# Patient Record
Sex: MALE | Race: WHITE | NOT HISPANIC OR LATINO | Employment: OTHER | URBAN - METROPOLITAN AREA
[De-identification: names, ages, dates, MRNs, and addresses within clinical notes are randomized per-mention and may not be internally consistent; named-entity substitution may affect disease eponyms.]

---

## 2017-06-19 DIAGNOSIS — E11.9 TYPE 2 DIABETES MELLITUS WITHOUT COMPLICATION (HCC): ICD-10-CM

## 2017-08-23 DIAGNOSIS — I10 ESSENTIAL HYPERTENSION: ICD-10-CM

## 2017-08-23 RX ORDER — ENALAPRIL MALEATE 5 MG/1
TABLET ORAL
Qty: 30 TABLET | Refills: 3 | Status: SHIPPED | OUTPATIENT
Start: 2017-08-23 | End: 2018-01-13 | Stop reason: SDUPTHER

## 2017-09-14 ENCOUNTER — APPOINTMENT (OUTPATIENT)
Dept: GENERAL RADIOLOGY | Facility: HOSPITAL | Age: 67
End: 2017-09-14

## 2017-09-14 ENCOUNTER — APPOINTMENT (OUTPATIENT)
Dept: MRI IMAGING | Facility: HOSPITAL | Age: 67
End: 2017-09-14

## 2017-09-14 ENCOUNTER — APPOINTMENT (OUTPATIENT)
Dept: CT IMAGING | Facility: HOSPITAL | Age: 67
End: 2017-09-14

## 2017-09-14 ENCOUNTER — TELEPHONE (OUTPATIENT)
Dept: INTERNAL MEDICINE | Age: 67
End: 2017-09-14

## 2017-09-14 ENCOUNTER — HOSPITAL ENCOUNTER (OUTPATIENT)
Facility: HOSPITAL | Age: 67
Setting detail: OBSERVATION
Discharge: HOME | End: 2017-09-16
Attending: EMERGENCY MEDICINE | Admitting: INTERNAL MEDICINE

## 2017-09-14 DIAGNOSIS — E11.9 TYPE 2 DIABETES MELLITUS WITHOUT COMPLICATION (HCC): ICD-10-CM

## 2017-09-14 DIAGNOSIS — G45.9 TRANSIENT CEREBRAL ISCHEMIA, UNSPECIFIED TYPE: Primary | ICD-10-CM

## 2017-09-14 DIAGNOSIS — R47.01 APHASIA: ICD-10-CM

## 2017-09-14 LAB
ALBUMIN SERPL-MCNC: 4.6 G/DL (ref 3.5–5.2)
ALBUMIN/GLOB SERPL: 1.8 G/DL
ALP SERPL-CCNC: 94 U/L (ref 39–117)
ALT SERPL W P-5'-P-CCNC: 14 U/L (ref 1–41)
ANION GAP SERPL CALCULATED.3IONS-SCNC: 13.8 MMOL/L
AST SERPL-CCNC: 13 U/L (ref 1–40)
BASOPHILS # BLD AUTO: 0.03 10*3/MM3 (ref 0–0.2)
BASOPHILS NFR BLD AUTO: 0.3 % (ref 0–1.5)
BILIRUB SERPL-MCNC: 0.3 MG/DL (ref 0.1–1.2)
BILIRUB UR QL STRIP: NEGATIVE
BUN BLD-MCNC: 16 MG/DL (ref 8–23)
BUN/CREAT SERPL: 20.3 (ref 7–25)
CALCIUM SPEC-SCNC: 9.8 MG/DL (ref 8.6–10.5)
CHLORIDE SERPL-SCNC: 100 MMOL/L (ref 98–107)
CLARITY UR: CLEAR
CO2 SERPL-SCNC: 27.2 MMOL/L (ref 22–29)
COLOR UR: YELLOW
CREAT BLD-MCNC: 0.79 MG/DL (ref 0.76–1.27)
DEPRECATED RDW RBC AUTO: 41.5 FL (ref 37–54)
EOSINOPHIL # BLD AUTO: 0.05 10*3/MM3 (ref 0–0.7)
EOSINOPHIL NFR BLD AUTO: 0.6 % (ref 0.3–6.2)
ERYTHROCYTE [DISTWIDTH] IN BLOOD BY AUTOMATED COUNT: 13.1 % (ref 11.5–14.5)
GFR SERPL CREATININE-BSD FRML MDRD: 98 ML/MIN/1.73
GLOBULIN UR ELPH-MCNC: 2.5 GM/DL
GLUCOSE BLD-MCNC: 162 MG/DL (ref 65–99)
GLUCOSE BLDC GLUCOMTR-MCNC: 167 MG/DL (ref 70–130)
GLUCOSE BLDC GLUCOMTR-MCNC: 175 MG/DL (ref 70–130)
GLUCOSE UR STRIP-MCNC: NEGATIVE MG/DL
HBA1C MFR BLD: 6.9 % (ref 4.8–5.6)
HCT VFR BLD AUTO: 41.4 % (ref 40.4–52.2)
HGB BLD-MCNC: 14 G/DL (ref 13.7–17.6)
HGB UR QL STRIP.AUTO: NEGATIVE
HOLD SPECIMEN: NORMAL
HOLD SPECIMEN: NORMAL
IMM GRANULOCYTES # BLD: 0.02 10*3/MM3 (ref 0–0.03)
IMM GRANULOCYTES NFR BLD: 0.2 % (ref 0–0.5)
KETONES UR QL STRIP: NEGATIVE
LEUKOCYTE ESTERASE UR QL STRIP.AUTO: NEGATIVE
LYMPHOCYTES # BLD AUTO: 2.28 10*3/MM3 (ref 0.9–4.8)
LYMPHOCYTES NFR BLD AUTO: 26.1 % (ref 19.6–45.3)
MAGNESIUM SERPL-MCNC: 1.9 MG/DL (ref 1.6–2.4)
MCH RBC QN AUTO: 29.2 PG (ref 27–32.7)
MCHC RBC AUTO-ENTMCNC: 33.8 G/DL (ref 32.6–36.4)
MCV RBC AUTO: 86.4 FL (ref 79.8–96.2)
MONOCYTES # BLD AUTO: 0.65 10*3/MM3 (ref 0.2–1.2)
MONOCYTES NFR BLD AUTO: 7.5 % (ref 5–12)
NEUTROPHILS # BLD AUTO: 5.69 10*3/MM3 (ref 1.9–8.1)
NEUTROPHILS NFR BLD AUTO: 65.3 % (ref 42.7–76)
NITRITE UR QL STRIP: NEGATIVE
PH UR STRIP.AUTO: 5.5 [PH] (ref 5–8)
PLATELET # BLD AUTO: 267 10*3/MM3 (ref 140–500)
PMV BLD AUTO: 10.3 FL (ref 6–12)
POTASSIUM BLD-SCNC: 4 MMOL/L (ref 3.5–5.2)
PROT SERPL-MCNC: 7.1 G/DL (ref 6–8.5)
PROT UR QL STRIP: NEGATIVE
RBC # BLD AUTO: 4.79 10*6/MM3 (ref 4.6–6)
SODIUM BLD-SCNC: 141 MMOL/L (ref 136–145)
SP GR UR STRIP: 1.01 (ref 1–1.03)
TROPONIN T SERPL-MCNC: <0.01 NG/ML (ref 0–0.03)
UROBILINOGEN UR QL STRIP: NORMAL
WBC NRBC COR # BLD: 8.72 10*3/MM3 (ref 4.5–10.7)
WHOLE BLOOD HOLD SPECIMEN: NORMAL
WHOLE BLOOD HOLD SPECIMEN: NORMAL

## 2017-09-14 PROCEDURE — 71010 HC CHEST PA OR AP: CPT

## 2017-09-14 PROCEDURE — G0378 HOSPITAL OBSERVATION PER HR: HCPCS

## 2017-09-14 PROCEDURE — 81003 URINALYSIS AUTO W/O SCOPE: CPT | Performed by: EMERGENCY MEDICINE

## 2017-09-14 PROCEDURE — 96361 HYDRATE IV INFUSION ADD-ON: CPT

## 2017-09-14 PROCEDURE — 85025 COMPLETE CBC W/AUTO DIFF WBC: CPT | Performed by: EMERGENCY MEDICINE

## 2017-09-14 PROCEDURE — 70450 CT HEAD/BRAIN W/O DYE: CPT

## 2017-09-14 PROCEDURE — 63710000001 INSULIN ASPART PER 5 UNITS: Performed by: INTERNAL MEDICINE

## 2017-09-14 PROCEDURE — 99285 EMERGENCY DEPT VISIT HI MDM: CPT

## 2017-09-14 PROCEDURE — 93010 ELECTROCARDIOGRAM REPORT: CPT | Performed by: INTERNAL MEDICINE

## 2017-09-14 PROCEDURE — 93005 ELECTROCARDIOGRAM TRACING: CPT | Performed by: EMERGENCY MEDICINE

## 2017-09-14 PROCEDURE — 83735 ASSAY OF MAGNESIUM: CPT | Performed by: EMERGENCY MEDICINE

## 2017-09-14 PROCEDURE — 82962 GLUCOSE BLOOD TEST: CPT

## 2017-09-14 PROCEDURE — 84484 ASSAY OF TROPONIN QUANT: CPT | Performed by: EMERGENCY MEDICINE

## 2017-09-14 PROCEDURE — 25010000002 ONDANSETRON PER 1 MG: Performed by: INTERNAL MEDICINE

## 2017-09-14 PROCEDURE — 83036 HEMOGLOBIN GLYCOSYLATED A1C: CPT | Performed by: INTERNAL MEDICINE

## 2017-09-14 PROCEDURE — 80053 COMPREHEN METABOLIC PANEL: CPT | Performed by: EMERGENCY MEDICINE

## 2017-09-14 PROCEDURE — 96374 THER/PROPH/DIAG INJ IV PUSH: CPT

## 2017-09-14 RX ORDER — DEXTROSE MONOHYDRATE 25 G/50ML
25 INJECTION, SOLUTION INTRAVENOUS
Status: DISCONTINUED | OUTPATIENT
Start: 2017-09-14 | End: 2017-09-16 | Stop reason: HOSPADM

## 2017-09-14 RX ORDER — ATORVASTATIN CALCIUM 80 MG/1
80 TABLET, FILM COATED ORAL NIGHTLY
Status: DISCONTINUED | OUTPATIENT
Start: 2017-09-14 | End: 2017-09-16 | Stop reason: HOSPADM

## 2017-09-14 RX ORDER — ASPIRIN 325 MG
325 TABLET ORAL ONCE
Status: COMPLETED | OUTPATIENT
Start: 2017-09-14 | End: 2017-09-14

## 2017-09-14 RX ORDER — NICOTINE POLACRILEX 4 MG
15 LOZENGE BUCCAL
Status: DISCONTINUED | OUTPATIENT
Start: 2017-09-14 | End: 2017-09-16 | Stop reason: HOSPADM

## 2017-09-14 RX ORDER — SODIUM CHLORIDE 9 MG/ML
75 INJECTION, SOLUTION INTRAVENOUS CONTINUOUS
Status: DISCONTINUED | OUTPATIENT
Start: 2017-09-14 | End: 2017-09-16 | Stop reason: HOSPADM

## 2017-09-14 RX ORDER — ASPIRIN 325 MG
TABLET ORAL
Status: COMPLETED
Start: 2017-09-14 | End: 2017-09-14

## 2017-09-14 RX ORDER — ACETAMINOPHEN 650 MG/1
650 SUPPOSITORY RECTAL EVERY 4 HOURS PRN
Status: DISCONTINUED | OUTPATIENT
Start: 2017-09-14 | End: 2017-09-16 | Stop reason: HOSPADM

## 2017-09-14 RX ORDER — ASPIRIN 300 MG/1
300 SUPPOSITORY RECTAL DAILY
Status: DISCONTINUED | OUTPATIENT
Start: 2017-09-14 | End: 2017-09-15

## 2017-09-14 RX ORDER — SODIUM CHLORIDE 0.9 % (FLUSH) 0.9 %
10 SYRINGE (ML) INJECTION AS NEEDED
Status: DISCONTINUED | OUTPATIENT
Start: 2017-09-14 | End: 2017-09-16 | Stop reason: HOSPADM

## 2017-09-14 RX ORDER — SODIUM CHLORIDE 0.9 % (FLUSH) 0.9 %
1-10 SYRINGE (ML) INJECTION AS NEEDED
Status: DISCONTINUED | OUTPATIENT
Start: 2017-09-14 | End: 2017-09-16 | Stop reason: HOSPADM

## 2017-09-14 RX ORDER — ENALAPRIL MALEATE 5 MG/1
5 TABLET ORAL
Status: DISCONTINUED | OUTPATIENT
Start: 2017-09-14 | End: 2017-09-16 | Stop reason: HOSPADM

## 2017-09-14 RX ORDER — LORAZEPAM 0.5 MG/1
0.5 TABLET ORAL ONCE AS NEEDED
Status: COMPLETED | OUTPATIENT
Start: 2017-09-14 | End: 2017-09-14

## 2017-09-14 RX ORDER — ASPIRIN 325 MG
325 TABLET ORAL DAILY
Status: DISCONTINUED | OUTPATIENT
Start: 2017-09-14 | End: 2017-09-15

## 2017-09-14 RX ORDER — ACETAMINOPHEN 325 MG/1
650 TABLET ORAL EVERY 4 HOURS PRN
Status: DISCONTINUED | OUTPATIENT
Start: 2017-09-14 | End: 2017-09-16 | Stop reason: HOSPADM

## 2017-09-14 RX ORDER — ONDANSETRON 2 MG/ML
4 INJECTION INTRAMUSCULAR; INTRAVENOUS EVERY 6 HOURS PRN
Status: DISCONTINUED | OUTPATIENT
Start: 2017-09-14 | End: 2017-09-16 | Stop reason: HOSPADM

## 2017-09-14 RX ADMIN — Medication 325 MG: at 17:33

## 2017-09-14 RX ADMIN — ENALAPRIL MALEATE 5 MG: 5 TABLET ORAL at 23:08

## 2017-09-14 RX ADMIN — ASPIRIN 325 MG: 325 TABLET ORAL at 17:33

## 2017-09-14 RX ADMIN — LORAZEPAM 0.5 MG: 0.5 TABLET ORAL at 20:35

## 2017-09-14 RX ADMIN — SODIUM CHLORIDE 75 ML/HR: 9 INJECTION, SOLUTION INTRAVENOUS at 22:27

## 2017-09-14 RX ADMIN — ONDANSETRON 4 MG: 2 INJECTION INTRAMUSCULAR; INTRAVENOUS at 20:30

## 2017-09-14 RX ADMIN — ATORVASTATIN CALCIUM 80 MG: 80 TABLET, FILM COATED ORAL at 23:08

## 2017-09-14 RX ADMIN — INSULIN ASPART 2 UNITS: 100 INJECTION, SOLUTION INTRAVENOUS; SUBCUTANEOUS at 23:08

## 2017-09-14 NOTE — ED NOTES
Gave patient urine specimen cup and asked that he give us a sample as soon as he can      Vlad Molina  09/14/17 9019

## 2017-09-14 NOTE — ED PROVIDER NOTES
" EMERGENCY DEPARTMENT ENCOUNTER    CHIEF COMPLAINT  Chief Complaint: jumbled speech  History given by: pt  History limited by: nothing  Room Number: 21/21  PMD: Artie Carranza MD      HPI:  Pt is a 66 y.o. male who presents complaining of an episode of \"jumbled speech\" yesterday resolved in 1.5 minutes.  He describes it as he was trying to talk but his speech came out in nonsensical pattern without recognizable words.  Pt states that he has had two episodes.  Pt states the first episode occurred 2 months ago.  This most recent episode lasted for about a minute and a half.  Pt also c/o feeling off balance and SOA for months, unchanged recently.  Pt is diabetic.  Pt denies cp, palpitations, fever, cough, abdominal pain, vomiting, and diarrhea.  Pt states 5 years ago he had evaluation of his carotids that were 50 and 60% stenosed at that time.    Duration:  2 months ( 2 episodes)  Onset: gradual  Timing: episodic  Radiation: none  Quality: jumbled speech  Intensity/Severity: moderate  Progression: unchanged  Associated Symptoms: feeling off balance  Aggravating Factors: none  Alleviating Factors: none  Previous Episodes: none  Treatment before arrival: none    PAST MEDICAL HISTORY  Active Ambulatory Problems     Diagnosis Date Noted   • Hypertension 08/30/2016   • Diabetes mellitus 08/30/2016   • Routine health maintenance 08/30/2016   • Abnormal PSA 09/01/2016   • Prostatitis 09/26/2016   • Sepsis 09/27/2016   • UTI (urinary tract infection) due to urinary indwelling catheter 09/27/2016   • Reflux esophagitis 12/01/2016   • Melena 12/01/2016   • Bilateral carotid artery stenosis 12/01/2016     Resolved Ambulatory Problems     Diagnosis Date Noted   • No Resolved Ambulatory Problems     Past Medical History:   Diagnosis Date   • Cancer    • Diabetes mellitus    • Hypertension    • Sepsis 09/2016   • Shingles    • UTI (urinary tract infection)    • Vertebral artery insufficiency        PAST SURGICAL HISTORY  Past " Surgical History:   Procedure Laterality Date   • COLONOSCOPY      2011   • ENDOSCOPY N/A 12/15/2016    Procedure: ESOPHAGOGASTRODUODENOSCOPY WITH COLD BIOPSIES;  Surgeon: Moshe Lux MD;  [facility] END[facility]COPY;  Service:        FAMILY HISTORY  Family History   Problem Relation Age of Onset   • Diabetes Mother    • Heart disease Mother    • Uterine cancer Mother    • Diabetes Father    • Heart disease Father    • Kidney disease Father    • Skin cancer Father      melanoma   • Cancer Father      testicle       SOCIAL HISTORY  Social History     Social History   • Marital status:      Spouse name: N/A   • Number of children: N/A   • Years of education: N/A     Occupational History   • Not on file.     Social History Main Topics   • Smoking status: Never Smoker   • Smokeless tobacco: Not on file   • Alcohol use No      Comment: 1/2 beer once every 3 months   • Drug use: No   • Sexual activity: Defer     Other Topics Concern   • Not on file     Social History Narrative       ALLERGIES  Shellfish-derived products and Bee venom    REVIEW OF SYSTEMS  Review of Systems   Constitutional: Negative for chills and fever.   HENT: Negative for sore throat and trouble swallowing.    Eyes: Negative for visual disturbance.   Respiratory: Negative for cough and shortness of breath.    Cardiovascular: Negative for chest pain and leg swelling.   Gastrointestinal: Negative for abdominal pain, diarrhea and vomiting.   Endocrine: Negative.    Genitourinary: Negative for decreased urine volume and frequency.   Musculoskeletal: Negative for neck pain.   Skin: Negative for rash.   Allergic/Immunologic: Negative.    Neurological: Positive for speech difficulty (jumbled). Negative for weakness and numbness.   Hematological: Negative.    Psychiatric/Behavioral: Negative.    All other systems reviewed and are negative.      PHYSICAL EXAM  ED Triage Vitals   Temp Heart Rate Resp BP SpO2   09/14/17 1333 09/14/17 1333 09/14/17  1411 09/14/17 1411 09/14/17 1333   96.8 °F (36 °C) 107 18 129/84 99 %      Temp src Heart Rate Source Patient Position BP Location FiO2 (%)   -- 09/14/17 1411 09/14/17 1411 09/14/17 1411 --    Monitor Sitting Left arm        Physical Exam   Constitutional: He is oriented to person, place, and time. He appears distressed (mildly).   HENT:   Head: Normocephalic and atraumatic.   Eyes: EOM are normal.   Neck: Normal range of motion.   Cardiovascular: Normal rate, regular rhythm and normal heart sounds.    No murmur heard.  Pulses:       Posterior tibial pulses are 2+ on the right side, and 2+ on the left side.   Pulmonary/Chest: Effort normal and breath sounds normal. No respiratory distress. He has no wheezes.   Abdominal: Soft. Bowel sounds are normal. There is no tenderness. There is no rebound and no guarding.   Musculoskeletal: Normal range of motion. He exhibits no edema.   Neurological: He is alert and oriented to person, place, and time.   Skin: Skin is warm and dry.   Psychiatric: Affect normal.   Nursing note and vitals reviewed.      LAB RESULTS  Lab Results (last 24 hours)     Procedure Component Value Units Date/Time    CBC & Differential [[id number]] Collected:  09/14/17 1449    Specimen:  Blood Updated:  09/14/17 1313    Narrative:       The following orders were created for panel order CBC & Differential.  Procedure                               Abnormality         Status                     ---------                               -----------         ------                     CBC Auto Differential[[id number]]         Normal              Final result                 Please view results for these tests on the individual orders.    Comprehensive Metabolic Panel [[id number]]  (Abnormal) Collected:  09/14/17 1449    Specimen:  Blood Updated:  09/14/17 9348     Glucose 162 (H) mg/dL      BUN 16 mg/dL      Creatinine 0.79 mg/dL      Sodium 141 mmol/L      Potassium 4.0 mmol/L      Chloride 100 mmol/L      CO2 27.2  mmol/L      Calcium 9.8 mg/dL      Total Protein 7.1 g/dL      Albumin 4.60 g/dL      ALT (SGPT) 14 U/L      AST (SGOT) 13 U/L      Alkaline Phosphatase 94 U/L      Total Bilirubin 0.3 mg/dL      eGFR Non African Amer 98 mL/min/1.73      Globulin 2.5 gm/dL      A/G Ratio 1.8 g/dL      BUN/Creatinine Ratio 20.3     Anion Gap 13.8 mmol/L     Troponin [[id number]]  (Normal) Collected:  09/14/17 1449    Specimen:  Blood Updated:  09/14/17 1538     Troponin T <0.010 ng/mL     Narrative:       Troponin T Reference Ranges:  Less than 0.03 ng/mL:    Negative for AMI  0.03 to 0.09 ng/mL:      Indeterminant for AMI  Greater than 0.09 ng/mL: Positive for AMI    Magnesium [[id number]]  (Normal) Collected:  09/14/17 1449    Specimen:  Blood Updated:  09/14/17 1538     Magnesium 1.9 mg/dL     CBC Auto Differential [[id number]]  (Normal) Collected:  09/14/17 1449    Specimen:  Blood Updated:  09/14/17 1513     WBC 8.72 10*3/mm3      RBC 4.79 10*6/mm3      Hemoglobin 14.0 g/dL      Hematocrit 41.4 %      MCV 86.4 fL      MCH 29.2 pg      MCHC 33.8 g/dL      RDW 13.1 %      RDW-SD 41.5 fl      MPV 10.3 fL      Platelets 267 10*3/mm3      Neutrophil % 65.3 %      Lymphocyte % 26.1 %      Monocyte % 7.5 %      Eosinophil % 0.6 %      Basophil % 0.3 %      Immature Grans % 0.2 %      Neutrophils, Absolute 5.69 10*3/mm3      Lymphocytes, Absolute 2.28 10*3/mm3      Monocytes, Absolute 0.65 10*3/mm3      Eosinophils, Absolute 0.05 10*3/mm3      Basophils, Absolute 0.03 10*3/mm3      Immature Grans, Absolute 0.02 10*3/mm3     Urinalysis With / Culture If Indicated [[id number]]  (Normal) Collected:  09/14/17 1620    Specimen:  Urine from Urine, Clean Catch Updated:  09/14/17 1636     Color, UA Yellow     Appearance, UA Clear     pH, UA 5.5     Specific Gravity, UA 1.015     Glucose, UA Negative     Ketones, UA Negative     Bilirubin, UA Negative     Blood, UA Negative     Protein, UA Negative     Leuk Esterase, UA Negative     Nitrite, UA Negative      Urobilinogen, UA 0.2 E.U./dL    Narrative:       Urine microscopic not indicated.          I ordered the above labs and reviewed the results    RADIOLOGY  XR Chest 1 View   Final Result   No evidence for acute pulmonary process. Follow-up as   clinical indications persist.       This report was finalized on 9/14/2017 5:27 PM by Dr. Rhett Barnard MD.          CT Head Without Contrast   Final Result           No acute intracranial hemorrhage or hydrocephalus. Chronic appearing   changes. If there is further clinical concern, MRI could be considered   for further evaluation.       This report was finalized on 9/14/2017 4:25 PM by Dr. Rhett Barnard MD.               I ordered the above noted radiological studies. Interpreted by radiologist. Reviewed by me in PACS.       PROCEDURES  Procedures    EKG         EKG time: 1504   Rhythm/Rate: sinus rhythm, rate: 90  Normal P waves and normal [place] interval   Normal QRS, Normal axis  Normal ST and T waves  Limited by significant artifact    Interpreted Contemporaneously by me, independently viewed  unchanged compared to prior 9/20/04    Interval: baseline (on initial exam)  1a. Level Of Consciousness: 0-->Alert: keenly responsive  1b. LOC Questions: 0-->Answers both questions correctly  1c. LOC Commands: 0-->Performs both tasks correctly  2. Best Gaze: 0-->Normal  3. Visual: 0-->No visual loss  4. Facial Palsy: 0-->Normal symmetrical movements  5a. Motor Arm, Left: 0-->No drift: limb holds 90 (or 45) degrees for full 10 secs  5b. Motor Arm, Right: 0-->No drift: limb holds 90 (or 45) degrees for full 10 secs  6a. Motor Leg, Left: 0-->No drift: leg holds 30 degree position for full 5 secs  6b. Motor Leg, Right: 0-->No drift: leg holds 30 degree position for full 5 secs  7. Limb Ataxia: 0-->Absent  8. Sensory: 0-->Normal: no sensory loss  9. Best Language: 0-->No aphasia: normal  10. Dysarthria: 0-->Normal  11. Extinction and Inattention (formerly Neglect): 0-->No  abnormality    Total (NIH Stroke Scale): 0    PROGRESS AND CONSULTS  ED Course   1445: On initial exam discussed plan to admit pt. He agrees with and understands plan to admit. All questions were answered at this time.  1448: Ordered IVF. Ordered labs and CXR for further evaluation.  1517: Ordered CT head for further evaluation.  1723: Placed call to [facility].  1743: Pt will be admitted. Waiting for call from [facility].  1751: Discussed pt's case with Dr. Mckeon ([facility]) who agrees to admit pt.    MEDICAL DECISION MAKING  Results were reviewed/discussed with the patient and they were also made aware of online access. Pt also made aware that some labs, such as cultures, will not be resulted during ER visit and follow up with PMD is necessary.     MDM  Number of Diagnoses or Management Options  Aphasia:   Transient cerebral ischemia, unspecified type:      Amount and/or Complexity of Data Reviewed  Clinical lab tests: ordered and reviewed  Tests in the radiology section of CPT®: ordered and reviewed (CT head: showed nothing acute. CXR: showed nothing acute)  Tests in the medicine section of CPT®: ordered and reviewed (See note)  Decide to obtain previous medical records or to obtain history from someone other than the patient: yes  Review and summarize past medical records: yes  Discuss the patient with other providers: yes (Dr. Mckeon([facility]))  Independent visualization of images, tracings, or specimens: yes    Patient Progress  Patient progress: stable         DIAGNOSIS  Final diagnoses:   Transient cerebral ischemia, unspecified type   Aphasia       DISPOSITION  ADMISSION    Discussed treatment plan and reason for admission with pt/family and admitting physician.  Pt/family voiced understanding of the plan for admission for further testing/treatment as needed.         Latest Documented Vital Signs:  As of 5:48 PM  BP- 130/86 HR- 92 Temp- 96.8 °F (36 °C) O2 sat- 100%    --  Documentation assistance provided by alex Stover  for Dr. Nelson.  Information recorded by the scribe was done at my direction and has been verified and validated by me.          Ary Stover  09/14/17 3061       Sasha Nelson MD  09/16/17 8925

## 2017-09-14 NOTE — TELEPHONE ENCOUNTER
BENI:  Pt called stating, two months ago and then again two days ago, while talking to his wife, he became off balanced, queasy, words were jumbled, and lasted from 1-2 minutes. Pt stated when he tried to deliberately focus on the words as he was speaking, it sounded foreign. Pt did not go anywhere to be seen for this problem. Pt said he was going to wait and try to get into see Dr Carranza when he got back.   I spoke with the medical assistant who informed me that the pt should go directly to the ER to be checked out.  I spoke with the pt and told him what the assistant said, and the pt understood and said he would go to [facility] ER this morning 9-14-17.  Thanks SP

## 2017-09-14 NOTE — H&P
Internal medicine history and physical  INTERNAL MEDICINE   [facility]       Patient Identification:  Name: Harry Sierra  Age: 66 y.o.  Sex: male  :  1950  MRN: [id number]                   Primary Care Physician: Artie Carranza MD                                   Chief Complaint:  Sudden onset of inability to speak very well lasting about a minute to minute and a half around midday 2 days ago and another episode 2 months ago    History of Present Illness:   Patient is a 66-year-old male with past medical history remarkable for hospitalization in [place] 5 years ago for sudden symptoms of ataxia and vertigo.  According to him he was worked up with MRI and was told that he has 50 and 60% blockages in his circulation of the neck.  He was diagnosed with TIA at that time.  According to him he has vertigo since then and has been battling with these but otherwise doing okay until about 2 months ago when while driving the car and talking to his wife over the phone he noticed that he couldn't say the words she wanted to say and was not coming out right.  He has to pull over his car until he settled down.  The symptoms lasted for about couple of minutes and went away.  He talked about these symptoms to his daughter who works in [place] and has an in vitro fertilization program, and she suggested at that time that he needs to check it out as he may have a TIA.  According to the patient he told her that he would not do that and was busy in doing what he does on a daily basis until about a month ago when he started noticing that she was getting short of breath easily and for getting fatigued and weak and tired.  He also having more episodes of vertigo.  In that background 2 days ago he developed these symptoms again as mentioned.  In this time he decided to come to the emergency room for further evaluation.      Past Medical History:  Past Medical History:   Diagnosis Date   • Cancer     Basal  Cell   • Diabetes mellitus    • Hypertension    • Sepsis 09/2016   • Shingles    • UTI (urinary tract infection)    • Vertebral artery insufficiency      Past Surgical History:  Past Surgical History:   Procedure Laterality Date   • COLONOSCOPY      2011   • ENDOSCOPY N/A 12/15/2016    Procedure: ESOPHAGOGASTRODUODENOSCOPY WITH COLD BIOPSIES;  Surgeon: Moshe Lux MD;  Location: [facility] ENDOSCOPY;  Service:       Home Meds:  Prescriptions Prior to Admission   Medication Sig Dispense Refill Last Dose   • acetaminophen (TYLENOL) 500 MG tablet Take 1 tablet by mouth 4 (Four) Times a Day As Needed for mild pain (1-3) or fever.  0 12/12/2016   • enalapril (VASOTEC) 5 MG tablet TAKE ONE TABLET BY MOUTH DAILY 30 tablet 3    • metFORMIN (GLUCOPHAGE) 1000 MG tablet TAKE ONE TABLET BY MOUTH TWICE A DAY WITH MEALS 180 tablet 2    • aspirin 81 MG chewable tablet Chew 325 mg Daily.   12/14/2016 at Unknown time     Current Meds:     Current Facility-Administered Medications:   •  sodium chloride 0.9 % flush 10 mL, 10 mL, Intravenous, PRN, Sasha Nelson MD  Allergies:  Allergies   Allergen Reactions   • Shellfish-Derived Products Anaphylaxis   • Bee Venom      Social History:   Social History   Substance Use Topics   • Smoking status: Never Smoker   • Smokeless tobacco: Not on file   • Alcohol use No      Comment: 1/2 beer once every 3 months      Family History:  Family History   Problem Relation Age of Onset   • Diabetes Mother    • Heart disease Mother    • Uterine cancer Mother    • Diabetes Father    • Heart disease Father    • Kidney disease Father    • Skin cancer Father      melanoma   • Cancer Father      testicle          Review of Systems  See history of present illness and past medical history. .  Constitutional: Complaining of progressive fatigue and weakness.   HENT: Negative for sore throat and trouble swallowing.    Eyes: Negative for visual disturbance.   Respiratory: Negative for cough and shortness of  "breath.    Cardiovascular: Negative for chest pain and leg swelling.   Gastrointestinal: Negative for abdominal pain, diarrhea and vomiting.   Endocrine: Negative.    Genitourinary: Negative for decreased urine volume and frequency.   Musculoskeletal: Negative for neck pain.   Skin: Negative for rash.   Allergic/Immunologic: Negative.    Neurological: Positive for speech difficulty (jumbled) transient 2 months ago in 2 days ago not present at this time.. Negative for weakness and numbness.   Hematological: Negative.    Psychiatric/Behavioral: Negative.      Vitals:   /83 (BP Location: Left arm, Patient Position: Lying)  Pulse 86  Temp 97.5 °F (36.4 °C) (Oral)   Resp 18  Ht 69\" (175.3 cm)  Wt 161 lb (73 kg)  SpO2 97%  BMI 23.78 kg/m2  I/O: No intake or output data in the 24 hours ending 09/14/17 1909  Exam:  General Appearance:    Alert, cooperative, no distress, appears stated age   Head:    Normocephalic, without obvious abnormality, atraumatic   Eyes:    PERRL, conjunctiva/corneas clear, EOM's intact, both eyes   Ears:    Normal external ear canals, both ears   Nose:   Nares normal, septum midline, mucosa normal, no drainage    or sinus tenderness   Throat:   Lips, tongue, gums normal; oral mucosa pink and moist   Neck:   Supple, symmetrical, trachea midline, no adenopathy;     thyroid:  no enlargement/tenderness/nodules; no carotid    bruit or JVD   Back:     Symmetric, no curvature, ROM normal, no CVA tenderness   Lungs:     Clear to auscultation bilaterally, respirations unlabored   Chest Wall:    No tenderness or deformity    Heart:    Regular rate and rhythm, S1 and S2 normal, no murmur, rub   or gallop   Abdomen:     Soft, non-tender, bowel sounds active all four quadrants,     no masses, no hepatomegaly, no splenomegaly   Extremities:   Extremities normal, atraumatic, no cyanosis or edema   Pulses:   Pulses palpable in all extremities; symmetric all extremities   Skin:   Skin color normal, Skin " is warm and dry,  no rashes or palpable lesions   Neurologic:   CNII-XII intact, motor strength grossly intact, sensation grossly intact to light touch, no focal deficits noted       Data Review:      I reviewed the patient's new clinical results.    Results from last 7 days  Lab Units 09/14/17  1449   WBC 10*3/mm3 8.72   HEMOGLOBIN g/dL 14.0   PLATELETS 10*3/mm3 267       Results from last 7 days  Lab Units 09/14/17  1449   SODIUM mmol/L 141   POTASSIUM mmol/L 4.0   CHLORIDE mmol/L 100   CO2 mmol/L 27.2   BUN mg/dL 16   CREATININE mg/dL 0.79   CALCIUM mg/dL 9.8   GLUCOSE mg/dL 162*       Assessment:  Active Hospital Problems (** Indicates Principal Problem)    Diagnosis Date Noted   • **Transient cerebral ischemia [G45.9] 09/14/2017   • Bilateral carotid artery stenosis [I65.23] 12/01/2016     Bilateral 16-49% stenosis, December 2016     • Diabetes mellitus [E11.9] 08/30/2016   • Hypertension [I10] 08/30/2016      Resolved Hospital Problems    Diagnosis Date Noted Date Resolved   No resolved problems to display.       Plan:  Admit the patient, check MRI and MRA of head and neck vessels, check 2-D echo with Doppler, neurology consultation, check fasting lipid profile and continue with aspirin and Lipitor.  Further management as his condition evolves.    Harley Mckeon MD   9/14/2017  7:09 PM  Much of this encounter note is an electronic transcription/translation of spoken language to printed text. The electronic translation of spoken language may permit erroneous, or at times, nonsensical words or phrases to be inadvertently transcribed; Although I have reviewed the note for such errors, some may still exist

## 2017-09-15 ENCOUNTER — APPOINTMENT (OUTPATIENT)
Dept: CARDIOLOGY | Facility: HOSPITAL | Age: 67
End: 2017-09-15
Attending: INTERNAL MEDICINE

## 2017-09-15 ENCOUNTER — APPOINTMENT (OUTPATIENT)
Dept: CT IMAGING | Facility: HOSPITAL | Age: 67
End: 2017-09-15

## 2017-09-15 LAB
ALBUMIN SERPL-MCNC: 4 G/DL (ref 3.5–5.2)
ALBUMIN/GLOB SERPL: 1.9 G/DL
ALP SERPL-CCNC: 77 U/L (ref 39–117)
ALT SERPL W P-5'-P-CCNC: 15 U/L (ref 1–41)
ANION GAP SERPL CALCULATED.3IONS-SCNC: 11.7 MMOL/L
AST SERPL-CCNC: 13 U/L (ref 1–40)
BH CV ECHO MEAS - ACS: 1.6 CM
BH CV ECHO MEAS - AO MEAN PG (FULL): 2 MMHG
BH CV ECHO MEAS - AO MEAN PG: 6 MMHG
BH CV ECHO MEAS - AO ROOT AREA (BSA CORRECTED): 1.4
BH CV ECHO MEAS - AO ROOT AREA: 5.7 CM^2
BH CV ECHO MEAS - AO ROOT DIAM: 2.7 CM
BH CV ECHO MEAS - AO V2 MAX: 177 CM/SEC
BH CV ECHO MEAS - AO V2 MEAN: 107 CM/SEC
BH CV ECHO MEAS - AO V2 VTI: 32.3 CM
BH CV ECHO MEAS - ASC AORTA: 2.9 CM
BH CV ECHO MEAS - AVA(I,A): 2.6 CM^2
BH CV ECHO MEAS - AVA(I,D): 2.6 CM^2
BH CV ECHO MEAS - BSA(HAYCOCK): 1.9 M^2
BH CV ECHO MEAS - BSA: 1.9 M^2
BH CV ECHO MEAS - BZI_BMI: 23.8 KILOGRAMS/M^2
BH CV ECHO MEAS - BZI_METRIC_HEIGHT: 175.3 CM
BH CV ECHO MEAS - BZI_METRIC_WEIGHT: 73 KG
BH CV ECHO MEAS - CONTRAST EF (2CH): 65.6 ML/M^2
BH CV ECHO MEAS - CONTRAST EF 4CH: 65.6 ML/M^2
BH CV ECHO MEAS - EDV(CUBED): 91.1 ML
BH CV ECHO MEAS - EDV(MOD-SP2): 61 ML
BH CV ECHO MEAS - EDV(MOD-SP4): 61 ML
BH CV ECHO MEAS - EDV(TEICH): 92.4 ML
BH CV ECHO MEAS - EF(CUBED): 80.7 %
BH CV ECHO MEAS - EF(MOD-SP2): 65.6 %
BH CV ECHO MEAS - EF(MOD-SP4): 65.6 %
BH CV ECHO MEAS - EF(TEICH): 73.4 %
BH CV ECHO MEAS - ESV(CUBED): 17.6 ML
BH CV ECHO MEAS - ESV(MOD-SP2): 21 ML
BH CV ECHO MEAS - ESV(MOD-SP4): 21 ML
BH CV ECHO MEAS - ESV(TEICH): 24.6 ML
BH CV ECHO MEAS - FS: 42.2 %
BH CV ECHO MEAS - IVS/LVPW: 0.9
BH CV ECHO MEAS - IVSD: 0.9 CM
BH CV ECHO MEAS - LAT PEAK E' VEL: 10 CM/SEC
BH CV ECHO MEAS - LV DIASTOLIC VOL/BSA (35-75): 32.4 ML/M^2
BH CV ECHO MEAS - LV MASS(C)D: 142.9 GRAMS
BH CV ECHO MEAS - LV MASS(C)DI: 75.9 GRAMS/M^2
BH CV ECHO MEAS - LV MEAN PG: 4 MMHG
BH CV ECHO MEAS - LV SYSTOLIC VOL/BSA (12-30): 11.1 ML/M^2
BH CV ECHO MEAS - LV V1 MAX: 133 CM/SEC
BH CV ECHO MEAS - LV V1 MEAN: 85.1 CM/SEC
BH CV ECHO MEAS - LV V1 VTI: 26.7 CM
BH CV ECHO MEAS - LVIDD: 4.5 CM
BH CV ECHO MEAS - LVIDS: 2.6 CM
BH CV ECHO MEAS - LVLD AP2: 7.3 CM
BH CV ECHO MEAS - LVLD AP4: 7.8 CM
BH CV ECHO MEAS - LVLS AP2: 6.3 CM
BH CV ECHO MEAS - LVLS AP4: 6.5 CM
BH CV ECHO MEAS - LVOT AREA (M): 3.1 CM^2
BH CV ECHO MEAS - LVOT AREA: 3.1 CM^2
BH CV ECHO MEAS - LVOT DIAM: 2 CM
BH CV ECHO MEAS - LVPWD: 1 CM
BH CV ECHO MEAS - MED PEAK E' VEL: 9 CM/SEC
BH CV ECHO MEAS - MR MAX PG: 26.4 MMHG
BH CV ECHO MEAS - MR MAX VEL: 250.5 CM/SEC
BH CV ECHO MEAS - MV A DUR: 0.13 SEC
BH CV ECHO MEAS - MV A MAX VEL: 114 CM/SEC
BH CV ECHO MEAS - MV DEC SLOPE: 506 CM/SEC^2
BH CV ECHO MEAS - MV DEC TIME: 0.21 SEC
BH CV ECHO MEAS - MV E MAX VEL: 94.8 CM/SEC
BH CV ECHO MEAS - MV E/A: 0.83
BH CV ECHO MEAS - MV MEAN PG: 3 MMHG
BH CV ECHO MEAS - MV P1/2T MAX VEL: 111 CM/SEC
BH CV ECHO MEAS - MV P1/2T: 64.3 MSEC
BH CV ECHO MEAS - MV V2 MEAN: 74 CM/SEC
BH CV ECHO MEAS - MV V2 VTI: 29 CM
BH CV ECHO MEAS - MVA P1/2T LCG: 2 CM^2
BH CV ECHO MEAS - MVA(P1/2T): 3.4 CM^2
BH CV ECHO MEAS - MVA(VTI): 2.9 CM^2
BH CV ECHO MEAS - PA ACC SLOPE: 6.7 CM/SEC^2
BH CV ECHO MEAS - PA ACC TIME: 0.12 SEC
BH CV ECHO MEAS - PA MAX PG (FULL): 1.2 MMHG
BH CV ECHO MEAS - PA MAX PG: 3.3 MMHG
BH CV ECHO MEAS - PA PR(ACCEL): 26.8 MMHG
BH CV ECHO MEAS - PA V2 MAX: 90.8 CM/SEC
BH CV ECHO MEAS - PULM A REVS DUR: 0.11 SEC
BH CV ECHO MEAS - PULM A REVS VEL: 33.6 CM/SEC
BH CV ECHO MEAS - PULM DIAS VEL: 44.9 CM/SEC
BH CV ECHO MEAS - PULM S/D: 1.5
BH CV ECHO MEAS - PULM SYS VEL: 65.6 CM/SEC
BH CV ECHO MEAS - PVA(V,A): 3.9 CM^2
BH CV ECHO MEAS - PVA(V,D): 3.9 CM^2
BH CV ECHO MEAS - QP/QS: 0.94
BH CV ECHO MEAS - RAP SYSTOLE: 8 MMHG
BH CV ECHO MEAS - RV MAX PG: 2.1 MMHG
BH CV ECHO MEAS - RV MEAN PG: 1 MMHG
BH CV ECHO MEAS - RV V1 MAX: 72.9 CM/SEC
BH CV ECHO MEAS - RV V1 MEAN: 53.5 CM/SEC
BH CV ECHO MEAS - RV V1 VTI: 16 CM
BH CV ECHO MEAS - RVOT AREA: 4.9 CM^2
BH CV ECHO MEAS - RVOT DIAM: 2.5 CM
BH CV ECHO MEAS - RVSP: 26.8 MMHG
BH CV ECHO MEAS - SI(AO): 98.2 ML/M^2
BH CV ECHO MEAS - SI(CUBED): 39 ML/M^2
BH CV ECHO MEAS - SI(LVOT): 44.5 ML/M^2
BH CV ECHO MEAS - SI(MOD-SP2): 21.2 ML/M^2
BH CV ECHO MEAS - SI(MOD-SP4): 21.2 ML/M^2
BH CV ECHO MEAS - SI(TEICH): 36 ML/M^2
BH CV ECHO MEAS - SUP REN AO DIAM: 1.7 CM
BH CV ECHO MEAS - SV(AO): 184.9 ML
BH CV ECHO MEAS - SV(CUBED): 73.5 ML
BH CV ECHO MEAS - SV(LVOT): 83.9 ML
BH CV ECHO MEAS - SV(MOD-SP2): 40 ML
BH CV ECHO MEAS - SV(MOD-SP4): 40 ML
BH CV ECHO MEAS - SV(RVOT): 78.5 ML
BH CV ECHO MEAS - SV(TEICH): 67.8 ML
BH CV ECHO MEAS - TAPSE (>1.6): 2.7 CM2
BH CV ECHO MEAS - TR MAX VEL: 217 CM/SEC
BH CV VAS BP RIGHT ARM: NORMAL MMHG
BH CV XLRA - RV BASE: 3 CM
BH CV XLRA - TDI S': 12 CM/SEC
BILIRUB SERPL-MCNC: 0.3 MG/DL (ref 0.1–1.2)
BUN BLD-MCNC: 16 MG/DL (ref 8–23)
BUN/CREAT SERPL: 22.5 (ref 7–25)
CALCIUM SPEC-SCNC: 9.2 MG/DL (ref 8.6–10.5)
CHLORIDE SERPL-SCNC: 107 MMOL/L (ref 98–107)
CHOLEST SERPL-MCNC: 120 MG/DL (ref 0–200)
CO2 SERPL-SCNC: 24.3 MMOL/L (ref 22–29)
CREAT BLD-MCNC: 0.71 MG/DL (ref 0.76–1.27)
DEPRECATED RDW RBC AUTO: 42 FL (ref 37–54)
E/E' RATIO: 11
ERYTHROCYTE [DISTWIDTH] IN BLOOD BY AUTOMATED COUNT: 13.1 % (ref 11.5–14.5)
GFR SERPL CREATININE-BSD FRML MDRD: 110 ML/MIN/1.73
GLOBULIN UR ELPH-MCNC: 2.1 GM/DL
GLUCOSE BLD-MCNC: 142 MG/DL (ref 65–99)
GLUCOSE BLDC GLUCOMTR-MCNC: 114 MG/DL (ref 70–130)
GLUCOSE BLDC GLUCOMTR-MCNC: 146 MG/DL (ref 70–130)
GLUCOSE BLDC GLUCOMTR-MCNC: 147 MG/DL (ref 70–130)
GLUCOSE BLDC GLUCOMTR-MCNC: 203 MG/DL (ref 70–130)
GLUCOSE BLDC GLUCOMTR-MCNC: 224 MG/DL (ref 70–130)
HCT VFR BLD AUTO: 38.8 % (ref 40.4–52.2)
HDLC SERPL-MCNC: 30 MG/DL (ref 40–60)
HGB BLD-MCNC: 13 G/DL (ref 13.7–17.6)
LDLC SERPL CALC-MCNC: 69 MG/DL (ref 0–100)
LDLC/HDLC SERPL: 2.29 {RATIO}
LEFT ATRIUM VOLUME INDEX: 14 ML/M2
LV EF 2D ECHO EST: 66 %
MCH RBC QN AUTO: 29.5 PG (ref 27–32.7)
MCHC RBC AUTO-ENTMCNC: 33.5 G/DL (ref 32.6–36.4)
MCV RBC AUTO: 88 FL (ref 79.8–96.2)
PLATELET # BLD AUTO: 209 10*3/MM3 (ref 140–500)
PMV BLD AUTO: 10.3 FL (ref 6–12)
POTASSIUM BLD-SCNC: 4.2 MMOL/L (ref 3.5–5.2)
PROT SERPL-MCNC: 6.1 G/DL (ref 6–8.5)
RBC # BLD AUTO: 4.41 10*6/MM3 (ref 4.6–6)
SODIUM BLD-SCNC: 143 MMOL/L (ref 136–145)
TRIGL SERPL-MCNC: 106 MG/DL (ref 0–150)
VLDLC SERPL-MCNC: 21.2 MG/DL (ref 5–40)
WBC NRBC COR # BLD: 6.64 10*3/MM3 (ref 4.5–10.7)

## 2017-09-15 PROCEDURE — 82962 GLUCOSE BLOOD TEST: CPT

## 2017-09-15 PROCEDURE — G8978 MOBILITY CURRENT STATUS: HCPCS

## 2017-09-15 PROCEDURE — 97161 PT EVAL LOW COMPLEX 20 MIN: CPT

## 2017-09-15 PROCEDURE — G0378 HOSPITAL OBSERVATION PER HR: HCPCS

## 2017-09-15 PROCEDURE — G8979 MOBILITY GOAL STATUS: HCPCS

## 2017-09-15 PROCEDURE — G8980 MOBILITY D/C STATUS: HCPCS

## 2017-09-15 PROCEDURE — 85027 COMPLETE CBC AUTOMATED: CPT | Performed by: INTERNAL MEDICINE

## 2017-09-15 PROCEDURE — 93306 TTE W/DOPPLER COMPLETE: CPT

## 2017-09-15 PROCEDURE — 63710000001 INSULIN ASPART PER 5 UNITS: Performed by: INTERNAL MEDICINE

## 2017-09-15 PROCEDURE — 70496 CT ANGIOGRAPHY HEAD: CPT

## 2017-09-15 PROCEDURE — 93306 TTE W/DOPPLER COMPLETE: CPT | Performed by: INTERNAL MEDICINE

## 2017-09-15 PROCEDURE — 97110 THERAPEUTIC EXERCISES: CPT

## 2017-09-15 PROCEDURE — 80061 LIPID PANEL: CPT | Performed by: INTERNAL MEDICINE

## 2017-09-15 PROCEDURE — 80053 COMPREHEN METABOLIC PANEL: CPT | Performed by: INTERNAL MEDICINE

## 2017-09-15 PROCEDURE — 96361 HYDRATE IV INFUSION ADD-ON: CPT

## 2017-09-15 PROCEDURE — 99205 OFFICE O/P NEW HI 60 MIN: CPT | Performed by: RADIOLOGY

## 2017-09-15 PROCEDURE — 70498 CT ANGIOGRAPHY NECK: CPT

## 2017-09-15 PROCEDURE — 0 IOPAMIDOL PER 1 ML: Performed by: HOSPITALIST

## 2017-09-15 RX ORDER — CLOPIDOGREL BISULFATE 75 MG/1
75 TABLET ORAL DAILY
Status: DISCONTINUED | OUTPATIENT
Start: 2017-09-15 | End: 2017-09-16 | Stop reason: HOSPADM

## 2017-09-15 RX ADMIN — SODIUM CHLORIDE 75 ML/HR: 9 INJECTION, SOLUTION INTRAVENOUS at 17:38

## 2017-09-15 RX ADMIN — ASPIRIN 325 MG: 325 TABLET ORAL at 08:37

## 2017-09-15 RX ADMIN — IOPAMIDOL 95 ML: 755 INJECTION, SOLUTION INTRAVENOUS at 21:15

## 2017-09-15 RX ADMIN — SODIUM CHLORIDE 75 ML/HR: 9 INJECTION, SOLUTION INTRAVENOUS at 07:00

## 2017-09-15 RX ADMIN — ATORVASTATIN CALCIUM 80 MG: 80 TABLET, FILM COATED ORAL at 21:19

## 2017-09-15 RX ADMIN — CLOPIDOGREL 75 MG: 75 TABLET, FILM COATED ORAL at 19:27

## 2017-09-15 RX ADMIN — INSULIN ASPART 3 UNITS: 100 INJECTION, SOLUTION INTRAVENOUS; SUBCUTANEOUS at 12:06

## 2017-09-15 NOTE — SIGNIFICANT NOTE
09/15/17 1111   Rehab Treatment   Discipline occupational therapist   Rehab Evaluation   Evaluation Not Performed (pt and wife deny ADL or mobility concerns. Pt walks to bathroom, toilets on own while OT in room. Denies visual, cognitive, balance, UE concerns. Full OT eval not warranted.  Pt hopes to d/c home today. )

## 2017-09-15 NOTE — PLAN OF CARE
Problem: Patient Care Overview (Adult)  Goal: Plan of Care Review    09/15/17 6753   Coping/Psychosocial Response Interventions   Plan Of Care Reviewed With patient   Outcome Evaluation   Outcome Summary/Follow up Plan Pt. currently independent with functional mobility and is at baseline functionally. Pt. does not require skilled inpt. P.T. and can ambulate with family or nursing staff while here in the hospital. Pt. with no further questions/concerns regarding functional mobility or home safety. Will sign off.

## 2017-09-15 NOTE — PLAN OF CARE
Problem: Patient Care Overview (Adult)  Goal: Plan of Care Review  Outcome: Ongoing (interventions implemented as appropriate)    09/15/17 0059   Coping/Psychosocial Response Interventions   Plan Of Care Reviewed With patient;spouse   Patient Care Overview   Progress unable to show any progress toward functional goals   Outcome Evaluation   Outcome Summary/Follow up Plan Patient has had a history of gradual onset of gait unstability and STM loss. SX aware.          Problem: Stroke (Ischemic) (Adult)  Goal: Signs and Symptoms of Listed Potential Problems Will be Absent or Manageable (Stroke)  Outcome: Ongoing (interventions implemented as appropriate)    09/15/17 0059   Stroke (Ischemic)   Problems Assessed (Stroke (Ischemic)/TIA) situational response;acute neurologic deterioration   Problems Present (Stroke (Ischemic)/TIA) situational response;acute neurologic deterioration         Problem: Fall Risk (Adult)  Goal: Identify Related Risk Factors and Signs and Symptoms  Outcome: Ongoing (interventions implemented as appropriate)    09/15/17 0059   Fall Risk   Fall Risk: Related Risk Factors age-related changes;fatigue/slow reaction;gait/mobility problems   Fall Risk: Signs and Symptoms presence of risk factors       Goal: Absence of Falls  Outcome: Ongoing (interventions implemented as appropriate)    09/15/17 0059   Fall Risk (Adult)   Absence of Falls making progress toward outcome

## 2017-09-15 NOTE — PROGRESS NOTES
Continued Stay Note  [facility]     Patient Name: Harry Sierra  MRN: [id number]  Today's Date: 9/15/2017    Admit Date: 9/14/2017          Discharge Plan       09/15/17 1215    Case Management/Social Work Plan    Plan Home with assist of wife    Patient/Family In Agreement With Plan yes    Additional Comments Met with patient and wife.  Plan is home at discharge.  No needs identified.                Discharge Codes     None            Faith Devi RN

## 2017-09-15 NOTE — PLAN OF CARE
Problem: Patient Care Overview (Adult)  Goal: Plan of Care Review  Outcome: Ongoing (interventions implemented as appropriate)    09/15/17 1481   Coping/Psychosocial Response Interventions   Plan Of Care Reviewed With patient   Patient Care Overview   Progress progress towards functional goals is fair   Outcome Evaluation   Outcome Summary/Follow up Plan Patient has a NIH of 0. C/O gradual STM loss and gait instability over the last few months. SX aware. Patient had an episode last evening of misprounouncing a word per family. Neuro assessment unchanged. Both Primary and Neuro SX notified. Attempted MRI yesterday but, even with light sedation on board, pt became very anxious in MRI. Both SX made aware. Will attempt again today , VVS with no neuro changes.

## 2017-09-15 NOTE — NURSING NOTE
Referral received per Psychiatric TIA/Stroke order set.  Noted OT and PT have seen and signed off.  Will sign off re: inpatient acute rehab.  Thank you--Katherine Morales,  Rehab Adm Nurse

## 2017-09-15 NOTE — CONSULTS
"DOS: 9/15/2017  NAME: Harry Sierra   : 1950  PCP: Artie Carranza MD  CC: aphasia  Referring MD: Harley Mckeon MD    Neurological Problem and Interval History:  66 y.o. LHW male with a Hx of diabetes mellitus, hyperlipidemia, hypertension and \"VBI\".  The patient was in usual state of excellent health when 3 days ago he suddenly had trouble talking.  All of his words came out \"scrambled up like a word salad\".  This lasted 90 seconds to 2 minutes and resolved spontaneously.  He denies any other stroke or TIA symptoms with this event.  It was no headache with it either.  The exact same thing happened approximately 2 months prior.  He did not seek medical attention with either event.  5-6 years ago he had an episode of severe decreased balance.  He states that he had a complete workup and no cause was found except for possible viral infection.  In reviewing the chart notation is made of VBI but the patient denies this. He does state however that 5-6 years ago he had approximately 50% stenosis carotid arteries.  He is independent and active.  He does not take any antiplatelets.  Approximately 3-4 months ago he had an losing ulcer and did not have any surgery for it.  Whenever he takes any nonsteroidal anti-inflammatory medication he has black tarry stools.  An aspirin upsets his stomach.  He is severely claustrophobic and cannot tolerate MRI.    Past Medical/Surgical Hx:  Past Medical History:   Diagnosis Date   • Cancer     Basal Cell   • Diabetes mellitus    • Hypertension    • Sepsis 2016   • Shingles    • UTI (urinary tract infection)    • Vertebral artery insufficiency      Past Surgical History:   Procedure Laterality Date   • COLONOSCOPY         • ENDOSCOPY N/A 12/15/2016    Procedure: ESOPHAGOGASTRODUODENOSCOPY WITH COLD BIOPSIES;  Surgeon: Moshe Lux MD;  Location: [facility] ENDOSCOPY;  Service:        Review of Systems:        A complete review of all systems is negative except as described " above plus Occasionally off balance.    Medications On Admission  Prescriptions Prior to Admission   Medication Sig Dispense Refill Last Dose   • acetaminophen (TYLENOL) 500 MG tablet Take 1 tablet by mouth 4 (Four) Times a Day As Needed for mild pain (1-3) or fever.  0 12/12/2016   • enalapril (VASOTEC) 5 MG tablet TAKE ONE TABLET BY MOUTH DAILY 30 tablet 3    • metFORMIN (GLUCOPHAGE) 1000 MG tablet TAKE ONE TABLET BY MOUTH TWICE A DAY WITH MEALS 180 tablet 2    • aspirin 81 MG chewable tablet Chew 325 mg Daily.   12/14/2016 at Unknown time       Allergies:  Allergies   Allergen Reactions   • Shellfish-Derived Products Anaphylaxis   • Bee Venom        Social Hx:  Social History     Social History   • Marital status:      Spouse name: N/A   • Number of children: N/A   • Years of education: N/A     Occupational History   • Not on file.     Social History Main Topics   • Smoking status: Never Smoker   • Smokeless tobacco: Not on file   • Alcohol use No      Comment: 1/2 beer once every 3 months   • Drug use: No   • Sexual activity: Defer     Other Topics Concern   • Not on file     Social History Narrative       Family Hx:  Family History   Problem Relation Age of Onset   • Diabetes Mother    • Heart disease Mother    • Uterine cancer Mother    • Diabetes Father    • Heart disease Father    • Kidney disease Father    • Skin cancer Father      melanoma   • Cancer Father      testicle       Review of Imaging (Interpretation of images not reports):  CT brain: Negative for acute stroke mass or hemorrhage.  There is severe calcification of the intracranial vertebral arteries.  EKG: Sinus rhythm  Carotid ultrasound: Plaque in bilateral carotid arteries but no significant stenosis    Laboratory Results:   Lab Results   Component Value Date    GLUCOSE 142 (H) 09/15/2017    CALCIUM 9.2 09/15/2017     09/15/2017    K 4.2 09/15/2017    CO2 24.3 09/15/2017     09/15/2017    BUN 16 09/15/2017    CREATININE 0.71  "(L) 09/15/2017    EGFRIFAFRI 116 08/31/2016    EGFRIFNONA 110 09/15/2017    BCR 22.5 09/15/2017    ANIONGAP 11.7 09/15/2017     Lab Results   Component Value Date    WBC 6.64 09/15/2017    HGB 13.0 (L) 09/15/2017    HCT 38.8 (L) 09/15/2017    MCV 88.0 09/15/2017     09/15/2017     Lab Results   Component Value Date    LDLCALC 69 09/15/2017     Lab Results   Component Value Date    HGBA1C 6.90 (H) 09/14/2017     No results found for: INR, PROTIME  TTE: No cardiac source of embolism    Physical Examination:  /80 (BP Location: Left arm, Patient Position: Lying)  Pulse 82  Temp 97.5 °F (36.4 °C) (Oral)   Resp 18  Ht 69\" (175.3 cm)  Wt 161 lb (73 kg)  SpO2 98%  BMI 23.78 kg/m2  General Appearance:   Well developed, well nourished, well groomed, alert, and cooperative.  HEENT: Normocephalic.  Normal fundoscopic exam including normal retina, discs are flat with sharp margins, normal vasculature.  Neck and Spine: Normal range of motion.  Normal alignment. No mass or tenderness. No bruits.  Cardiac: Regular rate and rhythm. No murmurs.  Peripheral Vasculature: Radial and pedal pulses are 1+ equal and symmetric. No signs of distal embolization.  Extremities:    No edema or deformities. Normal joint ROM. EFREN hoses and SCD's in place  Skin:    No rashes or birth marks.    Neurological examination:  Higher Integrative  Function: Oriented to time, place and person. Normal registration, recall, attention span and concentration. Normal language including comprehension, spontaneous speech, repetition, reading, writing, naming and vocabulary. No neglect with normal visual-spatial function and construction. Normal fund of knowledge and higher integrative function.  CN II: Pupils are equal, round, and reactive to light. Normal visual acuity and visual fields.    CN III IV VI: Extraocular movements are full without nystagmus.   CN V: Normal facial sensation and strength of muscles of mastication.  CN VII: Facial " movements are symmetric. No weakness.  CN VIII:   Auditory acuity is normal.  CN IX & X:   Symmetric palatal movement.  CN XI: Sternocleidomastoid and trapezius are normal.  No weakness.  CN XII:   The tongue is midline.  No atrophy or fasciculations.  Motor: Normal muscle strength, bulk and tone in upper and lower extremities. ?  Subtle pronator drift in the right arm.  No fasciculations, rigidity, spasticity, or abnormal movements.  Reflexes: 2+ in the upper and 1+ knees, trace at ankles. Plantar responses are flexor.  Sensation: Normal to light touch, pinprick, vibration, temperature, and proprioception in arms and legs. Normal graphesthesia and no extinction on DSS.  Station and Gait: Normal gait and station.    Coordination: Finger to nose test shows no dysmetria.  Rapid alternating movements are normal.  Heel to shin normal.  NIHSS: 0    Diagnoses / Discussion:  66 y.o. man who presents with Sx of recurrent transient aphasia who is neurologically normal.  He has several stroke risk factors and the recurrence of the same symptoms is suggestive of a fixed arterial stenosis causing TIA.  He needs completion of the stroke workup.  Since he will not tolerate an MRI he will need a CT angiogram of the head and neck.  Depending on what that shows he may or may not need revascularization.  He will need continued treatment of risk factors which appear to be generally well controlled.  The major concern will be his history of recent GI bleed and for that reason I will start Plavix only and will need to monitor for derick/occult blood in the stool.    Plan:  Plavix 75mg  Hydrate  [facility]  Lipitor  Non-pharmacological DVT prophylaxis  CTA  ?SEAN- if CTA negative  EKG Tele  PT/OT/ST  Stroke Education  Blood pressure control to <130/80  Goal LDL <70-recommend high dose statins-   Serum glucose < 140     Call 911 for stroke any stroke symptoms    I have discussed the above with the patient.  Time spent with patient:  60min    MDM  Reviewed: vitals  Reviewed previous: ultrasound  Interpretation: CT scan, ultrasound, labs and ECG        Dictated using Dragon dictation.

## 2017-09-15 NOTE — SIGNIFICANT NOTE
09/15/17 1306   Rehab Treatment   Discipline speech language pathologist   Rehab Evaluation   Evaluation Not Performed patient/family declined evaluation  (Pt/family deny any speech, language, swallowing difficulties.  Pt able to participate in concrete and abstract discussions with SLP.)

## 2017-09-15 NOTE — PLAN OF CARE
Problem: Patient Care Overview (Adult)  Goal: Plan of Care Review  Outcome: Ongoing (interventions implemented as appropriate)    09/15/17 9223   Coping/Psychosocial Response Interventions   Plan Of Care Reviewed With patient   Patient Care Overview   Progress progress toward functional goals is gradual   Outcome Evaluation   Outcome Summary/Follow up Plan NIH 0; alert and oriented x4. unable to do MRI. denies pain. up ad maureen. refusd scd. ivf. vss         Problem: Stroke (Ischemic) (Adult)  Goal: Signs and Symptoms of Listed Potential Problems Will be Absent or Manageable (Stroke)  Outcome: Ongoing (interventions implemented as appropriate)

## 2017-09-15 NOTE — SIGNIFICANT NOTE
09/15/17 0943   Rehab Treatment   Discipline occupational therapist   Rehab Evaluation   Evaluation Not Performed (off floor to testing)   Recommendation   OT - Next Appointment 09/16/17

## 2017-09-15 NOTE — CONSULTS
"Diabetes Education  Assessment/Teaching    Patient Name:  Harry Sierra  YOB: 1950  MRN: [id number]  Admit Date:  9/14/2017      Assessment Date:  9/15/2017       Most Recent Value    General Information      Referral From:  A1c    Height  5' 9\" (1.753 m)    Height Method  Stated    Weight  161 lb (73 kg)    Weight Method  Stated    Pregnancy Assessment     Diabetes History     What type of diabetes do you have?  Type 2    Length of Diabetes Diagnosis  6 - 10 years    Current DM knowledge  good    Have you had diabetes education/teaching in the past?  no    Do you test your blood sugar at home?  no    Have you had low blood sugar? (<70mg/dl)  no    Have you had high blood sugar? (>140mg/dl)  no    How would you rate your diabetes control?  fair    Education Preferences     What areas of diabetes would you like to learn about?  avoiding high blood sugar, testing my blood sugar at home, medications for diabetes    Nutrition Information     Assessment Topics     Healthy Eating - Assessment  N/A-unable to assess    Being Active - Assessment  N/A- unable to assess    Taking Medication - Assessment  Competent    Problem Solving - Assessment  N/A-unable to assess    Reducing Risk - Assessment  N/A-unable to assess    Healthy Coping - Assessment  N/A-unable to assess    Monitoring - Assessment  Needs education    DM Goals     Monitoring - Goal  0-7 days from discharge [to test 1-2 times a day]               Most Recent Value    DM Education Needs     Meter  Meter provided    Meter Type  One Touch [verio]    Frequency of Testing  Daily [was not testing but is willing to test once a day]    Medication  Oral [on metformin at home]    Healthy Coping  Appropriate    Discharge Plan  Home    Motivation  Engaged    Teaching Method  Explanation, Discussion, Demonstration, Handouts, Teach back    Patient Response  Verbalized understanding, Demonstrates adequately            Other Comments:         Electronically signed " by:  Mariela Gaming RN  09/15/17 3:27 PM  Discussed diabetes management with  Pt and his wife. Pt has never tested BG ,has a fear of needles. Given and instructed on a verio meter,pt did well and states he is willing to test every day.Will need a RX for the strips and lancets.

## 2017-09-15 NOTE — THERAPY DISCHARGE NOTE
Acute Care - Physical Therapy Initial Eval/Discharge  [facility]     Patient Name: Harry Sierra  : 1950  MRN: [id number]  Today's Date: 9/15/2017   Onset of Illness/Injury or Date of Surgery Date: 17  Date of Referral to PT: 17  Referring Physician: Harley Zhang      Admit Date: 2017    Visit Dx:    ICD-10-CM ICD-9-CM   1. Transient cerebral ischemia, unspecified type G45.9 435.9   2. Aphasia R47.01 784.3     Patient Active Problem List   Diagnosis   • Hypertension   • Diabetes mellitus   • Routine health maintenance   • Abnormal PSA   • Prostatitis   • Sepsis   • UTI (urinary tract infection) due to urinary indwelling catheter   • Reflux esophagitis   • Melena   • Bilateral carotid artery stenosis   • Transient cerebral ischemia     Past Medical History:   Diagnosis Date   • Cancer     Basal Cell   • Diabetes mellitus    • Hypertension    • Sepsis 2016   • Shingles    • UTI (urinary tract infection)    • Vertebral artery insufficiency      Past Surgical History:   Procedure Laterality Date   • COLONOSCOPY         • ENDOSCOPY N/A 12/15/2016    Procedure: ESOPHAGOGASTRODUODENOSCOPY WITH COLD BIOPSIES;  Surgeon: Moshe Lux MD;  Location: [facility] ENDOSCOPY;  Service:           PT ASSESSMENT (last 72 hours)      PT Evaluation       09/15/17 1058 09/15/17 0943    Rehab Evaluation    Document Type discharge evaluation/summary  -MS     Subjective Information agree to therapy;no complaints  -MS     Evaluation Not Performed  --   off floor to testing  -LE    Patient Effort, Rehab Treatment excellent  -MS     Symptoms Noted Comment Pt. reports no pain, dizziness, or weakness. Pt. also reports Aphasia is completely resolved as of today.  -MS     General Information    Onset of Illness/Injury or Date of Surgery Date 17  -MS     Referring Physician Harley Zhang  -MS     Pertinent History Of Current Problem Pt. admitted with aphasia  -MS     Prior Level of Function  independent:  -MS     Equipment Currently Used at Home none  -MS     Plans/Goals Discussed With patient and family;agreed upon  -MS     Clinical Impression    Date of Referral to PT 09/14/17  -MS     Criteria for Skilled Therapeutic Interventions Met no problems identified which require skilled intervention  -MS     Pain Assessment    Pain Assessment No/denies pain   No verbal/visual signs of pain.  -MS     Cognitive Assessment/Intervention    Current Cognitive/Communication Assessment functional  -MS     Orientation Status oriented x 4  -MS     Follows Commands/Answers Questions 100% of the time  -MS     Personal Safety WNL/WFL  -MS     Personal Safety Interventions fall prevention program maintained;gait belt;nonskid shoes/slippers when out of bed;supervised activity  -MS     ROM (Range of Motion)    General ROM no range of motion deficits identified  -MS     MMT (Manual Muscle Testing)    General MMT Assessment --   BUE/LE MMT (4+/5)  -MS     Bed Mobility, Assessment/Treatment    Bed Mob, Supine to Sit, [place] independent  -MS     Bed Mob, Sit to Supine, [place] independent  -MS     Transfer Assessment/Treatment    Transfers, Sit-Stand [place] independent  -MS     Transfers, Stand-Sit [place] independent  -MS     Gait Assessment/Treatment    Gait, [place] Level independent  -MS     Gait, Distance (Feet) 400  -MS     Gait, Comment No balance or gait deficits noted.  -MS     Positioning and Restraints    Pre-Treatment Position sitting in chair/recliner  -MS     Post Treatment Position chair  -MS     In Chair notified nsg;sitting;call light within reach;encouraged to call for assist;with family/caregiver;with nsg  -MS       09/15/17 0909 09/15/17 0830    Rehab Evaluation    Evaluation Not Performed patient unavailable for evaluation   Pt. leaving the floor for tesing (Cardio); Will follow up.  -MS     Muscle Tone Assessment    Muscle Tone Assessment  Bilateral Upper Extremities  -JM       09/14/17 2323 09/14/17 2002    General Information    Equipment Currently Used at Home none  -LB     Muscle Tone Assessment    Muscle Tone Assessment  Bilateral Upper Extremities  -LB      User Key  (r) = Recorded By, (t) = Taken By, (c) = Cosigned By    Initials Name Provider Type    ARSH Khalil, OTR Occupational Therapist    MS Noe Morrison, PT Physical Therapist    VARUN Guzman, RN Registered Nurse    SINDHU Thomas, RN Registered Nurse          Physical Therapy Education     Title: PT OT SLP Therapies (Resolved)     Topic: Physical Therapy (Resolved)     Point: Mobility training (Resolved)    Learning Progress Summary    Learner Readiness Method Response Comment Documented by Status   Patient Acceptance ALESHIA GRIMES DU  MS 09/15/17 1105 Done               Point: Body mechanics (Resolved)    Learning Progress Summary    Learner Readiness Method Response Comment Documented by Status   Patient Acceptance ALESHIA GRIMES DU  MS 09/15/17 1105 Done               Point: Precautions (Resolved)    Learning Progress Summary    Learner Readiness Method Response Comment Documented by Status   Patient Acceptance ALESHIA GRIMES DU  MS 09/15/17 1105 Done                      User Key     Initials Effective Dates Name Provider Type Discipline    MS 12/01/15 -  Noe Morrison, PT Physical Therapist PT                PT Recommendation and Plan  Anticipated Discharge Disposition: home  Plan of Care Review  Plan Of Care Reviewed With: patient  Outcome Summary/Follow up Plan: Pt. currently independent with functional mobility and is at baseline functionally. Pt. does not require skilled inpt. P.T. and can ambulate with family or nursing staff while here in the hospital.  Pt. with no further questions/concerns regarding functional mobility or home safety. Will sign off.              Outcome Measures       09/15/17 1100          How much help from another person do you currently need...    Turning from your back to your side while in flat  bed without using bedrails? 4  -MS      Moving from lying on back to sitting on the side of a flat bed without bedrails? 4  -MS      Moving to and from a bed to a chair (including a wheelchair)? 4  -MS      Standing up from a chair using your arms (e.g., wheelchair, bedside chair)? 4  -MS      Climbing 3-5 steps with a railing? 4  -MS      To walk in hospital room? 4  -MS      AM-PAC 6 Clicks Score 24  -MS      Functional Assessment    Outcome Measure Options AM-PAC 6 Clicks Basic Mobility (PT)  -MS        User Key  (r) = Recorded By, (t) = Taken By, (c) = Cosigned By    Initials Name Provider Type    MS Noe TIN Tamiko PT Physical Therapist           Time Calculation:         PT Charges       09/15/17 1107 09/15/17 0909       Time Calculation    Start Time 1032  -MS      Stop Time 1045  -MS      Time Calculation (min) 13 min  -MS      PT Received On 09/15/17  -MS      PT - Next Appointment  09/15/17  -MS       User Key  (r) = Recorded By, (t) = Taken By, (c) = Cosigned By    Initials Name Provider Type    MS Noe TIN Tamiko PT Physical Therapist          Therapy Charges for Today     Code Description Service Date Service Provider Modifiers Qty    65222901635 HC PT MOBILITY CURRENT 9/15/2017 Noe Morrison, PT GP,  1    01604460953 HC PT MOBILITY PROJECTED 9/15/2017 Noe Morrison PT GP,  1    88296038993 HC PT MOBILITY DISCHARGE 9/15/2017 Noe Morrison PT GP,  1    09109341195 HC PT EVAL LOW COMPLEXITY 1 9/15/2017 Noe Morrison PT GP 1    84008068113 HC PT THER PROC EA 15 MIN 9/15/2017 Noe Morrison PT GP 1          PT G-Codes  PT Professional Judgement Used?: Yes  Outcome Measure Options: AM-PAC 6 Clicks Basic Mobility (PT)  Functional Limitation: Mobility: Walking and moving around  Mobility: Walking and Moving Around Current Status (): 0 percent impaired, limited or restricted  Mobility: Walking and Moving Around Goal Status (): 0 percent impaired, limited or  restricted  Mobility: Walking and Moving Around Discharge Status (): 0 percent impaired, limited or restricted    PT Discharge Summary  Anticipated Discharge Disposition: home  Reason for Discharge: Independent, At baseline function  Outcomes Achieved: Refer to plan of care for updates on goals achieved  Discharge Destination: Home    Noe Morrison, PT  9/15/2017

## 2017-09-15 NOTE — SIGNIFICANT NOTE
09/15/17 0909   Rehab Evaluation   Evaluation Not Performed patient unavailable for evaluation  (Pt. leaving the floor for tesing (Cardio); Will follow up.)   Recommendation   PT - Next Appointment 09/15/17

## 2017-09-15 NOTE — PROGRESS NOTES
"DAILY PROGRESS NOTE  [facility]    Patient Identification:  Name: Harry Sierra  Age: 66 y.o.  Sex: male  :  1950  MRN: [id number]         Primary Care Physician: Artie Carranza MD      Subjective  No new c/o.  No recurrence of dysphagia.  Unable to due MRI/MRA due to claustrophobia.     Objective:  General Appearance:  Comfortable, well-appearing, in no acute distress and not in pain.    Vital signs: (most recent): Blood pressure 134/75, pulse 88, temperature 97.9 °F (36.6 °C), temperature source Oral, resp. rate 18, height 69\" (175.3 cm), weight 161 lb (73 kg), SpO2 97 %.    Lungs:  Normal respiratory rate and normal effort.  Breath sounds clear to auscultation.    Heart: Normal rate.  Regular rhythm.  S1 normal.    Extremities: There is no dependent edema.    Neurological: Patient is alert and oriented to person, place and time.    Skin:  Warm and dry.                Vital signs in last 24 hours:  Temp:  [97.5 °F (36.4 °C)-98.5 °F (36.9 °C)] 97.9 °F (36.6 °C)  Heart Rate:  [84-93] 88  Resp:  [16-20] 18  BP: (123-148)/() 134/75    Intake/Output:    Intake/Output Summary (Last 24 hours) at 09/15/17 1359  Last data filed at 09/15/17 0300   Gross per 24 hour   Intake              360 ml   Output              500 ml   Net             -140 ml           Results from last 7 days  Lab Units 09/15/17  0505 17  1449   WBC 10*3/mm3 6.64 8.72   HEMOGLOBIN g/dL 13.0* 14.0   PLATELETS 10*3/mm3 209 267     Results from last 7 days  Lab Units 09/15/17  0505 17  1449   SODIUM mmol/L 143 141   POTASSIUM mmol/L 4.2 4.0   CHLORIDE mmol/L 107 100   CO2 mmol/L 24.3 27.2   BUN mg/dL 16 16   CREATININE mg/dL 0.71* 0.79   GLUCOSE mg/dL 142* 162*   Estimated Creatinine Clearance: 91.3 mL/min (by C-G formula based on Cr of 0.71).  Results from last 7 days  Lab Units 09/15/17  0505 17  1449   CALCIUM mg/dL 9.2 9.8   ALBUMIN g/dL 4.00 4.60   MAGNESIUM mg/dL  --  1.9     Results from last 7 " days  Lab Units 09/15/17  0505 09/14/17  1449   ALBUMIN g/dL 4.00 4.60   BILIRUBIN mg/dL 0.3 0.3   ALK PHOS U/L 77 94   AST (SGOT) U/L 13 13   ALT (SGPT) U/L 15 14       Assessment:  Principal Problem:    Transient cerebral ischemia  Active Problems:    Hypertension    Diabetes mellitus    Bilateral carotid artery stenosis        Plan:  Await Neuro input re MRI, sedation...    Chinedu Steele MD  9/15/2017  1:59 PM

## 2017-09-16 VITALS
WEIGHT: 161 LBS | BODY MASS INDEX: 23.85 KG/M2 | HEIGHT: 69 IN | HEART RATE: 85 BPM | RESPIRATION RATE: 18 BRPM | TEMPERATURE: 97.8 F | DIASTOLIC BLOOD PRESSURE: 86 MMHG | SYSTOLIC BLOOD PRESSURE: 130 MMHG | OXYGEN SATURATION: 100 %

## 2017-09-16 LAB
GLUCOSE BLDC GLUCOMTR-MCNC: 161 MG/DL (ref 70–130)
GLUCOSE BLDC GLUCOMTR-MCNC: 201 MG/DL (ref 70–130)

## 2017-09-16 PROCEDURE — G0378 HOSPITAL OBSERVATION PER HR: HCPCS

## 2017-09-16 PROCEDURE — 99214 OFFICE O/P EST MOD 30 MIN: CPT | Performed by: NURSE PRACTITIONER

## 2017-09-16 PROCEDURE — 82962 GLUCOSE BLOOD TEST: CPT

## 2017-09-16 PROCEDURE — 63710000001 INSULIN ASPART PER 5 UNITS: Performed by: INTERNAL MEDICINE

## 2017-09-16 RX ORDER — PANTOPRAZOLE SODIUM 40 MG/1
40 TABLET, DELAYED RELEASE ORAL DAILY
Qty: 30 TABLET | Refills: 0 | Status: SHIPPED | OUTPATIENT
Start: 2017-09-16 | End: 2017-10-17 | Stop reason: SDUPTHER

## 2017-09-16 RX ORDER — CLOPIDOGREL BISULFATE 75 MG/1
75 TABLET ORAL DAILY
Qty: 30 TABLET | Refills: 0 | Status: SHIPPED | OUTPATIENT
Start: 2017-09-16 | End: 2017-10-17 | Stop reason: SDUPTHER

## 2017-09-16 RX ORDER — ATORVASTATIN CALCIUM 80 MG/1
80 TABLET, FILM COATED ORAL NIGHTLY
Qty: 30 TABLET | Refills: 0 | Status: SHIPPED | OUTPATIENT
Start: 2017-09-16 | End: 2017-10-17 | Stop reason: SDUPTHER

## 2017-09-16 RX ORDER — PANTOPRAZOLE SODIUM 40 MG/1
40 TABLET, DELAYED RELEASE ORAL
Status: DISCONTINUED | OUTPATIENT
Start: 2017-09-17 | End: 2017-09-16 | Stop reason: HOSPADM

## 2017-09-16 RX ADMIN — INSULIN ASPART 3 UNITS: 100 INJECTION, SOLUTION INTRAVENOUS; SUBCUTANEOUS at 12:13

## 2017-09-16 RX ADMIN — ENALAPRIL MALEATE 5 MG: 5 TABLET ORAL at 08:22

## 2017-09-16 RX ADMIN — SODIUM CHLORIDE 75 ML/HR: 9 INJECTION, SOLUTION INTRAVENOUS at 08:20

## 2017-09-16 RX ADMIN — INSULIN ASPART 2 UNITS: 100 INJECTION, SOLUTION INTRAVENOUS; SUBCUTANEOUS at 08:20

## 2017-09-16 RX ADMIN — CLOPIDOGREL 75 MG: 75 TABLET, FILM COATED ORAL at 08:22

## 2017-09-16 NOTE — PROGRESS NOTES
"DOS: 2017  NAME: Harry Sierra   : 1950  PCP: Artie Carranza MD  Chief Complaint   Patient presents with   • Unable to speak     intermittent \"jumbled words.\" \"It's like I'm speakingin tongues.\"     CC: Speech trouble.     Subjective: No changes overnight per RN. Patient denies any recurrence of speech trouble. He denies any new stroke/TIA symptoms.     Interval History  Patient Complaints: None  Patient Denies:  Headache   History taken from: patient chart family RN    Objective:  Vital signs: /86 (BP Location: Left arm, Patient Position: Lying)  Pulse 85  Temp 97.8 °F (36.6 °C) (Oral)   Resp 18  Ht 69\" (175.3 cm)  Wt 161 lb (73 kg)  SpO2 100%  BMI 23.78 kg/m2      Physical Exam:  GENERAL: NAD  HEENT: Normocephalic, atraumatic   COR: RRR  Resp: Even and unlabored  Extremities: No signs of distal embolization. TEDs and SCDs in place.    Neurological:   MS: AO. Language normal. No neglect. Higher integrative function normal  CN: II-XII normal  Motor: Normal strength and tone throughout.  Sensory: Intact  Coordination: Normal    Results Review:     I reviewed the patient's new clinical results.    Current Medications:    atorvastatin 80 mg Oral Nightly   clopidogrel 75 mg Oral Daily   enalapril 5 mg Oral Q24H   gadobenate dimeglumine 15 mL Intravenous Once in imaging   insulin aspart 0-7 Units Subcutaneous 4x Daily With Meals & Nightly       sodium chloride 75 mL/hr Last Rate: 75 mL/hr (17 0820)       Medications Reviewed    Laboratory results:  Lab Results   Component Value Date    GLUCOSE 142 (H) 09/15/2017    CALCIUM 9.2 09/15/2017     09/15/2017    K 4.2 09/15/2017    CO2 24.3 09/15/2017     09/15/2017    BUN 16 09/15/2017    CREATININE 0.71 (L) 09/15/2017    EGFRIFAFRI 116 2016    EGFRIFNONA 110 09/15/2017    BCR 22.5 09/15/2017    ANIONGAP 11.7 09/15/2017     Lab Results   Component Value Date    WBC 6.64 09/15/2017    HGB 13.0 (L) 09/15/2017    HCT 38.8 (L) " 09/15/2017    MCV 88.0 09/15/2017     09/15/2017        Results from last 7 days  Lab Units 09/15/17  0505   CHOLESTEROL mg/dL 120     No results found for: INR, PROTIME  Lab Results   Component Value Date    LDLCALC 69 09/15/2017     Lab Results   Component Value Date    HGBA1C 6.90 (H) 09/14/2017       Review and interpretation of imaging: Per Dr. Alvarez.  CT brain: Negative for acute stroke mass or hemorrhage.  There is severe calcification of the intracranial vertebral arteries.  Carotid ultrasound: Plaque in bilateral carotid arteries but no significant stenosis  CTA h/n 9/16/17: Mild calcified aortic stenosis. Moderate calcified stenosis of the bilateral vertebral origins. Mild calcification of the V4 segments. Calcified eccentric plaque of bilateral ICA bulbs. Calcification of the intracranial bilateral ICAs. Small right ICA sidewall aneurysm vs ulceration.     EKG: Sinus rhythm  TTE 9/15/17: LVEF 65%, normal size; RV normal size; LA normal size, saline tests negative; RA normal    Impression: 66 y.o. man who presents with Sx of recurrent transient aphasia who is neurologically normal.  He has several stroke risk factors and the recurrence of the same symptoms is suggestive of a fixed arterial stenosis causing TIA.  He could not tolerate MRI; but I reviewed his CTA h/n with Dr. Alvarez which shows eccentric plaques of bilateral ICA bulbs that are the likely cause. TTE results are noted above, essentially normal. He will need to continue Plavix and Lipitor. I discussed importance of risk factor control for stroke prevention, including BP, cholesterol and blood sugar control. No further work-up from neurology standpoint. He will need to follow-up in stroke clinic in three months. Please call with questions or concerns.     Plan:  Plavix 75mg  Lipitor 80 mg  Follow-up with PCP in 6-8 week recheck of lipid panel, LFTs  PT/OT/ST  Stroke Education  Blood pressure control to <130/80  Goal LDL  <70-recommend high dose statins-                       Serum glucose < 140    Call 911 for stroke/TIA symptoms  F/U in stroke clinic in 3 months, call [phone] for an appointment      I have discussed the above with the patient and family.  Latrice Gustafson, JO  09/16/17  10:29 AM

## 2017-09-16 NOTE — DISCHARGE SUMMARY
"                                                                   PHYSICIAN DISCHARGE SUMMARY                                                                        [facility]    Patient Identification:  Name: Harry Sierra  Age: 66 y.o.  Sex: male  :  1950  MRN: [id number]  Primary Care Physician: Artie Carranza MD    Admit date: 2017  Discharge date and time: 2017     Discharged Condition: good    Discharge Diagnoses:Principal Problem:    Transient cerebral ischemia  Active Problems:    Hypertension    Diabetes mellitus    Bilateral carotid artery stenosis    Recent hx/o UGI bleed.       Hospital Course:  Pleasant 58-year-old gentleman with a known history of cerebral vascular disease presents with a history of transient bouts of difficulty speaking.  Please H&P for full details.  He was admitted and put through the CVA protocol with exception that he could not tolerate MRI.  CTA of the head was performed instead.  This did show evidence of atherosclerotic disease but no critical stenosis.  There is no recurrence of symptomatology during the hospitalization.  Clinically this appeared to be secondary to a fixed stenosis.  Recommendations by neurology were for a platelet inhibitor and of course optimizing risk factors.  He has a fairly recent history of an upper GI bleed.  For this reason he was switched over to Plavix.  On discussing with the patient today it is noted that he is taking his PPI only \"as needed.\"  Have encouraged him to take this on a regular basis and will be provided prescription for such.  He did have a CTA done yesterday and I have instructed him to put his metformin on hold for 3 days.  He will resume this on Tuesday.      Consults:     Consults     Date and Time Order Name Status Description    2017 Inpatient consult to Neurology Completed     2017 5953 [facility] (on-call MD unless specified) Completed             Discharge Exam:  Physical Exam  Afebrile " vital signs stable.  Well-developed well-nourished male in no apparent distress.  Lungs clear to auscultation good air movement.  Heart regular rate and rhythm.  Alert oriented conversant cooperative and pleasant with no appreciable lateralizing signs.     Disposition:  Home    Patient Instructions:    Harry Sierra   Home Medication Instructions DAYNA:[id number]    Printed on:09/16/17 1411   Medication Information                      acetaminophen (TYLENOL) 500 MG tablet  Take 1 tablet by mouth 4 (Four) Times a Day As Needed for mild pain (1-3) or fever.             atorvastatin (LIPITOR) 80 MG tablet  Take 1 tablet by mouth Every Night.             clopidogrel (PLAVIX) 75 MG tablet  Take 1 tablet by mouth Daily.             enalapril (VASOTEC) 5 MG tablet  TAKE ONE TABLET BY MOUTH DAILY             glucose blood (COOL BLOOD GLUCOSE TEST STRIPS) test strip  Use as instructed             metFORMIN (GLUCOPHAGE) 1000 MG tablet  Hold till Tuesday.             pantoprazole (PROTONIX) 40 MG EC tablet  Take 1 tablet by mouth Daily.               No future appointments.  Additional Instructions for the Follow-ups that You Need to Schedule     Discharge Follow-up with PCP    As directed    Follow Up Details:  1 week       Discharge Follow-up with Specialty    As directed    Specialty:  Neurology   Follow Up Details:  As directed.             Follow-up Information     Follow up with Artie Carranza MD .    Specialty:  Internal Medicine    Why:  1 week    Contact information:    [address] [address]  [phone]          Discharge Order     Start     Ordered    09/16/17 1407  Discharge patient  Once     Expected Discharge Date:  09/16/17    Discharge Disposition:  Home or Self Care        09/16/17 1410                Signed:  Chinedu Steele MD  9/16/2017  2:11 PM    EMR Dragon/Transcription disclaimer:   Much of this encounter note is an electronic transcription/translation of spoken language to  printed text. The electronic translation of spoken language may permit erroneous, or at times, nonsensical words or phrases to be inadvertently transcribed; Although I have reviewed the note for such errors, some may still exist.

## 2017-09-16 NOTE — DISCHARGE INSTRUCTIONS
Diabetes educator consult: test blood glucose 1-2 times day and use numbers to problem solve changes in diabetes care. questions call [phone] or email:  [email or web address].    Patient Risk Factors  -Previous stroke  -High blood pressure  -Type II Diabetes    Please keep all follow up appointments and continue to take Vasotec (high blood pressure), Metformin (Type II Diabetes), Lipitor (Cholesterol control) and Plavix (Blood Thinner).

## 2017-09-20 ENCOUNTER — PATIENT OUTREACH (OUTPATIENT)
Dept: CASE MANAGEMENT | Facility: OTHER | Age: 67
End: 2017-09-20

## 2017-09-20 NOTE — OUTREACH NOTE
"Pt. Report he is doing \"good\" he has filled his new scripts and plans to see PCP to review new meds.He was concerned about all the tests he had and PCP not having them, explained they are all in EPIC for him to see.  Explained role of Care Advisor and contact information given to patient.No other questions or concerns voiced at this time.  "

## 2017-09-25 ENCOUNTER — PATIENT OUTREACH (OUTPATIENT)
Dept: CASE MANAGEMENT | Facility: OTHER | Age: 67
End: 2017-09-25

## 2017-09-25 NOTE — OUTREACH NOTE
"Pt. Reports he is feeling better and feels like he is \"getting a handle\" on his new med regimen. He reviewed schedule with RN and has good knowledge base on meds. He has a f/u appointment with PCP 9/28 teaching done on need to take all meds for him to eval.No other questions or concerns voiced at this time.  "

## 2017-09-28 ENCOUNTER — OFFICE VISIT (OUTPATIENT)
Dept: INTERNAL MEDICINE | Age: 67
End: 2017-09-28

## 2017-09-28 VITALS
WEIGHT: 160 LBS | SYSTOLIC BLOOD PRESSURE: 120 MMHG | BODY MASS INDEX: 23.7 KG/M2 | OXYGEN SATURATION: 97 % | HEIGHT: 69 IN | TEMPERATURE: 97.8 F | DIASTOLIC BLOOD PRESSURE: 70 MMHG | HEART RATE: 82 BPM

## 2017-09-28 DIAGNOSIS — E11.9 TYPE 2 DIABETES MELLITUS WITHOUT COMPLICATION, WITHOUT LONG-TERM CURRENT USE OF INSULIN (HCC): ICD-10-CM

## 2017-09-28 DIAGNOSIS — G45.1 HEMISPHERIC CAROTID ARTERY SYNDROME: Primary | ICD-10-CM

## 2017-09-28 PROCEDURE — 99213 OFFICE O/P EST LOW 20 MIN: CPT | Performed by: INTERNAL MEDICINE

## 2017-09-28 NOTE — PROGRESS NOTES
"Harry ALCALA Mariela / 66 y.o. / male  09/28/2017  VITALS    /70  Pulse 82  Temp 97.8 °F (36.6 °C)  Ht 69\" (175.3 cm)  Wt 160 lb (72.6 kg)  SpO2 97%  BMI 23.63 kg/m2    BP Readings from Last 3 Encounters:   09/28/17 120/70   09/16/17 130/86   12/15/16 134/80     Wt Readings from Last 3 Encounters:   09/28/17 160 lb (72.6 kg)   09/14/17 161 lb (73 kg)   09/14/17 161 lb (73 kg)      Body mass index is 23.63 kg/(m^2).    CC:  Main reason(s) for today's visit: Transient cerebral ischemia (ER F/U 9/14/2017 Discharged 9/16/17)      HPI:     This is arrival home, he has had no further symptoms and is tolerating medication without difficulty.  He is to see neurology again during November or December.  This time he is having difficulty scheduling an appointment with this particular group.  Please medical history significant for carotid artery stenosis while living in [place].    Patient Care Team:  Artie Carranza MD as PCP - General (Internal Medicine)  Sakshi Pearce, RN as Care Coordinator (Population Health)  ____________________________________________________________________    ASSESSMENT & PLAN:    Problem List Items Addressed This Visit        Unprioritized    Diabetes mellitus    Relevant Medications    metFORMIN (GLUCOPHAGE) 1000 MG tablet    Transient cerebral ischemia - Primary    Relevant Orders    Lipid Panel    Adult Transthoracic Echo Complete W/ Cont if Necessary Per Protocol    Duplex Carotid Ultrasound CAR        Orders Placed This Encounter   Procedures   • Lipid Panel   • Adult Transthoracic Echo Complete W/ Cont if Necessary Per Protocol       Summary/Discussion:   Number-one status post TIA, clinically stable, on appropriate medication at this time, we'll check lipid panel to see if adjusting dosage of atorvastatin as indicated.  We'll also check cardiac echo or completeness    #2 history of carotid artery stenosis, will check carotid Doppler follow-up results on lying or by mail as " Call the office at [phone]  if you have any questions or concerns. appropriate.    Return in about 3 months (around 12/28/2017) for Recheck.    No future appointments.    ______________________________________________________    REVIEW OF SYSTEMS    Review of Systems   Eyes: Negative for visual disturbance.   Neurological: Negative for dizziness, tremors, seizures, syncope, speech difficulty, weakness, light-headedness, numbness and headaches.         PHYSICAL EXAMINATION    Physical Exam   Constitutional: He is oriented to person, place, and time. He appears well-developed and well-nourished. No distress.   Eyes: EOM are normal. Pupils are equal, round, and reactive to light.   Neurological: He is alert and oriented to person, place, and time. He has normal strength and normal reflexes. No cranial nerve deficit or sensory deficit. He displays a negative Romberg sign.   Skin: Skin is warm and dry. He is not diaphoretic.   Psychiatric: He has a normal mood and affect. His behavior is normal. Judgment and thought content normal.   Nursing note and vitals reviewed.        REVIEWED DATA:    Labs:     Lab Results   Component Value Date     09/15/2017    K 4.2 09/15/2017    AST 13 09/15/2017    ALT 15 09/15/2017    BUN 16 09/15/2017    CREATININE 0.71 (L) 09/15/2017    CREATININE 0.79 09/14/2017    CREATININE 0.66 (L) 09/28/2016    EGFRIFNONA 110 09/15/2017    EGFRIFAFRI 116 08/31/2016       Lab Results   Component Value Date    HGBA1C 6.90 (H) 09/14/2017    HGBA1C 7.10 (H) 09/27/2016    HGBA1C 6.8 (H) 08/31/2016     (H) 08/31/2016    MICROALBUR 3.5 08/31/2016       Lab Results   Component Value Date    LDL 88 08/31/2016    HDL 30 (L) 09/15/2017    TRIG 106 09/15/2017       No results found for: TSH, FREET4    Lab Results   Component Value Date    WBC 6.64 09/15/2017    HGB 13.0 (L) 09/15/2017    HGB 14.0 09/14/2017    HGB 13.0 12/01/2016     09/15/2017       Imaging:         Medical Tests:         Summary of old records / correspondence / consultant report: Patient  presented to the emergency room on 14th of September was discharged from the hospital and 16 to September.  He has a known history of cerebrovascular disease, and had transient episodes of dysarthria.  He was admitted, went through the CVA protocol with the exception of a could not tolerate MRI.  CTA was performed did show some evidence of atherosclerotic disease but no critical stenosis.  He is no recurrent symptoms during his hospitalizations.  Neurology saw the patient recommended platelet inhibitor and optimization of risk factors.  Because of the fairly recent history of upper GI bleeding, he was switched to Plavix.        Request outside records:         ALLERGIES  Allergies   Allergen Reactions   • Shellfish-Derived Products Anaphylaxis   • Bee Venom         MEDICATIONS  Current Outpatient Prescriptions on File Prior to Visit   Medication Sig Dispense Refill   • acetaminophen (TYLENOL) 500 MG tablet Take 1 tablet by mouth 4 (Four) Times a Day As Needed for mild pain (1-3) or fever.  0   • atorvastatin (LIPITOR) 80 MG tablet Take 1 tablet by mouth Every Night. 30 tablet 0   • clopidogrel (PLAVIX) 75 MG tablet Take 1 tablet by mouth Daily. 30 tablet 0   • enalapril (VASOTEC) 5 MG tablet TAKE ONE TABLET BY MOUTH DAILY 30 tablet 3   • glucose blood (COOL BLOOD GLUCOSE TEST STRIPS) test strip Use as instructed 1 each 0   • metFORMIN (GLUCOPHAGE) 1000 MG tablet Hold till Tuesday. 180 tablet 2   • pantoprazole (PROTONIX) 40 MG EC tablet Take 1 tablet by mouth Daily. 30 tablet 0     No current facility-administered medications on file prior to visit.      Current outpatient and discharge medications have been reconciled for the patient.  Artie Carranza MD    [facility]:     The following portions of the patient's history were reviewed and updated as appropriate: Allergies / Current Medications / Past Medical History / Surgical History / Social History / Family History    PROBLEM LIST   Patient Active Problem List    Diagnosis   • Hypertension   • Diabetes mellitus   • Routine health maintenance   • Abnormal PSA   • Prostatitis   • Sepsis   • UTI (urinary tract infection) due to urinary indwelling catheter   • Reflux esophagitis   • Melena   • Bilateral carotid artery stenosis   • Transient cerebral ischemia       PAST MEDICAL HISTORY  Past Medical History:   Diagnosis Date   • Cancer     Basal Cell   • Diabetes mellitus    • Hypertension    • Sepsis 09/2016   • Shingles    • UTI (urinary tract infection)    • Vertebral artery insufficiency        SURGICAL HISTORY  Past Surgical History:   Procedure Laterality Date   • COLONOSCOPY      2011   • ENDOSCOPY N/A 12/15/2016    Procedure: ESOPHAGOGASTRODUODENOSCOPY WITH COLD BIOPSIES;  Surgeon: Moshe Lux MD;  Location: [facility] ENDOSCOPY;  Service:        SOCIAL HISTORY  Social History     Social History   • Marital status:      Spouse name: N/A   • Number of children: N/A   • Years of education: N/A     Social History Main Topics   • Smoking status: Never Smoker   • Smokeless tobacco: Never Used   • Alcohol use No      Comment: 1/2 beer once every 3 months   • Drug use: No   • Sexual activity: Defer     Other Topics Concern   • None     Social History Narrative   • None       FAMILY HISTORY  Family History   Problem Relation Age of Onset   • Diabetes Mother    • Heart disease Mother    • Uterine cancer Mother    • Diabetes Father    • Heart disease Father    • Kidney disease Father    • Skin cancer Father      melanoma   • Cancer Father      testicle         **Dragon Disclaimer:   Much of this encounter note is an electronic transcription/translation of spoken language to printed text. The electronic translation of spoken language may permit erroneous, or at times, nonsensical words or phrases to be inadvertently transcribed. Although I have reviewed the note for such errors, some may still exist.

## 2017-10-12 ENCOUNTER — TELEPHONE (OUTPATIENT)
Dept: NEUROLOGY | Facility: CLINIC | Age: 67
End: 2017-10-12

## 2017-10-12 NOTE — TELEPHONE ENCOUNTER
I called and S/W Mr. Sierra.  He is back to work now with only intermittent dizziness and a feeling of being off balance.  This does not happen a lot and he did tell Dr. Carranza at his appt 2 weeks ago.  We reviewed his upcoming follow up appointments.  He is to have a carotid US on Oct 17th followed by a TTE with [facility] the 17th at 12:30 at their office.  Latrice OSORIO will see him December 12th at [facility] neurology.  We reviewed his home meds:  Enalapril 5 mg daily, Blood glucose checks 4 times a day.  Metformin 1000 mg daily, Lipitor 80 mg daily and Plavix 75 mg daily.  He told me he has not had any GI difficulties taking the Plavix and he is aware to contact his MD if any black, tarry stools occur.  We reviewed S/S of stroke and to call 911 immediately.  mRS 1.  CORNELIO Escalante RN

## 2017-10-17 ENCOUNTER — HOSPITAL ENCOUNTER (OUTPATIENT)
Dept: CARDIOLOGY | Facility: HOSPITAL | Age: 67
Discharge: HOME | End: 2017-10-17
Admitting: INTERNAL MEDICINE

## 2017-10-17 ENCOUNTER — TELEPHONE (OUTPATIENT)
Dept: NEUROLOGY | Facility: CLINIC | Age: 67
End: 2017-10-17

## 2017-10-17 ENCOUNTER — HOSPITAL ENCOUNTER (OUTPATIENT)
Dept: CARDIOLOGY | Facility: HOSPITAL | Age: 67
End: 2017-10-17

## 2017-10-17 DIAGNOSIS — K21.00 REFLUX ESOPHAGITIS: ICD-10-CM

## 2017-10-17 DIAGNOSIS — I65.23 BILATERAL CAROTID ARTERY STENOSIS: ICD-10-CM

## 2017-10-17 DIAGNOSIS — G45.1 HEMISPHERIC CAROTID ARTERY SYNDROME: Primary | ICD-10-CM

## 2017-10-17 LAB
BH CV XLRA MEAS LEFT DIST CCA EDV: -25.1 CM/SEC
BH CV XLRA MEAS LEFT DIST CCA PSV: -101 CM/SEC
BH CV XLRA MEAS LEFT DIST ICA EDV: -22.1 CM/SEC
BH CV XLRA MEAS LEFT DIST ICA PSV: -60.8 CM/SEC
BH CV XLRA MEAS LEFT MID ICA EDV: -29.1 CM/SEC
BH CV XLRA MEAS LEFT MID ICA PSV: -73.1 CM/SEC
BH CV XLRA MEAS LEFT PROX CCA EDV: 24.4 CM/SEC
BH CV XLRA MEAS LEFT PROX CCA PSV: 122 CM/SEC
BH CV XLRA MEAS LEFT PROX ECA EDV: -22.8 CM/SEC
BH CV XLRA MEAS LEFT PROX ECA PSV: -96.6 CM/SEC
BH CV XLRA MEAS LEFT PROX ICA EDV: -22.8 CM/SEC
BH CV XLRA MEAS LEFT PROX ICA PSV: -55.8 CM/SEC
BH CV XLRA MEAS LEFT PROX SCLA PSV: 68.6 CM/SEC
BH CV XLRA MEAS LEFT VERTEBRAL A EDV: 18.8 CM/SEC
BH CV XLRA MEAS LEFT VERTEBRAL A PSV: 52.2 CM/SEC
BH CV XLRA MEAS RIGHT DIST CCA EDV: 25.2 CM/SEC
BH CV XLRA MEAS RIGHT DIST CCA PSV: 87.9 CM/SEC
BH CV XLRA MEAS RIGHT DIST ICA EDV: -23.1 CM/SEC
BH CV XLRA MEAS RIGHT DIST ICA PSV: -57.7 CM/SEC
BH CV XLRA MEAS RIGHT MID ICA EDV: -22.3 CM/SEC
BH CV XLRA MEAS RIGHT MID ICA PSV: -61 CM/SEC
BH CV XLRA MEAS RIGHT PROX CCA EDV: 18.2 CM/SEC
BH CV XLRA MEAS RIGHT PROX CCA PSV: 90.3 CM/SEC
BH CV XLRA MEAS RIGHT PROX ECA EDV: -16.5 CM/SEC
BH CV XLRA MEAS RIGHT PROX ECA PSV: -122 CM/SEC
BH CV XLRA MEAS RIGHT PROX ICA EDV: 24.6 CM/SEC
BH CV XLRA MEAS RIGHT PROX ICA PSV: 81.5 CM/SEC
BH CV XLRA MEAS RIGHT PROX SCLA PSV: 95.9 CM/SEC
BH CV XLRA MEAS RIGHT VERTEBRAL A EDV: 18.2 CM/SEC
BH CV XLRA MEAS RIGHT VERTEBRAL A PSV: 51 CM/SEC
LEFT ARM BP: NORMAL MMHG
RIGHT ARM BP: NORMAL MMHG

## 2017-10-17 PROCEDURE — 93880 EXTRACRANIAL BILAT STUDY: CPT

## 2017-10-17 RX ORDER — ATORVASTATIN CALCIUM 80 MG/1
80 TABLET, FILM COATED ORAL NIGHTLY
Qty: 30 TABLET | Refills: 2 | Status: SHIPPED | OUTPATIENT
Start: 2017-10-17 | End: 2018-01-13 | Stop reason: SDUPTHER

## 2017-10-17 RX ORDER — CLOPIDOGREL BISULFATE 75 MG/1
75 TABLET ORAL DAILY
Qty: 30 TABLET | Refills: 2 | Status: SHIPPED | OUTPATIENT
Start: 2017-10-17 | End: 2018-01-13 | Stop reason: SDUPTHER

## 2017-10-17 RX ORDER — PANTOPRAZOLE SODIUM 40 MG/1
40 TABLET, DELAYED RELEASE ORAL DAILY
Qty: 30 TABLET | Refills: 2 | Status: SHIPPED | OUTPATIENT
Start: 2017-10-17 | End: 2018-01-13 | Stop reason: SDUPTHER

## 2017-10-17 NOTE — TELEPHONE ENCOUNTER
----- Message from JO Garza sent at 10/17/2017  3:43 PM EDT -----  Contact: PT  This is not stroke related. He needs to FU with his PCP for this  ----- Message -----     From: Lisbeth Brown MA     Sent: 10/17/2017   1:28 PM       To: JO Garza        ----- Message -----     From: Latrice Bernal     Sent: 10/13/2017   2:42 PM       To: Lisbeth Brown MA    Pt says at times his hands are very very cold and wonders if it could be due to poor blood circulation also occasionally he feels like there's a bug crawling on his scalp just above his left ear, when his wife or daughter checks there is nothing there.  Please call him at [phone]

## 2017-12-12 ENCOUNTER — OFFICE VISIT (OUTPATIENT)
Dept: NEUROLOGY | Facility: CLINIC | Age: 67
End: 2017-12-12

## 2017-12-12 VITALS
SYSTOLIC BLOOD PRESSURE: 108 MMHG | HEIGHT: 69 IN | BODY MASS INDEX: 23.85 KG/M2 | WEIGHT: 161 LBS | DIASTOLIC BLOOD PRESSURE: 66 MMHG

## 2017-12-12 DIAGNOSIS — E11.8 TYPE 2 DIABETES MELLITUS WITH COMPLICATION, UNSPECIFIED LONG TERM INSULIN USE STATUS: ICD-10-CM

## 2017-12-12 DIAGNOSIS — E78.5 HYPERLIPIDEMIA LDL GOAL <70: ICD-10-CM

## 2017-12-12 DIAGNOSIS — G45.1 HEMISPHERIC CAROTID ARTERY SYNDROME: Primary | ICD-10-CM

## 2017-12-12 DIAGNOSIS — I10 ESSENTIAL HYPERTENSION: ICD-10-CM

## 2017-12-12 DIAGNOSIS — I65.23 BILATERAL CAROTID ARTERY STENOSIS: ICD-10-CM

## 2017-12-12 PROCEDURE — 99213 OFFICE O/P EST LOW 20 MIN: CPT | Performed by: NURSE PRACTITIONER

## 2017-12-12 NOTE — PROGRESS NOTES
"DOS: 2017  NAME: Harry Sierra   : 1950  PCP: Artie Carranza MD    Chief Complaint   Patient presents with   • Cerebrovascular Accident      Neurological Problem and Interval History:  67 y.o. LHW (ambidextrous) male with HTN, DM, HLD and VBI who presents today to follow-up for TIA.     Mr. Sierra presented to [facility] on 9/15/17 with recurrent transient aphasia. His could not tolerate an MRI but did have a CTA head/neck that showed an eccentric plaques of bilateral ICA bulbs that were likely cause of his TIA.  He had a 2-D echocardiogram that was essentially normal.  He was on ASA 81 mg prior to his event.  He was discharged on Plavix and Lipitor.  He continues on the same medication regimen and is tolerating well, but does complain of some increased loose BMs.  He also complains of bilateral hands being cold.  He denies weakness, numbness, tingling, recurrent speech difficulty or any new stroke/TIA symptoms.  He states feeling a little \"off\" today but no changes in recent health.  He does not take his blood pressure or his blood sugar regularly.  He denies smoking or alcohol use.    Review of Systems:        Review of Systems   Constitutional: Positive for fatigue. Negative for chills and fever.   HENT: Positive for hearing loss and tinnitus. Negative for trouble swallowing.    Eyes: Positive for redness. Negative for pain, discharge, itching and visual disturbance.   Respiratory: Positive for shortness of breath. Negative for cough and wheezing.    Cardiovascular: Negative for chest pain, palpitations and leg swelling.   Gastrointestinal: Positive for nausea (up to 5 times a week). Negative for constipation, diarrhea and vomiting.   Endocrine: Positive for cold intolerance (both hands cold). Negative for heat intolerance and polydipsia.   Genitourinary: Negative for decreased urine volume, difficulty urinating, frequency and urgency.   Musculoskeletal: Negative for back pain, gait problem, neck pain and neck " "stiffness.   Skin: Negative for color change, rash and wound.   Allergic/Immunologic: Positive for food allergies. Negative for immunocompromised state.   Neurological: Positive for dizziness (up to 5 times a week) and headaches (over the right eye). Negative for tremors, seizures, syncope, facial asymmetry, speech difficulty, weakness, light-headedness and numbness.   Hematological: Negative for adenopathy. Does not bruise/bleed easily.   Psychiatric/Behavioral: Negative for agitation, behavioral problems, confusion, decreased concentration, dysphoric mood, hallucinations, self-injury, sleep disturbance and suicidal ideas. The patient is not nervous/anxious and is not hyperactive.        \"The following portions of the patient's history were reviewed and updated as appropriate: allergies, current medications, past family history, past medical history, past social history, past surgical history and problem list.\"  Review and Interpretation of Imaging: Per Dr. Alvarez.  CT brain: Negative for acute stroke mass or hemorrhage.  There is severe calcification of the intracranial vertebral arteries.  Carotid ultrasound: Plaque in bilateral carotid arteries but no significant stenosis  CTA h/n 9/16/17: Mild calcified aortic stenosis. Moderate calcified stenosis of the bilateral vertebral origins. Mild calcification of the V4 segments. Calcified eccentric plaque of bilateral ICA bulbs. Calcification of the intracranial bilateral ICAs. Small right ICA sidewall aneurysm vs ulceration.      EKG: Sinus rhythm  TTE 9/15/17: LVEF 65%, normal size; RV normal size; LA normal size, saline tests negative; RA normal    Carotid US 10/17/17:   Blood Pressure Measurements     Right Side Left Side   Blood Pressure 121/73 mmHg       129/76 mmHg            Carotid Velocities - Right Side    Systolic Diastolic   CCA Prox 90.3 cm/sec       18.2 cm/sec         CCA Dist 87.9 cm/sec       25.2 cm/sec         ICA Prox 81.5 cm/sec       24.6 " cm/sec         ICA Mid -61.0 cm/sec       -22.3 cm/sec         ICA Dist -57.7 cm/sec       -23.1 cm/sec         ECA -122.0 cm/sec       -16.5 cm/sec         Vertebral 51.0 cm/sec       18.2 cm/sec         Subclavian 95.9 cm/sec                Carotid Velocities - Left Side    Systolic Diastolic   CCA Prox 122.0 cm/sec       24.4 cm/sec         CCA Dist -101.0 cm/sec       -25.1 cm/sec         ICA Prox -55.8 cm/sec       -22.8 cm/sec         ICA Mid -73.1 cm/sec       -29.1 cm/sec         ICA Dist -60.8 cm/sec       -22.1 cm/sec         ECA -96.6 cm/sec       -22.8 cm/sec         Vertebral 52.2 cm/sec       18.8 cm/sec         Subclavian 68.6 cm/sec                   Laboratory Results:             Lab Results   Component Value Date    HGBA1C 6.90 (H) 09/14/2017     Lab Results   Component Value Date    CHOL 120 09/15/2017     Lab Results   Component Value Date    HDL 30 (L) 09/15/2017    HDL 35 (L) 08/31/2016     Lab Results   Component Value Date    LDL 88 08/31/2016     Lab Results   Component Value Date    TRIG 106 09/15/2017    TRIG 105 08/31/2016     Lab Results   Component Value Date    CHOL 120 09/15/2017    CHLPL 144 08/31/2016    TRIG 106 09/15/2017    HDL 30 (L) 09/15/2017    LDLCALC 69 09/15/2017    LDL 88 08/31/2016     No components found for: CHOLHDL  No results found for: RPR  No results found for: TSH  No results found for: [id number]    Physical Examination: NIHSS: 0 mRS: 0  General Appearance:   Well developed, well nourished, well groomed, alert, and cooperative.  HEENT: Normocephalic.    Neck and Spine: Normal range of motion.  Normal alignment. No mass or tenderness.   Cardiac: Regular rate and rhythm. No murmurs.  Peripheral Vasculature: Radial pulses are equal and symmetric. No signs of distal embolization.  Extremities:    No edema or deformities. Normal joint ROM.  Skin:    No rashes or birth marks.    Neurological examination:  Higher Integrative  Function: Oriented to time, place and  person. Normal registration, recall (3/3), attention span and concentration. Normal language including comprehension, spontaneous speech, repetition, reading, writing, naming and vocabulary. No neglect with normal visual-spatial function and construction. Normal fund of knowledge and higher integrative function.  CN II: Pupils are equal, round, and reactive to light. Normal visual acuity and visual fields.    CN III IV VI: Extraocular movements are full without nystagmus.   CN V: Normal facial sensation and strength of muscles of mastication.  CN VII: Facial movements are symmetric. No weakness.  CN VIII:   Auditory acuity is normal.  CN IX & X:   Symmetric palatal movement.  CN XI: Sternocleidomastoid and trapezius are normal.  No weakness.  CN XII:   The tongue is midline.  No atrophy or fasciculations.  Motor: Normal muscle strength, bulk and tone in upper and lower extremities.  No fasciculations, rigidity, spasticity, or abnormal movements.  Sensation: Normal to light touch, temperature, and proprioception in arms and legs. Normal graphesthesia and no extinction on DSS.  Station and Gait: Normal gait and station.    Coordination: Finger to nose test shows no dysmetria.  Heel to shin normal.    Diagnoses / Discussion:  Mr. Sierra is doing well following his episodes of transient aphasia he suffered in September of this year.  His had no recurrent events or any new stroke/TIA symptoms and is essentially remained at his neurologic baseline.  The etiology of his events were likely due to bilateral ICA bulb eccentric plaques found on CTA. He had a carotid US done 10/17, velocities indicate no severe stenosis. He should continue his current medication regimen. He is scheduled to have follow-up lab work with his PCP in the next several weeks. Continue Lipitor with goal LDL < 70.  We discussed the importance of regular monitoring of his blood pressure and blood sugar. Discussed importance of risk factor control for stroke  prevention, including BP and cholesterol control. We reviewed signs/symptoms of stroke and importance of calling 911 if he were to experience any of these. He is to follow-up in 9 months.     Plan:   Continue plavix   Patient to follow-up with PCP with lab work   Lipitor, dose pending above   Goal LDL < 70   montior BP and blood sugar regularly   Blood pressure control to <130/80   Serum glucose < 140     Call 911 for stroke any stroke symptoms   Follow-up in 9 months  Harry was seen today for cerebrovascular accident.    Diagnoses and all orders for this visit:    Hemispheric carotid artery syndrome    Essential hypertension    Bilateral carotid artery stenosis    Type 2 diabetes mellitus with complication, unspecified long term insulin use status    Hyperlipidemia LDL goal <70      Coding

## 2017-12-18 ENCOUNTER — TELEPHONE (OUTPATIENT)
Dept: INTERNAL MEDICINE | Age: 67
End: 2017-12-18

## 2017-12-18 NOTE — TELEPHONE ENCOUNTER
Patient called his neuro doctor would like to make sure when we do labs that we run PSA and lipid for him. He is scheduled for 12-19-17

## 2017-12-19 ENCOUNTER — OFFICE VISIT (OUTPATIENT)
Dept: INTERNAL MEDICINE | Age: 67
End: 2017-12-19

## 2017-12-19 VITALS
BODY MASS INDEX: 24.2 KG/M2 | OXYGEN SATURATION: 99 % | TEMPERATURE: 98 F | WEIGHT: 163.4 LBS | SYSTOLIC BLOOD PRESSURE: 112 MMHG | HEART RATE: 96 BPM | HEIGHT: 69 IN | DIASTOLIC BLOOD PRESSURE: 68 MMHG

## 2017-12-19 DIAGNOSIS — R19.7 DIARRHEA, UNSPECIFIED TYPE: Primary | ICD-10-CM

## 2017-12-19 DIAGNOSIS — R97.20 ABNORMAL PROSTATE SPECIFIC ANTIGEN (PSA): ICD-10-CM

## 2017-12-19 DIAGNOSIS — E11.8 TYPE 2 DIABETES MELLITUS WITH COMPLICATION, UNSPECIFIED LONG TERM INSULIN USE STATUS: ICD-10-CM

## 2017-12-19 PROCEDURE — 99214 OFFICE O/P EST MOD 30 MIN: CPT | Performed by: INTERNAL MEDICINE

## 2017-12-19 RX ORDER — GLIMEPIRIDE 2 MG/1
2 TABLET ORAL
Qty: 30 TABLET | Refills: 2 | Status: SHIPPED | OUTPATIENT
Start: 2017-12-19 | End: 2018-03-13

## 2017-12-19 NOTE — PROGRESS NOTES
"Harry S Mariela / 67 y.o. / male  12/19/2017  VITALS    Visit Vitals   • /68   • Pulse 96   • Temp 98 °F (36.7 °C)   • Ht 175.3 cm (69.02\")   • Wt 74.1 kg (163 lb 6.4 oz)   • SpO2 99%   • BMI 24.12 kg/m2       BP Readings from Last 3 Encounters:   12/19/17 112/68   12/12/17 108/66   09/28/17 120/70     Wt Readings from Last 3 Encounters:   12/19/17 74.1 kg (163 lb 6.4 oz)   12/12/17 73 kg (161 lb)   09/28/17 72.6 kg (160 lb)      Body mass index is 24.12 kg/(m^2).    CC:  Main reason(s) for today's visit: Follow-up      HPI:   Patient presents today with chief complaint of diarrhea, noted over the past 3 months.  He has been on metformin over the past 3 months as well.  He has never been on Amaryl.  He does not monitor blood sugar at home.    Interval history: Patient is seen by neurology within the past week.  He has had no further neurological symptoms since his discharge earlier this fall.    Patient is being followed by urology for abnormal PSA.  He has had at least one biopsy to date.  There is a family history of prostate cancer (brother).  He would like his PSA done today to have in hand for his next urological follow-up.      Patient Care Team:  Artie Carranza MD as PCP - General (Internal Medicine)  Sakshi Pearce RN as Care Coordinator (Population Health)  ____________________________________________________________________    ASSESSMENT & PLAN:    Problem List Items Addressed This Visit        Unprioritized    Diabetes mellitus    Relevant Medications    glimepiride (AMARYL) 2 MG tablet    Other Relevant Orders    Hemoglobin A1c    Microalbumin / Creatinine Urine Ratio - Urine, Clean Catch    Lipid Panel      Other Visit Diagnoses     Diarrhea, unspecified type    -  Primary    Abnormal prostate specific antigen (PSA)        Relevant Orders    PSA        Orders Placed This Encounter   Procedures   • Hemoglobin A1c   • Microalbumin / Creatinine Urine Ratio - Urine, Clean Catch   • Lipid Panel   • " PSA       Summary/Discussion:  · Number-one diarrhea I believe this is due to side effect of metformin.  Recommend discontinue metformin today, patient will contact me in about a week with an update on his symptoms.  ·   · #2 diabetes we'll check A1c and microalbumin today as a baseline since patient including side effects was reviewed not monitor blood sugar at home, switched Amaryl 2 mg per day.  Side effects reviewed with patient.  We'll also check baseline lipid panel today    #3 history of abnormal PSA, repeat PSA today, so we can present this at his next urological follow-up in January.    Return in about 2 months (around 2/19/2018) for Recheck.    No future appointments.    ______________________________________________________    REVIEW OF SYSTEMS    Review of Systems   Respiratory: Negative for chest tightness and shortness of breath.    Cardiovascular: Negative for chest pain and palpitations.   Neurological: Negative for dizziness, syncope, speech difficulty, weakness, light-headedness and headaches.         PHYSICAL EXAMINATION    Physical Exam   Constitutional: He is oriented to person, place, and time. He appears well-developed and well-nourished. No distress.   Cardiovascular: Normal rate, regular rhythm, normal heart sounds and intact distal pulses.  Exam reveals no gallop and no friction rub.    No murmur heard.  Pulmonary/Chest: Effort normal and breath sounds normal. He has no wheezes. He has no rales.   Musculoskeletal: He exhibits no edema.   Neurological: He is alert and oriented to person, place, and time.   Skin: Skin is warm and dry. No rash noted.   Psychiatric: He has a normal mood and affect. His behavior is normal. Judgment and thought content normal.   Nursing note and vitals reviewed.    REVIEWED DATA:    Labs:     Lab Results   Component Value Date     09/15/2017    K 4.2 09/15/2017    AST 13 09/15/2017    ALT 15 09/15/2017    BUN 16 09/15/2017    CREATININE 0.71 (L) 09/15/2017     CREATININE 0.79 09/14/2017    CREATININE 0.66 (L) 09/28/2016    EGFRIFNONA 110 09/15/2017    EGFRIFAFRI 116 08/31/2016       Lab Results   Component Value Date    HGBA1C 6.90 (H) 09/14/2017    HGBA1C 7.10 (H) 09/27/2016    HGBA1C 6.8 (H) 08/31/2016     (H) 08/31/2016    MICROALBUR 3.5 08/31/2016       Lab Results   Component Value Date    LDL 88 08/31/2016    HDL 30 (L) 09/15/2017    TRIG 106 09/15/2017       No results found for: TSH, FREET4    Lab Results   Component Value Date    WBC 6.64 09/15/2017    HGB 13.0 (L) 09/15/2017    HGB 14.0 09/14/2017    HGB 13.0 12/01/2016     09/15/2017       Imaging:         Medical Tests:         Summary of old records / correspondence / consultant report:         Request outside records:         ALLERGIES  Allergies   Allergen Reactions   • Shellfish-Derived Products Anaphylaxis   • Bee Venom         MEDICATIONS  Current Outpatient Prescriptions   Medication Sig Dispense Refill   • acetaminophen (TYLENOL) 500 MG tablet Take 1 tablet by mouth 4 (Four) Times a Day As Needed for mild pain (1-3) or fever.  0   • atorvastatin (LIPITOR) 80 MG tablet Take 1 tablet by mouth Every Night. 30 tablet 2   • clopidogrel (PLAVIX) 75 MG tablet Take 1 tablet by mouth Daily. 30 tablet 2   • enalapril (VASOTEC) 5 MG tablet TAKE ONE TABLET BY MOUTH DAILY 30 tablet 3   • glimepiride (AMARYL) 2 MG tablet Take 1 tablet by mouth Every Morning Before Breakfast. 30 tablet 2   • glucose blood (COOL BLOOD GLUCOSE TEST STRIPS) test strip Use as instructed 1 each 0   • pantoprazole (PROTONIX) 40 MG EC tablet Take 1 tablet by mouth Daily. 30 tablet 2     No current facility-administered medications for this visit.        PFSH:     The following portions of the patient's history were reviewed and updated as appropriate: Allergies / Current Medications / Past Medical History / Surgical History / Social History / Family History    PROBLEM LIST   Patient Active Problem List   Diagnosis   •  Hypertension   • Diabetes mellitus   • Routine health maintenance   • Abnormal PSA   • Prostatitis   • Sepsis   • UTI (urinary tract infection) due to urinary indwelling catheter   • Reflux esophagitis   • Melena   • Bilateral carotid artery stenosis   • Transient cerebral ischemia   • Hyperlipidemia LDL goal <70       PAST MEDICAL HISTORY  Past Medical History:   Diagnosis Date   • Cancer     Basal Cell   • Diabetes mellitus    • Hypertension    • Sepsis 09/2016   • Shingles    • UTI (urinary tract infection)    • Vertebral artery insufficiency        SURGICAL HISTORY  Past Surgical History:   Procedure Laterality Date   • COLONOSCOPY      2011   • ENDOSCOPY N/A 12/15/2016    Procedure: ESOPHAGOGASTRODUODENOSCOPY WITH COLD BIOPSIES;  Surgeon: Moshe Lux MD;  Location: [facility] ENDOSCOPY;  Service:        SOCIAL HISTORY  Social History     Social History   • Marital status:      Spouse name: N/A   • Number of children: N/A   • Years of education: N/A     Social History Main Topics   • Smoking status: Never Smoker   • Smokeless tobacco: Never Used   • Alcohol use No      Comment: 1/2 beer once every 3 months   • Drug use: No   • Sexual activity: Defer     Other Topics Concern   • None     Social History Narrative       FAMILY HISTORY  Family History   Problem Relation Age of Onset   • Diabetes Mother    • Heart disease Mother    • Uterine cancer Mother    • Diabetes Father    • Heart disease Father    • Kidney disease Father    • Skin cancer Father      melanoma   • Cancer Father      testicle         **Dragon Disclaimer:   Much of this encounter note is an electronic transcription/translation of spoken language to printed text. The electronic translation of spoken language may permit erroneous, or at times, nonsensical words or phrases to be inadvertently transcribed. Although I have reviewed the note for such errors, some may still exist.

## 2017-12-20 LAB
CHOLEST SERPL-MCNC: 85 MG/DL (ref 0–200)
HBA1C MFR BLD: 8.63 % (ref 4.8–5.6)
HDLC SERPL-MCNC: 37 MG/DL (ref 40–60)
LDLC SERPL CALC-MCNC: 33 MG/DL (ref 0–100)
PSA SERPL-MCNC: 4.26 NG/ML (ref 0–4)
TRIGL SERPL-MCNC: 73 MG/DL (ref 0–150)
UNABLE TO VOID: NORMAL
VLDLC SERPL CALC-MCNC: 14.6 MG/DL (ref 5–40)

## 2017-12-26 ENCOUNTER — TELEPHONE (OUTPATIENT)
Dept: INTERNAL MEDICINE | Age: 67
End: 2017-12-26

## 2017-12-26 NOTE — TELEPHONE ENCOUNTER
"If he is doing well and having \"no adverse reactions\" then I would suggest continuing the same dose would be the most logical thing to do.  Follow-up with Dr. Carranza per whatever advice has been given to the patient by him.  "

## 2017-12-26 NOTE — TELEPHONE ENCOUNTER
The new medication (replacement for metformin) is going well. He has had no adverse reactions. He wanted to know if Dr. Carranza wanted to up the dose or keep it at the same dose.

## 2017-12-26 NOTE — TELEPHONE ENCOUNTER
Pt notified of Dr Blackman recommendations. Pt requested this info to still be passed to PCP for review. SHIRA

## 2018-01-13 DIAGNOSIS — I65.23 BILATERAL CAROTID ARTERY STENOSIS: ICD-10-CM

## 2018-01-13 DIAGNOSIS — I10 ESSENTIAL HYPERTENSION: ICD-10-CM

## 2018-01-13 DIAGNOSIS — G45.1 HEMISPHERIC CAROTID ARTERY SYNDROME: ICD-10-CM

## 2018-01-13 DIAGNOSIS — K21.00 REFLUX ESOPHAGITIS: ICD-10-CM

## 2018-01-15 RX ORDER — ENALAPRIL MALEATE 5 MG/1
TABLET ORAL
Qty: 30 TABLET | Refills: 2 | Status: SHIPPED | OUTPATIENT
Start: 2018-01-15 | End: 2018-04-14 | Stop reason: SDUPTHER

## 2018-01-15 RX ORDER — CLOPIDOGREL BISULFATE 75 MG/1
TABLET ORAL
Qty: 30 TABLET | Refills: 2 | Status: SHIPPED | OUTPATIENT
Start: 2018-01-15 | End: 2018-04-14 | Stop reason: SDUPTHER

## 2018-01-15 RX ORDER — PANTOPRAZOLE SODIUM 40 MG/1
TABLET, DELAYED RELEASE ORAL
Qty: 30 TABLET | Refills: 2 | Status: SHIPPED | OUTPATIENT
Start: 2018-01-15 | End: 2018-04-14 | Stop reason: SDUPTHER

## 2018-01-15 RX ORDER — ATORVASTATIN CALCIUM 80 MG/1
TABLET, FILM COATED ORAL
Qty: 30 TABLET | Refills: 2 | Status: SHIPPED | OUTPATIENT
Start: 2018-01-15 | End: 2018-03-13 | Stop reason: SDUPTHER

## 2018-03-13 ENCOUNTER — OFFICE VISIT (OUTPATIENT)
Dept: INTERNAL MEDICINE | Age: 68
End: 2018-03-13

## 2018-03-13 VITALS
TEMPERATURE: 98 F | WEIGHT: 161.4 LBS | OXYGEN SATURATION: 99 % | DIASTOLIC BLOOD PRESSURE: 82 MMHG | BODY MASS INDEX: 23.91 KG/M2 | HEIGHT: 69 IN | HEART RATE: 81 BPM | SYSTOLIC BLOOD PRESSURE: 120 MMHG

## 2018-03-13 DIAGNOSIS — I65.23 BILATERAL CAROTID ARTERY STENOSIS: ICD-10-CM

## 2018-03-13 DIAGNOSIS — E78.5 HYPERLIPIDEMIA LDL GOAL <70: ICD-10-CM

## 2018-03-13 DIAGNOSIS — E11.8 TYPE 2 DIABETES MELLITUS WITH COMPLICATION, UNSPECIFIED LONG TERM INSULIN USE STATUS: ICD-10-CM

## 2018-03-13 DIAGNOSIS — G45.1 HEMISPHERIC CAROTID ARTERY SYNDROME: ICD-10-CM

## 2018-03-13 DIAGNOSIS — I10 ESSENTIAL HYPERTENSION: Primary | ICD-10-CM

## 2018-03-13 PROBLEM — C61 PROSTATE CANCER: Status: ACTIVE | Noted: 2018-03-13

## 2018-03-13 LAB — HBA1C MFR BLD: 9.8 % (ref 4.8–5.6)

## 2018-03-13 PROCEDURE — 99214 OFFICE O/P EST MOD 30 MIN: CPT | Performed by: INTERNAL MEDICINE

## 2018-03-13 RX ORDER — GLIMEPIRIDE 2 MG/1
4 TABLET ORAL
Qty: 60 TABLET | Refills: 3 | Status: SHIPPED | OUTPATIENT
Start: 2018-03-13 | End: 2018-03-14 | Stop reason: SDUPTHER

## 2018-03-13 RX ORDER — METFORMIN HYDROCHLORIDE EXTENDED-RELEASE TABLETS 1000 MG/1
1000 TABLET, FILM COATED, EXTENDED RELEASE ORAL 2 TIMES DAILY WITH MEALS
Qty: 60 TABLET | Refills: 3 | Status: SHIPPED | OUTPATIENT
Start: 2018-03-13 | End: 2018-03-14 | Stop reason: SDUPTHER

## 2018-03-13 RX ORDER — ATORVASTATIN CALCIUM 40 MG/1
40 TABLET, FILM COATED ORAL
Qty: 30 TABLET | Refills: 3
Start: 2018-03-13 | End: 2018-05-07 | Stop reason: SDUPTHER

## 2018-03-13 NOTE — PROGRESS NOTES
"    Harry S Mariela / 67 y.o. / male  03/13/2018  VITALS    Visit Vitals  /82   Pulse 81   Temp 98 °F (36.7 °C)   Ht 175.3 cm (69.02\")   Wt 73.2 kg (161 lb 6.4 oz)   SpO2 99%   BMI 23.82 kg/m²       BP Readings from Last 3 Encounters:   03/13/18 120/82   12/19/17 112/68   12/12/17 108/66     Wt Readings from Last 3 Encounters:   03/13/18 73.2 kg (161 lb 6.4 oz)   12/19/17 74.1 kg (163 lb 6.4 oz)   12/12/17 73 kg (161 lb)      Body mass index is 23.82 kg/m².    CC:  Main reason(s) for today's visit: Hypertension; Diabetes; and Hyperlipidemia      HPI:     Patient presents today for follow-up of several medical issues.    Hypertension: He is compliant with medication, dietary salt restriction, regular physical activity, home blood pressure runs in the 1 teens over 70s range. he does walk 40-45 minutes minutes 3-4 times per week.      Hyperlipidemia on medication,last lipids done December 2017, total cholesterol 85 HDL 37, LDL 33, triglycerides 73 on Lipitor 80 mg..    Diabetes: Last A1c was 8.63 in December.at that time, Amaryl was  started  Repeat A1c is due today.  She is on ACE inhibitor at this time, S ophthalmology visit within the past 2 years.      Patient Care Team:  Artie Carranza MD as PCP - General (Internal Medicine)  Artie Carranza MD as PCP - Claims Attributed  Sakshi Pearce RN as Care Coordinator (Population Health)  ____________________________________________________________________    ASSESSMENT & PLAN:    Problem List Items Addressed This Visit        Unprioritized    Hypertension - Primary    Relevant Medications    enalapril (VASOTEC) 5 MG tablet    Diabetes mellitus    Relevant Medications    glimepiride (AMARYL) 2 MG tablet    Other Relevant Orders    Hemoglobin A1c    Bilateral carotid artery stenosis    Overview     Bilateral 16-49% stenosis, December 2016, No change October 2017.         Relevant Medications    clopidogrel (PLAVIX) 75 MG tablet    atorvastatin (LIPITOR) 40 MG tablet    " Transient cerebral ischemia    Relevant Medications    clopidogrel (PLAVIX) 75 MG tablet    atorvastatin (LIPITOR) 40 MG tablet    Hyperlipidemia LDL goal <70    Relevant Medications    atorvastatin (LIPITOR) 40 MG tablet    Other Relevant Orders    ALT    AST    Lipid Panel      Other Visit Diagnoses    None.       Orders Placed This Encounter   Procedures   • Hemoglobin A1c   • ALT   • AST   • Lipid Panel       Summary/Discussion:  · Number-one hypertension stable on current medication.  Plan: Same meds, blood pressure check 4 months.  ·   · #2 hyperlipidemia I believe that the suppression of the lipids is extremely at this point.  I recommended patient decrease the Lipitor at 40 mg and repeat the labs in approximately 2 months.  ·   · #3 diabetes status post addition of glimepiride, diet and exercise.  We'll check A1c today and proceed according to results.  ·   · #4 routine health maintenance, schedule annual wellness visit.    Return in about 6 months (around 9/13/2018) for Blood pressure check, Annual wellness visit.    No future appointments.    ______________________________________________________    REVIEW OF SYSTEMS    Review of Systems   Respiratory: Negative for chest tightness and shortness of breath.    Cardiovascular: Negative for chest pain and palpitations.   Neurological: Negative for dizziness, syncope, speech difficulty, weakness, light-headedness and headaches.         PHYSICAL EXAMINATION    Physical Exam   Constitutional: He is oriented to person, place, and time. He appears well-developed and well-nourished. No distress.   Cardiovascular: Normal rate, regular rhythm, normal heart sounds and intact distal pulses.  Exam reveals no gallop and no friction rub.    No murmur heard.  Pulmonary/Chest: Effort normal and breath sounds normal. He has no wheezes. He has no rales.   Musculoskeletal: He exhibits no edema.   Neurological: He is alert and oriented to person, place, and time.   Skin: Skin is  warm and dry. No rash noted.   Psychiatric: He has a normal mood and affect. His behavior is normal. Judgment and thought content normal.   Nursing note and vitals reviewed.      REVIEWED DATA:    Labs:     Lab Results   Component Value Date     09/15/2017    K 4.2 09/15/2017    AST 13 09/15/2017    ALT 15 09/15/2017    BUN 16 09/15/2017    CREATININE 0.71 (L) 09/15/2017    CREATININE 0.79 09/14/2017    CREATININE 0.66 (L) 09/28/2016    EGFRIFNONA 110 09/15/2017    EGFRIFAFRI 116 08/31/2016       Lab Results   Component Value Date    HGBA1C 8.63 (H) 12/19/2017    HGBA1C 6.90 (H) 09/14/2017    HGBA1C 7.10 (H) 09/27/2016    GLUCOSE 142 (H) 09/15/2017    GLUCOSE 162 (H) 09/14/2017    GLUCOSE 173 (H) 09/28/2016    MICROALBUR 3.5 08/31/2016       Lab Results   Component Value Date    LDL 33 12/19/2017    LDL 69 09/15/2017    LDL 88 08/31/2016    HDL 37 (L) 12/19/2017    TRIG 73 12/19/2017       No results found for: TSH, FREET4    Lab Results   Component Value Date    WBC 6.64 09/15/2017    HGB 13.0 (L) 09/15/2017    HGB 14.0 09/14/2017    HGB 13.0 12/01/2016     09/15/2017       Imaging:         Medical Tests:         Summary of old records / correspondence / consultant report:         Request outside records:         ALLERGIES  Allergies   Allergen Reactions   • Shellfish-Derived Products Anaphylaxis   • Bee Venom         MEDICATIONS  Current Outpatient Prescriptions   Medication Sig Dispense Refill   • acetaminophen (TYLENOL) 500 MG tablet Take 1 tablet by mouth 4 (Four) Times a Day As Needed for mild pain (1-3) or fever.  0   • atorvastatin (LIPITOR) 40 MG tablet Take 1 tablet by mouth every night at bedtime. 30 tablet 3   • clopidogrel (PLAVIX) 75 MG tablet TAKE ONE TABLET BY MOUTH DAILY 30 tablet 2   • enalapril (VASOTEC) 5 MG tablet TAKE ONE TABLET BY MOUTH DAILY 30 tablet 2   • glimepiride (AMARYL) 2 MG tablet Take 1 tablet by mouth Every Morning Before Breakfast. 30 tablet 2   • glucose blood (COOL  BLOOD GLUCOSE TEST STRIPS) test strip Use as instructed 1 each 0   • pantoprazole (PROTONIX) 40 MG EC tablet TAKE ONE TABLET BY MOUTH DAILY 30 tablet 2     No current facility-administered medications for this visit.        PFSH:     The following portions of the patient's history were reviewed and updated as appropriate: Allergies / Current Medications / Past Medical History / Surgical History / Social History / Family History    PROBLEM LIST   Patient Active Problem List   Diagnosis   • Hypertension   • Diabetes mellitus   • Routine health maintenance   • Abnormal PSA   • Prostatitis   • Sepsis   • UTI (urinary tract infection) due to urinary indwelling catheter   • Reflux esophagitis   • Melena   • Bilateral carotid artery stenosis   • Transient cerebral ischemia   • Hyperlipidemia LDL goal <70   • Prostate cancer       PAST MEDICAL HISTORY  Past Medical History:   Diagnosis Date   • Cancer     Basal Cell   • Diabetes mellitus    • Hypertension    • Sepsis 09/2016   • Shingles    • UTI (urinary tract infection)    • Vertebral artery insufficiency        SURGICAL HISTORY  Past Surgical History:   Procedure Laterality Date   • COLONOSCOPY      2011   • ENDOSCOPY N/A 12/15/2016    Procedure: ESOPHAGOGASTRODUODENOSCOPY WITH COLD BIOPSIES;  Surgeon: Moshe Lux MD;  Location: [facility] ENDOSCOPY;  Service:        SOCIAL HISTORY  Social History     Social History   • Marital status:    • Number of children: 4     Social History Main Topics   • Smoking status: Never Smoker   • Smokeless tobacco: Never Used   • Alcohol use No      Comment: 1/2 beer once every 3 months   • Drug use: No   • Sexual activity: Defer     Other Topics Concern   • Not on file       FAMILY HISTORY  Family History   Problem Relation Age of Onset   • Diabetes Mother    • Heart disease Mother    • Uterine cancer Mother    • Diabetes Father    • Heart disease Father    • Kidney disease Father    • Skin cancer Father      melanoma   • Cancer  Father      testicle         **Darlene Disclaimer:   Much of this encounter note is an electronic transcription/translation of spoken language to printed text. The electronic translation of spoken language may permit erroneous, or at times, nonsensical words or phrases to be inadvertently transcribed. Although I have reviewed the note for such errors, some may still exist.

## 2018-03-14 DIAGNOSIS — E11.8 TYPE 2 DIABETES MELLITUS WITH COMPLICATION, UNSPECIFIED LONG TERM INSULIN USE STATUS: ICD-10-CM

## 2018-03-14 RX ORDER — GLIMEPIRIDE 4 MG/1
8 TABLET ORAL
Qty: 60 TABLET | Refills: 3 | Status: SHIPPED | OUTPATIENT
Start: 2018-03-14 | End: 2018-07-09 | Stop reason: SDUPTHER

## 2018-03-14 RX ORDER — METFORMIN HYDROCHLORIDE EXTENDED-RELEASE TABLETS 1000 MG/1
1000 TABLET, FILM COATED, EXTENDED RELEASE ORAL 2 TIMES DAILY WITH MEALS
Qty: 60 TABLET | Refills: 3 | Status: SHIPPED | OUTPATIENT
Start: 2018-03-14 | End: 2018-10-16

## 2018-03-16 DIAGNOSIS — E11.8 TYPE 2 DIABETES MELLITUS WITH COMPLICATION, UNSPECIFIED LONG TERM INSULIN USE STATUS: ICD-10-CM

## 2018-03-16 RX ORDER — GLIMEPIRIDE 2 MG/1
TABLET ORAL
Qty: 30 TABLET | Refills: 5 | Status: SHIPPED | OUTPATIENT
Start: 2018-03-16 | End: 2018-07-10 | Stop reason: DRUGHIGH

## 2018-03-18 ENCOUNTER — RESULTS ENCOUNTER (OUTPATIENT)
Dept: INTERNAL MEDICINE | Age: 68
End: 2018-03-18

## 2018-03-18 DIAGNOSIS — E11.8 TYPE 2 DIABETES MELLITUS WITH COMPLICATION, UNSPECIFIED LONG TERM INSULIN USE STATUS: ICD-10-CM

## 2018-04-14 DIAGNOSIS — I65.23 BILATERAL CAROTID ARTERY STENOSIS: ICD-10-CM

## 2018-04-14 DIAGNOSIS — K21.00 REFLUX ESOPHAGITIS: ICD-10-CM

## 2018-04-14 DIAGNOSIS — G45.1 HEMISPHERIC CAROTID ARTERY SYNDROME: ICD-10-CM

## 2018-04-14 DIAGNOSIS — I10 ESSENTIAL HYPERTENSION: ICD-10-CM

## 2018-04-16 RX ORDER — PANTOPRAZOLE SODIUM 40 MG/1
TABLET, DELAYED RELEASE ORAL
Qty: 30 TABLET | Refills: 1 | Status: SHIPPED | OUTPATIENT
Start: 2018-04-16 | End: 2018-06-12 | Stop reason: SDUPTHER

## 2018-04-16 RX ORDER — CLOPIDOGREL BISULFATE 75 MG/1
TABLET ORAL
Qty: 30 TABLET | Refills: 1 | Status: SHIPPED | OUTPATIENT
Start: 2018-04-16 | End: 2018-06-12 | Stop reason: SDUPTHER

## 2018-04-16 RX ORDER — ENALAPRIL MALEATE 5 MG/1
TABLET ORAL
Qty: 30 TABLET | Refills: 1 | Status: SHIPPED | OUTPATIENT
Start: 2018-04-16 | End: 2018-06-12 | Stop reason: SDUPTHER

## 2018-04-16 RX ORDER — ATORVASTATIN CALCIUM 80 MG/1
TABLET, FILM COATED ORAL
Qty: 30 TABLET | Refills: 1 | Status: SHIPPED | OUTPATIENT
Start: 2018-04-16 | End: 2018-05-07 | Stop reason: DRUGHIGH

## 2018-04-25 ENCOUNTER — TELEPHONE (OUTPATIENT)
Dept: INTERNAL MEDICINE | Age: 68
End: 2018-04-25

## 2018-04-25 NOTE — TELEPHONE ENCOUNTER
Patient developed thrush over the weekend in his mouth. He went to the [facility] and they prescribed Nystatin for 7-14 days. He wanted to make sure this was the correct treatment for this and make sure it did not interact with any of his other medications.

## 2018-05-07 ENCOUNTER — TELEPHONE (OUTPATIENT)
Dept: INTERNAL MEDICINE | Age: 68
End: 2018-05-07

## 2018-05-07 DIAGNOSIS — G45.1 HEMISPHERIC CAROTID ARTERY SYNDROME: ICD-10-CM

## 2018-05-07 DIAGNOSIS — I65.23 BILATERAL CAROTID ARTERY STENOSIS: ICD-10-CM

## 2018-05-07 RX ORDER — ATORVASTATIN CALCIUM 40 MG/1
40 TABLET, FILM COATED ORAL
Qty: 30 TABLET | Refills: 3 | Status: SHIPPED | OUTPATIENT
Start: 2018-05-07 | End: 2018-09-09 | Stop reason: SDUPTHER

## 2018-05-07 NOTE — TELEPHONE ENCOUNTER
----- Message from Vanessa Cabral sent at 5/7/2018 12:47 PM EDT -----  Regarding: Update Atorvastatin dosage  Pt's med list states Atorvastatin 80mg, take 1 tab by mouth every night; according to LOV 3/13/18,  ordered pt to decrease rx to 40mg, which pt has stated to me that he is cutting the 80mg tab in half & taking half a tab every night.     Pls update pt's med list.

## 2018-05-14 ENCOUNTER — RESULTS ENCOUNTER (OUTPATIENT)
Dept: INTERNAL MEDICINE | Age: 68
End: 2018-05-14

## 2018-05-14 DIAGNOSIS — E78.5 HYPERLIPIDEMIA LDL GOAL <70: ICD-10-CM

## 2018-06-12 DIAGNOSIS — K21.00 REFLUX ESOPHAGITIS: ICD-10-CM

## 2018-06-12 DIAGNOSIS — G45.1 HEMISPHERIC CAROTID ARTERY SYNDROME: ICD-10-CM

## 2018-06-12 DIAGNOSIS — I65.23 BILATERAL CAROTID ARTERY STENOSIS: ICD-10-CM

## 2018-06-12 DIAGNOSIS — I10 ESSENTIAL HYPERTENSION: ICD-10-CM

## 2018-06-12 RX ORDER — CLOPIDOGREL BISULFATE 75 MG/1
TABLET ORAL
Qty: 30 TABLET | Refills: 0 | Status: SHIPPED | OUTPATIENT
Start: 2018-06-12 | End: 2018-07-12 | Stop reason: SDUPTHER

## 2018-06-12 RX ORDER — ENALAPRIL MALEATE 5 MG/1
TABLET ORAL
Qty: 30 TABLET | Refills: 0 | Status: SHIPPED | OUTPATIENT
Start: 2018-06-12 | End: 2018-07-12 | Stop reason: SDUPTHER

## 2018-06-12 RX ORDER — PANTOPRAZOLE SODIUM 40 MG/1
TABLET, DELAYED RELEASE ORAL
Qty: 30 TABLET | Refills: 0 | Status: SHIPPED | OUTPATIENT
Start: 2018-06-12 | End: 2018-07-12 | Stop reason: SDUPTHER

## 2018-07-09 DIAGNOSIS — E11.8 TYPE 2 DIABETES MELLITUS WITH COMPLICATION, UNSPECIFIED LONG TERM INSULIN USE STATUS: ICD-10-CM

## 2018-07-10 RX ORDER — GLIMEPIRIDE 4 MG/1
TABLET ORAL
Qty: 60 TABLET | Refills: 2 | Status: SHIPPED | OUTPATIENT
Start: 2018-07-10 | End: 2018-10-04 | Stop reason: SDUPTHER

## 2018-07-12 DIAGNOSIS — I10 ESSENTIAL HYPERTENSION: ICD-10-CM

## 2018-07-12 DIAGNOSIS — I65.23 BILATERAL CAROTID ARTERY STENOSIS: ICD-10-CM

## 2018-07-12 DIAGNOSIS — K21.00 REFLUX ESOPHAGITIS: ICD-10-CM

## 2018-07-12 DIAGNOSIS — G45.1 HEMISPHERIC CAROTID ARTERY SYNDROME: ICD-10-CM

## 2018-07-12 RX ORDER — PANTOPRAZOLE SODIUM 40 MG/1
TABLET, DELAYED RELEASE ORAL
Qty: 30 TABLET | Refills: 0 | Status: SHIPPED | OUTPATIENT
Start: 2018-07-12 | End: 2018-08-31 | Stop reason: SDUPTHER

## 2018-07-12 RX ORDER — CLOPIDOGREL BISULFATE 75 MG/1
TABLET ORAL
Qty: 30 TABLET | Refills: 0 | Status: SHIPPED | OUTPATIENT
Start: 2018-07-12 | End: 2018-08-31 | Stop reason: SDUPTHER

## 2018-07-12 RX ORDER — ENALAPRIL MALEATE 5 MG/1
TABLET ORAL
Qty: 30 TABLET | Refills: 0 | Status: SHIPPED | OUTPATIENT
Start: 2018-07-12 | End: 2018-08-31 | Stop reason: SDUPTHER

## 2018-08-31 DIAGNOSIS — I65.23 BILATERAL CAROTID ARTERY STENOSIS: ICD-10-CM

## 2018-08-31 DIAGNOSIS — G45.1 HEMISPHERIC CAROTID ARTERY SYNDROME: ICD-10-CM

## 2018-08-31 DIAGNOSIS — K21.00 REFLUX ESOPHAGITIS: ICD-10-CM

## 2018-08-31 DIAGNOSIS — I10 ESSENTIAL HYPERTENSION: ICD-10-CM

## 2018-08-31 RX ORDER — PANTOPRAZOLE SODIUM 40 MG/1
TABLET, DELAYED RELEASE ORAL
Qty: 30 TABLET | Refills: 0 | Status: SHIPPED | OUTPATIENT
Start: 2018-08-31 | End: 2018-09-27 | Stop reason: SDUPTHER

## 2018-08-31 RX ORDER — CLOPIDOGREL BISULFATE 75 MG/1
TABLET ORAL
Qty: 30 TABLET | Refills: 0 | Status: SHIPPED | OUTPATIENT
Start: 2018-08-31 | End: 2018-09-27 | Stop reason: SDUPTHER

## 2018-08-31 RX ORDER — ENALAPRIL MALEATE 5 MG/1
TABLET ORAL
Qty: 30 TABLET | Refills: 0 | Status: SHIPPED | OUTPATIENT
Start: 2018-08-31 | End: 2018-09-27 | Stop reason: SDUPTHER

## 2018-09-09 DIAGNOSIS — G45.1 HEMISPHERIC CAROTID ARTERY SYNDROME: ICD-10-CM

## 2018-09-09 DIAGNOSIS — I65.23 BILATERAL CAROTID ARTERY STENOSIS: ICD-10-CM

## 2018-09-10 RX ORDER — ATORVASTATIN CALCIUM 40 MG/1
TABLET, FILM COATED ORAL
Qty: 30 TABLET | Refills: 5 | Status: SHIPPED | OUTPATIENT
Start: 2018-09-10 | End: 2019-01-24 | Stop reason: SDUPTHER

## 2018-09-18 ENCOUNTER — OFFICE VISIT (OUTPATIENT)
Dept: NEUROLOGY | Facility: CLINIC | Age: 68
End: 2018-09-18

## 2018-09-18 VITALS
DIASTOLIC BLOOD PRESSURE: 80 MMHG | SYSTOLIC BLOOD PRESSURE: 142 MMHG | WEIGHT: 172.4 LBS | BODY MASS INDEX: 25.53 KG/M2 | OXYGEN SATURATION: 94 % | HEIGHT: 69 IN | HEART RATE: 81 BPM

## 2018-09-18 DIAGNOSIS — I65.23 BILATERAL CAROTID ARTERY STENOSIS: ICD-10-CM

## 2018-09-18 DIAGNOSIS — E78.5 HYPERLIPIDEMIA LDL GOAL <70: ICD-10-CM

## 2018-09-18 DIAGNOSIS — G45.1 HEMISPHERIC CAROTID ARTERY SYNDROME: ICD-10-CM

## 2018-09-18 DIAGNOSIS — I10 ESSENTIAL HYPERTENSION: ICD-10-CM

## 2018-09-18 PROBLEM — G47.9 SLEEP DISTURBANCE: Status: ACTIVE | Noted: 2018-09-18

## 2018-09-18 PROCEDURE — 99213 OFFICE O/P EST LOW 20 MIN: CPT | Performed by: NURSE PRACTITIONER

## 2018-09-18 NOTE — PROGRESS NOTES
"DOS: 2018  NAME: Harry Sierra   : 1950  PCP: Artie Carranza MD    Chief Complaint   Patient presents with   • Stroke      Neurological Problem and Interval History:  67 y.o. LHW (ambidextrous) male with HTN, DM, HLD and VBI who presents today to follow-up for TIA.    Mr. Sierra initially presented to [facility] on 9/15/17 with recurrent transient aphasia. His could not tolerate an MRI but did have a CTA head/neck that showed an eccentric plaques of bilateral ICA bulbs that were likely cause of his TIA.  He had a 2-D echocardiogram that was essentially normal.  He was on ASA 81 mg prior to his event.  He was discharged on Plavix and Lipitor.    He presents today and continues on Plavix and Lipitor 40 mg and is tolerating well. He reports that about 2-3 times per week he wakes up from sleeping and when he rolls over he \"feels like he is falling\". He states he feels like there is \"wax build up\" in the left ear. He denies ear pain but does report having \"lifelong\" tinnitus that is unchanged. He also reports a nightmare several weeks ago that was \"horrific\" and suspects it was related to low blood sugar. He has had no recurrence. He does wake in the middle of the night and is unable to get back to sleep for several hours about a couple nights a week. He reports having to go the ER while in [place] with high blood pressure because he did not bring his meds on his trip. He denies any speech difficulties, vision changes, headaches or any new stroke or TIA symptoms. He denies any other changes in his health since his last visit. He does not take his BP regularly except for when in a drugstore but states it runs \"fine\". His blood sugar runs between . He denies snoring, morning headaches or any symptoms of sleep apnea. He denies smoking. No alcohol use.     Review of Systems:        Review of Systems   Constitutional: Positive for fatigue. Negative for activity change and appetite change.   HENT: Negative for ear " pain, facial swelling and trouble swallowing.    Eyes: Negative for photophobia, pain and visual disturbance.   Respiratory: Negative for choking, chest tightness and shortness of breath.    Cardiovascular: Negative for chest pain, palpitations and leg swelling.   Gastrointestinal: Negative for abdominal pain, constipation and nausea.   Endocrine: Negative for polydipsia, polyphagia and polyuria.   Genitourinary: Negative for difficulty urinating, frequency and urgency.   Musculoskeletal: Positive for back pain (lower back) and gait problem. Negative for neck pain.   Skin: Negative for color change, rash and wound.   Allergic/Immunologic: Negative for environmental allergies, food allergies and immunocompromised state.   Neurological: Positive for dizziness and light-headedness. Negative for tremors, seizures, syncope, facial asymmetry, speech difficulty, weakness, numbness and headaches.   Hematological: Negative for adenopathy. Bruises/bleeds easily.   Psychiatric/Behavioral: Positive for confusion, decreased concentration and sleep disturbance (wakes in the middle of the night around 2:00 or 2:30 and can not go back to sleep for a few hours). Negative for agitation, behavioral problems, dysphoric mood, hallucinations, self-injury and suicidal ideas. The patient is not nervous/anxious and is not hyperactive.        Current Outpatient Prescriptions:   •  acetaminophen (TYLENOL) 500 MG tablet, Take 1 tablet by mouth 4 (Four) Times a Day As Needed for mild pain (1-3) or fever., Disp: , Rfl: 0  •  atorvastatin (LIPITOR) 40 MG tablet, TAKE ONE TABLET BY MOUTH EVERY NIGHT AT BEDTIME, Disp: 30 tablet, Rfl: 5  •  clopidogrel (PLAVIX) 75 MG tablet, TAKE ONE TABLET BY MOUTH DAILY, Disp: 30 tablet, Rfl: 0  •  enalapril (VASOTEC) 5 MG tablet, TAKE ONE TABLET BY MOUTH DAILY, Disp: 30 tablet, Rfl: 0  •  glimepiride (AMARYL) 4 MG tablet, TAKE TWO TABLETS BY MOUTH EVERY MORNING BEFORE BREAKFAST, Disp: 60 tablet, Rfl: 2  •   "glucose blood (COOL BLOOD GLUCOSE TEST STRIPS) test strip, Use as instructed, Disp: 1 each, Rfl: 0  •  metFORMIN (FORTAMET) 1000 MG (OSM) 24 hr tablet, Take 1 tablet by mouth 2 (Two) Times a Day With Meals., Disp: 60 tablet, Rfl: 3  •  Multiple Vitamins-Minerals (CENTRUM SILVER 50+MEN PO), Take  by mouth., Disp: , Rfl:   •  pantoprazole (PROTONIX) 40 MG EC tablet, TAKE ONE TABLET BY MOUTH DAILY, Disp: 30 tablet, Rfl: 0    \"The following portions of the patient's history were reviewed and updated as appropriate: allergies, current medications, past family history, past medical history, past social history, past surgical history and problem list.\"      Laboratory Results:             Lab Results   Component Value Date    WBC 6.64 09/15/2017    HGB 13.0 (L) 09/15/2017    HCT 38.8 (L) 09/15/2017    MCV 88.0 09/15/2017     09/15/2017     Lab Results   Component Value Date    GLUCOSE 142 (H) 09/15/2017    BUN 16 09/15/2017    CREATININE 0.71 (L) 09/15/2017    EGFRIFNONA 110 09/15/2017    EGFRIFAFRI 116 08/31/2016    BCR 22.5 09/15/2017    K 4.2 09/15/2017    CO2 24.3 09/15/2017    CALCIUM 9.2 09/15/2017    PROTENTOTREF 6.3 08/31/2016    ALBUMIN 4.00 09/15/2017    LABIL2 2.3 08/31/2016    AST 13 09/15/2017    ALT 15 09/15/2017         Lab Results   Component Value Date    HGBA1C 9.80 (H) 03/13/2018         Lab Results   Component Value Date    CHOL 120 09/15/2017         Lab Results   Component Value Date    HDL 37 (L) 12/19/2017    HDL 30 (L) 09/15/2017    HDL 35 (L) 08/31/2016         Lab Results   Component Value Date    LDL 33 12/19/2017    LDL 69 09/15/2017    LDL 88 08/31/2016         Lab Results   Component Value Date    TRIG 73 12/19/2017    TRIG 106 09/15/2017    TRIG 105 08/31/2016     Physical Examination: NIHSS: 0 mRS:   General Appearance:   Well developed, well nourished, well groomed, alert, and cooperative.  HEENT: Normocephalic.  Neck and Spine: Normal range of motion.  Normal alignment. No mass or " tenderness. No bruits.  Cardiac: Regular rate and rhythm. No murmurs.  Peripheral Vasculature: Radial pulses are equal and symmetric. No signs of distal embolization.  Extremities:    No edema or deformities. Normal joint ROM.  Skin:    No rashes or birth marks.    Neurological examination:  Higher Integrative  Function: Oriented to time, place and person. Normal registration, recall (3/3), attention span and concentration. Normal language including comprehension, spontaneous speech, repetition, reading, writing, naming and vocabulary. No neglect with normal visual-spatial function and construction. Normal fund of knowledge and higher integrative function.  CN II: Pupils are equal, round, and reactive to light. Normal visual acuity and visual fields.    CN III IV VI: Extraocular movements are full without nystagmus.   CN V: Normal facial sensation and strength of muscles of mastication.  CN VII: Facial movements are symmetric. No weakness.  CN VIII:   Auditory acuity is normal.  CN IX & X:   Symmetric palatal movement.  CN XI: Sternocleidomastoid and trapezius are normal.  No weakness.  CN XII:   The tongue is midline.  No atrophy or fasciculations.  Motor: Normal muscle strength, bulk and tone in upper and lower extremities.  No fasciculations, rigidity, spasticity, or abnormal movements.  Sensation: Normal to light touch,  temperature, and proprioception in arms and legs. Normal graphesthesia and no extinction on DSS.  Station and Gait: Normal gait and station.    Coordination: Finger to nose test shows no dysmetria.  Heel to shin normal.    Diagnoses / Discussion:  Mr. Sierra continues to do well following his episodes of transient aphasia he suffered a year ago that were likely due to his bilateral ICA eccentric plaques found on CTA..  He does complain of vertigo that is consistent with symptoms of BPPV but otherwise has remained at his neurologic baseline.  We discussed following up with his PCP for possible inner  ear issues, we also discussed possible referral to vestibular rehabilitation.  Recommend continuing current medication regimen.  We'll request his most recent lab work from PCP.  We discussed the importance of risk factor control for stroke prevention including BP, cholesterol and blood sugar control.  We reviewed the signs and symptoms of stroke and the importance of calling 911 if he were to experience any of these.  He will follow-up in one year, sooner if symptoms warrant.      Plan:   Continue current medication regimen   Consider referral for vestibular rehab   Continue to monitor BP regularly    Blood pressure control to <130/80   Goal LDL <70-recommend high dose statins-    Serum glucose < 140   Melatonin for sleep     Call 911 for stroke any stroke symptoms   Follow-up in one year    I spent 40 minutes face to face with patient, with  > 50% spent for counseling and coordination of care  Harry was seen today for stroke.    Diagnoses and all orders for this visit:    Hemispheric carotid artery syndrome    Bilateral carotid artery stenosis    Essential hypertension    Hyperlipidemia LDL goal <70        Coding

## 2018-09-27 DIAGNOSIS — K21.00 REFLUX ESOPHAGITIS: ICD-10-CM

## 2018-09-27 DIAGNOSIS — I65.23 BILATERAL CAROTID ARTERY STENOSIS: ICD-10-CM

## 2018-09-27 DIAGNOSIS — G45.1 HEMISPHERIC CAROTID ARTERY SYNDROME: ICD-10-CM

## 2018-09-27 DIAGNOSIS — I10 ESSENTIAL HYPERTENSION: ICD-10-CM

## 2018-09-27 RX ORDER — ENALAPRIL MALEATE 5 MG/1
TABLET ORAL
Qty: 30 TABLET | Refills: 5 | Status: SHIPPED | OUTPATIENT
Start: 2018-09-27 | End: 2018-10-16 | Stop reason: SDUPTHER

## 2018-09-27 RX ORDER — CLOPIDOGREL BISULFATE 75 MG/1
TABLET ORAL
Qty: 30 TABLET | Refills: 5 | Status: SHIPPED | OUTPATIENT
Start: 2018-09-27 | End: 2018-10-16

## 2018-09-27 RX ORDER — PANTOPRAZOLE SODIUM 40 MG/1
TABLET, DELAYED RELEASE ORAL
Qty: 30 TABLET | Refills: 5 | Status: SHIPPED | OUTPATIENT
Start: 2018-09-27 | End: 2019-01-24 | Stop reason: SDUPTHER

## 2018-10-04 DIAGNOSIS — E11.8 TYPE 2 DIABETES MELLITUS WITH COMPLICATION (HCC): ICD-10-CM

## 2018-10-04 RX ORDER — GLIMEPIRIDE 4 MG/1
TABLET ORAL
Qty: 60 TABLET | Refills: 1 | Status: SHIPPED | OUTPATIENT
Start: 2018-10-04 | End: 2018-12-04 | Stop reason: SDUPTHER

## 2018-10-11 DIAGNOSIS — I65.23 BILATERAL CAROTID ARTERY STENOSIS: Primary | ICD-10-CM

## 2018-10-15 ENCOUNTER — APPOINTMENT (OUTPATIENT)
Dept: CARDIOLOGY | Facility: HOSPITAL | Age: 68
End: 2018-10-15

## 2018-10-15 ENCOUNTER — HOSPITAL ENCOUNTER (OUTPATIENT)
Dept: CARDIOLOGY | Facility: HOSPITAL | Age: 68
Discharge: HOME | End: 2018-10-15
Admitting: INTERNAL MEDICINE

## 2018-10-15 DIAGNOSIS — I65.23 BILATERAL CAROTID ARTERY STENOSIS: ICD-10-CM

## 2018-10-15 LAB
BH CV XLRA MEAS LEFT DIST CCA EDV: -22 CM/SEC
BH CV XLRA MEAS LEFT DIST CCA PSV: -119 CM/SEC
BH CV XLRA MEAS LEFT DIST ICA EDV: -23.5 CM/SEC
BH CV XLRA MEAS LEFT DIST ICA PSV: -75 CM/SEC
BH CV XLRA MEAS LEFT ICA/CCA RATIO: 0.7
BH CV XLRA MEAS LEFT MID ICA EDV: -25.8 CM/SEC
BH CV XLRA MEAS LEFT MID ICA PSV: -84.4 CM/SEC
BH CV XLRA MEAS LEFT PROX CCA EDV: 20.4 CM/SEC
BH CV XLRA MEAS LEFT PROX CCA PSV: 122 CM/SEC
BH CV XLRA MEAS LEFT PROX ECA EDV: -17.3 CM/SEC
BH CV XLRA MEAS LEFT PROX ECA PSV: -130 CM/SEC
BH CV XLRA MEAS LEFT PROX ICA EDV: -17 CM/SEC
BH CV XLRA MEAS LEFT PROX ICA PSV: -64.8 CM/SEC
BH CV XLRA MEAS LEFT PROX SCLA PSV: 129 CM/SEC
BH CV XLRA MEAS LEFT VERTEBRAL A EDV: 15.8 CM/SEC
BH CV XLRA MEAS LEFT VERTEBRAL A PSV: 52.8 CM/SEC
BH CV XLRA MEAS RIGHT DIST CCA EDV: -28.3 CM/SEC
BH CV XLRA MEAS RIGHT DIST CCA PSV: -99.8 CM/SEC
BH CV XLRA MEAS RIGHT DIST ICA EDV: -21.9 CM/SEC
BH CV XLRA MEAS RIGHT DIST ICA PSV: -67.6 CM/SEC
BH CV XLRA MEAS RIGHT ICA/CCA RATIO: 1
BH CV XLRA MEAS RIGHT MID ICA EDV: -26.7 CM/SEC
BH CV XLRA MEAS RIGHT MID ICA PSV: -78.6 CM/SEC
BH CV XLRA MEAS RIGHT PROX CCA EDV: 22.8 CM/SEC
BH CV XLRA MEAS RIGHT PROX CCA PSV: 115 CM/SEC
BH CV XLRA MEAS RIGHT PROX ECA EDV: -20.6 CM/SEC
BH CV XLRA MEAS RIGHT PROX ECA PSV: -190 CM/SEC
BH CV XLRA MEAS RIGHT PROX ICA EDV: -28.7 CM/SEC
BH CV XLRA MEAS RIGHT PROX ICA PSV: -101 CM/SEC
BH CV XLRA MEAS RIGHT PROX SCLA PSV: 133 CM/SEC
BH CV XLRA MEAS RIGHT VERTEBRAL A EDV: -23.6 CM/SEC
BH CV XLRA MEAS RIGHT VERTEBRAL A PSV: -61.3 CM/SEC
BH CVPROX RIGHT ICA HIDDEN LRR: 1 CM
LEFT ARM BP: NORMAL MMHG
RIGHT ARM BP: NORMAL MMHG

## 2018-10-15 PROCEDURE — 93880 EXTRACRANIAL BILAT STUDY: CPT

## 2018-10-16 ENCOUNTER — HOSPITAL ENCOUNTER (EMERGENCY)
Facility: HOSPITAL | Age: 68
Discharge: HOME | End: 2018-10-16
Admitting: NURSE PRACTITIONER

## 2018-10-16 ENCOUNTER — OFFICE VISIT (OUTPATIENT)
Dept: INTERNAL MEDICINE | Age: 68
End: 2018-10-16

## 2018-10-16 VITALS
WEIGHT: 171 LBS | SYSTOLIC BLOOD PRESSURE: 153 MMHG | HEIGHT: 69 IN | RESPIRATION RATE: 16 BRPM | TEMPERATURE: 98.2 F | OXYGEN SATURATION: 97 % | DIASTOLIC BLOOD PRESSURE: 101 MMHG | BODY MASS INDEX: 25.33 KG/M2 | HEART RATE: 97 BPM

## 2018-10-16 VITALS
BODY MASS INDEX: 25.62 KG/M2 | HEIGHT: 69 IN | TEMPERATURE: 98 F | WEIGHT: 173 LBS | DIASTOLIC BLOOD PRESSURE: 80 MMHG | SYSTOLIC BLOOD PRESSURE: 144 MMHG | HEART RATE: 78 BPM | OXYGEN SATURATION: 99 %

## 2018-10-16 DIAGNOSIS — Z00.00 MEDICARE ANNUAL WELLNESS VISIT, INITIAL: Primary | ICD-10-CM

## 2018-10-16 DIAGNOSIS — H92.02 LEFT EAR PAIN: ICD-10-CM

## 2018-10-16 DIAGNOSIS — H61.22 LEFT EAR IMPACTED CERUMEN: ICD-10-CM

## 2018-10-16 DIAGNOSIS — Z23 ENCOUNTER FOR IMMUNIZATION: ICD-10-CM

## 2018-10-16 DIAGNOSIS — I10 ESSENTIAL HYPERTENSION: ICD-10-CM

## 2018-10-16 DIAGNOSIS — H92.02 ACUTE OTALGIA, LEFT: Primary | ICD-10-CM

## 2018-10-16 DIAGNOSIS — H61.22 IMPACTED CERUMEN OF LEFT EAR: ICD-10-CM

## 2018-10-16 DIAGNOSIS — E11.8 TYPE 2 DIABETES MELLITUS WITH COMPLICATION, UNSPECIFIED WHETHER LONG TERM INSULIN USE: ICD-10-CM

## 2018-10-16 DIAGNOSIS — E78.5 HYPERLIPIDEMIA LDL GOAL <70: ICD-10-CM

## 2018-10-16 PROCEDURE — 69209 REMOVE IMPACTED EAR WAX UNI: CPT

## 2018-10-16 PROCEDURE — 99214 OFFICE O/P EST MOD 30 MIN: CPT | Performed by: INTERNAL MEDICINE

## 2018-10-16 PROCEDURE — G0438 PPPS, INITIAL VISIT: HCPCS | Performed by: INTERNAL MEDICINE

## 2018-10-16 PROCEDURE — 99283 EMERGENCY DEPT VISIT LOW MDM: CPT

## 2018-10-16 RX ORDER — ENALAPRIL MALEATE 5 MG/1
5 TABLET ORAL 2 TIMES DAILY
Qty: 30 TABLET | Refills: 5 | COMMUNITY
Start: 2018-10-16 | End: 2019-01-10 | Stop reason: SDUPTHER

## 2018-10-16 RX ORDER — CLOPIDOGREL BISULFATE 75 MG/1
75 TABLET ORAL DAILY
COMMUNITY
End: 2018-11-16 | Stop reason: HOSPADM

## 2018-10-16 NOTE — PROGRESS NOTES
Az Sierra is a 67 y.o. male.     History of Present Illness     Patient presents this morning for initial annual Medicare wellness evaluation.    Health maintenance: Last ophthalmology visit within the past 18 months.  Dentist several years ago.  Patient is aware that the recommended frequency of dental care is every 6-12 months.  Exercise walking and weights.    Hypertension he is compliant with medication, dietary salt restriction, and blood pressure monitoring [facility].  Blood pressure typically is in the normal range no medication side effects noted.    Hyperlipidemia: He is compliant with atorvastatin, and is fasting for lab work today.  He does monitor fat intake as well.    Diabetes: He is compliant with medication, home glucose monitoring, rare if any hypoglycemic event.  Patient is also compliant with ACE inhibitor, and ophthalmological follow-up as noted above.  He does not have a podiatrist.  We did discuss the utility of podiatric care      The following portions of the patient's history were reviewed and updated as appropriate: allergies, current medications, past family history, past medical history, past social history, past surgical history and problem list.    Review of Systems   Constitutional: Negative.    HENT: Positive for hearing loss.         Left side   Eyes: Negative.    Respiratory: Positive for cough.         Clear mucus, treated with over-the-counter oral antihistamines.   Cardiovascular: Negative.    Gastrointestinal: Negative.    Endocrine: Negative.    Genitourinary: Negative.    Musculoskeletal: Negative.    Skin: Negative.    Allergic/Immunologic: Negative for immunocompromised state.   Neurological: Negative.    Hematological: Negative.    Psychiatric/Behavioral: Negative.         Does have some decrement in short-term memory, there is no impairment in activities of daily living.       Objective   Physical Exam   Constitutional: He is  oriented to person, place, and time. He appears well-developed and well-nourished. No distress.   HENT:   Head: Normocephalic and atraumatic.   Right Ear: Tympanic membrane, external ear and ear canal normal.   Mouth/Throat: Uvula is midline, oropharynx is clear and moist and mucous membranes are normal.   iNCREASED WAX LEFT   Eyes: Pupils are equal, round, and reactive to light. Conjunctivae and EOM are normal.   Neck: Normal range of motion. Neck supple. No JVD present. Carotid bruit is not present. No thyromegaly present.   Cardiovascular: Normal rate, regular rhythm, normal heart sounds and intact distal pulses.  Exam reveals no gallop and no friction rub.    No murmur heard.  Pulmonary/Chest: Effort normal and breath sounds normal. He has no wheezes. He has no rales.   Abdominal: Soft. Bowel sounds are normal. There is no hepatosplenomegaly. There is no tenderness.   Genitourinary:   Genitourinary Comments: Deferred to urology   Musculoskeletal: Normal range of motion. He exhibits no edema or deformity.   Lymphadenopathy:     He has no cervical adenopathy.   Neurological: He is alert and oriented to person, place, and time. He has normal strength and normal reflexes. No cranial nerve deficit or sensory deficit. Coordination normal.   Skin: Skin is warm and dry. No rash noted.   Psychiatric: He has a normal mood and affect. His speech is normal and behavior is normal. Judgment and thought content normal. Cognition and memory are normal.   Nursing note and vitals reviewed.      Assessment/Plan   Harry was seen today for annual exam.    Diagnoses and all orders for this visit:    Medicare annual wellness visit, initial  -     CBC & Differential    Essential hypertension  -     Comprehensive Metabolic Panel    Hyperlipidemia LDL goal <70  -     Lipid Panel    Type 2 diabetes mellitus with complication, unspecified whether long term insulin use (CMS/LTAC, [facility])  -     Hemoglobin A1c  -     Microalbumin / Creatinine Urine  Ratio - Urine, Clean Catch    Encounter for immunization    Impacted cerumen of left ear      Number-one initial Medicare wellness evaluation, clinically stable.  My concerns today include lack of regular dental care.  Recommend patient see the dentist because of poor oral hygiene this point.  Recommend repeat exam in one year.  We'll also check CBC today, this is a noncovered benefit, patient be asked to sign an ABN.    #2 hypertension mild stage I elevation, increase enalapril to 10 mg per day, follow-up with me in 2 months for blood pressure check, check CMP follow-up results by mail.    #3 hyperlipidemia on meds, status post be determined continue meds, check lipids and follow-up as above.  Adjust dosage if need be.    Number for diabetes status to be determined, continue present treatment, check A1c and microalbumin adjusting treatment if need be.    #5 immunization update, recommend shingles after the holidays.    #6 impacted cerumen left side, removed today.

## 2018-10-16 NOTE — ED PROVIDER NOTES
The KALLIE and I have discussed this patient's history, physical exam, and treatment plan. I have reviewed the documentation and personally had a face to face interaction with the patient  I affirm the documentation and agree with the treatment and plan.  The following describes my personal findings.    The patient presents complaining of left ear pain. Pt was seen at his PCP today and was told he had a cerumen impaction. They tried to irrigate the impaction without success and pt reports he began having severe pain during the attempts at clearing his ear while in PCP office.  Pt has an ENT appointment tomorrow. Pt states the pain is somewhat improved. Pt denies CP, urinary sx, vomiting, vomiting, and diarrhea.     Patient is nontoxic appearing   No respiratory distress   HENT: left TM is not visible due to cerumen impaction     7:47 PM  Advised pt to keep his f/u appointment tomorrow at the ENT. Informed pt he can take Motrin and Tylenol for pain. Pt understands and agrees with the plan, all questions answered.      Documentation assistance provided by alex Blanco.  Information recorded by the alex was done at my direction and has been verified and validated by me.       Amelia Blanco  10/16/18 1953       Sasha Nelson MD  10/22/18 0013

## 2018-10-16 NOTE — ED PROVIDER NOTES
EMERGENCY DEPARTMENT ENCOUNTER    CHIEF COMPLAINT  Chief Complaint: left ear pain   History given by: patient     HPI:  Pt is a 67 y.o. male who presents with a cc of left ear pain onset after cerumen irrigation at his doctor's office today. No fever. No vomiting. No ear pain present prior to irrigation at the office.     MEDICAL RECORD REVIEW      PAST MEDICAL HISTORY  Active Ambulatory Problems     Diagnosis Date Noted   • Hypertension 08/30/2016   • Diabetes mellitus (CMS/HCC) 08/30/2016   • Routine health maintenance 08/30/2016   • Abnormal PSA 09/01/2016   • Prostatitis 09/26/2016   • Sepsis (CMS/HCC) 09/27/2016   • UTI (urinary tract infection) due to urinary indwelling catheter (CMS/HCC) 09/27/2016   • Reflux esophagitis 12/01/2016   • Melena 12/01/2016   • Bilateral carotid artery stenosis 12/01/2016   • Transient cerebral ischemia 09/14/2017   • Hyperlipidemia LDL goal <70 12/12/2017   • Prostate cancer (CMS/HCC) 03/13/2018   • Sleep disturbance 09/18/2018     Resolved Ambulatory Problems     Diagnosis Date Noted   • No Resolved Ambulatory Problems     Past Medical History:   Diagnosis Date   • Cancer (CMS/HCC)    • Diabetes mellitus (CMS/HCC)    • Hypertension    • Sepsis (CMS/HCC) 09/2016   • Shingles    • UTI (urinary tract infection)    • Vertebral artery insufficiency        PAST SURGICAL HISTORY  Past Surgical History:   Procedure Laterality Date   • COLONOSCOPY      2011   • ENDOSCOPY N/A 12/15/2016    Procedure: ESOPHAGOGASTRODUODENOSCOPY WITH COLD BIOPSIES;  Surgeon: Moshe Lux MD;  Location: [facility] ENDOSCOPY;  Service:        FAMILY HISTORY  Family History   Problem Relation Age of Onset   • Diabetes Mother    • Heart disease Mother    • Uterine cancer Mother    • Diabetes Father    • Heart disease Father    • Kidney disease Father    • Skin cancer Father         melanoma   • Cancer Father         testicle       SOCIAL HISTORY  Social History     Social History   • Marital status:       Spouse name: N/A   • Number of children: 4   • Years of education: N/A     Occupational History   • Not on file.     Social History Main Topics   • Smoking status: Never Smoker   • Smokeless tobacco: Never Used   • Alcohol use No      Comment: 1/2 beer once every 3 months   • Drug use: No   • Sexual activity: Defer     Other Topics Concern   • Not on file     Social History Narrative   • No narrative on file       ALLERGIES  Shellfish-derived products and Bee venom    REVIEW OF SYSTEMS  Review of Systems    PHYSICAL EXAM  ED Triage Vitals   Temp Heart Rate Resp BP SpO2   10/16/18 1721 10/16/18 1721 10/16/18 1721 10/16/18 1806 10/16/18 1721   98.2 °F (36.8 °C) 109 16 (!) 186/110 97 %      Temp src Heart Rate Source Patient Position BP Location FiO2 (%)   10/16/18 1721 10/16/18 1721 -- -- --   Tympanic Monitor          Physical Exam   Constitutional: He is well-developed, well-nourished, and in no distress. No distress.   HENT:   Head: Normocephalic and atraumatic.   Right Ear: Tympanic membrane normal.   Nose: Nose normal.   Mouth/Throat: Uvula is midline, oropharynx is clear and moist and mucous membranes are normal.   Left TM obscured by cerumen. Manually removed a small amount of cerumen with curette.    Neck: Neck supple.   Cardiovascular: Normal rate and regular rhythm.    Pulmonary/Chest: Effort normal and breath sounds normal.   Skin: Skin is warm and dry.   Psychiatric: Memory, affect and judgment normal.   Nursing note and vitals reviewed.      PROCEDURES      COURSE & MEDICAL DECISION MAKING  Pertinent Labs and Imaging studies that were ordered and reviewed are noted above.  Results were reviewed/discussed with the patient. Pt also made aware that some labs, such as cultures, will not be resulted during ER visit and follow up with PMD is necessary.       PROGRESS AND CONSULTS    Progress Notes:         1820 Reviewed pt's history and workup with Dr. raymond.  After a bedside evaluation; Dr Raymond agrees with  "the plan of care    1830 The patient's history, physical exam, and lab findings were discussed with the physician, who also performed a face to face history and physical exam.  I discussed all results and noted any abnormalities with patient.  I answered any of the patient's questions.  Discussed plan for discharge.  Pt is agreeable and understands need for follow up and repeat testing.  Pt is discharged with instructions to follow up with primary care doctor. He also has an ENT appt tomorrow at 1pm which he can follow up for further evaluation of his ear pain.      MEDICATIONS GIVEN IN ER  Medications - No data to display    BP (!) 153/101   Pulse 97   Temp 98.2 °F (36.8 °C) (Tympanic)   Resp 16   Ht 175.9 cm (69.25\")   Wt 77.6 kg (171 lb)   SpO2 97%   BMI 25.07 kg/m²       DIAGNOSIS  Final diagnoses:   Acute otalgia, left   Left ear impacted cerumen       FOLLOW UP   Artie Carranza MD  [address] [address]  [phone]    Call            Hailey Becker APRN  10/23/18 0631       Hailey Becker APRN  10/23/18 0631    "

## 2018-10-16 NOTE — PROGRESS NOTES
QUICK REFERENCE INFORMATION:  The ABCs of the Annual Wellness Visit    Initial Medicare Wellness Visit    HEALTH RISK ASSESSMENT    1950    Recent Hospitalizations:  No hospitalization(s) within the last year..        Current Medical Providers:  Patient Care Team:  Artie Carranza MD as PCP - General (Internal Medicine)  Artie Carranza MD as PCP - Claims Attributed  Sakshi Pearce RN as Care Coordinator (Population Health)        Smoking Status:  History   Smoking Status   • Never Smoker   Smokeless Tobacco   • Never Used       Alcohol Consumption:  History   Alcohol Use No     Comment: 1/2 beer once every 3 months       Depression Screen:   PHQ-2/PHQ-9 Depression Screening 10/16/2018   Little interest or pleasure in doing things 0   Feeling down, depressed, or hopeless 0   Total Score 0       Health Habits and Functional and Cognitive Screening:  Functional & Cognitive Status 10/16/2018   Do you have difficulty preparing food and eating? No   Do you have difficulty bathing yourself, getting dressed or grooming yourself? No   Do you have difficulty using the toilet? No   Do you have difficulty moving around from place to place? No   Do you have trouble with steps or getting out of a bed or a chair? No   In the past year have you fallen or experienced a near fall? No   Do you need help using the phone?  No   Are you deaf or do you have serious difficulty hearing?  No   Do you need help with transportation? No   Do you need help shopping? No   Do you need help preparing meals?  No   Do you need help with housework?  No   Do you need help with laundry? No   Do you need help taking your medications? No   Do you need help managing money? No   Do you ever drive or ride in a car without wearing a seat belt? No           Does the patient have evidence of cognitive impairment? No    Asiprin use counseling: On clopidrogel as an alternative (due to ASA contraindication)      Recent Lab Results:    Visual Acuity:  No  exam data present    Age-appropriate Screening Schedule:  Refer to the list below for future screening recommendations based on patient's age, sex and/or medical conditions. Orders for these recommended tests are listed in the plan section. The patient has been provided with a written plan.    Health Maintenance   Topic Date Due   • DIABETIC FOOT EXAM  08/30/2016   • ZOSTER VACCINE (2 of 3) 10/13/2017   • INFLUENZA VACCINE  08/01/2018   • DIABETIC EYE EXAM  11/07/2018   • LIPID PANEL  12/19/2018   • URINE MICROALBUMIN  12/19/2018   • HEMOGLOBIN A1C  04/16/2019   • COLONOSCOPY  08/29/2021   • TDAP/TD VACCINES (2 - Td) 08/28/2025   • PNEUMOCOCCAL VACCINES (65+ LOW/MEDIUM RISK)  Completed        Subjective   History of Present Illness    Harry Sierra is a 67 y.o. male who presents for an Annual Wellness Visit.    The following portions of the patient's history were reviewed and updated as appropriate: allergies, current medications, past family history, past medical history, past social history, past surgical history and problem list.    Outpatient Medications Prior to Visit   Medication Sig Dispense Refill   • acetaminophen (TYLENOL) 500 MG tablet Take 1 tablet by mouth 4 (Four) Times a Day As Needed for mild pain (1-3) or fever.  0   • atorvastatin (LIPITOR) 40 MG tablet TAKE ONE TABLET BY MOUTH EVERY NIGHT AT BEDTIME 30 tablet 5   • enalapril (VASOTEC) 5 MG tablet TAKE ONE TABLET BY MOUTH DAILY 30 tablet 5   • glimepiride (AMARYL) 4 MG tablet TAKE TWO TABLETS BY MOUTH EVERY MORNING BEFORE BREAKFAST 60 tablet 1   • glucose blood (COOL BLOOD GLUCOSE TEST STRIPS) test strip Use as instructed 1 each 0   • Multiple Vitamins-Minerals (CENTRUM SILVER 50+MEN PO) Take  by mouth.     • pantoprazole (PROTONIX) 40 MG EC tablet TAKE ONE TABLET BY MOUTH DAILY 30 tablet 5   • clopidogrel (PLAVIX) 75 MG tablet TAKE ONE TABLET BY MOUTH DAILY 30 tablet 5   • metFORMIN (FORTAMET) 1000 MG (OSM) 24 hr tablet Take 1 tablet by mouth 2  "(Two) Times a Day With Meals. 60 tablet 3     No facility-administered medications prior to visit.        Patient Active Problem List   Diagnosis   • Hypertension   • Diabetes mellitus (CMS/[facility])   • Routine health maintenance   • Abnormal PSA   • Prostatitis   • Sepsis (CMS/[facility])   • UTI (urinary tract infection) due to urinary indwelling catheter (CMS/[facility])   • Reflux esophagitis   • Melena   • Bilateral carotid artery stenosis   • Transient cerebral ischemia   • Hyperlipidemia LDL goal <70   • Prostate cancer (CMS/[facility])   • Sleep disturbance       Advance Care Planning:  has NO advance directive - information provided to the patient today    Identification of Risk Factors:  Risk factors include: None noted.    Review of Systems    Compared to one year ago, the patient feels his physical health is better.  Compared to one year ago, the patient feels his mental health is worse.    Objective     Physical Exam    Vitals:    10/16/18 1012   BP: 140/90   Pulse: 78   Temp: 98 °F (36.7 °C)   SpO2: 99%   Weight: 78.5 kg (173 lb)   Height: 175.3 cm (69.02\")   PainSc: 0-No pain       Patient's Body mass index is 25.54 kg/m². BMI is within normal parameters. No follow-up required.      Assessment/Plan   Patient Self-Management and Personalized Health Advice  The patient has been provided with information about: None noted and preventive services including:   · She was reminded to have the new shingles vaccine after the holidays..    Visit Diagnoses:    ICD-10-CM ICD-9-CM   1. Medicare annual wellness visit, initial Z00.00 V70.0   2. Essential hypertension I10 401.9   3. Hyperlipidemia LDL goal <70 E78.5 272.4   4. Type 2 diabetes mellitus with complication, unspecified whether long term insulin use (CMS/[facility]) E11.8 250.90   5. Encounter for immunization Z23 V03.89       No orders of the defined types were placed in this encounter.      Outpatient Encounter Prescriptions as of 10/16/2018   Medication Sig Dispense Refill   • " acetaminophen (TYLENOL) 500 MG tablet Take 1 tablet by mouth 4 (Four) Times a Day As Needed for mild pain (1-3) or fever.  0   • atorvastatin (LIPITOR) 40 MG tablet TAKE ONE TABLET BY MOUTH EVERY NIGHT AT BEDTIME 30 tablet 5   • clopidogrel (PLAVIX) 75 MG tablet Take 75 mg by mouth Daily.     • enalapril (VASOTEC) 5 MG tablet TAKE ONE TABLET BY MOUTH DAILY 30 tablet 5   • glimepiride (AMARYL) 4 MG tablet TAKE TWO TABLETS BY MOUTH EVERY MORNING BEFORE BREAKFAST 60 tablet 1   • glucose blood (COOL BLOOD GLUCOSE TEST STRIPS) test strip Use as instructed 1 each 0   • metFORMIN (GLUCOPHAGE) 1000 MG tablet Take 1,000 mg by mouth Daily With Breakfast.     • Multiple Vitamins-Minerals (CENTRUM SILVER 50+MEN PO) Take  by mouth.     • pantoprazole (PROTONIX) 40 MG EC tablet TAKE ONE TABLET BY MOUTH DAILY 30 tablet 5   • [DISCONTINUED] clopidogrel (PLAVIX) 75 MG tablet TAKE ONE TABLET BY MOUTH DAILY 30 tablet 5   • [DISCONTINUED] metFORMIN (FORTAMET) 1000 MG (OSM) 24 hr tablet Take 1 tablet by mouth 2 (Two) Times a Day With Meals. 60 tablet 3     No facility-administered encounter medications on file as of 10/16/2018.        Reviewed use of high risk medication in the elderly: yes  Reviewed for potential of harmful drug interactions in the elderly: yes    Follow Up:  1 YEAR    An After Visit Summary and PPPS with all of these plans were given to the patient.

## 2018-10-16 NOTE — PATIENT INSTRUCTIONS
Medicare Wellness  Personal Prevention Plan of Service     Date of Office Visit:  10/16/2018  Encounter Provider:  Artie Carranza MD  Place of Service:  [facility]  Patient Name: Harry Sierra  :  1950    As part of the Medicare Wellness portion of your visit today, we are providing you with this personalized preventive plan of services (PPPS). This plan is based upon recommendations of the [place] Preventive Services Task Force (USPSTF) and the Advisory Committee on Immunization Practices (ACIP).    This lists the preventive care services that should be considered, and provides dates of when you are due. Items listed as completed are up-to-date and do not require any further intervention.    Health Maintenance   Topic Date Due   • DIABETIC FOOT EXAM  2016   • ZOSTER VACCINE (2 of 3) 10/13/2017   • INFLUENZA VACCINE  2018   • DIABETIC EYE EXAM  2018   • LIPID PANEL  2018   • URINE MICROALBUMIN  2018   • HEMOGLOBIN A1C  2019   • MEDICARE ANNUAL WELLNESS  10/16/2019   • COLONOSCOPY  2021   • TDAP/TD VACCINES (2 - Td) 2025   • HEPATITIS C SCREENING  Addressed   • PNEUMOCOCCAL VACCINES (65+ LOW/MEDIUM RISK)  Completed       Orders Placed This Encounter   Procedures   • Comprehensive Metabolic Panel   • Lipid Panel   • Hemoglobin A1c   • Microalbumin / Creatinine Urine Ratio - Urine, Clean Catch   • CBC & Differential     Order Specific Question:   Manual Differential     Answer:   No       No Follow-up on file.

## 2018-10-16 NOTE — DISCHARGE INSTRUCTIONS
Keep follow up appt with ENT   Return if worse or new concerns   Continue care with your primary care physician and have your blood pressure regularly checked and managed. Normal blood pressure is 120/80.

## 2018-10-17 ENCOUNTER — EPISODE CHANGES (OUTPATIENT)
Dept: SOCIAL WORK | Facility: HOSPITAL | Age: 68
End: 2018-10-17

## 2018-10-19 LAB
ALBUMIN SERPL-MCNC: 4.4 G/DL (ref 3.5–5.2)
ALBUMIN/CREAT UR: 11.5 MG/G CREAT (ref 0–30)
ALBUMIN/GLOB SERPL: 1.9 G/DL
ALP SERPL-CCNC: 121 U/L (ref 39–117)
ALT SERPL-CCNC: 15 U/L (ref 1–41)
AST SERPL-CCNC: 11 U/L (ref 1–40)
BASOPHILS # BLD AUTO: 0.01 10*3/MM3 (ref 0–0.2)
BASOPHILS NFR BLD AUTO: 0.1 % (ref 0–1.5)
BILIRUB SERPL-MCNC: 0.5 MG/DL (ref 0.1–1.2)
BUN SERPL-MCNC: 16 MG/DL (ref 8–23)
BUN/CREAT SERPL: 20.8 (ref 7–25)
CALCIUM SERPL-MCNC: 9.3 MG/DL (ref 8.6–10.5)
CHLORIDE SERPL-SCNC: 102 MMOL/L (ref 98–107)
CHOLEST SERPL-MCNC: 84 MG/DL (ref 0–200)
CO2 SERPL-SCNC: 23.3 MMOL/L (ref 22–29)
CREAT SERPL-MCNC: 0.77 MG/DL (ref 0.76–1.27)
CREAT UR-MCNC: 96 MG/DL
EOSINOPHIL # BLD AUTO: 0.05 10*3/MM3 (ref 0–0.7)
EOSINOPHIL NFR BLD AUTO: 0.7 % (ref 0.3–6.2)
ERYTHROCYTE [DISTWIDTH] IN BLOOD BY AUTOMATED COUNT: 13.4 % (ref 11.5–14.5)
GLOBULIN SER CALC-MCNC: 2.3 GM/DL
GLUCOSE SERPL-MCNC: 201 MG/DL (ref 65–99)
HBA1C MFR BLD: 7.92 % (ref 4.8–5.6)
HCT VFR BLD AUTO: 40 % (ref 40.4–52.2)
HDLC SERPL-MCNC: 39 MG/DL (ref 40–60)
HGB BLD-MCNC: 13.5 G/DL (ref 13.7–17.6)
IMM GRANULOCYTES # BLD: 0.01 10*3/MM3 (ref 0–0.03)
IMM GRANULOCYTES NFR BLD: 0.1 % (ref 0–0.5)
LDLC SERPL CALC-MCNC: 34 MG/DL (ref 0–100)
LYMPHOCYTES # BLD AUTO: 1.79 10*3/MM3 (ref 0.9–4.8)
LYMPHOCYTES NFR BLD AUTO: 23.7 % (ref 19.6–45.3)
MCH RBC QN AUTO: 29 PG (ref 27–32.7)
MCHC RBC AUTO-ENTMCNC: 33.8 G/DL (ref 32.6–36.4)
MCV RBC AUTO: 86 FL (ref 79.8–96.2)
MICROALBUMIN UR-MCNC: 11 UG/ML
MONOCYTES # BLD AUTO: 0.72 10*3/MM3 (ref 0.2–1.2)
MONOCYTES NFR BLD AUTO: 9.5 % (ref 5–12)
NEUTROPHILS # BLD AUTO: 4.97 10*3/MM3 (ref 1.9–8.1)
NEUTROPHILS NFR BLD AUTO: 66 % (ref 42.7–76)
PLATELET # BLD AUTO: 222 10*3/MM3 (ref 140–500)
POTASSIUM SERPL-SCNC: 4.4 MMOL/L (ref 3.5–5.2)
PROT SERPL-MCNC: 6.7 G/DL (ref 6–8.5)
RBC # BLD AUTO: 4.65 10*6/MM3 (ref 4.6–6)
SODIUM SERPL-SCNC: 140 MMOL/L (ref 136–145)
TRIGL SERPL-MCNC: 56 MG/DL (ref 0–150)
VLDLC SERPL CALC-MCNC: 11.2 MG/DL (ref 5–40)
WBC # BLD AUTO: 7.54 10*3/MM3 (ref 4.5–10.7)

## 2018-11-12 ENCOUNTER — TELEPHONE (OUTPATIENT)
Dept: INTERNAL MEDICINE | Age: 68
End: 2018-11-12

## 2018-11-12 NOTE — TELEPHONE ENCOUNTER
Patient called has been taking dayquil and nyquil for several days still does not feel good. He is not able to keep anything down and so he is not taking any of his other meds. Per Dr Carranza advised pt to got to the immediate care center or emergency room.  Pt asked if he could go to the clinic at  [facility] and I told him no.

## 2018-11-13 ENCOUNTER — HOSPITAL ENCOUNTER (INPATIENT)
Facility: HOSPITAL | Age: 68
LOS: 3 days | Discharge: HOME | End: 2018-11-16
Attending: EMERGENCY MEDICINE | Admitting: HOSPITALIST

## 2018-11-13 ENCOUNTER — APPOINTMENT (OUTPATIENT)
Dept: GENERAL RADIOLOGY | Facility: HOSPITAL | Age: 68
End: 2018-11-13

## 2018-11-13 DIAGNOSIS — A41.9 SEPSIS, DUE TO UNSPECIFIED ORGANISM: Primary | ICD-10-CM

## 2018-11-13 DIAGNOSIS — J06.9 UPPER RESPIRATORY TRACT INFECTION, UNSPECIFIED TYPE: ICD-10-CM

## 2018-11-13 PROBLEM — J20.4 PARAINFLUENZA VIRUS BRONCHITIS: Status: ACTIVE | Noted: 2018-11-13

## 2018-11-13 PROBLEM — R00.0 TACHYCARDIA: Status: ACTIVE | Noted: 2018-11-13

## 2018-11-13 PROBLEM — E86.0 DEHYDRATION: Status: ACTIVE | Noted: 2018-11-13

## 2018-11-13 LAB
ALBUMIN SERPL-MCNC: 4.4 G/DL (ref 3.5–5.2)
ALBUMIN/GLOB SERPL: 1.3 G/DL
ALP SERPL-CCNC: 155 U/L (ref 39–117)
ALT SERPL W P-5'-P-CCNC: 44 U/L (ref 1–41)
ANION GAP SERPL CALCULATED.3IONS-SCNC: 15.9 MMOL/L
AST SERPL-CCNC: 47 U/L (ref 1–40)
B PERT DNA SPEC QL NAA+PROBE: NOT DETECTED
BASOPHILS # BLD AUTO: 0 10*3/MM3 (ref 0–0.2)
BASOPHILS NFR BLD AUTO: 0 % (ref 0–1.5)
BILIRUB SERPL-MCNC: 0.6 MG/DL (ref 0.1–1.2)
BUN BLD-MCNC: 12 MG/DL (ref 8–23)
BUN/CREAT SERPL: 13 (ref 7–25)
C PNEUM DNA NPH QL NAA+NON-PROBE: NOT DETECTED
CALCIUM SPEC-SCNC: 9.3 MG/DL (ref 8.6–10.5)
CHLORIDE SERPL-SCNC: 95 MMOL/L (ref 98–107)
CO2 SERPL-SCNC: 24.1 MMOL/L (ref 22–29)
CREAT BLD-MCNC: 0.92 MG/DL (ref 0.76–1.27)
CRP SERPL-MCNC: 6.02 MG/DL (ref 0–0.5)
D-LACTATE SERPL-SCNC: 1.2 MMOL/L (ref 0.5–2)
DEPRECATED RDW RBC AUTO: 41.3 FL (ref 37–54)
EOSINOPHIL # BLD AUTO: 0 10*3/MM3 (ref 0–0.7)
EOSINOPHIL NFR BLD AUTO: 0 % (ref 0.3–6.2)
ERYTHROCYTE [DISTWIDTH] IN BLOOD BY AUTOMATED COUNT: 13.2 % (ref 11.5–14.5)
FLUAV H1 2009 PAND RNA NPH QL NAA+PROBE: NOT DETECTED
FLUAV H1 HA GENE NPH QL NAA+PROBE: NOT DETECTED
FLUAV H3 RNA NPH QL NAA+PROBE: NOT DETECTED
FLUAV SUBTYP SPEC NAA+PROBE: NOT DETECTED
FLUBV RNA ISLT QL NAA+PROBE: NOT DETECTED
GFR SERPL CREATININE-BSD FRML MDRD: 82 ML/MIN/1.73
GLOBULIN UR ELPH-MCNC: 3.4 GM/DL
GLUCOSE BLD-MCNC: 300 MG/DL (ref 65–99)
GLUCOSE BLDC GLUCOMTR-MCNC: 206 MG/DL (ref 70–130)
GLUCOSE BLDC GLUCOMTR-MCNC: 235 MG/DL (ref 70–130)
GLUCOSE BLDC GLUCOMTR-MCNC: 254 MG/DL (ref 70–130)
GLUCOSE BLDC GLUCOMTR-MCNC: 262 MG/DL (ref 70–130)
GLUCOSE BLDC GLUCOMTR-MCNC: 307 MG/DL (ref 70–130)
HADV DNA SPEC NAA+PROBE: NOT DETECTED
HCOV 229E RNA SPEC QL NAA+PROBE: NOT DETECTED
HCOV HKU1 RNA SPEC QL NAA+PROBE: NOT DETECTED
HCOV NL63 RNA SPEC QL NAA+PROBE: NOT DETECTED
HCOV OC43 RNA SPEC QL NAA+PROBE: NOT DETECTED
HCT VFR BLD AUTO: 39.2 % (ref 40.4–52.2)
HGB BLD-MCNC: 13.3 G/DL (ref 13.7–17.6)
HMPV RNA NPH QL NAA+NON-PROBE: NOT DETECTED
HOLD SPECIMEN: NORMAL
HOLD SPECIMEN: NORMAL
HPIV1 RNA SPEC QL NAA+PROBE: NOT DETECTED
HPIV2 RNA SPEC QL NAA+PROBE: DETECTED
HPIV3 RNA NPH QL NAA+PROBE: NOT DETECTED
HPIV4 P GENE NPH QL NAA+PROBE: NOT DETECTED
IMM GRANULOCYTES # BLD: 0.01 10*3/MM3 (ref 0–0.03)
IMM GRANULOCYTES NFR BLD: 0.1 % (ref 0–0.5)
LYMPHOCYTES # BLD AUTO: 0.89 10*3/MM3 (ref 0.9–4.8)
LYMPHOCYTES NFR BLD AUTO: 10.1 % (ref 19.6–45.3)
M PNEUMO IGG SER IA-ACNC: NOT DETECTED
MCH RBC QN AUTO: 29 PG (ref 27–32.7)
MCHC RBC AUTO-ENTMCNC: 33.9 G/DL (ref 32.6–36.4)
MCV RBC AUTO: 85.6 FL (ref 79.8–96.2)
MONOCYTES # BLD AUTO: 0.91 10*3/MM3 (ref 0.2–1.2)
MONOCYTES NFR BLD AUTO: 10.3 % (ref 5–12)
NEUTROPHILS # BLD AUTO: 7.04 10*3/MM3 (ref 1.9–8.1)
NEUTROPHILS NFR BLD AUTO: 79.6 % (ref 42.7–76)
PLATELET # BLD AUTO: 194 10*3/MM3 (ref 140–500)
PMV BLD AUTO: 10.1 FL (ref 6–12)
POTASSIUM BLD-SCNC: 4.1 MMOL/L (ref 3.5–5.2)
PROCALCITONIN SERPL-MCNC: 0.17 NG/ML (ref 0.1–0.25)
PROT SERPL-MCNC: 7.8 G/DL (ref 6–8.5)
RBC # BLD AUTO: 4.58 10*6/MM3 (ref 4.6–6)
RHINOVIRUS RNA SPEC NAA+PROBE: NOT DETECTED
RSV RNA NPH QL NAA+NON-PROBE: NOT DETECTED
SODIUM BLD-SCNC: 135 MMOL/L (ref 136–145)
WBC NRBC COR # BLD: 8.84 10*3/MM3 (ref 4.5–10.7)
WHOLE BLOOD HOLD SPECIMEN: NORMAL
WHOLE BLOOD HOLD SPECIMEN: NORMAL

## 2018-11-13 PROCEDURE — 82962 GLUCOSE BLOOD TEST: CPT

## 2018-11-13 PROCEDURE — 99285 EMERGENCY DEPT VISIT HI MDM: CPT

## 2018-11-13 PROCEDURE — 87798 DETECT AGENT NOS DNA AMP: CPT | Performed by: EMERGENCY MEDICINE

## 2018-11-13 PROCEDURE — 87633 RESP VIRUS 12-25 TARGETS: CPT | Performed by: EMERGENCY MEDICINE

## 2018-11-13 PROCEDURE — 87040 BLOOD CULTURE FOR BACTERIA: CPT | Performed by: EMERGENCY MEDICINE

## 2018-11-13 PROCEDURE — 25010000002 KETOROLAC TROMETHAMINE PER 15 MG: Performed by: HOSPITALIST

## 2018-11-13 PROCEDURE — 87581 M.PNEUMON DNA AMP PROBE: CPT | Performed by: EMERGENCY MEDICINE

## 2018-11-13 PROCEDURE — 87486 CHLMYD PNEUM DNA AMP PROBE: CPT | Performed by: EMERGENCY MEDICINE

## 2018-11-13 PROCEDURE — 84145 PROCALCITONIN (PCT): CPT | Performed by: EMERGENCY MEDICINE

## 2018-11-13 PROCEDURE — 80053 COMPREHEN METABOLIC PANEL: CPT | Performed by: EMERGENCY MEDICINE

## 2018-11-13 PROCEDURE — 71046 X-RAY EXAM CHEST 2 VIEWS: CPT

## 2018-11-13 PROCEDURE — 93010 ELECTROCARDIOGRAM REPORT: CPT | Performed by: INTERNAL MEDICINE

## 2018-11-13 PROCEDURE — 25010000002 ONDANSETRON PER 1 MG: Performed by: HOSPITALIST

## 2018-11-13 PROCEDURE — 83605 ASSAY OF LACTIC ACID: CPT | Performed by: EMERGENCY MEDICINE

## 2018-11-13 PROCEDURE — 93005 ELECTROCARDIOGRAM TRACING: CPT | Performed by: EMERGENCY MEDICINE

## 2018-11-13 PROCEDURE — 25010000002 LEVOFLOXACIN PER 250 MG: Performed by: EMERGENCY MEDICINE

## 2018-11-13 PROCEDURE — 36415 COLL VENOUS BLD VENIPUNCTURE: CPT

## 2018-11-13 PROCEDURE — 25010000002 ENOXAPARIN PER 10 MG: Performed by: HOSPITALIST

## 2018-11-13 PROCEDURE — 86140 C-REACTIVE PROTEIN: CPT | Performed by: EMERGENCY MEDICINE

## 2018-11-13 PROCEDURE — 85025 COMPLETE CBC W/AUTO DIFF WBC: CPT | Performed by: EMERGENCY MEDICINE

## 2018-11-13 RX ORDER — ACETAMINOPHEN 500 MG
1000 TABLET ORAL EVERY 6 HOURS PRN
Status: DISCONTINUED | OUTPATIENT
Start: 2018-11-13 | End: 2018-11-14

## 2018-11-13 RX ORDER — ONDANSETRON 2 MG/ML
4 INJECTION INTRAMUSCULAR; INTRAVENOUS EVERY 4 HOURS PRN
Status: DISCONTINUED | OUTPATIENT
Start: 2018-11-13 | End: 2018-11-16 | Stop reason: HOSPADM

## 2018-11-13 RX ORDER — PANTOPRAZOLE SODIUM 40 MG/1
40 TABLET, DELAYED RELEASE ORAL DAILY
Status: DISCONTINUED | OUTPATIENT
Start: 2018-11-13 | End: 2018-11-14

## 2018-11-13 RX ORDER — ENALAPRIL MALEATE 10 MG/1
10 TABLET ORAL DAILY
Status: DISCONTINUED | OUTPATIENT
Start: 2018-11-13 | End: 2018-11-13

## 2018-11-13 RX ORDER — GLIPIZIDE 10 MG/1
10 TABLET ORAL
Status: DISCONTINUED | OUTPATIENT
Start: 2018-11-13 | End: 2018-11-16 | Stop reason: HOSPADM

## 2018-11-13 RX ORDER — GUAIFENESIN 600 MG/1
1200 TABLET, EXTENDED RELEASE ORAL EVERY 12 HOURS SCHEDULED
Status: DISCONTINUED | OUTPATIENT
Start: 2018-11-13 | End: 2018-11-16 | Stop reason: HOSPADM

## 2018-11-13 RX ORDER — CLOPIDOGREL BISULFATE 75 MG/1
75 TABLET ORAL DAILY
Status: DISCONTINUED | OUTPATIENT
Start: 2018-11-13 | End: 2018-11-16 | Stop reason: HOSPADM

## 2018-11-13 RX ORDER — SODIUM CHLORIDE 9 MG/ML
125 INJECTION, SOLUTION INTRAVENOUS CONTINUOUS
Status: DISCONTINUED | OUTPATIENT
Start: 2018-11-13 | End: 2018-11-14

## 2018-11-13 RX ORDER — NICOTINE POLACRILEX 4 MG
15 LOZENGE BUCCAL
Status: DISCONTINUED | OUTPATIENT
Start: 2018-11-13 | End: 2018-11-16 | Stop reason: HOSPADM

## 2018-11-13 RX ORDER — ATORVASTATIN CALCIUM 20 MG/1
40 TABLET, FILM COATED ORAL DAILY
Status: DISCONTINUED | OUTPATIENT
Start: 2018-11-13 | End: 2018-11-16 | Stop reason: HOSPADM

## 2018-11-13 RX ORDER — ACETAMINOPHEN 325 MG/1
650 TABLET ORAL EVERY 4 HOURS PRN
Status: DISCONTINUED | OUTPATIENT
Start: 2018-11-13 | End: 2018-11-16 | Stop reason: HOSPADM

## 2018-11-13 RX ORDER — CHOLECALCIFEROL (VITAMIN D3) 125 MCG
10 CAPSULE ORAL NIGHTLY
Status: DISCONTINUED | OUTPATIENT
Start: 2018-11-13 | End: 2018-11-16 | Stop reason: HOSPADM

## 2018-11-13 RX ORDER — KETOROLAC TROMETHAMINE 30 MG/ML
15 INJECTION, SOLUTION INTRAMUSCULAR; INTRAVENOUS EVERY 6 HOURS PRN
Status: COMPLETED | OUTPATIENT
Start: 2018-11-13 | End: 2018-11-13

## 2018-11-13 RX ORDER — ENALAPRIL MALEATE 5 MG/1
5 TABLET ORAL EVERY 12 HOURS SCHEDULED
Status: DISCONTINUED | OUTPATIENT
Start: 2018-11-13 | End: 2018-11-16 | Stop reason: HOSPADM

## 2018-11-13 RX ORDER — DEXTROSE MONOHYDRATE 25 G/50ML
25 INJECTION, SOLUTION INTRAVENOUS
Status: DISCONTINUED | OUTPATIENT
Start: 2018-11-13 | End: 2018-11-16 | Stop reason: HOSPADM

## 2018-11-13 RX ORDER — LEVOFLOXACIN 5 MG/ML
750 INJECTION, SOLUTION INTRAVENOUS ONCE
Status: COMPLETED | OUTPATIENT
Start: 2018-11-13 | End: 2018-11-13

## 2018-11-13 RX ORDER — ACETAMINOPHEN 500 MG
1000 TABLET ORAL ONCE
Status: COMPLETED | OUTPATIENT
Start: 2018-11-13 | End: 2018-11-13

## 2018-11-13 RX ORDER — SODIUM CHLORIDE 0.9 % (FLUSH) 0.9 %
10 SYRINGE (ML) INJECTION AS NEEDED
Status: DISCONTINUED | OUTPATIENT
Start: 2018-11-13 | End: 2018-11-13

## 2018-11-13 RX ADMIN — HYDROCODONE BITARTRATE AND HOMATROPINE METHYLBROMIDE 5 ML: 5; 1.5 SOLUTION ORAL at 20:34

## 2018-11-13 RX ADMIN — ACETAMINOPHEN 650 MG: 325 TABLET, FILM COATED ORAL at 23:52

## 2018-11-13 RX ADMIN — KETOROLAC TROMETHAMINE 15 MG: 30 INJECTION, SOLUTION INTRAMUSCULAR at 16:35

## 2018-11-13 RX ADMIN — ACETAMINOPHEN 1000 MG: 500 TABLET, FILM COATED ORAL at 17:48

## 2018-11-13 RX ADMIN — SODIUM CHLORIDE 125 ML/HR: 9 INJECTION, SOLUTION INTRAVENOUS at 22:43

## 2018-11-13 RX ADMIN — Medication 10 MG: at 20:35

## 2018-11-13 RX ADMIN — ONDANSETRON 4 MG: 2 INJECTION INTRAMUSCULAR; INTRAVENOUS at 16:35

## 2018-11-13 RX ADMIN — HYDROCODONE BITARTRATE AND HOMATROPINE METHYLBROMIDE 5 ML: 5; 1.5 SOLUTION ORAL at 16:26

## 2018-11-13 RX ADMIN — SODIUM CHLORIDE 2328 ML: 9 INJECTION, SOLUTION INTRAVENOUS at 06:45

## 2018-11-13 RX ADMIN — ENALAPRIL MALEATE 5 MG: 10 TABLET ORAL at 13:42

## 2018-11-13 RX ADMIN — ENOXAPARIN SODIUM 40 MG: 40 INJECTION SUBCUTANEOUS at 12:33

## 2018-11-13 RX ADMIN — ENALAPRIL MALEATE 5 MG: 5 TABLET ORAL at 20:35

## 2018-11-13 RX ADMIN — SODIUM CHLORIDE 500 ML: 9 INJECTION, SOLUTION INTRAVENOUS at 16:26

## 2018-11-13 RX ADMIN — SODIUM CHLORIDE 125 ML/HR: 9 INJECTION, SOLUTION INTRAVENOUS at 16:27

## 2018-11-13 RX ADMIN — ACETAMINOPHEN 1000 MG: 500 TABLET, FILM COATED ORAL at 06:13

## 2018-11-13 RX ADMIN — ONDANSETRON 4 MG: 2 INJECTION INTRAMUSCULAR; INTRAVENOUS at 23:52

## 2018-11-13 RX ADMIN — KETOROLAC TROMETHAMINE 15 MG: 30 INJECTION, SOLUTION INTRAMUSCULAR at 22:40

## 2018-11-13 RX ADMIN — GUAIFENESIN 1200 MG: 600 TABLET, EXTENDED RELEASE ORAL at 20:35

## 2018-11-13 RX ADMIN — ATORVASTATIN CALCIUM 40 MG: 20 TABLET, FILM COATED ORAL at 20:35

## 2018-11-13 RX ADMIN — ACETAMINOPHEN 650 MG: 325 TABLET, FILM COATED ORAL at 12:33

## 2018-11-13 RX ADMIN — LEVOFLOXACIN 750 MG: 5 INJECTION, SOLUTION INTRAVENOUS at 06:46

## 2018-11-13 NOTE — ED PROVIDER NOTES
Assumed care of patient awaiting bed request for admission.  Discussed with Dr. Hernandes at 4400 who accepts to telemetry bed.       Ishan Barnes MD  11/13/18 4026

## 2018-11-13 NOTE — H&P
Patient Name:  Harry Sierra  YOB: 1950  MRN:  [id number]  Admit Date:  11/13/2018  Patient Care Team:  Artie Carranza MD as PCP - General (Internal Medicine)  Artie Carranza MD as PCP - Claims Attributed      Chief Complaint   Patient presents with   • Shortness of Breath   • Fever     Subjective   Mr. Sierra is a 67 y.o. male non-smoker with a history of diabetes and HTN that presents to [facility] complaining of SOA, cough, sore throat and fever for the last 4 days that has gotten worse.    Shortness of Breath   This is a new problem. The current episode started in the past 7 days. The problem occurs intermittently. The problem has been gradually worsening. Associated symptoms include a fever and a sore throat (Problems swallowing). Pertinent negatives include no hemoptysis, leg swelling, rash, sputum production or vomiting. There is no history of chronic lung disease, COPD or a heart failure. (Diabetes)   Fever    This is a new problem. The current episode started in the past 7 days. The problem occurs intermittently. The problem has been gradually worsening. His temperature was unmeasured prior to arrival. Associated symptoms include coughing and a sore throat (Problems swallowing). Pertinent negatives include no nausea, rash or vomiting. He has tried nothing for the symptoms. The treatment provided no relief.   Risk factors: recent sickness and sick contacts (Daughter recently diagnosed with parainfluenza virus)    Risk factors: no immunosuppression and no recent travel        Past Medical History:   Diagnosis Date   • Cancer (CMS/HCC)     Basal Cell   • Diabetes mellitus (CMS/HCC)    • Hypertension    • Sepsis (CMS/HCC) 09/2016   • Shingles    • UTI (urinary tract infection)    • Vertebral artery insufficiency      Past Surgical History:   Procedure Laterality Date   • COLONOSCOPY      2011     Family History   Problem Relation Age of Onset   • Diabetes Mother    • Heart  disease Mother    • Uterine cancer Mother    • Diabetes Father    • Heart disease Father    • Kidney disease Father    • Skin cancer Father         melanoma   • Cancer Father         testicle     Social History     Tobacco Use   • Smoking status: Never Smoker   • Smokeless tobacco: Never Used   Substance Use Topics   • Alcohol use: No     Comment: 1/2 beer once every 3 months   • Drug use: No       (Not in a hospital admission)  Allergies:    Allergies   Allergen Reactions   • Shellfish-Derived Products Anaphylaxis   • Aleve [Naproxen] Other (See Comments)     Bleeding     • Asa [Aspirin] Other (See Comments)     bleeding   • Bee Venom        Review of Systems   Constitutional: Positive for activity change, appetite change, chills, fatigue and fever.   HENT: Positive for sore throat (Problems swallowing).    Eyes: Negative.    Respiratory: Positive for cough, choking and shortness of breath. Negative for hemoptysis and sputum production.    Cardiovascular: Negative for palpitations and leg swelling.   Gastrointestinal: Positive for constipation. Negative for nausea and vomiting.   Endocrine: Negative.    Genitourinary: Negative.    Musculoskeletal: Negative.    Skin: Negative.  Negative for rash.   Neurological: Negative.    Hematological: Negative.    Psychiatric/Behavioral: Negative.         Objective    Vital Signs  Temp:  [100.2 °F (37.9 °C)-101 °F (38.3 °C)] 100.2 °F (37.9 °C)  Heart Rate:  [118-133] 118  Resp:  [18-22] 18  BP: (165-182)/() 165/91  SpO2:  [95 %-98 %] 95 %  on   ;   Device (Oxygen Therapy): room air  Body mass index is 27.16 kg/m².    Physical Exam   Constitutional: He is oriented to person, place, and time. He appears well-developed and well-nourished.   HENT:   Head: Normocephalic and atraumatic.   Mouth/Throat: Mucous membranes are dry. Posterior oropharyngeal erythema present.   Eyes: Conjunctivae and EOM are normal. No scleral icterus.   Neck: Normal range of motion. Neck supple. No  JVD present.   Cardiovascular: Regular rhythm. Tachycardia present.   No murmur heard.  Pulmonary/Chest: Effort normal. No respiratory distress. He has decreased breath sounds. He has rhonchi (Few, scattered).   Abdominal: Soft. Bowel sounds are normal. He exhibits no distension. There is no tenderness.   Musculoskeletal: He exhibits no edema.   Lymphadenopathy:     He has cervical adenopathy.   Neurological: He is alert and oriented to person, place, and time. No cranial nerve deficit.   Skin: Skin is warm and dry.   Psychiatric: He has a normal mood and affect. His behavior is normal.   Vitals reviewed.      Results Review:  I reviewed the patient's new clinical results.  I reviewed the patient's new imaging results and agree with the interpretation.  I reviewed the patient's other test results and agree with the interpretation  I personally viewed and interpreted the patient's EKG/Telemetry data  Discussed with ED provider.      Lab Results (last 24 hours)     Procedure Component Value Units Date/Time    POC Glucose Once [[id number]]  (Abnormal) Collected:  11/13/18 0548    Specimen:  Blood Updated:  11/13/18 0549     Glucose 307 mg/dL     CBC & Differential [[id number]] Collected:  11/13/18 0611    Specimen:  Blood Updated:  11/13/18 0635    Narrative:       The following orders were created for panel order CBC & Differential.  Procedure                               Abnormality         Status                     ---------                               -----------         ------                     CBC Auto Differential[[id number]]        Abnormal            Final result                 Please view results for these tests on the individual orders.    Comprehensive Metabolic Panel [[id number]]  (Abnormal) Collected:  11/13/18 0611    Specimen:  Blood Updated:  11/13/18 0652     Glucose 300 mg/dL      BUN 12 mg/dL      Creatinine 0.92 mg/dL      Sodium 135 mmol/L      Potassium 4.1 mmol/L      Chloride 95 mmol/L       CO2 24.1 mmol/L      Calcium 9.3 mg/dL      Total Protein 7.8 g/dL      Albumin 4.40 g/dL      ALT (SGPT) 44 U/L      AST (SGOT) 47 U/L      Alkaline Phosphatase 155 U/L      Total Bilirubin 0.6 mg/dL      eGFR Non African Amer 82 mL/min/1.73      Globulin 3.4 gm/dL      A/G Ratio 1.3 g/dL      BUN/Creatinine Ratio 13.0     Anion Gap 15.9 mmol/L     Lactic Acid, Plasma [[id number]]  (Normal) Collected:  11/13/18 0611    Specimen:  Blood Updated:  11/13/18 0645     Lactate 1.2 mmol/L     Blood Culture - Blood, Arm, Left [[id number]] Collected:  11/13/18 0611    Specimen:  Blood from Arm, Left Updated:  11/13/18 0648    Blood Culture - Blood, Arm, Left [[id number]] Collected:  11/13/18 0611    Specimen:  Blood from Arm, Left Updated:  11/13/18 0620    Respiratory Panel, PCR - Swab, Nasopharynx [[id number]]  (Abnormal) Collected:  11/13/18 0611    Specimen:  Swab from Nasopharynx Updated:  11/13/18 0751     ADENOVIRUS, PCR Not Detected     Coronavirus 229E Not Detected     Coronavirus HKU1 Not Detected     Coronavirus NL63 Not Detected     Coronavirus OC43 Not Detected     Human Metapneumovirus Not Detected     Human Rhinovirus/Enterovirus Not Detected     Influenza B PCR Not Detected     Parainfluenza Virus 1 Not Detected     Parainfluenza Virus 2 Detected     Parainfluenza Virus 3 Not Detected     Parainfluenza Virus 4 Not Detected     Bordetella pertussis pcr Not Detected     Influenza A H1 2009 PCR Not Detected     Chlamydophila pneumoniae PCR Not Detected     Mycoplasma pneumo by PCR Not Detected     Influenza A PCR Not Detected     Influenza A H3 Not Detected     Influenza A H1 Not Detected     RSV, PCR Not Detected    Procalcitonin [[id number]]  (Normal) Collected:  11/13/18 0611    Specimen:  Blood Updated:  11/13/18 0656     Procalcitonin 0.17 ng/mL     Narrative:       As a Marker for Sepsis (Non-Neonates):   1. <0.5 ng/mL represents a low risk of severe sepsis and/or septic shock.  1. >2 ng/mL represents a high  "risk of severe sepsis and/or septic shock.    As a Marker for Lower Respiratory Tract Infections that require antibiotic therapy:  PCT on Admission     Antibiotic Therapy             6-12 Hrs later  > 0.5                Strongly Recommended            >0.25 - <0.5         Recommended  0.1 - 0.25           Discouraged                   Remeasure/reassess PCT  <0.1                 Strongly Discouraged          Remeasure/reassess PCT      As 28 day mortality risk marker: \"Change in Procalcitonin Result\" (> 80 % or <=80 %) if Day 0 (or Day 1) and Day 4 values are available. Refer to http://www.[facility]ag[facility]ct-calculator.com/   Change in PCT <=80 %   A decrease of PCT levels below or equal to 80 % defines a positive change in PCT test result representing a higher risk for 28-day all-cause mortality of patients diagnosed with severe sepsis or septic shock.  Change in PCT > 80 %   A decrease of PCT levels of more than 80 % defines a negative change in PCT result representing a lower risk for 28-day all-cause mortality of patients diagnosed with severe sepsis or septic shock.                C-reactive Protein [[id number]]  (Abnormal) Collected:  11/13/18 0611    Specimen:  Blood Updated:  11/13/18 0652     C-Reactive Protein 6.02 mg/dL     CBC Auto Differential [[id number]]  (Abnormal) Collected:  11/13/18 0611    Specimen:  Blood Updated:  11/13/18 0635     WBC 8.84 10*3/mm3      RBC 4.58 10*6/mm3      Hemoglobin 13.3 g/dL      Hematocrit 39.2 %      MCV 85.6 fL      MCH 29.0 pg      MCHC 33.9 g/dL      RDW 13.2 %      RDW-SD 41.3 fl      MPV 10.1 fL      Platelets 194 10*3/mm3      Neutrophil % 79.6 %      Lymphocyte % 10.1 %      Monocyte % 10.3 %      Eosinophil % 0.0 %      Basophil % 0.0 %      Immature Grans % 0.1 %      Neutrophils, Absolute 7.04 10*3/mm3      Lymphocytes, Absolute 0.89 10*3/mm3      Monocytes, Absolute 0.91 10*3/mm3      Eosinophils, Absolute 0.00 10*3/mm3      Basophils, Absolute 0.00 10*3/mm3      " "Immature Grans, Absolute 0.01 10*3/mm3           Imaging Results (last 24 hours)     Procedure Component Value Units Date/Time    XR Chest 2 View [[id number]] Collected:  11/13/18 0806     Updated:  11/13/18 1434    Narrative:       EMERGENCY PA AND LATERAL CHEST X-RAY 11/13/2018     CLINICAL HISTORY: Sepsis protocol. History of hypertension and diabetes.     This correlated is to prior chest x-ray 09/14/2017.     FINDINGS: The cardiomediastinal silhouette and the pulmonary vasculature  are within normal limits. The lungs are clear. The costophrenic angles  are sharp.       Impression:       No active disease is seen in the chest.     This report was finalized on 11/13/2018 2:31 PM by Dr. Nick Roberts M.D.             ECG 12 Lead           Assessment/Plan   Active Hospital Problems    Diagnosis Date Noted   • **Parainfluenza virus bronchitis [J20.4] 11/13/2018   • Dehydration [E86.0] 11/13/2018   • Tachycardia [R00.0] 11/13/2018   • Bilateral carotid artery stenosis [I65.23] 12/01/2016   • Diabetes mellitus (CMS/HCC) [E11.9] 08/30/2016   • Hypertension [I10] 08/30/2016      Resolved Hospital Problems   No resolved problems to display.       Mr. Sierra is a 67 y.o. male non-smoker with a history of diabetes and HTN who is admitted over ED concerns for \"sepsis\" due +SIRS criteria. Patient complains of URI type symptoms and is positive for Parainfluenza virus 2 infection.      · Continue supportive care for acute viral bronchitis caused by parainfluenza virus 2. This almost certainly etiology of his throat pain as well. Will follow-up throat culture done at urgent care.  · Continue Tylenol for fever. Blood cultures obtained in ED. Anticipate tachycardia should improve with rehydration and treatment of fever. I do not feel this patient has sepsis.  · Monitor blood glucose. Hold metformin for now.      I discussed the patients findings and my recommendations with patient, family and nursing staff.      Adrian Hernandes, " MD [facility]  11/13/18  8:04 AM

## 2018-11-13 NOTE — PROGRESS NOTES
Pharmacy consult for Enoxaparin dosing    Harry Sierra is a 67 y.o. male 83.4 kg (183 lb 14.4 oz).    Pharmacy consulted to dose per Dr Hernandes  Indication: VTE ppx    Estimated Creatinine Clearance: 89.4 mL/min (by C-G formula based on SCr of 0.92 mg/dL).  Body mass index is 27.16 kg/m².    Creatinine   Date Value Ref Range Status   11/13/2018 0.92 0.76 - 1.27 mg/dL Final     Platelets   Date Value Ref Range Status   11/13/2018 194 140 - 500 10*3/mm3 Final     No results found for: INR    PLAN:    Will start Enoxaparin 40 mg sq Q 24 hr.  Will monitor and adjust as needed.      Thank you for the consult.    Brook Salas RPH  11/13/18 10:30 AM

## 2018-11-13 NOTE — ED NOTES
Pt with productive cough, SOB that started Friday. Pt was seen in [facility] yesterday and given IM antibiotics. Pt reports subjective fever.      Lis Nevarez, RN  11/13/18 7799

## 2018-11-13 NOTE — ED NOTES
Pt given peanut butter and crackers to eat with water to drink. Pt family at bedside. Pt aware awaiting ip bed assignment.      Azul Brice RN  11/13/18 [id number]

## 2018-11-13 NOTE — PLAN OF CARE
Problem: Patient Care Overview  Goal: Plan of Care Review  Outcome: Ongoing (interventions implemented as appropriate)   11/13/18 1808   Coping/Psychosocial   Plan of Care Reviewed With patient   Plan of Care Review   Progress no change   OTHER   Outcome Summary Pt got tylenol 3 times this shift, 2650mg to contol temp which highest was 103, pt got a 2828 cc bolus of NS this shift now running at 125cc/hr. Pt got Hycodan for cough at 1630, and Zofran for nausea at 1635, and toradol for pain at 1635, chloraseptic spray at the bedside, pt -140 MD aware said it was because of his fever. we will cont to monitor this.      Goal: Individualization and Mutuality  Outcome: Ongoing (interventions implemented as appropriate)    Goal: Discharge Needs Assessment  Outcome: Ongoing (interventions implemented as appropriate)    Goal: Interprofessional Rounds/Family Conf  Outcome: Ongoing (interventions implemented as appropriate)      Problem: ARDS (Acute Resp Distress Syndrome) (Adult)  Goal: Signs and Symptoms of Listed Potential Problems Will be Absent, Minimized or Managed (ARDS)  Outcome: Ongoing (interventions implemented as appropriate)

## 2018-11-13 NOTE — NURSING NOTE
I called [facility] at [phone] about pt cultures he had done on Tuesday, they said results wont be back til Wednesday or Thursday.

## 2018-11-13 NOTE — ED PROVIDER NOTES
EMERGENCY DEPARTMENT ENCOUNTER    CHIEF COMPLAINT  Chief Complaint: Shortness of air   History given by: patient   History limited by: n/a  Room Number: S403/1  PMD: Artie Carranza MD      HPI:  Pt is a 67 y.o. male who presents complaining of SOA that began 4 days ago, but has gradually worsened. Pt also complains of a fever and productive cough. Temp on arrival to the ED is 101. Pt was seen at the [facility] yesterday and given IM abx.     Duration:  4 days   Onset: gradual  Timing: constant   Location: respiratory   Radiation: none  Quality: SOA   Intensity/Severity: moderate  Progression: worsening   Associated Symptoms: cough, fever  Aggravating Factors: none  Alleviating Factors: none    PAST MEDICAL HISTORY  Active Ambulatory Problems     Diagnosis Date Noted   • Hypertension 08/30/2016   • Type 2 diabetes mellitus without complication, without long-term current use of insulin (CMS/Formerly [facility]) 08/30/2016   • Routine health maintenance 08/30/2016   • Abnormal PSA 09/01/2016   • Prostatitis 09/26/2016   • UTI (urinary tract infection) due to urinary indwelling catheter (CMS/[facility]) 09/27/2016   • Reflux esophagitis 12/01/2016   • Melena 12/01/2016   • Bilateral carotid artery stenosis 12/01/2016   • Transient cerebral ischemia 09/14/2017   • Hyperlipidemia LDL goal <70 12/12/2017   • Prostate cancer (CMS/[facility]) 03/13/2018   • Sleep disturbance 09/18/2018   • Valvular heart disease 11/15/2018     Resolved Ambulatory Problems     Diagnosis Date Noted   • No Resolved Ambulatory Problems     Past Medical History:   Diagnosis Date   • Cancer (CMS/[facility])    • Diabetes mellitus (CMS/[facility])    • Hypertension    • Sepsis (CMS/[facility]) 09/2016   • Shingles    • UTI (urinary tract infection)    • Vertebral artery insufficiency        PAST SURGICAL HISTORY  Past Surgical History:   Procedure Laterality Date   • COLONOSCOPY      2011       FAMILY HISTORY  Family History   Problem Relation Age of Onset   • Diabetes Mother    • Heart disease Mother     • Uterine cancer Mother    • Diabetes Father    • Heart disease Father    • Kidney disease Father    • Skin cancer Father         melanoma   • Cancer Father         testicle       SOCIAL HISTORY  Social History     Socioeconomic History   • Marital status:      Spouse name: Not on file   • Number of children: 4   • Years of education: Not on file   • Highest education level: Not on file   Social Needs   • Financial resource strain: Not on file   • Food insecurity - worry: Not on file   • Food insecurity - inability: Not on file   • Transportation needs - medical: Not on file   • Transportation needs - non-medical: Not on file   Occupational History   • Not on file   Tobacco Use   • Smoking status: Never Smoker   • Smokeless tobacco: Never Used   Substance and Sexual Activity   • Alcohol use: No     Comment: 1/2 beer once every 3 months   • Drug use: No   • Sexual activity: Defer   Other Topics Concern   • Not on file   Social History Narrative   • Not on file       ALLERGIES  Shellfish-derived products; Aleve [naproxen]; Asa [aspirin]; and Bee venom    REVIEW OF SYSTEMS  Review of Systems   Constitutional: Positive for fever. Negative for activity change and appetite change.   HENT: Negative for congestion and sore throat.    Eyes: Negative.    Respiratory: Positive for cough and shortness of breath.    Cardiovascular: Negative for chest pain and leg swelling.   Gastrointestinal: Negative for abdominal pain, diarrhea and vomiting.   Endocrine: Negative.    Genitourinary: Negative for decreased urine volume and dysuria.   Musculoskeletal: Negative for neck pain.   Skin: Negative for rash and wound.   Allergic/Immunologic: Negative.    Neurological: Negative for weakness, numbness and headaches.   Hematological: Negative.    Psychiatric/Behavioral: Negative.    All other systems reviewed and are negative.      PHYSICAL EXAM  ED Triage Vitals [11/13/18 0539]   Temp Heart Rate Resp BP SpO2   (!) 101 °F (38.3  °C) (!) 133 22 -- 98 %      Temp src Heart Rate Source Patient Position BP Location FiO2 (%)   Tympanic -- -- -- --       Physical Exam   Constitutional: He is oriented to person, place, and time. No distress.   HENT:   Head: Normocephalic and atraumatic.   Voice is slightly muffled   Eyes: EOM are normal. Pupils are equal, round, and reactive to light.   Neck: Normal range of motion. Neck supple.   Cardiovascular: Normal rate, regular rhythm and normal heart sounds.   Pulmonary/Chest: Effort normal. No respiratory distress. He has rhonchi (coarse).   Appears mildly dyspneic    Abdominal: Soft. There is no tenderness. There is no rebound and no guarding.   Musculoskeletal: Normal range of motion. He exhibits no edema.   Neurological: He is alert and oriented to person, place, and time. He has normal sensation and normal strength.   Skin: Skin is warm and dry.   Psychiatric: Mood and affect normal.   Nursing note and vitals reviewed.      LAB RESULTS  Lab Results (last 24 hours)     Procedure Component Value Units Date/Time    POC Glucose Once [[id number]]  (Abnormal) Collected:  11/15/18 0657    Specimen:  Blood Updated:  11/15/18 0657     Glucose 224 mg/dL     POC Glucose Once [[id number]]  (Abnormal) Collected:  11/15/18 1141    Specimen:  Blood Updated:  11/15/18 1146     Glucose 230 mg/dL     Procalcitonin [[id number]]  (Abnormal) Collected:  11/15/18 1155    Specimen:  Blood Updated:  11/15/18 1250     Procalcitonin 2.67 ng/mL     Narrative:       As a Marker for Sepsis (Non-Neonates):   1. <0.5 ng/mL represents a low risk of severe sepsis and/or septic shock.  1. >2 ng/mL represents a high risk of severe sepsis and/or septic shock.    As a Marker for Lower Respiratory Tract Infections that require antibiotic therapy:  PCT on Admission     Antibiotic Therapy             6-12 Hrs later  > 0.5                Strongly Recommended            >0.25 - <0.5         Recommended  0.1 - 0.25           Discouraged             "       Remeasure/reassess PCT  <0.1                 Strongly Discouraged          Remeasure/reassess PCT      As 28 day mortality risk marker: \"Change in Procalcitonin Result\" (> 80 % or <=80 %) if Day 0 (or Day 1) and Day 4 values are available. Refer to [email or web address]-pct-[email or web address]/   Change in PCT <=80 %   A decrease of PCT levels below or equal to 80 % defines a positive change in PCT test result representing a higher risk for 28-day all-cause mortality of patients diagnosed with severe sepsis or septic shock.  Change in PCT > 80 %   A decrease of PCT levels of more than 80 % defines a negative change in PCT result representing a lower risk for 28-day all-cause mortality of patients diagnosed with severe sepsis or septic shock.                POC Glucose Once [[id number]]  (Abnormal) Collected:  11/15/18 1555    Specimen:  Blood Updated:  11/15/18 1556     Glucose 206 mg/dL     POC Glucose Once [[id number]]  (Abnormal) Collected:  11/15/18 2038    Specimen:  Blood Updated:  11/15/18 2040     Glucose 137 mg/dL           I ordered the above labs and reviewed the results    RADIOLOGY  XR Chest PA & Lateral   Final Result   Abnormal airspace disease within the lower lobes and   lingula/right middle lobe consistent with multilobar pneumonia.   Recommend follow up to resolution.       This report was finalized on 11/15/2018 11:52 AM by Dr. Miguel Neal M.D.          XR Chest 2 View   Final Result   No active disease is seen in the chest.       This report was finalized on 11/13/2018 2:31 PM by Dr. Nick Roberts M.D.               I ordered the above noted radiological studies. Interpreted by radiologist. Reviewed by me in PACS.       PROCEDURES  Procedures    EKG          EKG time: 0556  Rhythm/Rate: Sinus tachycardia, 128  P waves and AL: nml  QRS, axis: nml   ST and T waves: non specific ST changes, likely rate related     Interpreted Contemporaneously by me, independently viewed  unchanged compared to " prior 9/14/14    PROGRESS AND CONSULTS       05:46  30 mL/kg fluid bolus ordered per sepsis protocol. CXR, respiratory viral panel, pro-juanjo, CRP, CMP, CBC, lactic, and blood cultures ordered.     05:48  BP- (!) 182/105 HR- (!) 133 Temp- (!) 101 °F (38.3 °C) (Tympanic) O2 sat- 98%  Informed pt of the plan for labs, CXR, and abx. Pt understands and agrees with the plan, all questions answered.    05:50  Levaquin ordered for abx coverage. Tylenol ordered to treat fever.     06:57  Call placed to [facility] for admission.        MEDICAL DECISION MAKING  Results were reviewed/discussed with the patient and they were also made aware of online access. Pt also made aware that some labs, such as cultures, will not be resulted during ER visit and follow up with PMD is necessary.     MDM  Number of Diagnoses or Management Options     Amount and/or Complexity of Data Reviewed  Clinical lab tests: ordered and reviewed (CRP=6.02)  Tests in the radiology section of CPT®: ordered and reviewed (CXR shows NAD)  Tests in the medicine section of CPT®: ordered and reviewed (See EKG procedure note. )    Patient Progress  Patient progress: stable         DIAGNOSIS  Final diagnoses:   Sepsis, due to unspecified organism (CMS/HCC)   Upper respiratory tract infection, unspecified type       DISPOSITION  ADMISSION    Discussed treatment plan and reason for admission with pt/family and admitting physician.  Pt/family voiced understanding of the plan for admission for further testing/treatment as needed.     Latest Documented Vital Signs:  As of 6:16 AM  BP- (!) 150/101 HR- 109 Temp- 98 °F (36.7 °C) (Oral) O2 sat- 92%    --  Documentation assistance provided by alex Walker for Dr Kapoor.  Information recorded by the scribrayray was done at my direction and has been verified and validated by me.        Wendy Walker  11/13/18 0658       Hector Kapoor MD  11/16/18 0656

## 2018-11-14 PROBLEM — B96.89 ACUTE BACTERIAL BRONCHITIS: Status: ACTIVE | Noted: 2018-11-14

## 2018-11-14 PROBLEM — K59.00 CONSTIPATION: Status: ACTIVE | Noted: 2018-11-14

## 2018-11-14 PROBLEM — E86.0 DEHYDRATION: Status: RESOLVED | Noted: 2018-11-13 | Resolved: 2018-11-14

## 2018-11-14 PROBLEM — A41.9 SEPSIS: Status: ACTIVE | Noted: 2018-11-14

## 2018-11-14 PROBLEM — I48.91 ATRIAL FIBRILLATION (HCC): Status: ACTIVE | Noted: 2018-11-13

## 2018-11-14 PROBLEM — J20.8 ACUTE BACTERIAL BRONCHITIS: Status: ACTIVE | Noted: 2018-11-14

## 2018-11-14 PROBLEM — B34.8 INFECTION DUE TO PARAINFLUENZA VIRUS 2: Status: ACTIVE | Noted: 2018-11-13

## 2018-11-14 LAB
ANION GAP SERPL CALCULATED.3IONS-SCNC: 14.4 MMOL/L
BUN BLD-MCNC: 13 MG/DL (ref 8–23)
BUN/CREAT SERPL: 17.6 (ref 7–25)
CALCIUM SPEC-SCNC: 8 MG/DL (ref 8.6–10.5)
CHLORIDE SERPL-SCNC: 101 MMOL/L (ref 98–107)
CO2 SERPL-SCNC: 23.6 MMOL/L (ref 22–29)
CREAT BLD-MCNC: 0.74 MG/DL (ref 0.76–1.27)
DEPRECATED RDW RBC AUTO: 44.4 FL (ref 37–54)
ERYTHROCYTE [DISTWIDTH] IN BLOOD BY AUTOMATED COUNT: 13.5 % (ref 11.5–14.5)
GFR SERPL CREATININE-BSD FRML MDRD: 105 ML/MIN/1.73
GLUCOSE BLD-MCNC: 185 MG/DL (ref 65–99)
GLUCOSE BLDC GLUCOMTR-MCNC: 209 MG/DL (ref 70–130)
GLUCOSE BLDC GLUCOMTR-MCNC: 213 MG/DL (ref 70–130)
GLUCOSE BLDC GLUCOMTR-MCNC: 235 MG/DL (ref 70–130)
GLUCOSE BLDC GLUCOMTR-MCNC: 273 MG/DL (ref 70–130)
GLUCOSE BLDC GLUCOMTR-MCNC: 345 MG/DL (ref 70–130)
HBA1C MFR BLD: 7.8 % (ref 4.8–5.6)
HCT VFR BLD AUTO: 37.6 % (ref 40.4–52.2)
HGB BLD-MCNC: 11.9 G/DL (ref 13.7–17.6)
L PNEUMO1 AG UR QL IA: NEGATIVE
MAGNESIUM SERPL-MCNC: 1.5 MG/DL (ref 1.6–2.4)
MCH RBC QN AUTO: 28.3 PG (ref 27–32.7)
MCHC RBC AUTO-ENTMCNC: 31.6 G/DL (ref 32.6–36.4)
MCV RBC AUTO: 89.5 FL (ref 79.8–96.2)
PLATELET # BLD AUTO: 168 10*3/MM3 (ref 140–500)
PMV BLD AUTO: 9.9 FL (ref 6–12)
POTASSIUM BLD-SCNC: 3.5 MMOL/L (ref 3.5–5.2)
PROCALCITONIN SERPL-MCNC: 1.76 NG/ML (ref 0.1–0.25)
RBC # BLD AUTO: 4.2 10*6/MM3 (ref 4.6–6)
S PNEUM AG SPEC QL LA: NEGATIVE
SODIUM BLD-SCNC: 139 MMOL/L (ref 136–145)
TSH SERPL DL<=0.05 MIU/L-ACNC: 2.91 MIU/ML (ref 0.27–4.2)
WBC NRBC COR # BLD: 9.68 10*3/MM3 (ref 4.5–10.7)

## 2018-11-14 PROCEDURE — 99222 1ST HOSP IP/OBS MODERATE 55: CPT | Performed by: INTERNAL MEDICINE

## 2018-11-14 PROCEDURE — 93010 ELECTROCARDIOGRAM REPORT: CPT | Performed by: INTERNAL MEDICINE

## 2018-11-14 PROCEDURE — 87205 SMEAR GRAM STAIN: CPT | Performed by: INTERNAL MEDICINE

## 2018-11-14 PROCEDURE — 25010000002 MAGNESIUM SULFATE IN D5W 1G/100ML (PREMIX) 1-5 GM/100ML-% SOLUTION: Performed by: HOSPITALIST

## 2018-11-14 PROCEDURE — 25010000002 KETOROLAC TROMETHAMINE PER 15 MG: Performed by: INTERNAL MEDICINE

## 2018-11-14 PROCEDURE — 87899 AGENT NOS ASSAY W/OPTIC: CPT | Performed by: INTERNAL MEDICINE

## 2018-11-14 PROCEDURE — 25010000002 ENOXAPARIN PER 10 MG: Performed by: HOSPITALIST

## 2018-11-14 PROCEDURE — 25010000002 ONDANSETRON PER 1 MG: Performed by: HOSPITALIST

## 2018-11-14 PROCEDURE — 93005 ELECTROCARDIOGRAM TRACING: CPT | Performed by: HOSPITALIST

## 2018-11-14 PROCEDURE — 25010000002 CEFTRIAXONE PER 250 MG: Performed by: INTERNAL MEDICINE

## 2018-11-14 PROCEDURE — 87070 CULTURE OTHR SPECIMN AEROBIC: CPT | Performed by: INTERNAL MEDICINE

## 2018-11-14 PROCEDURE — 82962 GLUCOSE BLOOD TEST: CPT

## 2018-11-14 PROCEDURE — 99223 1ST HOSP IP/OBS HIGH 75: CPT | Performed by: INTERNAL MEDICINE

## 2018-11-14 PROCEDURE — 87081 CULTURE SCREEN ONLY: CPT | Performed by: INTERNAL MEDICINE

## 2018-11-14 PROCEDURE — 85027 COMPLETE CBC AUTOMATED: CPT | Performed by: HOSPITALIST

## 2018-11-14 PROCEDURE — 63710000001 INSULIN LISPRO (HUMAN) PER 5 UNITS: Performed by: HOSPITALIST

## 2018-11-14 PROCEDURE — 83036 HEMOGLOBIN GLYCOSYLATED A1C: CPT | Performed by: HOSPITALIST

## 2018-11-14 PROCEDURE — 93005 ELECTROCARDIOGRAM TRACING: CPT | Performed by: INTERNAL MEDICINE

## 2018-11-14 PROCEDURE — 80048 BASIC METABOLIC PNL TOTAL CA: CPT | Performed by: HOSPITALIST

## 2018-11-14 PROCEDURE — 25010000002 KETOROLAC TROMETHAMINE PER 15 MG: Performed by: HOSPITALIST

## 2018-11-14 PROCEDURE — 84443 ASSAY THYROID STIM HORMONE: CPT | Performed by: HOSPITALIST

## 2018-11-14 PROCEDURE — 83735 ASSAY OF MAGNESIUM: CPT | Performed by: HOSPITALIST

## 2018-11-14 PROCEDURE — 84145 PROCALCITONIN (PCT): CPT | Performed by: HOSPITALIST

## 2018-11-14 RX ORDER — DEXTROSE, SODIUM CHLORIDE, AND POTASSIUM CHLORIDE 5; .45; .15 G/100ML; G/100ML; G/100ML
100 INJECTION INTRAVENOUS CONTINUOUS
Status: DISCONTINUED | OUTPATIENT
Start: 2018-11-14 | End: 2018-11-15

## 2018-11-14 RX ORDER — KETOROLAC TROMETHAMINE 15 MG/ML
15 INJECTION, SOLUTION INTRAMUSCULAR; INTRAVENOUS EVERY 6 HOURS PRN
Status: DISCONTINUED | OUTPATIENT
Start: 2018-11-14 | End: 2018-11-14 | Stop reason: CLARIF

## 2018-11-14 RX ORDER — ACETAMINOPHEN 160 MG/5ML
650 SOLUTION ORAL EVERY 4 HOURS PRN
Status: DISCONTINUED | OUTPATIENT
Start: 2018-11-14 | End: 2018-11-16 | Stop reason: HOSPADM

## 2018-11-14 RX ORDER — MAGNESIUM SULFATE 1 G/100ML
2 INJECTION INTRAVENOUS ONCE
Status: COMPLETED | OUTPATIENT
Start: 2018-11-14 | End: 2018-11-14

## 2018-11-14 RX ORDER — CEFTRIAXONE SODIUM 2 G/50ML
2 INJECTION, SOLUTION INTRAVENOUS EVERY 24 HOURS
Status: DISCONTINUED | OUTPATIENT
Start: 2018-11-14 | End: 2018-11-16

## 2018-11-14 RX ORDER — KETOROLAC TROMETHAMINE 30 MG/ML
15 INJECTION, SOLUTION INTRAMUSCULAR; INTRAVENOUS EVERY 6 HOURS PRN
Status: DISCONTINUED | OUTPATIENT
Start: 2018-11-14 | End: 2018-11-15

## 2018-11-14 RX ORDER — SENNA LEAF EXTRACT 176MG/5ML
10 SYRUP ORAL 2 TIMES DAILY
Status: DISCONTINUED | OUTPATIENT
Start: 2018-11-14 | End: 2018-11-16 | Stop reason: HOSPADM

## 2018-11-14 RX ORDER — KETOROLAC TROMETHAMINE 30 MG/ML
30 INJECTION, SOLUTION INTRAMUSCULAR; INTRAVENOUS ONCE
Status: DISCONTINUED | OUTPATIENT
Start: 2018-11-14 | End: 2018-11-14 | Stop reason: SDUPTHER

## 2018-11-14 RX ORDER — KETOROLAC TROMETHAMINE 30 MG/ML
15 INJECTION, SOLUTION INTRAMUSCULAR; INTRAVENOUS EVERY 6 HOURS PRN
Status: DISCONTINUED | OUTPATIENT
Start: 2018-11-14 | End: 2018-11-14 | Stop reason: SDUPTHER

## 2018-11-14 RX ORDER — CEFTRIAXONE SODIUM 1 G/50ML
1 INJECTION, SOLUTION INTRAVENOUS ONCE
Status: COMPLETED | OUTPATIENT
Start: 2018-11-14 | End: 2018-11-14

## 2018-11-14 RX ORDER — DOXYCYCLINE 100 MG/1
100 CAPSULE ORAL ONCE
Status: COMPLETED | OUTPATIENT
Start: 2018-11-14 | End: 2018-11-14

## 2018-11-14 RX ORDER — PANTOPRAZOLE SODIUM 40 MG/10ML
40 INJECTION, POWDER, LYOPHILIZED, FOR SOLUTION INTRAVENOUS
Status: DISCONTINUED | OUTPATIENT
Start: 2018-11-14 | End: 2018-11-16 | Stop reason: HOSPADM

## 2018-11-14 RX ORDER — KETOROLAC TROMETHAMINE 15 MG/ML
15 INJECTION, SOLUTION INTRAMUSCULAR; INTRAVENOUS ONCE
Status: COMPLETED | OUTPATIENT
Start: 2018-11-14 | End: 2018-11-14

## 2018-11-14 RX ADMIN — GUAIFENESIN 1200 MG: 600 TABLET, EXTENDED RELEASE ORAL at 08:14

## 2018-11-14 RX ADMIN — POTASSIUM CHLORIDE, DEXTROSE MONOHYDRATE AND SODIUM CHLORIDE 100 ML/HR: 150; 5; 450 INJECTION, SOLUTION INTRAVENOUS at 12:02

## 2018-11-14 RX ADMIN — HYDROCODONE BITARTRATE AND HOMATROPINE METHYLBROMIDE 5 ML: 5; 1.5 SOLUTION ORAL at 09:13

## 2018-11-14 RX ADMIN — ENOXAPARIN SODIUM 80 MG: 80 INJECTION SUBCUTANEOUS at 13:30

## 2018-11-14 RX ADMIN — Medication 352 MG: at 12:15

## 2018-11-14 RX ADMIN — ACETAMINOPHEN 650 MG: 325 TABLET, FILM COATED ORAL at 23:48

## 2018-11-14 RX ADMIN — ENALAPRIL MALEATE 5 MG: 5 TABLET ORAL at 21:18

## 2018-11-14 RX ADMIN — INSULIN LISPRO 7 UNITS: 100 INJECTION, SOLUTION INTRAVENOUS; SUBCUTANEOUS at 12:02

## 2018-11-14 RX ADMIN — INSULIN LISPRO 4 UNITS: 100 INJECTION, SOLUTION INTRAVENOUS; SUBCUTANEOUS at 22:39

## 2018-11-14 RX ADMIN — HYDROCODONE BITARTRATE AND HOMATROPINE METHYLBROMIDE 5 ML: 5; 1.5 SOLUTION ORAL at 04:54

## 2018-11-14 RX ADMIN — PANTOPRAZOLE SODIUM 40 MG: 40 TABLET, DELAYED RELEASE ORAL at 06:57

## 2018-11-14 RX ADMIN — ATORVASTATIN CALCIUM 40 MG: 20 TABLET, FILM COATED ORAL at 21:18

## 2018-11-14 RX ADMIN — ACETAMINOPHEN 650 MG: 325 TABLET, FILM COATED ORAL at 09:13

## 2018-11-14 RX ADMIN — METOPROLOL TARTRATE 25 MG: 25 TABLET ORAL at 09:07

## 2018-11-14 RX ADMIN — INSULIN LISPRO 4 UNITS: 100 INJECTION, SOLUTION INTRAVENOUS; SUBCUTANEOUS at 08:14

## 2018-11-14 RX ADMIN — ENALAPRIL MALEATE 5 MG: 5 TABLET ORAL at 08:14

## 2018-11-14 RX ADMIN — MAGNESIUM SULFATE HEPTAHYDRATE 2 G: 1 INJECTION, SOLUTION INTRAVENOUS at 21:19

## 2018-11-14 RX ADMIN — METOPROLOL TARTRATE 25 MG: 25 TABLET ORAL at 21:17

## 2018-11-14 RX ADMIN — Medication 352 MG: at 21:16

## 2018-11-14 RX ADMIN — KETOROLAC TROMETHAMINE 15 MG: 15 INJECTION, SOLUTION INTRAMUSCULAR; INTRAVENOUS at 16:04

## 2018-11-14 RX ADMIN — SODIUM CHLORIDE 125 ML/HR: 9 INJECTION, SOLUTION INTRAVENOUS at 06:46

## 2018-11-14 RX ADMIN — ACETAMINOPHEN 650 MG: 325 TABLET, FILM COATED ORAL at 05:15

## 2018-11-14 RX ADMIN — CLOPIDOGREL 75 MG: 75 TABLET, FILM COATED ORAL at 08:14

## 2018-11-14 RX ADMIN — Medication 10 MG: at 21:17

## 2018-11-14 RX ADMIN — HYDROCODONE BITARTRATE AND HOMATROPINE METHYLBROMIDE 5 ML: 5; 1.5 SOLUTION ORAL at 00:37

## 2018-11-14 RX ADMIN — ONDANSETRON 4 MG: 2 INJECTION INTRAMUSCULAR; INTRAVENOUS at 05:16

## 2018-11-14 RX ADMIN — CEFTRIAXONE SODIUM 1 G: 1 INJECTION, SOLUTION INTRAVENOUS at 08:35

## 2018-11-14 RX ADMIN — GUAIFENESIN 1200 MG: 600 TABLET, EXTENDED RELEASE ORAL at 21:18

## 2018-11-14 RX ADMIN — POTASSIUM CHLORIDE, DEXTROSE MONOHYDRATE AND SODIUM CHLORIDE 100 ML/HR: 150; 5; 450 INJECTION, SOLUTION INTRAVENOUS at 22:40

## 2018-11-14 RX ADMIN — DOXYCYCLINE 100 MG: 100 CAPSULE ORAL at 08:35

## 2018-11-14 RX ADMIN — KETOROLAC TROMETHAMINE 15 MG: 15 INJECTION, SOLUTION INTRAMUSCULAR; INTRAVENOUS at 08:34

## 2018-11-14 RX ADMIN — ACETAMINOPHEN 650 MG: 325 TABLET, FILM COATED ORAL at 17:12

## 2018-11-14 RX ADMIN — INSULIN LISPRO 6 UNITS: 100 INJECTION, SOLUTION INTRAVENOUS; SUBCUTANEOUS at 17:13

## 2018-11-14 NOTE — CONSULTS
Referring Provider: Adrian Hernandes MD  Reason for Consultation: Elevated procal    Subjective   History of present illness:    This very nice 67-year-old we are asked for evaluation opinion regarding elevated Procalcitonin.    Per the patient, he was in his usual state of health until approximately 11/8/18 when developed cough and fever.  He said his fever was as high as 103 and associated with shaking chills the last about 20-30 minutes before resolving spontaneously.  Patient went to an urgent care center because with sore throat.  Apparently a sputum culture was done at that time but we do not have the results of this.  In any event, he said his infection occurred in the context of both his granddaughter and daughter being diagnosed with parainfluenza infections.  Due to persistently feeling poorly, he came to the ER yesterday and was admitted.  Initial Procalcitonin was negative at 0.1, but today's has increased to 1.7.  Chest x-ray was negative.  His course, Atrial fibrillation with rapid ventricular response.  He denies any shortness of breath but did have some chest pressure when he went into A. fib.  He does feel quite a bit better with initiation of antibiotics.    Past medical history: Skin cancer, diabetes mellitus type 2, prostate cancer, sepsis secondary to urinary catheter, TIA, colonoscopy  Allergies: Aspirin  No family history of infectious diseases  Social history: He is  and lives with his wife here in [place], [place].  He is retired.  Travel extensively throughout the globe for business and pleasure.    Review of Systems  Pertinent items are noted in HPI, all other systems reviewed and negative    Objective     Physical Exam:   Vital Signs   Temp:  [97.7 °F (36.5 °C)-103 °F (39.4 °C)] 98 °F (36.7 °C)  Heart Rate:  [117-151] 151  Resp:  [18-20] 20  BP: (133-174)/(74-95) 160/88    GENERAL: Awake and alert, appears ill but nontoxic   HEENT: Oropharynx is clear. Hearing is grossly normal.    EYES: PERRL. No conjunctival injection. No lid lag.   LYMPHATICS: No lymphadenopathy of the neck or inguinal regions.   HEART: Regular rate and irregular rhythm. No peripheral edema.   LUNGS: Rhonchorous with normal respiratory effort.   GI: Soft, nontender, nondistended. No appreciable organomegaly.   SKIN: Warm and dry without cutaneous eruptions   PSYCHIATRIC: Appropriate mood, affect, insight, and judgment.     Results Review:    WBC 9.68     H/H 11.9/38  Cr 0.74  glc 185-345  PCT 0.17-->1.76  ALT 44  AST 47    Microbiology:  11/13/18 Bcx 2/2 NGTD  11/13/18 RPP=Parainfluenza 2    Radiology:   CXR, independently interpreted: no pna      Assessment/Plan   1.  Parainfluenza 2  2.  Elevated Procalcitonin  3. DM2, cont glycemic control efforts to prevent/control infectious complications    Complex case.  On one hand, his symptoms can be explained sufficiently by the parainfluenza infection but that should not cause an elevated Procalcitonin.  It may be that he is trying to develop a secondary bacterial infection.  We need to assess for this and will do so with MRSA screening of the nares, sputum culture, repeat chest x-ray in the morning and urine legionella/strep, will antigens.  Continue ceftriaxone while awaiting these diagnostics. D/w Dr Hernandes re: impressions and plan    Thank you for this consult.  We will continue to follow along and tailor antibiotics as the patient's clinical course evolves.    Gomez Adames MD  11/14/18  11:50 AM

## 2018-11-14 NOTE — PLAN OF CARE
Problem: Patient Care Overview  Goal: Plan of Care Review  Outcome: Ongoing (interventions implemented as appropriate)   11/14/18 8788   Coping/Psychosocial   Plan of Care Reviewed With patient   Plan of Care Review   Progress no change   OTHER   Outcome Summary Pt feeling much better today, getting toradol, and tylenol, no fever this shift, started antibotics, consulted cardio for afib but pt converted back to NSR around 2pm, consulted ID, sent sputum down, sent nasal swab down, sent urine down. will cont to montior     Goal: Individualization and Mutuality  Outcome: Ongoing (interventions implemented as appropriate)    Goal: Discharge Needs Assessment  Outcome: Ongoing (interventions implemented as appropriate)    Goal: Interprofessional Rounds/Family Conf  Outcome: Ongoing (interventions implemented as appropriate)      Problem: ARDS (Acute Resp Distress Syndrome) (Adult)  Goal: Signs and Symptoms of Listed Potential Problems Will be Absent, Minimized or Managed (ARDS)  Outcome: Ongoing (interventions implemented as appropriate)

## 2018-11-14 NOTE — PROGRESS NOTES
Name: Harry ALCALA [facility] ADMIT: 2018   : 1950  PCP: Artie Carranza MD    MRN: [id number] LOS: 1 days   AGE/SEX: 67 y.o. male  ROOM: [facility]/     Subjective   Mainly c/o throat pain. No problem swallowing except pain. Generally speaking feels much better than yesterday. Difficulty swallowing pills. No drooling or regurgitation. Complains of constipation. Denies problems emptying his bladder. He had minor episode of chest heaviness early this morning associated with increased rate heart rate and possible atrial fibrillation.    Objective   Temp:  [97.7 °F (36.5 °C)-103 °F (39.4 °C)] 97.7 °F (36.5 °C)  Heart Rate:  [117-130] 122  Resp:  [18] 18  BP: (133-174)/(74-95) 142/74  SpO2:  [90 %-97 %] 90 %  on  Flow (L/min):  [2] 2;   Device (Oxygen Therapy): nasal cannula  Body mass index is 27.16 kg/m².    Physical Exam   Constitutional: He is oriented to person, place, and time. He appears well-developed. He is cooperative. No distress.   HENT:   Mouth/Throat: Oropharyngeal exudate (Less erythematous) present.   Neck: No JVD present. No tracheal deviation present.   Cardiovascular: An irregularly irregular rhythm present. Tachycardia present.   No murmur heard.  Pulmonary/Chest: Effort normal and breath sounds normal. No respiratory distress.   Abdominal: Soft. Normal appearance and bowel sounds are normal. He exhibits no distension. There is no tenderness.   Musculoskeletal: He exhibits no edema.   Lymphadenopathy:     He has cervical adenopathy (less tender).   Neurological: He is alert and oriented to person, place, and time.   Skin: Skin is warm and dry.   Psychiatric: He has a normal mood and affect. His behavior is normal.   Nursing note and vitals reviewed.      Results Review:       I reviewed the patient's new clinical results.  Results from last 7 days   Lab Units  18   0428  18   0611   WBC 10*3/mm3  9.68  8.84   HEMOGLOBIN g/dL  11.9*  13.3*   PLATELETS 10*3/mm3  168  194     Results from  last 7 days   Lab Units  11/14/18 0428  11/13/18   0611   SODIUM mmol/L  139  135*   POTASSIUM mmol/L  3.5  4.1   CHLORIDE mmol/L  101  95*   CO2 mmol/L  23.6  24.1   BUN mg/dL  13  12   CREATININE mg/dL  0.74*  0.92   GLUCOSE mg/dL  185*  300*   Estimated Creatinine Clearance: 102.8 mL/min (A) (by C-G formula based on SCr of 0.74 mg/dL (L)).  Results from last 7 days   Lab Units  11/14/18 0428  11/13/18   0611   CALCIUM mg/dL  8.0*  9.3   ALBUMIN g/dL   --   4.40     Results from last 7 days   Lab Units  11/14/18 0428  11/13/18 0611   PROCALCITONIN ng/mL  1.76*  0.17   LACTATE mmol/L   --   1.2   No results found for: STREPPNEUAG, LEGANTIGENUR  Results from last 7 days   Lab Units  11/13/18 0611   BLOODCX   No growth at 24 hours  No growth at 24 hours     Hemoglobin A1C   Date/Time Value Ref Range Status   11/14/2018 0428 7.80 (H) 4.80 - 5.60 % Final     Glucose   Date/Time Value Ref Range Status   11/14/2018 0622 213 (H) 70 - 130 mg/dL Final   11/14/2018 0300 209 (H) 70 - 130 mg/dL Final   11/13/2018 2001 254 (H) 70 - 130 mg/dL Final   11/13/2018 1601 206 (H) 70 - 130 mg/dL Final   11/13/2018 1108 235 (H) 70 - 130 mg/dL Final   11/13/2018 0909 262 (H) 70 - 130 mg/dL Final   11/13/2018 0548 307 (H) 70 - 130 mg/dL Final             atorvastatin 40 mg Oral Daily   ceftriaxone 1 g Intravenous Once   clopidogrel 75 mg Oral Daily   doxycycline 100 mg Oral Once   enalapril 5 mg Oral Q12H   enoxaparin 40 mg Subcutaneous Q24H   glipiZIDE 10 mg Oral QAM AC   guaiFENesin 1,200 mg Oral Q12H   insulin lispro 0-9 Units Subcutaneous 4x Daily With Meals & Nightly   ketorolac 15 mg Intravenous Once   melatonin 10 mg Oral Nightly   pantoprazole 40 mg Oral Daily       Pharmacy to Dose enoxaparin (LOVENOX)     sodium chloride 125 mL/hr Last Rate: 125 mL/hr (11/14/18 0646)   Diet Regular; Consistent Carbohydrate      Assessment/Plan      Active Hospital Problems    Diagnosis Date Noted   • **Infection due to parainfluenza  virus 2 [B34.8] 11/13/2018   • Acute bacterial bronchitis (secondary infection) [J20.8, B96.89] 11/14/2018   • Sepsis - present on admission [A41.9] 11/14/2018   • Constipation [K59.00] 11/14/2018   • Atrial fibrillation (new) [I48.91] 11/13/2018   • Bilateral carotid artery stenosis [I65.23] 12/01/2016   • Type 2 diabetes mellitus without complication, without long-term current use of insulin (CMS/[facility]) [E11.9] 08/30/2016   • Hypertension [I10] 08/30/2016      Resolved Hospital Problems    Diagnosis Date Noted Date Resolved   • Dehydration [E86.0] 11/13/2018 11/14/2018       Mr. Sierra is a 67 y.o. male non-smoker with a history of diabetes and HTN who is admitted with URI symptoms and + VRP for parainfluenza virus 2.    · PCT is elevated today whereas was normal yesterday. Antibiotics ordered this am by covering physician. Possible secondary bacterial infection bronchitis/pharyngitis. Will ask for ID opinion regarding appropriate course of antibiotics. Sepsis likely present on admission due to parainfluenza infection, fever, tachycardia, elevated pro-calcitonin and CRP. No evidence organ failure.  · Will call again today regarding throat culture from 11/12 urgent care visit. Blood cultures remain negative. Pharyngeal exam seems improved today. Due to painful swallowing will change his Tylenol to liquid and Protonix to IV. Continue Tylenol and Toradol for pain.  · EKG this am c/w afib, presumably new. Review records from 6/2018 and seen by PCP for CP, dizziness, HTN etc. Unclear workup at that time. History TIA on Plavix. Will need full dose anticoagulation until sorted out. Echocardiogram has been ordered. Lopressor started. Note prior history GI bleeding.  · Continue supportive care for acute viral bronchitis caused by parainfluenza virus 2. This almost certainly etiology of his throat pain as well. Will follow-up throat culture done at urgent care.  · Will add senna syrup for constipation.   · Will adjust IV fluids.  Change diet soft texture. Had ensure.      Adrian Hernandes MD  [facility]  11/14/18  8:15 AM

## 2018-11-14 NOTE — PLAN OF CARE
Problem: Patient Care Overview  Goal: Plan of Care Review  Outcome: Ongoing (interventions implemented as appropriate)   11/14/18 0208   Coping/Psychosocial   Plan of Care Reviewed With patient   Plan of Care Review   Progress no change   OTHER   Outcome Summary Pt c/o of pain in his throat that is not relieved with medication. Zofran and Tylenol given to relieve headache and nausea. Pt. still having frequent cough but unable to expel sputum. Pt still running sinus tach with -130. Will continue to closely monitor    Coping/Psychosocial   Patient Agreement with Plan of Care agrees

## 2018-11-14 NOTE — CONSULTS
Patient Name: Harry Sierra  :1950  67 y.o.    Date of Admission: 2018  Date of Consultation:  18  Encounter Provider: Brayden Franco III, MD  Place of Service: [facility] CARDIOLOGY  Referring Provider: Adrian Hernandes MD  Patient Care Team:  Artie Carranza MD as PCP - General (Internal Medicine)  Artei Carranza MD as PCP - Claims Attributed      Chief complaint: SOA, cough, fever    History of Present Illness:    This is a 67 year old male with history of prostate cancer, DM ,HTN, and TIA ( hx of carotid stenosis- on plavix), that came to our ER yesterday for SOA that began 5 days ago that had gradually become worse with intermittent fevers and productive cough. He was seen in urgent care initially where he had throat cultures taken (pending, will attempt to get records)- he had been given antibiotics. His Procal is elevated to 1.76 today. Blood cultures so far have been negative, but he tested positive for parainfluenza virus 2 infection. His fever is being treated with tylenol, and he is getting IV fluids.     EKG this am found atrial fibrillation RVR with heart rates in the 120s-130s. He is currently on 25mg of metoprolol Q 12. He denies having anychest pain or palpitations and says that his main complaint is the discomfort of trying to cough up his sputum. His family at bedside did mention that he had been hospitalized for sepsis 18 months ago due to issues with urinary retention/catheter infection. He follows with urology on an outpatient basis.     Upon reviewing prior records he did have an echocardiogram done in 2017 which found LV function normal with EF of 66%.    He has not had any sensation of prior tachycardia palpitations.  He has no known history of atrial fibrillation.  Currently he is weak and tired.  He is been having problems with nausea and pain on deep breath.  He did have some chest pain earlier when his heart rate was  increased, no chest pain now.  No problem with lower extremity edema.  No orthopnea or PND.    Echocardiogram pending.     XR chest 2 vw on 11/13/18-  FINDINGS: The cardiomediastinal silhouette and the pulmonary vasculature  are within normal limits. The lungs are clear. The costophrenic angles  are sharp.     IMPRESSION:  No active disease is seen in the chest.    Previous Testing-  Echocardiogram on 9/15/17-  Interpretation Summary     · Left ventricular systolic function is normal. Estimated EF = 66%.           Past Medical History:   Diagnosis Date   • Cancer (CMS/HCC)     Basal Cell   • Diabetes mellitus (CMS/HCC)    • Hypertension    • Sepsis (CMS/HCC) 09/2016   • Shingles    • UTI (urinary tract infection)    • Vertebral artery insufficiency        Past Surgical History:   Procedure Laterality Date   • COLONOSCOPY      2011         Prior to Admission medications    Medication Sig Start Date End Date Taking? Authorizing Provider   acetaminophen (TYLENOL) 500 MG tablet Take 1 tablet by mouth 4 (Four) Times a Day As Needed for mild pain (1-3) or fever. 9/29/16  Yes Eduardo Davis MD   atorvastatin (LIPITOR) 40 MG tablet TAKE ONE TABLET BY MOUTH EVERY NIGHT AT BEDTIME 9/10/18  Yes Artie Carranza MD   clopidogrel (PLAVIX) 75 MG tablet Take 75 mg by mouth Daily.   Yes ProviderSonny MD   enalapril (VASOTEC) 5 MG tablet Take 5 mg by mouth 2 (Two) Times a Day. 10/16/18  Yes Artie Carranza MD   glimepiride (AMARYL) 4 MG tablet TAKE TWO TABLETS BY MOUTH EVERY MORNING BEFORE BREAKFAST 10/4/18  Yes Artie Carranza MD   metFORMIN (GLUCOPHAGE) 1000 MG tablet Take 1,000 mg by mouth Daily With Breakfast.   Yes Sonny Maravilla MD   Multiple Vitamins-Minerals (CENTRUM SILVER 50+MEN PO) Take  by mouth.   Yes Sonny Maravilla MD   pantoprazole (PROTONIX) 40 MG EC tablet TAKE ONE TABLET BY MOUTH DAILY 9/27/18  Yes Artie Carranza MD   glucose blood (COOL BLOOD GLUCOSE TEST STRIPS) test strip Use as  instructed 9/16/17   Chinedu Steele MD       Allergies   Allergen Reactions   • Shellfish-Derived Products Anaphylaxis   • Aleve [Naproxen] Other (See Comments)     Bleeding     • Asa [Aspirin] Other (See Comments)     bleeding   • Bee Venom        Social History     Socioeconomic History   • Marital status:      Spouse name: Not on file   • Number of children: 4   • Years of education: Not on file   • Highest education level: Not on file   Tobacco Use   • Smoking status: Never Smoker   • Smokeless tobacco: Never Used   Substance and Sexual Activity   • Alcohol use: No     Comment: 1/2 beer once every 3 months   • Drug use: No   • Sexual activity: Defer       Family History   Problem Relation Age of Onset   • Diabetes Mother    • Heart disease Mother    • Uterine cancer Mother    • Diabetes Father    • Heart disease Father    • Kidney disease Father    • Skin cancer Father         melanoma   • Cancer Father         testicle       REVIEW OF SYSTEMS:   All systems reviewed.  Pertinent positives identified in HPI.  All other systems are negative.      Objective:     Vitals:    11/13/18 1900 11/13/18 2300 11/14/18 0521 11/14/18 0847   BP: 133/76 142/74  160/88   BP Location: Left arm Left leg  Right arm   Patient Position: Sitting Sitting  Lying   Pulse: 117 (!) 122  (!) 151   Resp: 18 18  20   Temp: 98.3 °F (36.8 °C) 99 °F (37.2 °C) 97.7 °F (36.5 °C) 98 °F (36.7 °C)   TempSrc: Oral Oral Oral Oral   SpO2: 92% 90%  91%   Weight:       Height:         Body mass index is 27.16 kg/m².    Physical Exam:  General Appearance:    Alert, cooperative, in no acute distress   Head:    Normocephalic, without obvious abnormality, atraumatic   Eyes:            Lids and lashes normal, conjunctivae and sclerae normal, no   icterus, no pallor, corneas clear, PERRLA   Ears:    Ears appear intact with no abnormalities noted   Throat:   No oral lesions, no thrush, oral mucosa moist   Neck:   No adenopathy, supple, trachea  midline, no thyromegaly, no   carotid bruit, no JVD   Back:     No kyphosis present, no scoliosis present, no skin lesions, erythema or scars, no tenderness to percussion or palpation, range of motion normal   Lungs:     Clear to auscultation,respirations regular, even and unlabored    Heart:    irregular rhythm and normal rate, normal S1 and S2, no murmur, no gallop, no rub, no click   Chest Wall:    No abnormalities observed   Abdomen:     Normal bowel sounds, no masses, no organomegaly, soft        non-tender, non-distended, no guarding, no rebound  tenderness   Extremities:   Moves all extremities well, no edema, no cyanosis, no redness   Pulses:   Pulses palpable and equal bilaterally. Normal radial, carotid, femoral, dorsalis pedis and posterior tibial pulses bilaterally. Normal abdominal aorta   Skin:  Psychiatric:   No bleeding, bruising or rash    Alert and oriented x 3, normal mood and affect         Lab Review:     Results from last 7 days   Lab Units  11/14/18   0428  11/13/18   0611   SODIUM mmol/L  139  135*   POTASSIUM mmol/L  3.5  4.1   CHLORIDE mmol/L  101  95*   CO2 mmol/L  23.6  24.1   BUN mg/dL  13  12   CREATININE mg/dL  0.74*  0.92   CALCIUM mg/dL  8.0*  9.3   BILIRUBIN mg/dL   --   0.6   ALK PHOS U/L   --   155*   ALT (SGPT) U/L   --   44*   AST (SGOT) U/L   --   47*   GLUCOSE mg/dL  185*  300*         Results from last 7 days   Lab Units  11/14/18   0428   WBC 10*3/mm3  9.68   HEMOGLOBIN g/dL  11.9*   HEMATOCRIT %  37.6*   PLATELETS 10*3/mm3  168                         Telemetry-      EKG on 11/14/18-      EKG on 11/13/18-    I personally viewed and interpreted the patient's EKG/Telemetry data.      Current Facility-Administered Medications:   •  acetaminophen (TYLENOL) tablet 1,000 mg, 1,000 mg, Oral, Q6H PRN, Adrian Hernandes MD, 1,000 mg at 11/13/18 1748  •  acetaminophen (TYLENOL) tablet 650 mg, 650 mg, Oral, Q4H PRN, Adrian Hernandes MD, 650 mg at 11/14/18 0913  •  atorvastatin (LIPITOR)  tablet 40 mg, 40 mg, Oral, Daily, Adrian Hernandes MD, 40 mg at 11/13/18 2035  •  clopidogrel (PLAVIX) tablet 75 mg, 75 mg, Oral, Daily, Adrian Hernandes MD, 75 mg at 11/14/18 0814  •  dextrose (D50W) 25 g/ 50mL Intravenous Solution 25 g, 25 g, Intravenous, Q15 Min PRN, Adrian Hernandes MD  •  dextrose (GLUTOSE) oral gel 15 g, 15 g, Oral, Q15 Min PRN, Adrian Hernandes MD  •  enalapril (VASOTEC) tablet 5 mg, 5 mg, Oral, Q12H, Adrian Hernandes MD, 5 mg at 11/14/18 0814  •  enoxaparin (LOVENOX) syringe 40 mg, 40 mg, Subcutaneous, Q24H, Adrian Hernandes MD, 40 mg at 11/13/18 1233  •  glipiZIDE (GLUCOTROL) tablet 10 mg, 10 mg, Oral, QAM AC, Adrian Hernandes MD  •  glucagon (human recombinant) (GLUCAGEN DIAGNOSTIC) injection 1 mg, 1 mg, Subcutaneous, PRN, Adrian Hernandes MD  •  guaiFENesin (MUCINEX) 12 hr tablet 1,200 mg, 1,200 mg, Oral, Q12H, Adrian Hernandes MD, 1,200 mg at 11/14/18 0814  •  HYDROcodone-homatropine (HYCODAN) 5-1.5 MG/5ML syrup 5 mL, 5 mL, Oral, Q4H PRN, Adrian Hernandes MD, 5 mL at 11/14/18 0913  •  insulin lispro (humaLOG) injection 0-9 Units, 0-9 Units, Subcutaneous, 4x Daily With Meals & Nightly, Adrian Hernandes MD, 4 Units at 11/14/18 0814  •  melatonin tablet 10 mg, 10 mg, Oral, Nightly, Adrian Hernandes MD, 10 mg at 11/13/18 2035  •  metoprolol tartrate (LOPRESSOR) tablet 25 mg, 25 mg, Oral, Q12H, Adrian Hernandes MD, 25 mg at 11/14/18 0907  •  ondansetron (ZOFRAN) injection 4 mg, 4 mg, Intravenous, Q4H PRN, Adrian Hernandes MD, 4 mg at 11/14/18 0516  •  pantoprazole (PROTONIX) EC tablet 40 mg, 40 mg, Oral, Daily, Adrian Hernandes MD, 40 mg at 11/14/18 0657  •  Pharmacy to Dose enoxaparin (LOVENOX), , Does not apply, Continuous PRN, Adrian Hernandes MD  •  phenol (CHLORASEPTIC) 1.4 % liquid 2 spray, 2 spray, Mouth/Throat, Q2H PRN, Adrian Hernandes MD  •  sodium chloride 0.9 % infusion, 125 mL/hr, Intravenous, Continuous, Adrian Hernandes MD, Last Rate: 125 mL/hr at 11/14/18 0646, 125 mL/hr at 11/14/18 0646    Assessment and Plan:        Active Hospital Problems    Diagnosis Date Noted   • **Parainfluenza virus bronchitis [J20.4] 11/13/2018   • Dehydration [E86.0] 11/13/2018   • Tachycardia [R00.0] 11/13/2018   • Bilateral carotid artery stenosis [I65.23] 12/01/2016   • Diabetes mellitus (CMS/HCC) [E11.9] 08/30/2016   • Hypertension [I10] 08/30/2016      Resolved Hospital Problems   No resolved problems to display.     1.  Paroxysmal atrial fibrillation-currently in persistent atrial fibrillation in the setting of acute parainfluenza viral infection.  Metoprolol has been started and heart rate is much improved, we will follow his response  2.  At this point I would recommend initiation of full dose anticoagulation given his prior history of cerebrovascular disease and TIA.  We will initiate Lovenox full dose-we will increase from the DVT prophylaxis dosing that he is currently receiving  3.  Parainfluenza viral bronchitis-evaluation is underway  4.  Patient's family is concerned about potential heart weakness because of his prior history of sepsis.  They specifically asked if with the acute viral infection whether it could've influenced his ventricular function.  I assured them that with the echocardiogram we would be able to assess his ejection fraction and then proceed appropriately.    Brayden Franco III, MD  11/14/18  10:50 AM

## 2018-11-14 NOTE — PROGRESS NOTES
Discharge Planning Assessment  [facility]     Patient Name: Harry Sierra  MRN: [id number]  Today's Date: 11/14/2018    Admit Date: 11/13/2018    Discharge Needs Assessment     Row Name 11/14/18 1054       Living Environment    Lives With  spouse    Name(s) of Who Lives With Patient  wife, Hanny Sierra, [phone]    Current Living Arrangements  home/apartment/condo    Primary Care Provided by  self    Provides Primary Care For  no one    Family Caregiver if Needed  spouse    Quality of Family Relationships  helpful;involved;supportive    Able to Return to Prior Arrangements  yes       Resource/Environmental Concerns    Resource/Environmental Concerns  none    Transportation Concerns  car, none       Transition Planning    Patient/Family Anticipates Transition to  home with family    Patient/Family Anticipated Services at Transition  none    Transportation Anticipated  family or friend will provide       Discharge Needs Assessment    Concerns to be Addressed  discharge planning    Equipment Currently Used at Home  none    Discharge Coordination/Progress  Home, follow for needs        Discharge Plan     Row Name 11/14/18 9675       Plan    Plan  Home, follow for needs    Patient/Family in Agreement with Plan  yes    Plan Comments  IMM letter checked. CCP met with pt and children to discuss d/c planning. Facesheet verified. CCP role explained. Pt resides with his wife in a single level home with four exterior steps. Pt uses no DME, and denies past home health or past sub-acute rehab. Pt confirms pharmacy is [facility] on [address] [facility] but opts to fill any d/c meds at [facility] pharmacy (enrolled in Meds to Beds by nursing). Pt denies known needs at this time. CCP to follow to assist should d/c needs arise. Taty Craven, [facility]        Destination      No service coordination in this encounter.      Durable Medical Equipment      No service coordination in this encounter.      Dialysis/Infusion      No service coordination in this  encounter.      Home Medical Care      No service coordination in this encounter.      Community Resources      No service coordination in this encounter.          Demographic Summary     Row Name 11/14/18 1052       General Information    Admission Type  inpatient    Arrived From  home    Required Notices Provided  Important Message from Medicare    Referral Source  admission list    Reason for Consult  discharge planning    Preferred Language  English        Functional Status     Row Name 11/14/18 1054       Functional Status    Usual Activity Tolerance  good    Current Activity Tolerance  good       Functional Status, IADL    Medications  independent    Meal Preparation  independent    Housekeeping  independent    Laundry  independent    Shopping  independent       Mental Status Summary    Recent Changes in Mental Status/Cognitive Functioning  no changes        Psychosocial    No documentation.       Abuse/Neglect    No documentation.       Legal    No documentation.       Substance Abuse    No documentation.       Patient Forms    No documentation.           Lavonne Craven LCSW

## 2018-11-14 NOTE — PROGRESS NOTES
Pharmacy consult for Enoxaparin dosing    Harry Sierra is a 67 y.o. male 83.4 kg (183 lb 14.4 oz).    Pharmacy consulted to dose per Dr Hernandes  Indication: AFIB    Estimated Creatinine Clearance: 102.8 mL/min (A) (by C-G formula based on SCr of 0.74 mg/dL (L)).  Body mass index is 27.16 kg/m².    Creatinine   Date Value Ref Range Status   11/14/2018 0.74 (L) 0.76 - 1.27 mg/dL Final   11/13/2018 0.92 0.76 - 1.27 mg/dL Final     Platelets   Date Value Ref Range Status   11/14/2018 168 140 - 500 10*3/mm3 Final   11/13/2018 194 140 - 500 10*3/mm3 Final     No results found for: INR    PLAN:    Will start Enoxaparin 80 mg sq Q 12 hr.  Will monitor and adjust as needed.      Thank you for the consult.    Brook Salas RPH  11/14/18 11:18 AM

## 2018-11-15 ENCOUNTER — APPOINTMENT (OUTPATIENT)
Dept: CARDIOLOGY | Facility: HOSPITAL | Age: 68
End: 2018-11-15
Attending: INTERNAL MEDICINE

## 2018-11-15 ENCOUNTER — APPOINTMENT (OUTPATIENT)
Dept: GENERAL RADIOLOGY | Facility: HOSPITAL | Age: 68
End: 2018-11-15

## 2018-11-15 PROBLEM — I38 VALVULAR HEART DISEASE: Status: ACTIVE | Noted: 2018-11-15

## 2018-11-15 PROBLEM — R91.8 MULTILOBAR LUNG INFILTRATE: Status: ACTIVE | Noted: 2018-11-14

## 2018-11-15 LAB
ANION GAP SERPL CALCULATED.3IONS-SCNC: 9.1 MMOL/L
AORTIC DIMENSIONLESS INDEX: 0.8 (DI)
BH CV ECHO MEAS - ACS: 1.9 CM
BH CV ECHO MEAS - AO MAX PG: 15 MMHG
BH CV ECHO MEAS - AO MEAN PG (FULL): 4 MMHG
BH CV ECHO MEAS - AO MEAN PG: 8 MMHG
BH CV ECHO MEAS - AO ROOT AREA (BSA CORRECTED): 1.8
BH CV ECHO MEAS - AO ROOT AREA: 10.2 CM^2
BH CV ECHO MEAS - AO ROOT DIAM: 3.6 CM
BH CV ECHO MEAS - AO V2 MAX: 193 CM/SEC
BH CV ECHO MEAS - AO V2 MEAN: 128 CM/SEC
BH CV ECHO MEAS - AO V2 VTI: 34.1 CM
BH CV ECHO MEAS - AVA(I,A): 2.4 CM^2
BH CV ECHO MEAS - AVA(I,D): 2.4 CM^2
BH CV ECHO MEAS - BSA(HAYCOCK): 2 M^2
BH CV ECHO MEAS - BSA: 2 M^2
BH CV ECHO MEAS - BZI_BMI: 27.2 KILOGRAMS/M^2
BH CV ECHO MEAS - BZI_METRIC_HEIGHT: 175.3 CM
BH CV ECHO MEAS - BZI_METRIC_WEIGHT: 83.5 KG
BH CV ECHO MEAS - EDV(CUBED): 85.2 ML
BH CV ECHO MEAS - EDV(MOD-SP2): 110 ML
BH CV ECHO MEAS - EDV(MOD-SP4): 98 ML
BH CV ECHO MEAS - EDV(TEICH): 87.7 ML
BH CV ECHO MEAS - EF(CUBED): 74.2 %
BH CV ECHO MEAS - EF(MOD-BP): 56 %
BH CV ECHO MEAS - EF(MOD-SP2): 60 %
BH CV ECHO MEAS - EF(MOD-SP4): 56.1 %
BH CV ECHO MEAS - EF(TEICH): 66.3 %
BH CV ECHO MEAS - ESV(CUBED): 22 ML
BH CV ECHO MEAS - ESV(MOD-SP2): 44 ML
BH CV ECHO MEAS - ESV(MOD-SP4): 43 ML
BH CV ECHO MEAS - ESV(TEICH): 29.6 ML
BH CV ECHO MEAS - FS: 36.4 %
BH CV ECHO MEAS - IVS/LVPW: 1.1
BH CV ECHO MEAS - IVSD: 1 CM
BH CV ECHO MEAS - LA DIMENSION: 3.5 CM
BH CV ECHO MEAS - LA/AO: 0.97
BH CV ECHO MEAS - LAT PEAK E' VEL: 9 CM/SEC
BH CV ECHO MEAS - LV DIASTOLIC VOL/BSA (35-75): 49.2 ML/M^2
BH CV ECHO MEAS - LV MASS(C)D: 137.8 GRAMS
BH CV ECHO MEAS - LV MASS(C)DI: 69.1 GRAMS/M^2
BH CV ECHO MEAS - LV MEAN PG: 4 MMHG
BH CV ECHO MEAS - LV SYSTOLIC VOL/BSA (12-30): 21.6 ML/M^2
BH CV ECHO MEAS - LV V1 MAX: 147 CM/SEC
BH CV ECHO MEAS - LV V1 MEAN: 88.3 CM/SEC
BH CV ECHO MEAS - LV V1 VTI: 25.6 CM
BH CV ECHO MEAS - LVIDD: 4.4 CM
BH CV ECHO MEAS - LVIDS: 2.8 CM
BH CV ECHO MEAS - LVLD AP2: 8.4 CM
BH CV ECHO MEAS - LVLD AP4: 8.4 CM
BH CV ECHO MEAS - LVLS AP2: 6.8 CM
BH CV ECHO MEAS - LVLS AP4: 7.4 CM
BH CV ECHO MEAS - LVOT AREA (M): 3.1 CM^2
BH CV ECHO MEAS - LVOT AREA: 3.1 CM^2
BH CV ECHO MEAS - LVOT DIAM: 2 CM
BH CV ECHO MEAS - LVPWD: 0.9 CM
BH CV ECHO MEAS - MED PEAK E' VEL: 8 CM/SEC
BH CV ECHO MEAS - MR MAX PG: 106.4 MMHG
BH CV ECHO MEAS - MR MAX VEL: 515.7 CM/SEC
BH CV ECHO MEAS - MV A DUR: 0.14 SEC
BH CV ECHO MEAS - MV A MAX VEL: 130 CM/SEC
BH CV ECHO MEAS - MV DEC SLOPE: 906 CM/SEC^2
BH CV ECHO MEAS - MV DEC TIME: 0.17 SEC
BH CV ECHO MEAS - MV E MAX VEL: 131 CM/SEC
BH CV ECHO MEAS - MV E/A: 1
BH CV ECHO MEAS - MV MEAN PG: 4 MMHG
BH CV ECHO MEAS - MV P1/2T MAX VEL: 152 CM/SEC
BH CV ECHO MEAS - MV P1/2T: 49.1 MSEC
BH CV ECHO MEAS - MV V2 MEAN: 92.9 CM/SEC
BH CV ECHO MEAS - MV V2 VTI: 29.1 CM
BH CV ECHO MEAS - MVA P1/2T LCG: 1.4 CM^2
BH CV ECHO MEAS - MVA(P1/2T): 4.5 CM^2
BH CV ECHO MEAS - MVA(VTI): 2.8 CM^2
BH CV ECHO MEAS - PA ACC SLOPE: 863 CM/SEC^2
BH CV ECHO MEAS - PA ACC TIME: 0.11 SEC
BH CV ECHO MEAS - PA MAX PG (FULL): 1.1 MMHG
BH CV ECHO MEAS - PA MAX PG: 3.7 MMHG
BH CV ECHO MEAS - PA PR(ACCEL): 30.4 MMHG
BH CV ECHO MEAS - PA V2 MAX: 95.6 CM/SEC
BH CV ECHO MEAS - PULM A REVS DUR: 0.12 SEC
BH CV ECHO MEAS - PULM A REVS VEL: 60.2 CM/SEC
BH CV ECHO MEAS - PULM DIAS VEL: 61.2 CM/SEC
BH CV ECHO MEAS - PULM S/D: 1.1
BH CV ECHO MEAS - PULM SYS VEL: 68.1 CM/SEC
BH CV ECHO MEAS - PVA(V,A): 3.2 CM^2
BH CV ECHO MEAS - PVA(V,D): 3.2 CM^2
BH CV ECHO MEAS - QP/QS: 0.72
BH CV ECHO MEAS - RAP SYSTOLE: 8 MMHG
BH CV ECHO MEAS - RV MAX PG: 2.5 MMHG
BH CV ECHO MEAS - RV MEAN PG: 1 MMHG
BH CV ECHO MEAS - RV V1 MAX: 79.7 CM/SEC
BH CV ECHO MEAS - RV V1 MEAN: 51.3 CM/SEC
BH CV ECHO MEAS - RV V1 VTI: 15.3 CM
BH CV ECHO MEAS - RVOT AREA: 3.8 CM^2
BH CV ECHO MEAS - RVOT DIAM: 2.2 CM
BH CV ECHO MEAS - RVSP: 49.2 MMHG
BH CV ECHO MEAS - SI(AO): 174.1 ML/M^2
BH CV ECHO MEAS - SI(CUBED): 31.7 ML/M^2
BH CV ECHO MEAS - SI(LVOT): 40.3 ML/M^2
BH CV ECHO MEAS - SI(MOD-SP2): 33.1 ML/M^2
BH CV ECHO MEAS - SI(MOD-SP4): 27.6 ML/M^2
BH CV ECHO MEAS - SI(TEICH): 29.2 ML/M^2
BH CV ECHO MEAS - SV(AO): 347.1 ML
BH CV ECHO MEAS - SV(CUBED): 63.2 ML
BH CV ECHO MEAS - SV(LVOT): 80.4 ML
BH CV ECHO MEAS - SV(MOD-SP2): 66 ML
BH CV ECHO MEAS - SV(MOD-SP4): 55 ML
BH CV ECHO MEAS - SV(RVOT): 58.2 ML
BH CV ECHO MEAS - SV(TEICH): 58.1 ML
BH CV ECHO MEAS - TAPSE (>1.6): 2 CM2
BH CV ECHO MEAS - TR MAX VEL: 321 CM/SEC
BH CV ECHO MEASUREMENTS AVERAGE E/E' RATIO: 15.41
BH CV VAS BP RIGHT ARM: NORMAL MMHG
BH CV XLRA - RV BASE: 3.2 CM
BH CV XLRA - TDI S': 15 CM/SEC
BUN BLD-MCNC: 15 MG/DL (ref 8–23)
BUN/CREAT SERPL: 18.3 (ref 7–25)
CALCIUM SPEC-SCNC: 8 MG/DL (ref 8.6–10.5)
CHLORIDE SERPL-SCNC: 102 MMOL/L (ref 98–107)
CO2 SERPL-SCNC: 23.9 MMOL/L (ref 22–29)
CREAT BLD-MCNC: 0.82 MG/DL (ref 0.76–1.27)
DEPRECATED RDW RBC AUTO: 44.1 FL (ref 37–54)
ERYTHROCYTE [DISTWIDTH] IN BLOOD BY AUTOMATED COUNT: 13.5 % (ref 11.5–14.5)
GFR SERPL CREATININE-BSD FRML MDRD: 93 ML/MIN/1.73
GLUCOSE BLD-MCNC: 213 MG/DL (ref 65–99)
GLUCOSE BLDC GLUCOMTR-MCNC: 137 MG/DL (ref 70–130)
GLUCOSE BLDC GLUCOMTR-MCNC: 206 MG/DL (ref 70–130)
GLUCOSE BLDC GLUCOMTR-MCNC: 224 MG/DL (ref 70–130)
GLUCOSE BLDC GLUCOMTR-MCNC: 230 MG/DL (ref 70–130)
HCT VFR BLD AUTO: 32.4 % (ref 40.4–52.2)
HGB BLD-MCNC: 10.4 G/DL (ref 13.7–17.6)
LEFT ATRIUM VOLUME INDEX: 25 ML/M2
LEFT ATRIUM VOLUME: 47 CM3
MAGNESIUM SERPL-MCNC: 2.4 MG/DL (ref 1.6–2.4)
MAXIMAL PREDICTED HEART RATE: 151 BPM
MCH RBC QN AUTO: 28.5 PG (ref 27–32.7)
MCHC RBC AUTO-ENTMCNC: 32.1 G/DL (ref 32.6–36.4)
MCV RBC AUTO: 88.8 FL (ref 79.8–96.2)
PLATELET # BLD AUTO: 176 10*3/MM3 (ref 140–500)
PMV BLD AUTO: 9.8 FL (ref 6–12)
POTASSIUM BLD-SCNC: 3.9 MMOL/L (ref 3.5–5.2)
PROCALCITONIN SERPL-MCNC: 2.67 NG/ML (ref 0.1–0.25)
PROCALCITONIN SERPL-MCNC: 3.05 NG/ML (ref 0.1–0.25)
RBC # BLD AUTO: 3.65 10*6/MM3 (ref 4.6–6)
SODIUM BLD-SCNC: 135 MMOL/L (ref 136–145)
STRESS TARGET HR: 128 BPM
WBC NRBC COR # BLD: 6.67 10*3/MM3 (ref 4.5–10.7)

## 2018-11-15 PROCEDURE — 71046 X-RAY EXAM CHEST 2 VIEWS: CPT

## 2018-11-15 PROCEDURE — 25010000002 KETOROLAC TROMETHAMINE PER 15 MG: Performed by: HOSPITALIST

## 2018-11-15 PROCEDURE — 83735 ASSAY OF MAGNESIUM: CPT | Performed by: HOSPITALIST

## 2018-11-15 PROCEDURE — 25010000002 ONDANSETRON PER 1 MG: Performed by: HOSPITALIST

## 2018-11-15 PROCEDURE — 63710000001 INSULIN LISPRO (HUMAN) PER 5 UNITS: Performed by: HOSPITALIST

## 2018-11-15 PROCEDURE — 99232 SBSQ HOSP IP/OBS MODERATE 35: CPT | Performed by: PHYSICIAN ASSISTANT

## 2018-11-15 PROCEDURE — 25010000003 CEFTRIAXONE PER 250 MG: Performed by: INTERNAL MEDICINE

## 2018-11-15 PROCEDURE — 25010000002 ENOXAPARIN PER 10 MG: Performed by: HOSPITALIST

## 2018-11-15 PROCEDURE — 99232 SBSQ HOSP IP/OBS MODERATE 35: CPT | Performed by: INTERNAL MEDICINE

## 2018-11-15 PROCEDURE — 80048 BASIC METABOLIC PNL TOTAL CA: CPT | Performed by: HOSPITALIST

## 2018-11-15 PROCEDURE — 85027 COMPLETE CBC AUTOMATED: CPT | Performed by: HOSPITALIST

## 2018-11-15 PROCEDURE — 84145 PROCALCITONIN (PCT): CPT | Performed by: INTERNAL MEDICINE

## 2018-11-15 PROCEDURE — 63710000001 INSULIN GLARGINE PER 5 UNITS: Performed by: HOSPITALIST

## 2018-11-15 PROCEDURE — 93306 TTE W/DOPPLER COMPLETE: CPT

## 2018-11-15 PROCEDURE — 93306 TTE W/DOPPLER COMPLETE: CPT | Performed by: INTERNAL MEDICINE

## 2018-11-15 PROCEDURE — 82962 GLUCOSE BLOOD TEST: CPT

## 2018-11-15 PROCEDURE — 84145 PROCALCITONIN (PCT): CPT | Performed by: HOSPITALIST

## 2018-11-15 RX ORDER — INSULIN GLARGINE 100 [IU]/ML
10 INJECTION, SOLUTION SUBCUTANEOUS EVERY MORNING
Status: DISCONTINUED | OUTPATIENT
Start: 2018-11-15 | End: 2018-11-16 | Stop reason: HOSPADM

## 2018-11-15 RX ORDER — KETOROLAC TROMETHAMINE 30 MG/ML
15 INJECTION, SOLUTION INTRAMUSCULAR; INTRAVENOUS EVERY 6 HOURS PRN
Status: COMPLETED | OUTPATIENT
Start: 2018-11-15 | End: 2018-11-15

## 2018-11-15 RX ADMIN — METOPROLOL TARTRATE 25 MG: 25 TABLET ORAL at 20:19

## 2018-11-15 RX ADMIN — KETOROLAC TROMETHAMINE 15 MG: 30 INJECTION, SOLUTION INTRAMUSCULAR at 08:19

## 2018-11-15 RX ADMIN — Medication 352 MG: at 08:19

## 2018-11-15 RX ADMIN — ACETAMINOPHEN 650 MG: 325 TABLET, FILM COATED ORAL at 11:47

## 2018-11-15 RX ADMIN — ATORVASTATIN CALCIUM 40 MG: 20 TABLET, FILM COATED ORAL at 20:19

## 2018-11-15 RX ADMIN — POTASSIUM CHLORIDE, DEXTROSE MONOHYDRATE AND SODIUM CHLORIDE 100 ML/HR: 150; 5; 450 INJECTION, SOLUTION INTRAVENOUS at 10:17

## 2018-11-15 RX ADMIN — ENOXAPARIN SODIUM 80 MG: 80 INJECTION SUBCUTANEOUS at 23:59

## 2018-11-15 RX ADMIN — ONDANSETRON 4 MG: 2 INJECTION INTRAMUSCULAR; INTRAVENOUS at 20:23

## 2018-11-15 RX ADMIN — HYDROCODONE BITARTRATE AND HOMATROPINE METHYLBROMIDE 5 ML: 5; 1.5 SOLUTION ORAL at 11:47

## 2018-11-15 RX ADMIN — CEFTRIAXONE SODIUM 2 G: 2 INJECTION, SOLUTION INTRAVENOUS at 08:18

## 2018-11-15 RX ADMIN — INSULIN GLARGINE 10 UNITS: 100 INJECTION, SOLUTION SUBCUTANEOUS at 08:18

## 2018-11-15 RX ADMIN — INSULIN LISPRO 4 UNITS: 100 INJECTION, SOLUTION INTRAVENOUS; SUBCUTANEOUS at 08:18

## 2018-11-15 RX ADMIN — KETOROLAC TROMETHAMINE 15 MG: 30 INJECTION, SOLUTION INTRAMUSCULAR at 00:56

## 2018-11-15 RX ADMIN — METOPROLOL TARTRATE 25 MG: 25 TABLET ORAL at 08:19

## 2018-11-15 RX ADMIN — Medication 352 MG: at 20:19

## 2018-11-15 RX ADMIN — CLOPIDOGREL 75 MG: 75 TABLET, FILM COATED ORAL at 08:19

## 2018-11-15 RX ADMIN — ENOXAPARIN SODIUM 80 MG: 80 INJECTION SUBCUTANEOUS at 00:56

## 2018-11-15 RX ADMIN — HYDROCODONE BITARTRATE AND HOMATROPINE METHYLBROMIDE 5 ML: 5; 1.5 SOLUTION ORAL at 15:49

## 2018-11-15 RX ADMIN — ENALAPRIL MALEATE 5 MG: 5 TABLET ORAL at 08:19

## 2018-11-15 RX ADMIN — GUAIFENESIN 1200 MG: 600 TABLET, EXTENDED RELEASE ORAL at 20:19

## 2018-11-15 RX ADMIN — ENALAPRIL MALEATE 5 MG: 5 TABLET ORAL at 20:19

## 2018-11-15 RX ADMIN — PANTOPRAZOLE SODIUM 40 MG: 40 INJECTION, POWDER, FOR SOLUTION INTRAVENOUS at 06:17

## 2018-11-15 RX ADMIN — ENOXAPARIN SODIUM 80 MG: 80 INJECTION SUBCUTANEOUS at 11:47

## 2018-11-15 RX ADMIN — INSULIN LISPRO 4 UNITS: 100 INJECTION, SOLUTION INTRAVENOUS; SUBCUTANEOUS at 11:47

## 2018-11-15 RX ADMIN — GLIPIZIDE 10 MG: 10 TABLET ORAL at 08:19

## 2018-11-15 RX ADMIN — GUAIFENESIN 1200 MG: 600 TABLET, EXTENDED RELEASE ORAL at 08:19

## 2018-11-15 RX ADMIN — Medication 10 MG: at 20:19

## 2018-11-15 RX ADMIN — ACETAMINOPHEN 650 MG: 325 TABLET, FILM COATED ORAL at 20:18

## 2018-11-15 RX ADMIN — INSULIN LISPRO 4 UNITS: 100 INJECTION, SOLUTION INTRAVENOUS; SUBCUTANEOUS at 17:07

## 2018-11-15 NOTE — PLAN OF CARE
Problem: Patient Care Overview  Goal: Plan of Care Review  Outcome: Ongoing (interventions implemented as appropriate)   11/15/18 4090   Coping/Psychosocial   Plan of Care Reviewed With patient   Plan of Care Review   Progress improving   OTHER   Outcome Summary prn cough meds given throughout shift- pt seems to be improving, probable d/c tomorrow w/ oral abx, VSS, will continue to monitor closely    Coping/Psychosocial   Patient Agreement with Plan of Care agrees     Goal: Individualization and Mutuality  Outcome: Ongoing (interventions implemented as appropriate)    Goal: Discharge Needs Assessment  Outcome: Ongoing (interventions implemented as appropriate)    Goal: Interprofessional Rounds/Family Conf  Outcome: Ongoing (interventions implemented as appropriate)      Problem: ARDS (Acute Resp Distress Syndrome) (Adult)  Goal: Signs and Symptoms of Listed Potential Problems Will be Absent, Minimized or Managed (ARDS)  Outcome: Outcome(s) achieved Date Met: 11/15/18

## 2018-11-15 NOTE — PROGRESS NOTES
"   LOS: 2 days   Patient Care Team:  Artie Carranza MD as PCP - General (Internal Medicine)  Artie Carranza MD as PCP - Claims Attributed    Chief Complaint: Atrial fibrillation with RVR       Interval History: feels much better, \"almost back to normal\"      Objective   Vital Signs  Temp:  [97.7 °F (36.5 °C)-99.9 °F (37.7 °C)] 98 °F (36.7 °C)  Heart Rate:  [102-111] 102  Resp:  [18-20] 18  BP: (117-168)/(64-88) 168/88    Intake/Output Summary (Last 24 hours) at 11/15/2018 1108  Last data filed at 11/15/2018 0645  Gross per 24 hour   Intake 240 ml   Output 650 ml   Net -410 ml           Physical Exam   Constitutional: He is oriented to person, place, and time. He appears well-developed and well-nourished. No distress.   HENT:   Head: Normocephalic and atraumatic.   Eyes: Pupils are equal, round, and reactive to light.   Neck: No JVD present. No thyromegaly present.   Cardiovascular: Normal rate, regular rhythm, normal heart sounds and intact distal pulses.   No murmur heard.  Pulmonary/Chest: Effort normal. No respiratory distress. He has decreased breath sounds. He has wheezes. He has rhonchi. He has rales.   Abdominal: Soft. Bowel sounds are normal. He exhibits no distension. There is no splenomegaly or hepatomegaly. There is no tenderness.   Musculoskeletal: Normal range of motion. He exhibits no edema.   Neurological: He is alert and oriented to person, place, and time.   Skin: Skin is warm and dry. He is not diaphoretic. No erythema.   Psychiatric: He has a normal mood and affect. His behavior is normal. Judgment normal.     Physical Exam   Constitutional: He is oriented to person, place, and time. He appears well-developed and well-nourished. No distress.   HENT:   Head: Normocephalic and atraumatic.   Eyes: Pupils are equal, round, and reactive to light.   Neck: No JVD present. No thyromegaly present.   Cardiovascular: Normal rate, regular rhythm, normal heart sounds and intact distal pulses.   No murmur " heard.  Pulmonary/Chest: Effort normal. No respiratory distress. He has decreased breath sounds. He has wheezes. He has rhonchi. He has rales.   Abdominal: Soft. Bowel sounds are normal. He exhibits no distension. There is no splenomegaly or hepatomegaly. There is no tenderness.   Musculoskeletal: Normal range of motion. He exhibits no edema.   Neurological: He is alert and oriented to person, place, and time.   Skin: Skin is warm and dry. He is not diaphoretic. No erythema.   Psychiatric: He has a normal mood and affect. His behavior is normal. Judgment normal.     Results Review:      Results from last 7 days   Lab Units  11/15/18   0421  11/14/18   0428  11/13/18   0611   SODIUM mmol/L  135*  139  135*   POTASSIUM mmol/L  3.9  3.5  4.1   CHLORIDE mmol/L  102  101  95*   CO2 mmol/L  23.9  23.6  24.1   BUN mg/dL  15  13  12   CREATININE mg/dL  0.82  0.74*  0.92   GLUCOSE mg/dL  213*  185*  300*   CALCIUM mg/dL  8.0*  8.0*  9.3         Results from last 7 days   Lab Units  11/15/18   0421  11/14/18   0428  11/13/18   0611   WBC 10*3/mm3  6.67  9.68  8.84   HEMOGLOBIN g/dL  10.4*  11.9*  13.3*   HEMATOCRIT %  32.4*  37.6*  39.2*   PLATELETS 10*3/mm3  176  168  194             Results from last 7 days   Lab Units  11/14/18   0428   MAGNESIUM mg/dL  1.5*           I reviewed the patient's new clinical results.  I personally viewed and interpreted the patient's EKG/Telemetry data        Medication Review:     atorvastatin 40 mg Oral Daily   ceftriaxone 2 g Intravenous Q24H   clopidogrel 75 mg Oral Daily   enalapril 5 mg Oral Q12H   enoxaparin 80 mg Subcutaneous Q12H   glipiZIDE 10 mg Oral QAM AC   guaiFENesin 1,200 mg Oral Q12H   insulin glargine 10 Units Subcutaneous QAM   insulin lispro 0-9 Units Subcutaneous 4x Daily With Meals & Nightly   melatonin 10 mg Oral Nightly   metoprolol tartrate 25 mg Oral Q12H   pantoprazole 40 mg Intravenous Q AM   senna 10 mL Oral BID         dextrose 5 % and sodium chloride 0.45 % with  KCl 20 mEq/L 100 mL/hr Last Rate: 100 mL/hr (11/15/18 1017)   Pharmacy to Dose enoxaparin (LOVENOX)         Assessment/Plan      1.  Atrial fibrillation with RVR.  This is in the setting of acute parainfluenza viral infection.  Currently heart rate is better controlled on metoprolol.  Recommendations were for anticoagulation secondary to his history of cerebrovascular disease and TIA.  Currently on full dose Lovenox.  Echocardiogram yesterday showed a normal LV function with an EF of 56% and no significant valvular abnormalities.  He had moderately elevated RVSP around 49 mmHg.  He is now in sinus rhythm.  I did discuss the plan of care with Dr. Franco.  From a cardiac standpoint, we would favor discontinuing the plavix and starting him on eliquis 5mg BID, as long as this was ok with his neurologist.  He has a history of TIA with mild carotid disease, he also has a history of UGI bleed.  From a cardiac standpoint he can go home tomorrow and will need to follow up with Dr. Franco in one month.    2.  Parainfluenza viral bronchitis.  Currently followed by infectious disease.      Infection due to parainfluenza virus 2    Hypertension    Type 2 diabetes mellitus without complication, without long-term current use of insulin (CMS/[facility])    Bilateral carotid artery stenosis    Atrial fibrillation (new)    Acute bacterial bronchitis (secondary infection)    Sepsis - present on admission    SIMEON Clemente  11/15/18  11:08 AM

## 2018-11-15 NOTE — PLAN OF CARE
Problem: Patient Care Overview  Goal: Plan of Care Review  Outcome: Ongoing (interventions implemented as appropriate)   11/15/18 2088   Coping/Psychosocial   Plan of Care Reviewed With patient   Plan of Care Review   Progress improving   OTHER   Outcome Summary Pt c/o of generalized weakness and aches. Remains in normal sinus rhythm.Will continue to monitor.    Coping/Psychosocial   Patient Agreement with Plan of Care agrees

## 2018-11-15 NOTE — PROGRESS NOTES
INFECTIOUS DISEASES PROGRESS NOTE    CC: f/u pna    S:   Feels better  Still with cough and just feels exhausted  No fevers or chills but still with drenching night sweats    O:  Physical Exam:  Temp:  [97.7 °F (36.5 °C)-99.9 °F (37.7 °C)] 98 °F (36.7 °C)  Heart Rate:  [102-151] 102  Resp:  [18-20] 18  BP: (117-168)/(64-88) 168/88  Physical Exam   Constitutional: He appears well-developed. No distress.   Pulmonary/Chest: Effort normal. He has rales (basilar).   Abdominal: Soft. He exhibits no distension. There is no tenderness.   Neurological: He is alert.   Skin: Skin is warm and dry.   Psychiatric: He has a normal mood and affect. His behavior is normal.        Diagnostics:    glc 213-345  Cr 0.8  PCT 3.05  WBC 6.7     H/H 10.4/32      Bcx ngtd  Sputum P  MRSA ngtd  Urine legionella/strep Ag neg    Assessment/Plan   1.  Parainfluenza 2  2.  Elevated Procalcitonin  3. DM2, cont glycemic control efforts to prevent/control infectious complications     Cont rocephin.  Repeat PCT a bit higher but can take a day or two to peak.  Repeat later today and hopefully starting to come down.  Repeat CXR.  Suspect secondary bacterial PNA but improving which is great    Gomez Adames MD  8:40 AM  11/15/18

## 2018-11-15 NOTE — PROGRESS NOTES
Name: Harry ALCALA [facility] ADMIT: 2018   : 1950  PCP: Artie Carranza MD    MRN: [id number] LOS: 2 days   AGE/SEX: 67 y.o. male  ROOM: S403/1     Subjective   Throat pain much improved and is tolerating a diet. Cough seems improved as well. Overall feeling much better.    Objective   Temp:  [97.7 °F (36.5 °C)-99.9 °F (37.7 °C)] 98 °F (36.7 °C)  Heart Rate:  [102-151] 102  Resp:  [18-20] 18  BP: (117-168)/(64-88) 168/88  SpO2:  [91 %-96 %] 93 %  on  Flow (L/min):  [2-3] 3;   Device (Oxygen Therapy): room air  Body mass index is 27.16 kg/m².    Physical Exam   Constitutional: He is oriented to person, place, and time. He appears well-developed. He is cooperative. No distress.   Cardiovascular: Normal rate and regular rhythm.   No murmur heard.  Pulmonary/Chest: Effort normal and breath sounds normal. No respiratory distress.   Abdominal: Soft. Normal appearance and bowel sounds are normal. He exhibits no distension. There is no tenderness.   Musculoskeletal: He exhibits no edema.   Lymphadenopathy:     He has no cervical adenopathy (less tender).   Neurological: He is alert and oriented to person, place, and time.   Skin: Skin is warm and dry.   Psychiatric: He has a normal mood and affect. His behavior is normal.   Nursing note and vitals reviewed.      Results Review:       I reviewed the patient's new clinical results.  Results from last 7 days   Lab Units  11/15/18   0421  11/14/18   0428  11/13/18   0611   WBC 10*3/mm3  6.67  9.68  8.84   HEMOGLOBIN g/dL  10.4*  11.9*  13.3*   PLATELETS 10*3/mm3  176  168  194     Results from last 7 days   Lab Units  11/15/18   0421  11/14/18   0428  11/13/18   0611   SODIUM mmol/L  135*  139  135*   POTASSIUM mmol/L  3.9  3.5  4.1   CHLORIDE mmol/L  102  101  95*   CO2 mmol/L  23.9  23.6  24.1   BUN mg/dL  15  13  12   CREATININE mg/dL  0.82  0.74*  0.92   GLUCOSE mg/dL  213*  185*  300*   Estimated Creatinine Clearance: 100.3 mL/min (by C-G formula based on SCr of  0.82 mg/dL).  Results from last 7 days   Lab Units  11/15/18   0421  11/14/18   0428  11/13/18   0611   CALCIUM mg/dL  8.0*  8.0*  9.3   ALBUMIN g/dL   --    --   4.40   MAGNESIUM mg/dL   --   1.5*   --      Results from last 7 days   Lab Units  11/15/18   0421  11/14/18   0428  11/13/18   0611   PROCALCITONIN ng/mL  3.05*  1.76*  0.17   LACTATE mmol/L   --    --   1.2     Lab Results   Component Value Date    STREPPNEUAG Negative 11/14/2018    LEGANTIGENUR Negative 11/14/2018     Results from last 7 days   Lab Units  11/13/18   0611   BLOODCX   No growth at 2 days  No growth at 2 days     Hemoglobin A1C   Date/Time Value Ref Range Status   11/14/2018 0428 7.80 (H) 4.80 - 5.60 % Final     Glucose   Date/Time Value Ref Range Status   11/15/2018 0657 224 (H) 70 - 130 mg/dL Final   11/14/2018 2158 235 (H) 70 - 130 mg/dL Final   11/14/2018 1627 273 (H) 70 - 130 mg/dL Final   11/14/2018 1058 345 (H) 70 - 130 mg/dL Final   11/14/2018 0622 213 (H) 70 - 130 mg/dL Final   11/14/2018 0300 209 (H) 70 - 130 mg/dL Final   11/13/2018 2001 254 (H) 70 - 130 mg/dL Final   11/13/2018 1601 206 (H) 70 - 130 mg/dL Final       2D ECHOCARDIOGRAM  · Left ventricular systolic function is normal. Calculated EF = 56%. Estimated EF was in agreement with the calculated EF. Normal left ventricular cavity size and wall thickness noted. All left ventricular wall segments contract normally.  · Left ventricular diastolic function is normal.  · There is mild calcification of the aortic valve.  · Mild mitral valve regurgitation is present.  · Mild tricuspid valve regurgitation is present. Estimated right ventricular systolic pressure from tricuspid regurgitation is moderately elevated (49 mmHg).      atorvastatin 40 mg Oral Daily   ceftriaxone 2 g Intravenous Q24H   clopidogrel 75 mg Oral Daily   enalapril 5 mg Oral Q12H   enoxaparin 80 mg Subcutaneous Q12H   glipiZIDE 10 mg Oral QAM AC   guaiFENesin 1,200 mg Oral Q12H   insulin lispro 0-9 Units  Subcutaneous 4x Daily With Meals & Nightly   melatonin 10 mg Oral Nightly   metoprolol tartrate 25 mg Oral Q12H   pantoprazole 40 mg Intravenous Q AM   senna 10 mL Oral BID       dextrose 5 % and sodium chloride 0.45 % with KCl 20 mEq/L 100 mL/hr Last Rate: 100 mL/hr (11/14/18 2240)   Pharmacy to Dose enoxaparin (LOVENOX)     Diet Soft Texture; Whole Foods; Thin; Consistent Carbohydrate      Assessment/Plan      Active Hospital Problems    Diagnosis Date Noted   • **Infection due to parainfluenza virus 2 [B34.8] 11/13/2018   • Acute bacterial bronchitis (secondary infection) [J20.8, B96.89] 11/14/2018   • Sepsis - present on admission [A41.9] 11/14/2018   • Constipation [K59.00] 11/14/2018   • Atrial fibrillation (new) [I48.91] 11/13/2018   • Bilateral carotid artery stenosis [I65.23] 12/01/2016   • Type 2 diabetes mellitus without complication, without long-term current use of insulin (CMS/[facility]) [E11.9] 08/30/2016   • Hypertension [I10] 08/30/2016      Resolved Hospital Problems    Diagnosis Date Noted Date Resolved   • Dehydration [E86.0] 11/13/2018 11/14/2018       Mr. Sierra is a 67 y.o. male non-smoker with a history of diabetes and HTN who is admitted with URI symptoms and + VRP for parainfluenza virus 2.    · Appreciate ID assistance. On rocephin for presumed secondary bacterial infection. CXR now showing pneumonia.  · PCT elevated again today but patient clinically improving. Afebrile 48 hours.   · Cardiology following for new afib. On lopressor and full dose lovenox. They would like his Plavix switched to Eliquis if approved by his neurologist. Will ask them to weigh in. History of prior TIA thought due to carotid stenosis. Discussed with Dr. Franco.  · Anticipate discharge 11/16 if okay with all.      Adrian Hernandes MD  [facility]  11/15/18  7:54 AM      ADDENDUM  Discussed case with Dr. Polo on call for neurology. He pulled up patient's records including reviewed his prior CT  angiogram. Mild bilateral carotid stenosis being followed by them as outpatient. As long as he remains on a statin he is okay with him coming off antiplatelet therapy so he can be on Eliquis for the atrial fibrillation. We will discuss this further with patient tomorrow. Will likely discharge on Eliquis tomorrow and discontinue Plavix. If cardiology stops the Eliquis in the near future he would recommend starting back on baby aspirin.    Time spent 35 minutes with > 50% in counseling and coordination of care. Discussed with patient/family the above outlined topics.      Adrian Hernandes MD  [facility]  11/15/18  5:30 PM    '

## 2018-11-16 VITALS
RESPIRATION RATE: 18 BRPM | OXYGEN SATURATION: 100 % | DIASTOLIC BLOOD PRESSURE: 96 MMHG | WEIGHT: 184 LBS | SYSTOLIC BLOOD PRESSURE: 153 MMHG | HEIGHT: 69 IN | HEART RATE: 100 BPM | TEMPERATURE: 97.9 F | BODY MASS INDEX: 27.25 KG/M2

## 2018-11-16 LAB
BACTERIA SPEC RESP CULT: NORMAL
GLUCOSE BLDC GLUCOMTR-MCNC: 221 MG/DL (ref 70–130)
GRAM STN SPEC: NORMAL
MRSA SPEC QL CULT: NORMAL

## 2018-11-16 PROCEDURE — 25010000003 CEFTRIAXONE PER 250 MG: Performed by: INTERNAL MEDICINE

## 2018-11-16 PROCEDURE — 63710000001 INSULIN LISPRO (HUMAN) PER 5 UNITS: Performed by: HOSPITALIST

## 2018-11-16 PROCEDURE — 99233 SBSQ HOSP IP/OBS HIGH 50: CPT | Performed by: INTERNAL MEDICINE

## 2018-11-16 PROCEDURE — 82962 GLUCOSE BLOOD TEST: CPT

## 2018-11-16 PROCEDURE — 93010 ELECTROCARDIOGRAM REPORT: CPT | Performed by: INTERNAL MEDICINE

## 2018-11-16 PROCEDURE — 99232 SBSQ HOSP IP/OBS MODERATE 35: CPT | Performed by: INTERNAL MEDICINE

## 2018-11-16 PROCEDURE — 93005 ELECTROCARDIOGRAM TRACING: CPT | Performed by: HOSPITALIST

## 2018-11-16 PROCEDURE — 63710000001 INSULIN GLARGINE PER 5 UNITS: Performed by: HOSPITALIST

## 2018-11-16 RX ORDER — METOPROLOL TARTRATE 50 MG/1
50 TABLET, FILM COATED ORAL EVERY 12 HOURS SCHEDULED
Qty: 60 TABLET | Refills: 0 | Status: SHIPPED | OUTPATIENT
Start: 2018-11-16 | End: 2019-01-24 | Stop reason: SDUPTHER

## 2018-11-16 RX ORDER — POLYETHYLENE GLYCOL 3350 17 G/17G
17 POWDER, FOR SOLUTION ORAL 2 TIMES DAILY
Start: 2018-11-16 | End: 2020-02-05

## 2018-11-16 RX ORDER — METOPROLOL TARTRATE 50 MG/1
50 TABLET, FILM COATED ORAL EVERY 12 HOURS SCHEDULED
Status: DISCONTINUED | OUTPATIENT
Start: 2018-11-16 | End: 2018-11-16 | Stop reason: HOSPADM

## 2018-11-16 RX ORDER — CEFDINIR 300 MG/1
300 CAPSULE ORAL EVERY 12 HOURS SCHEDULED
Qty: 8 CAPSULE | Refills: 0 | Status: SHIPPED | OUTPATIENT
Start: 2018-11-17 | End: 2018-11-21

## 2018-11-16 RX ORDER — CEFDINIR 300 MG/1
300 CAPSULE ORAL EVERY 12 HOURS SCHEDULED
Status: DISCONTINUED | OUTPATIENT
Start: 2018-11-17 | End: 2018-11-16 | Stop reason: HOSPADM

## 2018-11-16 RX ADMIN — CEFTRIAXONE SODIUM 2 G: 2 INJECTION, SOLUTION INTRAVENOUS at 08:36

## 2018-11-16 RX ADMIN — ACETAMINOPHEN 650 MG: 325 TABLET, FILM COATED ORAL at 08:34

## 2018-11-16 RX ADMIN — HYDROCODONE BITARTRATE AND HOMATROPINE METHYLBROMIDE 5 ML: 5; 1.5 SOLUTION ORAL at 08:36

## 2018-11-16 RX ADMIN — Medication 352 MG: at 08:34

## 2018-11-16 RX ADMIN — HYDROCODONE BITARTRATE AND HOMATROPINE METHYLBROMIDE 5 ML: 5; 1.5 SOLUTION ORAL at 04:39

## 2018-11-16 RX ADMIN — METOPROLOL TARTRATE 25 MG: 25 TABLET ORAL at 06:06

## 2018-11-16 RX ADMIN — METOPROLOL TARTRATE 25 MG: 25 TABLET ORAL at 08:34

## 2018-11-16 RX ADMIN — INSULIN LISPRO 4 UNITS: 100 INJECTION, SOLUTION INTRAVENOUS; SUBCUTANEOUS at 08:34

## 2018-11-16 RX ADMIN — GUAIFENESIN 1200 MG: 600 TABLET, EXTENDED RELEASE ORAL at 08:34

## 2018-11-16 RX ADMIN — INSULIN GLARGINE 10 UNITS: 100 INJECTION, SOLUTION SUBCUTANEOUS at 08:35

## 2018-11-16 RX ADMIN — GLIPIZIDE 10 MG: 10 TABLET ORAL at 08:34

## 2018-11-16 RX ADMIN — CLOPIDOGREL 75 MG: 75 TABLET, FILM COATED ORAL at 08:34

## 2018-11-16 RX ADMIN — ACETAMINOPHEN 650 MG: 325 TABLET, FILM COATED ORAL at 04:39

## 2018-11-16 RX ADMIN — PANTOPRAZOLE SODIUM 40 MG: 40 INJECTION, POWDER, FOR SOLUTION INTRAVENOUS at 06:06

## 2018-11-16 RX ADMIN — ENALAPRIL MALEATE 5 MG: 5 TABLET ORAL at 08:34

## 2018-11-16 NOTE — PLAN OF CARE
Problem: Patient Care Overview  Goal: Plan of Care Review   11/16/18 0245   Coping/Psychosocial   Plan of Care Reviewed With patient   Plan of Care Review   Progress improving   OTHER   Outcome Summary Pt c/o of pain and nausea relieved with tylenol and zofran. Cough has improved no prn medication requested throughout the shift. Resting comfortably. Probable dc in the am. Will continue to closely monitor   Coping/Psychosocial   Patient Agreement with Plan of Care agrees

## 2018-11-16 NOTE — PROGRESS NOTES
INFECTIOUS DISEASES PROGRESS NOTE    CC: f/u PNA    S:   Feels better  No sig dyspnea  Still with cough  No f/c; improving sweats    O:  Physical Exam:  Temp:  [97.9 °F (36.6 °C)-98.5 °F (36.9 °C)] 97.9 °F (36.6 °C)  Heart Rate:  [] 100  Resp:  [18-20] 18  BP: (137-164)/() 153/96  Physical Exam   GENERAL: Awake and alert, in no acute distress.   HEENT: Oropharynx is clear. Hearing is grossly normal.   EYES: PERRL. No conjunctival injection. No lid lag.   HEART: Regular rate and rhythm. No peripheral edema.   LUNGS: rhonchi with normal respiratory effort.   GI: Soft, nontender, nondistended. No appreciable organomegaly.   SKIN: Warm and dry without cutaneous eruptions   PSYCHIATRIC: Appropriate mood, affect, insight, and judgment.        Diagnostics:       Bcx ngtd  Sputum ngtd  MRSA ngtd  Urine legionella/strep Ag neg  glc 137-230  PCT 2.67   CXR, independently interpreted: bibasilar infiltatrates    Assessment/Plan   1.  Parainfluenza 2  2. Bibasilar multuifocal PNA  3. DM2, cont glycemic control efforts to prevent/control infectious complications    Repeat CXR shows PNA and he has developed a secondary bacterial pna  Luckily this was caught very early  He is improving  D/c ceftriaxone  Will change to omincef 300mg po q12 through 11/20, first dose tomorrow AM  D/w Dr Hernandes re abx plan  Ok to d/c from my end    Gomez Adames MD  9:34 AM  11/16/18

## 2018-11-16 NOTE — PROGRESS NOTES
"    Patient Name: Harry ALCALA [facility]  :1950  67 y.o.      Patient Care Team:  Artie Carranza MD as PCP - General (Internal Medicine)  Artie Carranza MD as PCP - Claims Attributed    Chief Complaint: PAF, parainfluenza    Interval History: episode of PAF this AM, given metoprolol early and back in SR       Objective   Vital Signs  Temp:  [98 °F (36.7 °C)-98.5 °F (36.9 °C)] 98 °F (36.7 °C)  Heart Rate:  [] 109  Resp:  [18-20] 18  BP: (137-168)/() 150/101    Intake/Output Summary (Last 24 hours) at 2018 0745  Last data filed at 2018 0331  Gross per 24 hour   Intake 120 ml   Output 1525 ml   Net -1405 ml     Flowsheet Rows      First Filed Value   Admission Height  175.3 cm (69\") Documented at 2018 0539   Admission Weight  83.4 kg (183 lb 14.4 oz) Documented at 2018 0600          Physical Exam:   General Appearance:    Alert, cooperative, in no acute distress   Lungs:     Clear to auscultation.  Normal respiratory effort and rate.      Heart:    Regular rhythm and normal rate, normal S1 and S2, no murmurs, gallops or rubs.     Chest Wall:    No abnormalities observed   Abdomen:     Soft, nontender, positive bowel sounds.     Extremities:   no cyanosis, clubbing or edema.  No marked joint deformities.  Adequate musculoskeletal strength.       Results Review:    Results from last 7 days   Lab Units  11/15/18   0421   SODIUM mmol/L  135*   POTASSIUM mmol/L  3.9   CHLORIDE mmol/L  102   CO2 mmol/L  23.9   BUN mg/dL  15   CREATININE mg/dL  0.82   GLUCOSE mg/dL  213*   CALCIUM mg/dL  8.0*         Results from last 7 days   Lab Units  11/15/18   0421   WBC 10*3/mm3  6.67   HEMOGLOBIN g/dL  10.4*   HEMATOCRIT %  32.4*   PLATELETS 10*3/mm3  176         Results from last 7 days   Lab Units  11/15/18   0421   MAGNESIUM mg/dL  2.4                   Medication Review:     atorvastatin 40 mg Oral Daily   ceftriaxone 2 g Intravenous Q24H   clopidogrel 75 mg Oral Daily   enalapril 5 mg Oral Q12H "   enoxaparin 80 mg Subcutaneous Q12H   glipiZIDE 10 mg Oral QAM AC   guaiFENesin 1,200 mg Oral Q12H   insulin glargine 10 Units Subcutaneous QAM   insulin lispro 0-9 Units Subcutaneous 4x Daily With Meals & Nightly   melatonin 10 mg Oral Nightly   metoprolol tartrate 25 mg Oral Q12H   pantoprazole 40 mg Intravenous Q AM   senna 10 mL Oral BID          Pharmacy to Dose enoxaparin (LOVENOX)        Assessment/Plan   Patient Active Problem List   Diagnosis   • Hypertension   • Type 2 diabetes mellitus without complication, without long-term current use of insulin (CMS/[facility])   • Routine health maintenance   • Abnormal PSA   • Prostatitis   • UTI (urinary tract infection) due to urinary indwelling catheter (CMS/[facility])   • Reflux esophagitis   • Melena   • Bilateral carotid artery stenosis   • Transient cerebral ischemia   • Hyperlipidemia LDL goal <70   • Prostate cancer (CMS/[facility])   • Sleep disturbance   • Infection due to parainfluenza virus 2   • Atrial fibrillation (new)   • Multilobar lung infiltrate, secondary bacterial infection   • Sepsis - present on admission   • Constipation   • Valvular heart disease     1.  Paroxysmal atrial fibrillation-currently in SR in the setting of acute parainfluenza viral infection.  episode of PAF this AM, given metoprolol early and back in SR- will augment dose of metoprolol  2.  OAC- would recommend Eliquis at D/C. Has been on Plavix, was started by neuro with his TIA and cerebrovascular disease and they currently follow him for this, noted that OK per neuro to stop Plavix.  May not need Eliquis long-term (since PAF in setting of acute parainfluenza infection)  3.  Parainfluenza viral bronchitis-  4.  OK from our standpoint  to d/c today as planned- to f/u in our office in 1 month. Will sign off           Brayden Franco III, MD  [facility]  11/16/18  7:45 AM

## 2018-11-16 NOTE — PLAN OF CARE
Problem: Patient Care Overview  Goal: Plan of Care Review  Outcome: Ongoing (interventions implemented as appropriate)   11/16/18 1105   Coping/Psychosocial   Plan of Care Reviewed With patient   Plan of Care Review   Progress improving   OTHER   Outcome Summary patient to be d/c to home today w family      Goal: Individualization and Mutuality  Outcome: Ongoing (interventions implemented as appropriate)    Goal: Discharge Needs Assessment  Outcome: Ongoing (interventions implemented as appropriate)    Goal: Interprofessional Rounds/Family Conf  Outcome: Ongoing (interventions implemented as appropriate)

## 2018-11-16 NOTE — NURSING NOTE
Pt HR began to increase to 130's. STAT EKG was preformed to show the patient is converting between atrial flutter and a-fib. Dr. Geller ordered a one time dose of 25mg metoprolol. Will continue to closely monitor.

## 2018-11-16 NOTE — DISCHARGE SUMMARY
Name: Harry Sierra  Age: 67 y.o.  Sex: male  :  1950  MRN: [id number]         Primary Care Physician: Artie Carranza MD      Date of Admission:  2018  Date of Discharge:  2018      Chief Complaint:  Shortness of Breath and Fever      Presenting Problem/History of Present Illness:  Sepsis, due to unspecified organism (CMS/[facility]) [A41.9]  Upper respiratory tract infection, unspecified type [J06.9]     Discharge Diagnosis:  Active Hospital Problems    Diagnosis Date Noted   • **Infection due to parainfluenza virus 2 [B34.8] 2018   • Multilobar lung infiltrate, secondary bacterial infection [R91.8] 2018   • Sepsis - present on admission [A41.9] 2018   • Constipation [K59.00] 2018   • Atrial fibrillation (new) [I48.91] 2018   • Bilateral carotid artery stenosis [I65.23] 2016   • Type 2 diabetes mellitus without complication, without long-term current use of insulin (CMS/[facility]) [E11.9] 2016   • Hypertension [I10] 2016      Resolved Hospital Problems    Diagnosis Date Noted Date Resolved   • Dehydration [E86.0] 2018       Secondary Diagnoses:  Past Medical History:   Diagnosis Date   • Cancer (CMS/[facility])     Basal Cell   • Diabetes mellitus (CMS/[facility])    • Hypertension    • Sepsis (CMS/[facility]) 2016   • Shingles    • UTI (urinary tract infection)    • Vertebral artery insufficiency          Consults:  Consult Orders (all) (From admission, onward)    Start     Ordered    18 1105  Inpatient Infectious Diseases Consult  Once     Specialty:  Infectious Diseases  Provider:  Senia Carter MD    18 1109    18 0849  Inpatient Cardiology Consult  Once     Specialty:  Cardiology  Provider:  Virgilio Trammell MD    18 0855          Procedures Performed:  XR Chest PA & Lateral   Final Result   Abnormal airspace disease within the lower lobes and   lingula/right middle lobe consistent with multilobar pneumonia.   Recommend  follow up to resolution.       This report was finalized on 11/15/2018 11:52 AM by Dr. Miguel Neal M.D.          XR Chest 2 View   Final Result   No active disease is seen in the chest.       This report was finalized on 11/13/2018 2:31 PM by Dr. Nick Roberts M.D.          ECHOCARDIOGRAM  · Left ventricular systolic function is normal. Calculated EF = 56%. Estimated EF was in agreement with the calculated EF. Normal left ventricular cavity size and wall thickness noted. All left ventricular wall segments contract normally.  · Left ventricular diastolic function is normal.  · There is mild calcification of the aortic valve.  · Mild mitral valve regurgitation is present.  · Mild tricuspid valve regurgitation is present. Estimated right ventricular systolic pressure from tricuspid regurgitation is moderately elevated (49 mmHg).          Hospital Course:  Mr. Sierra is a 67 y.o. male non-smoker with a history of diabetes and hypertension who presented to [facility] initially complaining of fever and upper respiratory symptoms. Please see the admitting history and physical for further details. He was found to have a parainfluenza virus and was admitted to the hospital for further evaluation and treatment. His initial chest x-ray was negative and he was admitted for supportive care for a acute viral illness. He was moderately ill and actually got worse before he got better. Additional studies revealed an elevated pro-calcitonin and infectious disease was asked to see in consultation. He was started on empiric Rocephin for presumed secondary bacterial infection. A repeat chest x-ray did confirm by basilar pneumonia which is again felt to be a secondary infection due to his initial viral bronchitis. With supportive care and antibiotics he has shown significant clinical improvement. He will be discharged today with a prescription for cefdinir to complete 7 total days of antibiotics.     His stay was  complicated slightly by the development of new atrial fibrillation. Cardiology consultation was obtained. He was started on Lopressor. Echocardiogram was performed with results outlined above. He did convert back to a sinus rhythm but given his prior history of TIA as felt indicated to start on anticoagulation. After consultation with neurology it was decided to take him off Plavix and discharged him on Eliquis. He will follow up with [facility] regarding ongoing management of his atrial fibrillation but as stated he was in sinus rhythm at time of discharge. It was explained to patient that if taken off of Eliquis in near future should start taking a baby aspirin.        Physical Exam:  Temp:  [97.9 °F (36.6 °C)-98.5 °F (36.9 °C)] 97.9 °F (36.6 °C)  Heart Rate:  [] 100  Resp:  [18-20] 18  BP: (137-164)/() 153/96  Body mass index is 27.17 kg/m².  Physical Exam   Constitutional: He is oriented to person, place, and time. No distress.   Cardiovascular: Normal rate and regular rhythm.   No murmur heard.  Pulmonary/Chest: Effort normal. He has rales (bases).   Abdominal: Soft. Bowel sounds are normal. He exhibits no distension. There is no tenderness.   Musculoskeletal: Normal range of motion. He exhibits no edema.   Neurological: He is alert and oriented to person, place, and time.   Skin: Skin is warm and dry. He is not diaphoretic.       Condition on Discharge:  Stable.    Discharge Disposition:   Home with family    Allergies:   Allergies   Allergen Reactions   • Shellfish-Derived Products Anaphylaxis   • Aleve [Naproxen] Other (See Comments)     Bleeding     • Asa [Aspirin] Other (See Comments)     bleeding   • Bee Venom        Discharge Medications:      Discharge Medications      New Medications      Instructions Start Date   apixaban 5 MG tablet tablet  Commonly known as:  ELIQUIS   5 mg, Oral, Every 12 Hours Scheduled      cefdinir 300 MG capsule  Commonly known as:  OMNICEF   300 mg,  Oral, Every 12 Hours Scheduled      HYDROcodone-homatropine 5-1.5 MG/5ML syrup  Commonly known as:  HYCODAN   5 mL, Oral, Every 4 Hours PRN      metoprolol tartrate 50 MG tablet  Commonly known as:  LOPRESSOR   50 mg, Oral, Every 12 Hours Scheduled      polyethylene glycol packet  Commonly known as:  MIRALAX   17 g, Oral, 2 Times Daily         Continue These Medications      Instructions Start Date   acetaminophen 500 MG tablet  Commonly known as:  TYLENOL   500 mg, Oral, 4 Times Daily PRN      atorvastatin 40 MG tablet  Commonly known as:  LIPITOR   TAKE ONE TABLET BY MOUTH EVERY NIGHT AT BEDTIME      CENTRUM SILVER 50+MEN PO   Oral      enalapril 5 MG tablet  Commonly known as:  VASOTEC   5 mg, Oral, 2 Times Daily      glimepiride 4 MG tablet  Commonly known as:  AMARYL   TAKE TWO TABLETS BY MOUTH EVERY MORNING BEFORE BREAKFAST      glucose blood test strip  Commonly known as:  COOL BLOOD GLUCOSE TEST STRIPS   Use as instructed      metFORMIN 1000 MG tablet  Commonly known as:  GLUCOPHAGE   1,000 mg, Oral, Daily With Breakfast      pantoprazole 40 MG EC tablet  Commonly known as:  PROTONIX   TAKE ONE TABLET BY MOUTH DAILY         Stop These Medications    clopidogrel 75 MG tablet  Commonly known as:  PLAVIX            Discharge Diet:   Diet Instructions     Diet: Regular, Consistent Carbohydrate      Discharge Diet:   Regular  Consistent Carbohydrate             Activity at Discharge:   Activity Instructions     Activity as Tolerated            Follow-up Appointments:  Future Appointments   Date Time Provider Department Center   12/4/2018  2:15 PM Artie Carranza MD [facility] None   12/18/2018  7:00 AM Rhett Blackman MD [facility] None     Follow-up Information     Rhett Blackman MD Follow up in 32 day(s).    Specialty:  Internal Medicine  Why:  Dec 18th @ 7:00 AM  Contact information:  [facility] [place] [place] [address]  [phone]             Artie Carranza MD Follow up in 18 day(s).     Specialty:  Internal Medicine  Why:  December 4th @ 2:15 PM  Contact information:  [address]  [place] [address]  [phone]                     Test Results Pending at Discharge:   Order Current Status    Blood Culture - Blood, Arm, Left Preliminary result    Blood Culture - Blood, Arm, Left Preliminary result           Code status:   Code Status and Medical Interventions:   Ordered at: 11/13/18 1018     Level Of Support Discussed With:    Patient     Code Status:    CPR     Medical Interventions (Level of Support Prior to Arrest):    Full         Adrian Hernandes MD  [facility]  11/16/18  10:51 AM      Time: greater than 30 minutes.

## 2018-11-17 ENCOUNTER — NURSE TRIAGE (OUTPATIENT)
Dept: CALL CENTER | Facility: HOSPITAL | Age: 68
End: 2018-11-17

## 2018-11-17 ENCOUNTER — READMISSION MANAGEMENT (OUTPATIENT)
Dept: CALL CENTER | Facility: HOSPITAL | Age: 68
End: 2018-11-17

## 2018-11-17 NOTE — TELEPHONE ENCOUNTER
"    Reason for Disposition  • Health Information question, no triage required and triager able to answer question    Additional Information  • Negative: [1] Caller is not with the adult (patient) AND [2] reporting urgent symptoms  • Negative: Lab result questions  • Negative: Medication questions  • Negative: Caller cannot be reached by phone  • Negative: Caller has already spoken to PCP or another triager  • Negative: RN needs further essential information from caller in order to complete triage  • Negative: Requesting regular office appointment  • Negative: [1] Caller requesting NON-URGENT health information AND [2] PCP's office is the best resource    Answer Assessment - Initial Assessment Questions  1. REASON FOR CALL or QUESTION: \"What is your reason for calling today?\" or \"How can I best help you?\" or \"What question do you have that I can help answer?\"      Caller was recently discharged from the hospital and is on antibiotics. He is wanting a RX for live probiotics. I explained that I could not call this in, but could have case management follow up with him. Email sent to case management.    Protocols used: INFORMATION ONLY CALL-ADULT-      "

## 2018-11-17 NOTE — OUTREACH NOTE
Prep Survey      Responses   Facility patient discharged from?  House   Is patient eligible?  Yes   Discharge diagnosis  Infection due to parainfluenza virus 2 B34,  Multilobar lung infiltrate, secondary bacterial infection ,  Sepsis   Does the patient have one of the following disease processes/diagnoses(primary or secondary)?  Sepsis   Does the patient have Home health ordered?  No   Is there a DME ordered?  No   Prep survey completed?  Yes          Padmini Valdes RN

## 2018-11-18 LAB
BACTERIA SPEC AEROBE CULT: NORMAL
BACTERIA SPEC AEROBE CULT: NORMAL

## 2018-11-19 ENCOUNTER — TELEPHONE (OUTPATIENT)
Dept: SOCIAL WORK | Facility: HOSPITAL | Age: 68
End: 2018-11-19

## 2018-11-19 ENCOUNTER — READMISSION MANAGEMENT (OUTPATIENT)
Dept: CALL CENTER | Facility: HOSPITAL | Age: 68
End: 2018-11-19

## 2018-11-19 NOTE — OUTREACH NOTE
Sepsis Week 1 Survey      Responses   Facility patient discharged from?  [facility] [address]   Does the patient have one of the following disease processes/diagnoses(primary or secondary)?  Sepsis   Is there a successful TCM telephone encounter documented?  No   Week 1 attempt successful?  No   Unsuccessful attempts  Attempt 1          Mona Sandoval RN

## 2018-11-19 NOTE — PROGRESS NOTES
Case Management Discharge Note    Final Note: home    Destination      No service has been selected for the patient.      Durable Medical Equipment      No service has been selected for the patient.      Dialysis/Infusion      No service has been selected for the patient.      Home Medical Care      No service has been selected for the patient.      Community Resources      No service has been selected for the patient.        Other: Other(private)    Final Discharge Disposition Code: 01 - home or self-care

## 2018-11-20 ENCOUNTER — READMISSION MANAGEMENT (OUTPATIENT)
Dept: CALL CENTER | Facility: HOSPITAL | Age: 68
End: 2018-11-20

## 2018-11-20 DIAGNOSIS — I65.23 BILATERAL CAROTID ARTERY STENOSIS: Primary | ICD-10-CM

## 2018-11-21 NOTE — OUTREACH NOTE
Sepsis Week 1 Survey      Responses   Facility patient discharged from?  [place]   Does the patient have one of the following disease processes/diagnoses(primary or secondary)?  Sepsis   Is there a successful TCM telephone encounter documented?  No   Week 1 attempt successful?  Yes   Call start time  1759   Call end time  1802   Discharge diagnosis  Infection due to parainfluenza virus 2 B34,  Multilobar lung infiltrate, secondary bacterial infection ,  Sepsis   Is patient permission given to speak with other caregiver?  Yes   List who call center can speak with  Hanny, wife   Meds reviewed with patient/caregiver?  Yes   Is the patient having any side effects they believe may be caused by any medication additions or changes?  No   Does the patient have all medications related to this admission filled (includes all antibiotics, inhalers, nebulizers,steroids,etc.)  Yes   Is the patient taking all medications as directed (includes completed medication regime)?  Yes   Comments regarding appointments  in December has appts.    Does the patient have a primary care provider?   Yes   Comments regarding PCP  Dr. Carranza   Does the patient have an appointment with their PCP within 7 days of discharge?  No   What is preventing the patient from scheduling follow up appointments within 7 days of discharge?  Haven't had time   Nursing Interventions  Educated patient on importance of making appointment   Has the patient kept scheduled appointments due by today?  N/A   Has home health visited the patient within 72 hours of discharge?  N/A   Did the patient receive a copy of their discharge instructions?  Yes   Nursing interventions  Reviewed instructions with patient, Educated on MyChart   What is the patient's perception of their health status since discharge?  Improving   Nursing interventions  Nurse provided patient education   Is the patient/caregiver able to teach back Sepsis?  S - Shivering,fever or very cold, E - Extreme  pain or generalized discomfort (worst ever,especially abdomen), P - Pale or discolored skin, S - Sleepy, difficult to arouse,confused, I -   I feel like I might die-a feeling of hopelessness, S - Short of breath   Nursing interventions  Nurse provided reassurance to patient, Nurse provided patient education, Advised patient to call provider, Advised patient to seek immediate care   Is patient/caregiver able to teach back steps to recovery at home?  Set small, achievable goals for return to baseline health, Rest and regain strength, Talk about feelings with family/friends, Record milestones and struggles in a journal, Learn about sepsis(sepsis.org), Eat a balanced diet, Exercise as tolerated, Make a list of questions for PCP appoinment   Is the patient/caregiver able to teach back signs and symptoms of worsening condition:  Fever, Hyperthermia, Rapid heart rate (>90), Shortness of breath/rapid respiratory rate, Altered mental status(confusion/coma), Edema, High blood glucose without diabetes   Is the patient/caregiver able to teach back the hierarchy of who to call/visit for symptoms/problems? PCP, Specialist, Home health nurse, Urgent Care, ED, 911  Yes   Week 1 call completed?  Yes   Wrap up additional comments  He is really weak, some better, jus does not feel like talking much today          Elizabeth Lara, SANJUANA

## 2018-11-28 ENCOUNTER — READMISSION MANAGEMENT (OUTPATIENT)
Dept: CALL CENTER | Facility: HOSPITAL | Age: 68
End: 2018-11-28

## 2018-11-28 NOTE — OUTREACH NOTE
Sepsis Week 2 Survey      Responses   Facility patient discharged from?  [place]   Does the patient have one of the following disease processes/diagnoses(primary or secondary)?  Sepsis   Week 2 attempt successful?  No   Unsuccessful attempts  Attempt 1          Carole Stephens RN

## 2018-11-29 ENCOUNTER — READMISSION MANAGEMENT (OUTPATIENT)
Dept: CALL CENTER | Facility: HOSPITAL | Age: 68
End: 2018-11-29

## 2018-11-29 NOTE — OUTREACH NOTE
Sepsis Week 2 Survey      Responses   Facility patient discharged from?  [place]   Does the patient have one of the following disease processes/diagnoses(primary or secondary)?  Sepsis   Week 2 attempt successful?  Yes   Call start time  1647   Call end time  1650   Discharge diagnosis  Infection due to parainfluenza virus 2 B34,  Multilobar lung infiltrate, secondary bacterial infection ,  Sepsis   Medication alerts for this patient  Has finished antibiotics   Meds reviewed with patient/caregiver?  Yes   Is the patient having any side effects they believe may be caused by any medication additions or changes?  No   Does the patient have all medications related to this admission filled (includes all antibiotics, inhalers, nebulizers,steroids,etc.)  Yes   Is the patient taking all medications as directed (includes completed medication regime)?  Yes   Does the patient have a primary care provider?   Yes   Comments regarding PCP  Dr. Carranza- Has an appt on 12/04/2018   Does the patient have an appointment with their PCP within 7 days of discharge?  Greater than 7 days   What is preventing the patient from scheduling follow up appointments within 7 days of discharge?  Earlier appointment not available   Nursing Interventions  Educated patient on importance of making appointment   Has the patient kept scheduled appointments due by today?  N/A   Has home health visited the patient within 72 hours of discharge?  N/A   Psychosocial issues?  No   Comments  Patient reports he is doing better. Fever free and has no chest pain, SOB, or other issues.    Did the patient receive a copy of their discharge instructions?  Yes   Nursing interventions  Reviewed instructions with patient, Educated on MyChart   What is the patient's perception of their health status since discharge?  Improving   Nursing interventions  Nurse provided patient education   Is the patient/caregiver able to teach back Sepsis?  S - Shivering,fever or very  cold, E - Extreme pain or generalized discomfort (worst ever,especially abdomen), P - Pale or discolored skin, S - Sleepy, difficult to arouse,confused, I -   I feel like I might die-a feeling of hopelessness, S - Short of breath   Nursing interventions  Nurse provided reassurance to patient, Nurse provided patient education, Advised patient to call provider, Advised patient to seek immediate care   Is patient/caregiver able to teach back steps to recovery at home?  Set small, achievable goals for return to baseline health, Rest and regain strength, Talk about feelings with family/friends, Record milestones and struggles in a journal, Learn about sepsis(sepsis.org), Eat a balanced diet, Exercise as tolerated, Make a list of questions for PCP appoinment   Is the patient/caregiver able to teach back signs and symptoms of worsening condition:  Fever, Hyperthermia, Rapid heart rate (>90), Shortness of breath/rapid respiratory rate, Altered mental status(confusion/coma), Edema, High blood glucose without diabetes   Is the patient/caregiver able to teach back the hierarchy of who to call/visit for symptoms/problems? PCP, Specialist, Home health nurse, Urgent Care, ED, 911  Yes   Week 2 call completed?  Yes          Narendra Andrade RN

## 2018-12-04 ENCOUNTER — OFFICE VISIT (OUTPATIENT)
Dept: INTERNAL MEDICINE | Age: 68
End: 2018-12-04

## 2018-12-04 VITALS
WEIGHT: 181.2 LBS | BODY MASS INDEX: 26.84 KG/M2 | OXYGEN SATURATION: 96 % | HEIGHT: 69 IN | DIASTOLIC BLOOD PRESSURE: 78 MMHG | HEART RATE: 70 BPM | TEMPERATURE: 97.4 F | SYSTOLIC BLOOD PRESSURE: 122 MMHG

## 2018-12-04 DIAGNOSIS — E11.8 TYPE 2 DIABETES MELLITUS WITH COMPLICATION (HCC): ICD-10-CM

## 2018-12-04 DIAGNOSIS — I48.0 PAROXYSMAL ATRIAL FIBRILLATION (HCC): ICD-10-CM

## 2018-12-04 DIAGNOSIS — B34.8 INFECTION DUE TO PARAINFLUENZA VIRUS 2: Primary | ICD-10-CM

## 2018-12-04 PROCEDURE — 99213 OFFICE O/P EST LOW 20 MIN: CPT | Performed by: INTERNAL MEDICINE

## 2018-12-04 RX ORDER — GLIMEPIRIDE 4 MG/1
TABLET ORAL
Qty: 60 TABLET | Refills: 0 | Status: SHIPPED | OUTPATIENT
Start: 2018-12-04 | End: 2019-01-05 | Stop reason: SDUPTHER

## 2018-12-04 NOTE — PROGRESS NOTES
"[facility] [place] [facility]  MD Harry GAFFNEY Mariela / 68 y.o. / male  12/04/2018      ASSESSMENT & PLAN:    Problem List Items Addressed This Visit        Unprioritized    Infection due to parainfluenza virus 2 - Primary    Relevant Orders    XR Chest 2 View    Atrial fibrillation (new)    Relevant Medications    metoprolol tartrate (LOPRESSOR) 50 MG tablet        Orders Placed This Encounter   Procedures   • XR Chest 2 View       Summary/Discussion:    Number-one history of parainfluenza pneumonia.  Patient is clinically improved at this time.  Recommend follow-up chest x-ray to document resolution of infiltrate by the end of December    #2 paroxysmal atrial fibrillation, resolved at this time, patient is a normal sinus rhythm today.  Continue meds as prescribed, he will be followed up by cardiology later this month, I suspect that they will discontinue the medication at that time or shortly thereafter.    Patient will follow-up with  after I leave the area.      Return in about 4 months (around 4/4/2019), or Dr Blackman, for Blood pressure check.    ____________________________________________________________________    VITALS    Visit Vitals  /78   Pulse 70   Temp 97.4 °F (36.3 °C)   Ht 175.3 cm (69.02\")   Wt 82.2 kg (181 lb 3.2 oz)   SpO2 96%   BMI 26.75 kg/m²       BP Readings from Last 3 Encounters:   12/04/18 122/78   11/16/18 153/96   10/16/18 (!) 153/101     Wt Readings from Last 3 Encounters:   12/04/18 82.2 kg (181 lb 3.2 oz)   11/15/18 83.5 kg (184 lb)   10/16/18 77.6 kg (171 lb)      Body mass index is 26.75 kg/m².    CC:  Main reason(s) for today's visit: Congestive Heart Failure (Hospital F/U 11.13.18)      HPI: Patient presents afternoon following recent admission to the hospital from the 13th through 16 November.  He presented with respiratory symptoms and fever found to have parainfluenza virus admitted for further evaluation and treatment.  Initially received supportive care.  " Subsequent to this he developed atrial fibrillation he was started on Lopressor.  Echo was done and he spontaneously converted back to normal sinus rhythm.  Because of prior history of TIA was felt prudent to begin him on anticoagulation, Plavix was discontinued and he was sent home on Eliquis.  Follow-up was scheduled with cardiology to manage atrial fibrillation.  Echocardiogram showed normal systolic function diastolic function normal no significant valvular lesions appreciated.    Since he has arrived home, he is feeling better but notes that the strength is now returned to baseline, and he still somewhat short of breath.  He has had some coughing this afternoon, but no significant cough is been appreciated.  Appetite is improved and he has had no fever since arrival at home.    He is not having any difficulty with the Lopressor nor Eliquis that is been recently started, and does have follow-up with cardiology scheduled for 18 December.        Patient Care Team:  Artie Carranza MD as PCP - General (Internal Medicine)  Artie Carranza MD as PCP - Claims Attributed  ____________________________________________________________________    REVIEW OF SYSTEMS    Review of Systems   Constitutional: Negative for fever.   Cardiovascular: Negative for chest pain and palpitations.         PHYSICAL EXAMINATION    Physical Exam   Constitutional: He is oriented to person, place, and time. He appears well-developed and well-nourished. No distress.   Cardiovascular: Normal rate, regular rhythm, normal heart sounds and intact distal pulses. Exam reveals no gallop and no friction rub.   No murmur heard.  Pulmonary/Chest: Effort normal and breath sounds normal. He has no wheezes. He has no rales.   Musculoskeletal: He exhibits no edema.   Neurological: He is alert and oriented to person, place, and time.   Skin: Skin is warm and dry. No rash noted.   Psychiatric: He has a normal mood and affect. His behavior is normal. Judgment  and thought content normal.   Nursing note and vitals reviewed.        REVIEWED DATA:    Labs:     Lab Results   Component Value Date     (L) 11/15/2018    K 3.9 11/15/2018    AST 47 (H) 11/13/2018    ALT 44 (H) 11/13/2018    BUN 15 11/15/2018    CREATININE 0.82 11/15/2018    CREATININE 0.74 (L) 11/14/2018    CREATININE 0.92 11/13/2018    EGFRIFNONA 93 11/15/2018    EGFRIFAFRI 121 10/18/2018       Lab Results   Component Value Date    HGBA1C 7.80 (H) 11/14/2018    HGBA1C 7.92 (H) 10/18/2018    HGBA1C 9.80 (H) 03/13/2018    GLUCOSE 213 (H) 11/15/2018    GLUCOSE 185 (H) 11/14/2018    GLUCOSE 300 (H) 11/13/2018    MICROALBUR 11.0 10/18/2018       Lab Results   Component Value Date    LDL 34 10/18/2018    LDL 33 12/19/2017    LDL 69 09/15/2017    HDL 39 (L) 10/18/2018    TRIG 56 10/18/2018       Lab Results   Component Value Date    TSH 2.910 11/14/2018       Lab Results   Component Value Date    WBC 6.67 11/15/2018    HGB 10.4 (L) 11/15/2018    HGB 11.9 (L) 11/14/2018    HGB 13.3 (L) 11/13/2018     11/15/2018       Lab Results   Component Value Date    PSA 4.260 (H) 12/19/2017         Imaging:         Medical Tests:         Summary of old records / correspondence / consultant report:         Request outside records:         ALLERGIES  Allergies   Allergen Reactions   • Shellfish-Derived Products Anaphylaxis   • Aleve [Naproxen] Other (See Comments)     Bleeding     • Asa [Aspirin] Other (See Comments)     bleeding   • Bee Venom    • Shellfish Allergy Rash        MEDICATIONS  Current Outpatient Medications   Medication Sig Dispense Refill   • acetaminophen (TYLENOL) 500 MG tablet Take 1 tablet by mouth 4 (Four) Times a Day As Needed for mild pain (1-3) or fever.  0   • apixaban (ELIQUIS) 5 MG tablet tablet Take 1 tablet by mouth Every 12 (Twelve) Hours. 60 tablet 0   • atorvastatin (LIPITOR) 40 MG tablet TAKE ONE TABLET BY MOUTH EVERY NIGHT AT BEDTIME 30 tablet 5   • enalapril (VASOTEC) 5 MG tablet Take 5  mg by mouth 2 (Two) Times a Day. 30 tablet 5   • glimepiride (AMARYL) 4 MG tablet TAKE TWO TABLETS BY MOUTH EVERY MORNING BEFORE BREAKFAST 60 tablet 0   • glucose blood (COOL BLOOD GLUCOSE TEST STRIPS) test strip Use as instructed 1 each 0   • metFORMIN (GLUCOPHAGE) 1000 MG tablet Take 1,000 mg by mouth Daily With Breakfast.     • metoprolol tartrate (LOPRESSOR) 50 MG tablet Take 1 tablet by mouth Every 12 (Twelve) Hours. 60 tablet 0   • Multiple Vitamins-Minerals (CENTRUM SILVER 50+MEN PO) Take  by mouth.     • pantoprazole (PROTONIX) 40 MG EC tablet TAKE ONE TABLET BY MOUTH DAILY 30 tablet 5   • polyethylene glycol (MIRALAX) packet Take 17 g by mouth 2 (Two) Times a Day.       No current facility-administered medications for this visit.      Current outpatient and discharge medications have been reconciled for the patient.  Reviewed by: Artie Carranza MD    [facility]:     The following portions of the patient's history were reviewed and updated as appropriate: Allergies / Current Medications / Past Medical History / Surgical History / Social History / Family History    PROBLEM LIST   Patient Active Problem List   Diagnosis   • Hypertension   • Type 2 diabetes mellitus without complication, without long-term current use of insulin (CMS/[facility])   • Routine health maintenance   • Abnormal PSA   • Prostatitis   • UTI (urinary tract infection) due to urinary indwelling catheter (CMS/[facility])   • Reflux esophagitis   • Melena   • Bilateral carotid artery stenosis   • Transient cerebral ischemia   • Hyperlipidemia LDL goal <70   • Prostate cancer (CMS/HCC)   • Sleep disturbance   • Infection due to parainfluenza virus 2   • Atrial fibrillation (new)   • Multilobar lung infiltrate, secondary bacterial infection   • Sepsis - present on admission   • Constipation   • Valvular heart disease       PAST MEDICAL HISTORY  Past Medical History:   Diagnosis Date   • Cancer (CMS/HCC)     Basal Cell   • Diabetes mellitus (CMS/HCC)    •  Hypertension    • Sepsis (CMS/HCC) 09/2016   • Shingles    • UTI (urinary tract infection)    • Vertebral artery insufficiency        SURGICAL HISTORY  Past Surgical History:   Procedure Laterality Date   • COLONOSCOPY      2011       SOCIAL HISTORY  Social History     Socioeconomic History   • Marital status:      Spouse name: Not on file   • Number of children: 4   • Years of education: Not on file   • Highest education level: Not on file   Tobacco Use   • Smoking status: Never Smoker   • Smokeless tobacco: Never Used   Substance and Sexual Activity   • Alcohol use: No     Comment: 1/2 beer once every 3 months   • Drug use: No   • Sexual activity: Defer       FAMILY HISTORY  Family History   Problem Relation Age of Onset   • Diabetes Mother    • Heart disease Mother    • Uterine cancer Mother    • Diabetes Father    • Heart disease Father    • Kidney disease Father    • Skin cancer Father         melanoma   • Cancer Father         testicle       Health Maintenance Due   Topic   • HEPATITIS A VACCINE ADULT (1 of 2)   • DIABETIC FOOT EXAM    • ZOSTER VACCINE (2 of 3)       Overdue health maintenance interventions  reviewed with patient.    Dictated utilizing Dragon voice recognition software

## 2018-12-07 ENCOUNTER — READMISSION MANAGEMENT (OUTPATIENT)
Dept: CALL CENTER | Facility: HOSPITAL | Age: 68
End: 2018-12-07

## 2018-12-07 NOTE — OUTREACH NOTE
Sepsis Week 3 Survey      Responses   Facility patient discharged from?  [place]   Does the patient have one of the following disease processes/diagnoses(primary or secondary)?  Sepsis   Week 3 attempt successful?  Yes   Call start time  0906   Call end time  0913   Discharge diagnosis  Infection due to parainfluenza virus 2 B34,  Multilobar lung infiltrate, secondary bacterial infection ,  Sepsis   Meds reviewed with patient/caregiver?  Yes   Has the patient kept scheduled appointments due by today?  Yes   What is the patient's perception of their health status since discharge?  Improving   Additional teach back comments  Pt complaining of mild cough but denies fever. Pt will monitor that and does have an appointment with pcp next wee.   Week 3 call completed?  Yes   Wrap up additional comments  Pt is feeling better and has increased energy level this week.          Carole Stephens RN

## 2018-12-14 ENCOUNTER — READMISSION MANAGEMENT (OUTPATIENT)
Dept: CALL CENTER | Facility: HOSPITAL | Age: 68
End: 2018-12-14

## 2018-12-14 NOTE — OUTREACH NOTE
Sepsis Week 4 Survey      Responses   Facility patient discharged from?  [place]   Does the patient have one of the following disease processes/diagnoses(primary or secondary)?  Sepsis   Week 4 attempt successful?  Yes   Call start time  1801   Call end time  1811   Discharge diagnosis  Infection due to parainfluenza virus 2 B34,  Multilobar lung infiltrate, secondary bacterial infection ,  Sepsis   Person spoke with today (if not patient) and relationship  Hanny, wife   Medication alerts for this patient  Has finished antibiotics   Meds reviewed with patient/caregiver?  Yes   Is the patient having any side effects they believe may be caused by any medication additions or changes?  No   Is the patient taking all medications as directed (includes completed medication regime)?  Yes   Has the patient kept scheduled appointments due by today?  Yes   Nursing Interventions  Advised patient to keep appointment   Comments  Has F/U appt with PCP on 12/18 - will discuss concerns about not improving   Is the patient still receiving Home Health Services?  N/A   Psychosocial issues?  No   Comments  Wife states he isn't doing any better.  Continues to have productive cough and no energy.  Denies any fever or difficulty breathing.    What is the patient's perception of their health status since discharge?  Same   Nursing interventions  Nurse provided patient education, Advised patient to call provider   Is the patient/caregiver able to teach back Sepsis?  S - Shivering,fever or very cold, E - Extreme pain or generalized discomfort (worst ever,especially abdomen), S - Short of breath   Nursing interventions  Nurse provided patient education, Nurse provided reassurance to patient   Is patient/caregiver able to teach back steps to recovery at home?  Set small, achievable goals for return to baseline health, Rest and regain strength, Eat a balanced diet, Exercise as tolerated   Is the patient/caregiver able to teach back signs and  symptoms of worsening condition:  Fever, Hyperthermia, Shortness of breath/rapid respiratory rate, Altered mental status(confusion/coma), High blood glucose without diabetes   If the patient is a current smoker, are they able to teach back resources for cessation?  -- [nonsmoker]   Is the patient/caregiver able to teach back the hierarchy of who to call/visit for symptoms/problems? PCP, Specialist, Home health nurse, Urgent Care, ED, 911  Yes   Additional teach back comments  Has F/U appt on 12/18.  Advised to discuss symptoms with PCP.  Instructed on cough/deep breathing, encourage fluids, good diet and checking Blood sugar   Week 4 call completed?  Yes   Would the patient like one additional call?  Yes   Wrap up additional comments  wife would like another call          Albertina Becker RN

## 2018-12-19 ENCOUNTER — HOSPITAL ENCOUNTER (OUTPATIENT)
Dept: GENERAL RADIOLOGY | Facility: HOSPITAL | Age: 68
Discharge: HOME | End: 2018-12-19
Admitting: INTERNAL MEDICINE

## 2018-12-19 DIAGNOSIS — B34.8 INFECTION DUE TO PARAINFLUENZA VIRUS 2: ICD-10-CM

## 2018-12-19 PROCEDURE — 71046 X-RAY EXAM CHEST 2 VIEWS: CPT

## 2018-12-21 ENCOUNTER — APPOINTMENT (OUTPATIENT)
Dept: GENERAL RADIOLOGY | Facility: HOSPITAL | Age: 68
End: 2018-12-21

## 2018-12-21 ENCOUNTER — TELEPHONE (OUTPATIENT)
Dept: INTERNAL MEDICINE | Age: 68
End: 2018-12-21

## 2018-12-21 ENCOUNTER — READMISSION MANAGEMENT (OUTPATIENT)
Dept: CALL CENTER | Facility: HOSPITAL | Age: 68
End: 2018-12-21

## 2018-12-21 RX ORDER — BLOOD-GLUCOSE METER
1 EACH MISCELLANEOUS DAILY
Qty: 1 KIT | Refills: 1 | Status: SHIPPED | OUTPATIENT
Start: 2018-12-21 | End: 2019-01-24

## 2018-12-21 RX ORDER — FLASH GLUCOSE SENSOR
KIT MISCELLANEOUS
Qty: 1 EACH | Refills: 0 | Status: SHIPPED | OUTPATIENT
Start: 2018-12-21 | End: 2019-01-01 | Stop reason: SDUPTHER

## 2018-12-21 NOTE — OUTREACH NOTE
Sepsis Week 5 Survey      Responses   Facility patient discharged from?  [place]   Does the patient have one of the following disease processes/diagnoses(primary or secondary)?  Sepsis   Week 5 attempt successful?  No          Narendra Andrade RN

## 2018-12-21 NOTE — TELEPHONE ENCOUNTER
Pt notified of results and system ordered. Pt made aware that Medicare may not pay for this. Pt stated he will pay for the system as long as he doesn't have to stick his finger. SHIRA

## 2018-12-21 NOTE — TELEPHONE ENCOUNTER
Pt would like to hear CXR results from 12/19/18 that was performed at [facility].    Also pt would like a Freestyle Vaishali Glucose Monitoring System. You don't stick your finger, it has a sensor that reads his blood sugar. It is rx only. [facility] #[phone].    Pls advise.    Pt can be reached #[phone].

## 2018-12-24 ENCOUNTER — TELEPHONE (OUTPATIENT)
Dept: INTERNAL MEDICINE | Age: 68
End: 2018-12-24

## 2018-12-24 RX ORDER — FLASH GLUCOSE SENSOR
1 KIT MISCELLANEOUS DAILY
Qty: 1 EACH | Refills: 0 | Status: SHIPPED | OUTPATIENT
Start: 2018-12-24 | End: 2019-01-07 | Stop reason: SDUPTHER

## 2018-12-24 NOTE — TELEPHONE ENCOUNTER
Patient called in and needs a RX sent in for:    Freestyle Vaishali device that reads the sensor. He has a patch on his shoulder, and needs the device to be able to read the patch. This is a new device that is out now so they don't have to prick their finger.

## 2018-12-26 ENCOUNTER — EPISODE CHANGES (OUTPATIENT)
Dept: CASE MANAGEMENT | Facility: OTHER | Age: 68
End: 2018-12-26

## 2019-01-02 RX ORDER — FLASH GLUCOSE SENSOR
KIT MISCELLANEOUS
Qty: 1 EACH | Refills: 0 | Status: SHIPPED | OUTPATIENT
Start: 2019-01-02 | End: 2019-01-07 | Stop reason: SDUPTHER

## 2019-01-05 DIAGNOSIS — E11.8 TYPE 2 DIABETES MELLITUS WITH COMPLICATION (HCC): ICD-10-CM

## 2019-01-07 ENCOUNTER — TELEPHONE (OUTPATIENT)
Dept: INTERNAL MEDICINE | Age: 69
End: 2019-01-07

## 2019-01-07 RX ORDER — FLASH GLUCOSE SENSOR
1 KIT MISCELLANEOUS DAILY
Qty: 1 EACH | Refills: 0 | Status: SHIPPED | OUTPATIENT
Start: 2019-01-07 | End: 2019-01-24

## 2019-01-07 RX ORDER — GLIMEPIRIDE 4 MG/1
TABLET ORAL
Qty: 60 TABLET | Refills: 0 | Status: SHIPPED | OUTPATIENT
Start: 2019-01-07 | End: 2019-01-24 | Stop reason: SDUPTHER

## 2019-01-07 RX ORDER — FLASH GLUCOSE SENSOR
KIT MISCELLANEOUS
Qty: 1 EACH | Refills: 0 | Status: SHIPPED | OUTPATIENT
Start: 2019-01-07 | End: 2019-01-14 | Stop reason: SDUPTHER

## 2019-01-07 NOTE — TELEPHONE ENCOUNTER
Patient called in and needs a new rx sent in for the Continuous Blood Gluc  (FREESTYLE MACRINA READER) device. Patient states he has lost this piece.

## 2019-01-10 ENCOUNTER — TELEPHONE (OUTPATIENT)
Dept: INTERNAL MEDICINE | Age: 69
End: 2019-01-10

## 2019-01-10 DIAGNOSIS — I10 ESSENTIAL HYPERTENSION: ICD-10-CM

## 2019-01-10 RX ORDER — ENALAPRIL MALEATE 5 MG/1
5 TABLET ORAL 2 TIMES DAILY
Qty: 180 TABLET | Refills: 1 | Status: SHIPPED | OUTPATIENT
Start: 2019-01-10 | End: 2019-01-24 | Stop reason: SDUPTHER

## 2019-01-10 NOTE — TELEPHONE ENCOUNTER
Pt is requesting a 90-day rx refill of Enalapril 10mg.     increased the rx on DOS 10/16/18.     updated the rx on 10/16/18 but is set as historical medication.    [facility] #[phone].

## 2019-01-15 RX ORDER — FLASH GLUCOSE SENSOR
KIT MISCELLANEOUS
Qty: 1 EACH | Refills: 0 | Status: SHIPPED | OUTPATIENT
Start: 2019-01-15 | End: 2019-01-24

## 2019-01-15 RX ORDER — FLASH GLUCOSE SENSOR
KIT MISCELLANEOUS
Qty: 1 EACH | Refills: 0 | Status: SHIPPED | OUTPATIENT
Start: 2019-01-15 | End: 2019-01-24 | Stop reason: SDUPTHER

## 2019-01-16 ENCOUNTER — PATIENT OUTREACH (OUTPATIENT)
Dept: CASE MANAGEMENT | Facility: OTHER | Age: 69
End: 2019-01-16

## 2019-01-18 ENCOUNTER — PATIENT OUTREACH (OUTPATIENT)
Dept: CASE MANAGEMENT | Facility: OTHER | Age: 69
End: 2019-01-18

## 2019-01-24 ENCOUNTER — APPOINTMENT (OUTPATIENT)
Dept: GENERAL RADIOLOGY | Facility: HOSPITAL | Age: 69
End: 2019-01-24

## 2019-01-24 ENCOUNTER — HOSPITAL ENCOUNTER (OUTPATIENT)
Facility: HOSPITAL | Age: 69
Setting detail: OBSERVATION
LOS: 1 days | Discharge: HOME | End: 2019-01-25
Attending: EMERGENCY MEDICINE | Admitting: NURSE PRACTITIONER

## 2019-01-24 ENCOUNTER — APPOINTMENT (OUTPATIENT)
Dept: CT IMAGING | Facility: HOSPITAL | Age: 69
End: 2019-01-24

## 2019-01-24 DIAGNOSIS — I10 ESSENTIAL HYPERTENSION: ICD-10-CM

## 2019-01-24 DIAGNOSIS — I65.23 BILATERAL CAROTID ARTERY STENOSIS: ICD-10-CM

## 2019-01-24 DIAGNOSIS — G45.1 HEMISPHERIC CAROTID ARTERY SYNDROME: ICD-10-CM

## 2019-01-24 DIAGNOSIS — J18.9 MULTIFOCAL PNEUMONIA: Primary | ICD-10-CM

## 2019-01-24 DIAGNOSIS — K21.00 REFLUX ESOPHAGITIS: ICD-10-CM

## 2019-01-24 DIAGNOSIS — E11.8 TYPE 2 DIABETES MELLITUS WITH COMPLICATION (HCC): ICD-10-CM

## 2019-01-24 PROBLEM — E11.9 DIABETES MELLITUS: Status: ACTIVE | Noted: 2019-01-24

## 2019-01-24 PROBLEM — R55 NEAR SYNCOPE: Status: ACTIVE | Noted: 2019-01-24

## 2019-01-24 PROBLEM — I48.91 ATRIAL FIBRILLATION: Status: ACTIVE | Noted: 2019-01-24

## 2019-01-24 PROBLEM — Z79.01 LONG TERM CURRENT USE OF ANTICOAGULANT THERAPY: Status: ACTIVE | Noted: 2019-01-24

## 2019-01-24 LAB
ALBUMIN SERPL-MCNC: 4.3 G/DL (ref 3.5–5.2)
ALBUMIN/GLOB SERPL: 1.5 G/DL
ALP SERPL-CCNC: 109 U/L (ref 39–117)
ALT SERPL W P-5'-P-CCNC: 26 U/L (ref 1–41)
ANION GAP SERPL CALCULATED.3IONS-SCNC: 12 MMOL/L
AST SERPL-CCNC: 14 U/L (ref 1–40)
B PARAPERT DNA SPEC QL NAA+PROBE: NOT DETECTED
B PERT DNA SPEC QL NAA+PROBE: NOT DETECTED
BASOPHILS # BLD AUTO: 0.01 10*3/MM3 (ref 0–0.2)
BASOPHILS NFR BLD AUTO: 0.1 % (ref 0–1.5)
BILIRUB SERPL-MCNC: 0.7 MG/DL (ref 0.1–1.2)
BUN BLD-MCNC: 13 MG/DL (ref 8–23)
BUN/CREAT SERPL: 16.7 (ref 7–25)
C PNEUM DNA NPH QL NAA+NON-PROBE: NOT DETECTED
CALCIUM SPEC-SCNC: 9.5 MG/DL (ref 8.6–10.5)
CHLORIDE SERPL-SCNC: 98 MMOL/L (ref 98–107)
CO2 SERPL-SCNC: 27 MMOL/L (ref 22–29)
CREAT BLD-MCNC: 0.78 MG/DL (ref 0.76–1.27)
D-LACTATE SERPL-SCNC: 1.2 MMOL/L (ref 0.5–2)
DEPRECATED RDW RBC AUTO: 43.4 FL (ref 37–54)
EOSINOPHIL # BLD AUTO: 0.01 10*3/MM3 (ref 0–0.7)
EOSINOPHIL NFR BLD AUTO: 0.1 % (ref 0.3–6.2)
ERYTHROCYTE [DISTWIDTH] IN BLOOD BY AUTOMATED COUNT: 13.4 % (ref 11.5–14.5)
FLUAV H1 2009 PAND RNA NPH QL NAA+PROBE: NOT DETECTED
FLUAV H1 HA GENE NPH QL NAA+PROBE: NOT DETECTED
FLUAV H3 RNA NPH QL NAA+PROBE: NOT DETECTED
FLUAV SUBTYP SPEC NAA+PROBE: NOT DETECTED
FLUBV RNA ISLT QL NAA+PROBE: NOT DETECTED
GFR SERPL CREATININE-BSD FRML MDRD: 98 ML/MIN/1.73
GLOBULIN UR ELPH-MCNC: 2.9 GM/DL
GLUCOSE BLD-MCNC: 258 MG/DL (ref 65–99)
GLUCOSE BLDC GLUCOMTR-MCNC: 256 MG/DL (ref 70–130)
HADV DNA SPEC NAA+PROBE: NOT DETECTED
HCOV 229E RNA SPEC QL NAA+PROBE: NOT DETECTED
HCOV HKU1 RNA SPEC QL NAA+PROBE: NOT DETECTED
HCOV NL63 RNA SPEC QL NAA+PROBE: NOT DETECTED
HCOV OC43 RNA SPEC QL NAA+PROBE: NOT DETECTED
HCT VFR BLD AUTO: 38 % (ref 40.4–52.2)
HGB BLD-MCNC: 13.2 G/DL (ref 13.7–17.6)
HMPV RNA NPH QL NAA+NON-PROBE: NOT DETECTED
HOLD SPECIMEN: NORMAL
HOLD SPECIMEN: NORMAL
HPIV1 RNA SPEC QL NAA+PROBE: NOT DETECTED
HPIV2 RNA SPEC QL NAA+PROBE: NOT DETECTED
HPIV3 RNA NPH QL NAA+PROBE: NOT DETECTED
HPIV4 P GENE NPH QL NAA+PROBE: NOT DETECTED
IMM GRANULOCYTES # BLD AUTO: 0.03 10*3/MM3 (ref 0–0.03)
IMM GRANULOCYTES NFR BLD AUTO: 0.3 % (ref 0–0.5)
LYMPHOCYTES # BLD AUTO: 1.47 10*3/MM3 (ref 0.9–4.8)
LYMPHOCYTES NFR BLD AUTO: 14.1 % (ref 19.6–45.3)
M PNEUMO IGG SER IA-ACNC: NOT DETECTED
MCH RBC QN AUTO: 30.6 PG (ref 27–32.7)
MCHC RBC AUTO-ENTMCNC: 34.7 G/DL (ref 32.6–36.4)
MCV RBC AUTO: 88 FL (ref 79.8–96.2)
MONOCYTES # BLD AUTO: 0.83 10*3/MM3 (ref 0.2–1.2)
MONOCYTES NFR BLD AUTO: 7.9 % (ref 5–12)
NEUTROPHILS # BLD AUTO: 8.13 10*3/MM3 (ref 1.9–8.1)
NEUTROPHILS NFR BLD AUTO: 77.8 % (ref 42.7–76)
PLATELET # BLD AUTO: 195 10*3/MM3 (ref 140–500)
PMV BLD AUTO: 10.1 FL (ref 6–12)
POTASSIUM BLD-SCNC: 3.6 MMOL/L (ref 3.5–5.2)
PROCALCITONIN SERPL-MCNC: 0.2 NG/ML (ref 0.1–0.25)
PROT SERPL-MCNC: 7.2 G/DL (ref 6–8.5)
RBC # BLD AUTO: 4.32 10*6/MM3 (ref 4.6–6)
RHINOVIRUS RNA SPEC NAA+PROBE: NOT DETECTED
RSV RNA NPH QL NAA+NON-PROBE: NOT DETECTED
SODIUM BLD-SCNC: 137 MMOL/L (ref 136–145)
TROPONIN T SERPL-MCNC: <0.01 NG/ML (ref 0–0.03)
WBC NRBC COR # BLD: 10.45 10*3/MM3 (ref 4.5–10.7)
WHOLE BLOOD HOLD SPECIMEN: NORMAL
WHOLE BLOOD HOLD SPECIMEN: NORMAL

## 2019-01-24 PROCEDURE — 84145 PROCALCITONIN (PCT): CPT | Performed by: NURSE PRACTITIONER

## 2019-01-24 PROCEDURE — 93010 ELECTROCARDIOGRAM REPORT: CPT | Performed by: INTERNAL MEDICINE

## 2019-01-24 PROCEDURE — 71046 X-RAY EXAM CHEST 2 VIEWS: CPT

## 2019-01-24 PROCEDURE — 87486 CHLMYD PNEUM DNA AMP PROBE: CPT | Performed by: EMERGENCY MEDICINE

## 2019-01-24 PROCEDURE — 82962 GLUCOSE BLOOD TEST: CPT

## 2019-01-24 PROCEDURE — 85025 COMPLETE CBC W/AUTO DIFF WBC: CPT | Performed by: NURSE PRACTITIONER

## 2019-01-24 PROCEDURE — 87581 M.PNEUMON DNA AMP PROBE: CPT | Performed by: EMERGENCY MEDICINE

## 2019-01-24 PROCEDURE — 94799 UNLISTED PULMONARY SVC/PX: CPT

## 2019-01-24 PROCEDURE — 83605 ASSAY OF LACTIC ACID: CPT | Performed by: NURSE PRACTITIONER

## 2019-01-24 PROCEDURE — 71250 CT THORAX DX C-: CPT

## 2019-01-24 PROCEDURE — 87798 DETECT AGENT NOS DNA AMP: CPT | Performed by: EMERGENCY MEDICINE

## 2019-01-24 PROCEDURE — 84484 ASSAY OF TROPONIN QUANT: CPT | Performed by: HOSPITALIST

## 2019-01-24 PROCEDURE — 93005 ELECTROCARDIOGRAM TRACING: CPT | Performed by: HOSPITALIST

## 2019-01-24 PROCEDURE — 96375 TX/PRO/DX INJ NEW DRUG ADDON: CPT

## 2019-01-24 PROCEDURE — 87633 RESP VIRUS 12-25 TARGETS: CPT | Performed by: EMERGENCY MEDICINE

## 2019-01-24 PROCEDURE — 86140 C-REACTIVE PROTEIN: CPT | Performed by: HOSPITALIST

## 2019-01-24 PROCEDURE — 80053 COMPREHEN METABOLIC PANEL: CPT | Performed by: NURSE PRACTITIONER

## 2019-01-24 PROCEDURE — 25010000002 LORAZEPAM PER 2 MG: Performed by: NURSE PRACTITIONER

## 2019-01-24 PROCEDURE — 94640 AIRWAY INHALATION TREATMENT: CPT

## 2019-01-24 PROCEDURE — 96374 THER/PROPH/DIAG INJ IV PUSH: CPT

## 2019-01-24 PROCEDURE — 99284 EMERGENCY DEPT VISIT MOD MDM: CPT

## 2019-01-24 RX ORDER — NICOTINE POLACRILEX 4 MG
15 LOZENGE BUCCAL
Status: DISCONTINUED | OUTPATIENT
Start: 2019-01-24 | End: 2019-01-25 | Stop reason: HOSPADM

## 2019-01-24 RX ORDER — OXYCODONE HYDROCHLORIDE AND ACETAMINOPHEN 5; 325 MG/1; MG/1
1 TABLET ORAL ONCE
Status: COMPLETED | OUTPATIENT
Start: 2019-01-24 | End: 2019-01-24

## 2019-01-24 RX ORDER — ATORVASTATIN CALCIUM 40 MG/1
40 TABLET, FILM COATED ORAL
Qty: 90 TABLET | Refills: 1 | Status: SHIPPED | OUTPATIENT
Start: 2019-01-24 | End: 2019-05-10 | Stop reason: SDUPTHER

## 2019-01-24 RX ORDER — ALBUTEROL SULFATE 2.5 MG/3ML
2.5 SOLUTION RESPIRATORY (INHALATION) ONCE
Status: COMPLETED | OUTPATIENT
Start: 2019-01-24 | End: 2019-01-24

## 2019-01-24 RX ORDER — PANTOPRAZOLE SODIUM 40 MG/1
40 TABLET, DELAYED RELEASE ORAL DAILY
Qty: 90 TABLET | Refills: 1 | Status: SHIPPED | OUTPATIENT
Start: 2019-01-24 | End: 2019-07-15 | Stop reason: SDUPTHER

## 2019-01-24 RX ORDER — GLIMEPIRIDE 4 MG/1
4 TABLET ORAL
Qty: 180 TABLET | Refills: 1 | Status: SHIPPED | OUTPATIENT
Start: 2019-01-24 | End: 2019-02-04 | Stop reason: SDUPTHER

## 2019-01-24 RX ORDER — DEXTROSE MONOHYDRATE 25 G/50ML
25 INJECTION, SOLUTION INTRAVENOUS
Status: DISCONTINUED | OUTPATIENT
Start: 2019-01-24 | End: 2019-01-25 | Stop reason: HOSPADM

## 2019-01-24 RX ORDER — CLOPIDOGREL BISULFATE 75 MG/1
75 TABLET ORAL DAILY
COMMUNITY
End: 2019-01-25 | Stop reason: HOSPADM

## 2019-01-24 RX ORDER — CEFTRIAXONE SODIUM 1 G/50ML
1 INJECTION, SOLUTION INTRAVENOUS ONCE
Status: DISCONTINUED | OUTPATIENT
Start: 2019-01-24 | End: 2019-01-24

## 2019-01-24 RX ORDER — CEFTRIAXONE SODIUM 1 G/50ML
1 INJECTION, SOLUTION INTRAVENOUS EVERY 24 HOURS
Status: DISCONTINUED | OUTPATIENT
Start: 2019-01-24 | End: 2019-01-25 | Stop reason: HOSPADM

## 2019-01-24 RX ORDER — LORAZEPAM 2 MG/ML
0.5 INJECTION INTRAMUSCULAR ONCE
Status: COMPLETED | OUTPATIENT
Start: 2019-01-24 | End: 2019-01-24

## 2019-01-24 RX ORDER — ENALAPRIL MALEATE 5 MG/1
5 TABLET ORAL 2 TIMES DAILY
Qty: 180 TABLET | Refills: 1 | Status: SHIPPED | OUTPATIENT
Start: 2019-01-24 | End: 2019-07-24 | Stop reason: SDUPTHER

## 2019-01-24 RX ORDER — AMOXICILLIN 500 MG/1
500 CAPSULE ORAL 3 TIMES DAILY
COMMUNITY
End: 2019-01-24

## 2019-01-24 RX ORDER — METOPROLOL TARTRATE 50 MG/1
50 TABLET, FILM COATED ORAL EVERY 12 HOURS SCHEDULED
Qty: 180 TABLET | Refills: 1 | Status: SHIPPED | OUTPATIENT
Start: 2019-01-24 | End: 2019-07-24 | Stop reason: SDUPTHER

## 2019-01-24 RX ORDER — FLASH GLUCOSE SENSOR
KIT MISCELLANEOUS
Qty: 10 EACH | Refills: 1 | Status: SHIPPED | OUTPATIENT
Start: 2019-01-24 | End: 2019-03-11 | Stop reason: SDUPTHER

## 2019-01-24 RX ADMIN — SODIUM CHLORIDE 1000 ML: 9 INJECTION, SOLUTION INTRAVENOUS at 19:30

## 2019-01-24 RX ADMIN — ALBUTEROL SULFATE 2.5 MG: 2.5 SOLUTION RESPIRATORY (INHALATION) at 19:07

## 2019-01-24 RX ADMIN — METFORMIN HYDROCHLORIDE 1000 MG: 1000 TABLET ORAL at 21:51

## 2019-01-24 RX ADMIN — OXYCODONE HYDROCHLORIDE AND ACETAMINOPHEN 1 TABLET: 5; 325 TABLET ORAL at 19:37

## 2019-01-24 RX ADMIN — LORAZEPAM 0.5 MG: 2 INJECTION INTRAMUSCULAR; INTRAVENOUS at 19:37

## 2019-01-24 NOTE — ED TRIAGE NOTES
Pt reports SOA, cough, and generalized weakness x3 days. Pt states that he has been taking OTC medications with no help.

## 2019-01-24 NOTE — ED PROVIDER NOTES
EMERGENCY DEPARTMENT ENCOUNTER    Room Number:  N446/1  Date seen:  1/24/2019  Time seen: 6:30 PM  PCP: Artie Carranza MD    HPI:  Chief complaint: shortness of air  Context:Harry Sierra is a 68 y.o. male who presents to the ED with c/o persistent cough w/ worsening SOA and generalized weakness X 3 days. Pt has an associated fever, chills, and nausea. Pt is unsure of any exposure to others w/ similar sx. Pt took DayQuill pta. w/o relief. Pt was seen at the [facility] pta and had a negative influenza swab. Pt was admitted 11/2018 due to influenza, PNA, and sepsis and reports he has never felt back at baseline since.    Timing: constant  Duration: 3 days  Location: n/a  Radiation: n/a  Quality: shortness of air  Intensity/Severity: moderate  Associated Symptoms: cough, fever, chills, generalized weakness  Aggravating Factors: none  Alleviating Factors:none  Previous Episodes: pt was recently admitted and treated for influenza, PNA, and sepsis  Treatment before arrival: DayQuil    MEDICAL RECORD REVIEW      ALLERGIES  Shellfish-derived products; Aleve [naproxen]; Asa [aspirin]; Bee venom; and Shellfish allergy    PAST MEDICAL HISTORY  Active Ambulatory Problems     Diagnosis Date Noted   • Hypertension 08/30/2016   • Type 2 diabetes mellitus without complication, without long-term current use of insulin (CMS/[facility]) 08/30/2016   • Routine health maintenance 08/30/2016   • Abnormal PSA 09/01/2016   • Prostatitis 09/26/2016   • UTI (urinary tract infection) due to urinary indwelling catheter (CMS/[facility]) 09/27/2016   • Reflux esophagitis 12/01/2016   • Melena 12/01/2016   • Bilateral carotid artery stenosis 12/01/2016   • Transient cerebral ischemia 09/14/2017   • Hyperlipidemia LDL goal <70 12/12/2017   • Prostate cancer (CMS/[facility]) 03/13/2018   • Sleep disturbance 09/18/2018   • Infection due to parainfluenza virus 2 11/13/2018   • Atrial fibrillation (new) 11/13/2018   • Multilobar lung infiltrate, secondary bacterial infection  11/14/2018   • Sepsis - present on admission 11/14/2018   • Constipation 11/14/2018   • Valvular heart disease 11/15/2018     Resolved Ambulatory Problems     Diagnosis Date Noted   • Dehydration 11/13/2018     Past Medical History:   Diagnosis Date   • Cancer (CMS/HCC)    • Diabetes mellitus (CMS/HCC)    • Hypertension    • Sepsis (CMS/HCC) 09/2016   • Shingles    • UTI (urinary tract infection)    • Vertebral artery insufficiency        PAST SURGICAL HISTORY  Past Surgical History:   Procedure Laterality Date   • COLONOSCOPY      2011   • ENDOSCOPY N/A 12/15/2016    Procedure: ESOPHAGOGASTRODUODENOSCOPY WITH COLD BIOPSIES;  Surgeon: Moshe Lux MD;  Location: [facility] ENDOSCOPY;  Service:        FAMILY HISTORY  Family History   Problem Relation Age of Onset   • Diabetes Mother    • Heart disease Mother    • Uterine cancer Mother    • Diabetes Father    • Heart disease Father    • Kidney disease Father    • Skin cancer Father         melanoma   • Cancer Father         testicle       SOCIAL HISTORY  Social History     Socioeconomic History   • Marital status:      Spouse name: Not on file   • Number of children: 4   • Years of education: Not on file   • Highest education level: Not on file   Social Needs   • Financial resource strain: Not on file   • Food insecurity - worry: Not on file   • Food insecurity - inability: Not on file   • Transportation needs - medical: Not on file   • Transportation needs - non-medical: Not on file   Occupational History   • Not on file   Tobacco Use   • Smoking status: Never Smoker   • Smokeless tobacco: Never Used   Substance and Sexual Activity   • Alcohol use: No     Comment: 1/2 beer once every 3 months   • Drug use: No   • Sexual activity: Defer   Other Topics Concern   • Not on file   Social History Narrative   • Not on file       REVIEW OF SYSTEMS  Review of Systems   Constitutional: Positive for chills, fatigue and fever. Negative for activity change, appetite  change and diaphoresis.   HENT: Negative for trouble swallowing.    Eyes: Negative for visual disturbance.   Respiratory: Positive for cough and shortness of breath. Negative for chest tightness and wheezing.    Cardiovascular: Negative for chest pain, palpitations and leg swelling.   Gastrointestinal: Positive for nausea. Negative for abdominal pain, diarrhea and vomiting.   Genitourinary: Negative for dysuria.   Musculoskeletal: Negative for back pain.   Skin: Negative for rash.   Allergic/Immunologic: Negative for food allergies.   Neurological: Positive for weakness (generalized). Negative for dizziness, speech difficulty and light-headedness.       PHYSICAL EXAM  ED Triage Vitals   Temp Heart Rate Resp BP SpO2   01/24/19 1741 01/24/19 1741 01/24/19 1741 01/24/19 1810 01/24/19 1741   100 °F (37.8 °C) (!) 134 18 166/100 96 %      Temp src Heart Rate Source Patient Position BP Location FiO2 (%)   01/24/19 1741 -- -- -- --   Tympanic         Physical Exam   Constitutional: He is oriented to person, place, and time and well-developed, well-nourished, and in no distress. No distress.   HENT:   Head: Normocephalic and atraumatic.   Eyes: Pupils are equal, round, and reactive to light.   Neck: Normal range of motion. Neck supple.   Cardiovascular: S1 normal, S2 normal and normal heart sounds. Tachycardia present. Exam reveals no gallop and no friction rub.   No murmur heard.  Pulmonary/Chest: Effort normal. No respiratory distress. He has no wheezes. He has rales (L base). He exhibits no tenderness.   96% on RA   Abdominal: Soft. There is no rebound and no guarding.   Musculoskeletal: Normal range of motion. He exhibits no edema or deformity.   Lymphadenopathy:     He has no cervical adenopathy.   Neurological: He is alert and oriented to person, place, and time.   Skin: Skin is warm and dry.   Psychiatric: Affect normal.   Nursing note and vitals reviewed.      LAB RESULTS  Recent Results (from the past 24 hour(s))    Comprehensive Metabolic Panel    Collection Time: 01/24/19  7:28 PM   Result Value Ref Range    Glucose 258 (H) 65 - 99 mg/dL    BUN 13 8 - 23 mg/dL    Creatinine 0.78 0.76 - 1.27 mg/dL    Sodium 137 136 - 145 mmol/L    Potassium 3.6 3.5 - 5.2 mmol/L    Chloride 98 98 - 107 mmol/L    CO2 27.0 22.0 - 29.0 mmol/L    Calcium 9.5 8.6 - 10.5 mg/dL    Total Protein 7.2 6.0 - 8.5 g/dL    Albumin 4.30 3.50 - 5.20 g/dL    ALT (SGPT) 26 1 - 41 U/L    AST (SGOT) 14 1 - 40 U/L    Alkaline Phosphatase 109 39 - 117 U/L    Total Bilirubin 0.7 0.1 - 1.2 mg/dL    eGFR Non African Amer 98 >60 mL/min/1.73    Globulin 2.9 gm/dL    A/G Ratio 1.5 g/dL    BUN/Creatinine Ratio 16.7 7.0 - 25.0    Anion Gap 12.0 mmol/L   Procalcitonin    Collection Time: 01/24/19  7:28 PM   Result Value Ref Range    Procalcitonin 0.20 0.10 - 0.25 ng/mL   Lactic Acid, Plasma    Collection Time: 01/24/19  7:28 PM   Result Value Ref Range    Lactate 1.2 0.5 - 2.0 mmol/L   Light Blue Top    Collection Time: 01/24/19  7:28 PM   Result Value Ref Range    Extra Tube hold for add-on    Green Top (Gel)    Collection Time: 01/24/19  7:28 PM   Result Value Ref Range    Extra Tube Hold for add-ons.    Gold Top - SST    Collection Time: 01/24/19  7:28 PM   Result Value Ref Range    Extra Tube Hold for add-ons.    Troponin    Collection Time: 01/24/19  7:28 PM   Result Value Ref Range    Troponin T <0.010 0.000 - 0.030 ng/mL   CBC Auto Differential    Collection Time: 01/24/19  7:29 PM   Result Value Ref Range    WBC 10.45 4.50 - 10.70 10*3/mm3    RBC 4.32 (L) 4.60 - 6.00 10*6/mm3    Hemoglobin 13.2 (L) 13.7 - 17.6 g/dL    Hematocrit 38.0 (L) 40.4 - 52.2 %    MCV 88.0 79.8 - 96.2 fL    MCH 30.6 27.0 - 32.7 pg    MCHC 34.7 32.6 - 36.4 g/dL    RDW 13.4 11.5 - 14.5 %    RDW-SD 43.4 37.0 - 54.0 fl    MPV 10.1 6.0 - 12.0 fL    Platelets 195 140 - 500 10*3/mm3    Neutrophil % 77.8 (H) 42.7 - 76.0 %    Lymphocyte % 14.1 (L) 19.6 - 45.3 %    Monocyte % 7.9 5.0 - 12.0 %     Eosinophil % 0.1 (L) 0.3 - 6.2 %    Basophil % 0.1 0.0 - 1.5 %    Immature Grans % 0.3 0.0 - 0.5 %    Neutrophils, Absolute 8.13 (H) 1.90 - 8.10 10*3/mm3    Lymphocytes, Absolute 1.47 0.90 - 4.80 10*3/mm3    Monocytes, Absolute 0.83 0.20 - 1.20 10*3/mm3    Eosinophils, Absolute 0.01 0.00 - 0.70 10*3/mm3    Basophils, Absolute 0.01 0.00 - 0.20 10*3/mm3    Immature Grans, Absolute 0.03 0.00 - 0.03 10*3/mm3   Lavender Top    Collection Time: 01/24/19  7:29 PM   Result Value Ref Range    Extra Tube hold for add-on    POC Glucose Once    Collection Time: 01/24/19  7:39 PM   Result Value Ref Range    Glucose 256 (H) 70 - 130 mg/dL   Respiratory Panel, PCR - Swab, Nasopharynx    Collection Time: 01/24/19  7:43 PM   Result Value Ref Range    ADENOVIRUS, PCR Not Detected Not Detected    Coronavirus 229E Not Detected Not Detected    Coronavirus HKU1 Not Detected Not Detected    Coronavirus NL63 Not Detected Not Detected    Coronavirus OC43 Not Detected Not Detected    Human Metapneumovirus Not Detected Not Detected    Human Rhinovirus/Enterovirus Not Detected Not Detected    Influenza B PCR Not Detected Not Detected    Parainfluenza Virus 1 Not Detected Not Detected    Parainfluenza Virus 2 Not Detected Not Detected    Parainfluenza Virus 3 Not Detected Not Detected    Parainfluenza Virus 4 Not Detected Not Detected    Bordetella pertussis pcr Not Detected Not Detected    Influenza A H1 2009 PCR Not Detected Not Detected    Chlamydophila pneumoniae PCR Not Detected Not Detected    Mycoplasma pneumo by PCR Not Detected Not Detected    Influenza A PCR Not Detected Not Detected    Influenza A H3 Not Detected Not Detected    Influenza A H1 Not Detected Not Detected    RSV, PCR Not Detected Not Detected    Bordetella parapertussis PCR Not Detected Not Detected       I ordered the above labs and reviewed the results    RADIOLOGY  XR Chest 2 View   Final Result   Since prior chest x-ray 12/19/2018, the patient has   developed patchy  airspace consolidation projecting over the left heart   and diaphragm on the PA view, the inferior tip of the lingular segment   of the left upper lobe and the left lung base on the lateral view   compatible with left basilar atelectasis and/or pneumonia.  Correlate   clinically.  Otherwise, no additional active disease is seen in the   chest.       This report was finalized on 1/24/2019 9:40 PM by Dr. Nick Roberts M.D.          CT Chest Without Contrast   Final Result          I ordered the above noted radiological studies and reviewed the images on the PACS system.  Spoke with Dr. Roberts regarding CT/MRI scan results    MEDICATIONS GIVEN IN ER  Medications   cefTRIAXone (ROCEPHIN) IVPB 1 g (not administered)   dextrose (GLUTOSE) oral gel 15 g (not administered)   dextrose (D50W) 25 g/ 50mL Intravenous Solution 25 g (not administered)   glucagon (human recombinant) (GLUCAGEN DIAGNOSTIC) injection 1 mg (not administered)   insulin lispro (humaLOG) injection 0-7 Units (not administered)   Pharmacy to dose vancomycin (not administered)   vancomycin 2000 mg/500 mL 0.9% NS IVPB ([facility]) (not administered)   vancomycin 1500 mg/500 mL 0.9% NS IVPB ([facility]) (not administered)   sodium chloride 0.9 % bolus 1,000 mL (0 mL Intravenous Stopped 1/24/19 2143)   albuterol (PROVENTIL) nebulizer solution 0.083% 2.5 mg/3mL (2.5 mg Nebulization Given 1/24/19 1907)   LORazepam (ATIVAN) injection 0.5 mg (0.5 mg Intravenous Given 1/24/19 1937)   oxyCODONE-acetaminophen (PERCOCET) 5-325 MG per tablet 1 tablet (1 tablet Oral Given 1/24/19 1937)   metFORMIN (GLUCOPHAGE) tablet 1,000 mg (1,000 mg Oral Given 1/24/19 2151)       PROCEDURES  Procedures    COURSE & MEDICAL DECISION MAKING  Pertinent Labs and Imaging studies that were ordered and reviewed are noted above.  Results were reviewed/discussed with the patient and they were also made aware of online access.  Pt also made aware that some labs, such as cultures, will not be resulted during ER  "visit and follow up with PMD is necessary.     PROGRESS AND CONSULTS    Progress Notes:    ED Course as of Jan 24 2348   Thu Jan 24, 2019 1912 CXR Patchy airspace consolidation over left heart atelectasis vs pneumonia.   [JS]      ED Course User Index  [JS] Hailey Becker, APRN   1916  BP- 166/100 HR- (!) 123 Temp- 100 °F (37.8 °C) (Tympanic) O2 sat- 100%  Rechecked the patient who is in NAD and is resting comfortably. Discussed XR showing patchy area of infiltrate vs atelectasis and the plan to do CTA chest for further evaluation. Pt understands and agrees with the plan, all questions answered.    2021  BP- 158/95 HR- (!) 123 Temp- 100 °F (37.8 °C) (Tympanic) O2 sat- 100%  Rechecked the patient who is in NAD and is resting comfortably. Discussed imaging showing atypical PNA and the plan to admit for abx and observation. Pt understands and agrees with the plan, all questions answered.    2023  Discussed the pt w/ Dr. Steele [facility]. He agreed to admit and advised to hold abx at this time. Respiratory viral panel still pending at time admission.    2040  Reviewed pt's history and workup with Dr. Quintanilla.  After a bedside evaluation, Dr. Quintanilla agrees with the plan of care.    Based on the patient's lab findings and presenting symptoms, the doctor and I feel it is appropriate to admit the patient for further management, evaluation, and treatment.  I have discussed this with the admitting team.  I have also discussed this with the patient/family.  They are in agreement with admission.        Disposition vitals:  /90 (BP Location: Left arm, Patient Position: Sitting)   Pulse 115   Temp 97.6 °F (36.4 °C) (Oral)   Resp 18   Ht 177.8 cm (70\")   Wt 73.7 kg (162 lb 7.7 oz)   SpO2 97%   BMI 23.31 kg/m²       DIAGNOSIS  Final diagnoses:   Multifocal pneumonia           Documentation assistance provided by alex Toribio for Kirti Nails, APRASIA.  Information recorded by the alex was done at my " direction and has been verified and validated by me.  Electronically signed by Hue Toribio on 1/24/2019 at time 11:48 PM     Hue Toribio  01/24/19 8756       Kirti Nails, JO  01/24/19 2520

## 2019-01-24 NOTE — ED TRIAGE NOTES
Report received from SIMEON Monterroso from urgent care. She reports the patient is being sent to ED for cough, SOB, fever and chills. Patient was hospitalized last month for pneumonia. Patient is currently taking amoxicillin d/t recent dental work.

## 2019-01-25 ENCOUNTER — APPOINTMENT (OUTPATIENT)
Dept: CARDIOLOGY | Facility: HOSPITAL | Age: 69
End: 2019-01-25
Attending: HOSPITALIST

## 2019-01-25 VITALS
OXYGEN SATURATION: 97 % | DIASTOLIC BLOOD PRESSURE: 97 MMHG | WEIGHT: 162 LBS | HEIGHT: 70 IN | RESPIRATION RATE: 16 BRPM | SYSTOLIC BLOOD PRESSURE: 162 MMHG | TEMPERATURE: 97.1 F | HEART RATE: 90 BPM | BODY MASS INDEX: 23.19 KG/M2

## 2019-01-25 LAB
ANION GAP SERPL CALCULATED.3IONS-SCNC: 12.8 MMOL/L
AORTIC DIMENSIONLESS INDEX: 0.7 (DI)
BASOPHILS # BLD AUTO: 0.01 10*3/MM3 (ref 0–0.2)
BASOPHILS NFR BLD AUTO: 0.1 % (ref 0–1.5)
BH CV ECHO MEAS - ACS: 1.7 CM
BH CV ECHO MEAS - AO MEAN PG (FULL): 3 MMHG
BH CV ECHO MEAS - AO MEAN PG: 5 MMHG
BH CV ECHO MEAS - AO ROOT AREA (BSA CORRECTED): 1.7
BH CV ECHO MEAS - AO ROOT AREA: 8 CM^2
BH CV ECHO MEAS - AO ROOT DIAM: 3.2 CM
BH CV ECHO MEAS - AO V2 MEAN: 105 CM/SEC
BH CV ECHO MEAS - AO V2 VTI: 27.7 CM
BH CV ECHO MEAS - AVA(I,A): 2.2 CM^2
BH CV ECHO MEAS - AVA(I,D): 2.2 CM^2
BH CV ECHO MEAS - BSA(HAYCOCK): 1.9 M^2
BH CV ECHO MEAS - BSA: 1.9 M^2
BH CV ECHO MEAS - BZI_BMI: 23.2 KILOGRAMS/M^2
BH CV ECHO MEAS - BZI_METRIC_HEIGHT: 177.8 CM
BH CV ECHO MEAS - BZI_METRIC_WEIGHT: 73.5 KG
BH CV ECHO MEAS - EDV(CUBED): 125 ML
BH CV ECHO MEAS - EDV(MOD-SP2): 85 ML
BH CV ECHO MEAS - EDV(MOD-SP4): 83 ML
BH CV ECHO MEAS - EDV(TEICH): 118.2 ML
BH CV ECHO MEAS - EF(CUBED): 85.9 %
BH CV ECHO MEAS - EF(MOD-BP): 52 %
BH CV ECHO MEAS - EF(MOD-SP2): 54.1 %
BH CV ECHO MEAS - EF(MOD-SP4): 50.6 %
BH CV ECHO MEAS - EF(TEICH): 79.2 %
BH CV ECHO MEAS - ESV(CUBED): 17.6 ML
BH CV ECHO MEAS - ESV(MOD-SP2): 39 ML
BH CV ECHO MEAS - ESV(MOD-SP4): 41 ML
BH CV ECHO MEAS - ESV(TEICH): 24.6 ML
BH CV ECHO MEAS - FS: 48 %
BH CV ECHO MEAS - IVS/LVPW: 0.9
BH CV ECHO MEAS - IVSD: 0.9 CM
BH CV ECHO MEAS - LAT PEAK E' VEL: 8 CM/SEC
BH CV ECHO MEAS - LV DIASTOLIC VOL/BSA (35-75): 43.5 ML/M^2
BH CV ECHO MEAS - LV MASS(C)D: 169.9 GRAMS
BH CV ECHO MEAS - LV MASS(C)DI: 89 GRAMS/M^2
BH CV ECHO MEAS - LV MEAN PG: 2 MMHG
BH CV ECHO MEAS - LV SYSTOLIC VOL/BSA (12-30): 21.5 ML/M^2
BH CV ECHO MEAS - LV V1 MEAN: 72.4 CM/SEC
BH CV ECHO MEAS - LV V1 VTI: 19.4 CM
BH CV ECHO MEAS - LVIDD: 5 CM
BH CV ECHO MEAS - LVIDS: 2.6 CM
BH CV ECHO MEAS - LVLD AP2: 7.9 CM
BH CV ECHO MEAS - LVLD AP4: 7.7 CM
BH CV ECHO MEAS - LVLS AP2: 6.8 CM
BH CV ECHO MEAS - LVLS AP4: 7.4 CM
BH CV ECHO MEAS - LVOT AREA (M): 3.1 CM^2
BH CV ECHO MEAS - LVOT AREA: 3.1 CM^2
BH CV ECHO MEAS - LVOT DIAM: 2 CM
BH CV ECHO MEAS - LVPWD: 1 CM
BH CV ECHO MEAS - MED PEAK E' VEL: 7 CM/SEC
BH CV ECHO MEAS - MV A DUR: 0.12 SEC
BH CV ECHO MEAS - MV A MAX VEL: 107 CM/SEC
BH CV ECHO MEAS - MV DEC SLOPE: 722 CM/SEC^2
BH CV ECHO MEAS - MV DEC TIME: 214 SEC
BH CV ECHO MEAS - MV E MAX VEL: 102 CM/SEC
BH CV ECHO MEAS - MV E/A: 0.95
BH CV ECHO MEAS - MV MEAN PG: 3 MMHG
BH CV ECHO MEAS - MV P1/2T MAX VEL: 119 CM/SEC
BH CV ECHO MEAS - MV P1/2T: 48.3 MSEC
BH CV ECHO MEAS - MV V2 MEAN: 78 CM/SEC
BH CV ECHO MEAS - MV V2 VTI: 22.6 CM
BH CV ECHO MEAS - MVA P1/2T LCG: 1.8 CM^2
BH CV ECHO MEAS - MVA(P1/2T): 4.6 CM^2
BH CV ECHO MEAS - MVA(VTI): 2.7 CM^2
BH CV ECHO MEAS - PA ACC SLOPE: 789 CM/SEC^2
BH CV ECHO MEAS - PA ACC TIME: 0.09 SEC
BH CV ECHO MEAS - PA MAX PG: 1.7 MMHG
BH CV ECHO MEAS - PA PR(ACCEL): 40.8 MMHG
BH CV ECHO MEAS - PA V2 MAX: 65.9 CM/SEC
BH CV ECHO MEAS - PULM A REVS DUR: 0.08 SEC
BH CV ECHO MEAS - PULM A REVS VEL: 21.2 CM/SEC
BH CV ECHO MEAS - PULM DIAS VEL: 41 CM/SEC
BH CV ECHO MEAS - PULM S/D: 1.3
BH CV ECHO MEAS - PULM SYS VEL: 53.8 CM/SEC
BH CV ECHO MEAS - RAP SYSTOLE: 3 MMHG
BH CV ECHO MEAS - RV MEAN PG: 1 MMHG
BH CV ECHO MEAS - RV V1 MEAN: 39.5 CM/SEC
BH CV ECHO MEAS - RV V1 VTI: 10.3 CM
BH CV ECHO MEAS - SI(AO): 116.7 ML/M^2
BH CV ECHO MEAS - SI(CUBED): 56.3 ML/M^2
BH CV ECHO MEAS - SI(LVOT): 31.9 ML/M^2
BH CV ECHO MEAS - SI(MOD-SP2): 24.1 ML/M^2
BH CV ECHO MEAS - SI(MOD-SP4): 22 ML/M^2
BH CV ECHO MEAS - SI(TEICH): 49.1 ML/M^2
BH CV ECHO MEAS - SV(AO): 222.8 ML
BH CV ECHO MEAS - SV(CUBED): 107.4 ML
BH CV ECHO MEAS - SV(LVOT): 60.9 ML
BH CV ECHO MEAS - SV(MOD-SP2): 46 ML
BH CV ECHO MEAS - SV(MOD-SP4): 42 ML
BH CV ECHO MEAS - SV(TEICH): 93.6 ML
BH CV ECHO MEAS - TAPSE (>1.6): 1.9 CM2
BH CV ECHO MEASUREMENTS AVERAGE E/E' RATIO: 13.6
BH CV VAS BP RIGHT ARM: NORMAL MMHG
BH CV XLRA - RV BASE: 3.1 CM
BH CV XLRA - TDI S': 12 CM/SEC
BILIRUB UR QL STRIP: NEGATIVE
BUN BLD-MCNC: 9 MG/DL (ref 8–23)
BUN/CREAT SERPL: 14.1 (ref 7–25)
CALCIUM SPEC-SCNC: 8.7 MG/DL (ref 8.6–10.5)
CHLORIDE SERPL-SCNC: 104 MMOL/L (ref 98–107)
CLARITY UR: CLEAR
CO2 SERPL-SCNC: 24.2 MMOL/L (ref 22–29)
COLOR UR: YELLOW
CREAT BLD-MCNC: 0.64 MG/DL (ref 0.76–1.27)
CRP SERPL-MCNC: 16.5 MG/DL (ref 0–0.5)
DEPRECATED RDW RBC AUTO: 44.1 FL (ref 37–54)
EOSINOPHIL # BLD AUTO: 0.04 10*3/MM3 (ref 0–0.7)
EOSINOPHIL NFR BLD AUTO: 0.5 % (ref 0.3–6.2)
ERYTHROCYTE [DISTWIDTH] IN BLOOD BY AUTOMATED COUNT: 13.6 % (ref 11.5–14.5)
ERYTHROCYTE [SEDIMENTATION RATE] IN BLOOD: 48 MM/HR (ref 0–20)
GFR SERPL CREATININE-BSD FRML MDRD: 124 ML/MIN/1.73
GLUCOSE BLD-MCNC: 161 MG/DL (ref 65–99)
GLUCOSE BLDC GLUCOMTR-MCNC: 145 MG/DL (ref 70–130)
GLUCOSE BLDC GLUCOMTR-MCNC: 161 MG/DL (ref 70–130)
GLUCOSE BLDC GLUCOMTR-MCNC: 174 MG/DL (ref 70–130)
GLUCOSE UR STRIP-MCNC: ABNORMAL MG/DL
HBA1C MFR BLD: 7.39 % (ref 4.8–5.6)
HCT VFR BLD AUTO: 33.8 % (ref 40.4–52.2)
HGB BLD-MCNC: 11.2 G/DL (ref 13.7–17.6)
HGB UR QL STRIP.AUTO: NEGATIVE
IMM GRANULOCYTES # BLD AUTO: 0.02 10*3/MM3 (ref 0–0.03)
IMM GRANULOCYTES NFR BLD AUTO: 0.3 % (ref 0–0.5)
KETONES UR QL STRIP: ABNORMAL
L PNEUMO1 AG UR QL IA: NEGATIVE
LEFT ATRIUM VOLUME INDEX: 22 ML/M2
LEUKOCYTE ESTERASE UR QL STRIP.AUTO: NEGATIVE
LYMPHOCYTES # BLD AUTO: 1.64 10*3/MM3 (ref 0.9–4.8)
LYMPHOCYTES NFR BLD AUTO: 20.8 % (ref 19.6–45.3)
MAGNESIUM SERPL-MCNC: 1.4 MG/DL (ref 1.6–2.4)
MAXIMAL PREDICTED HEART RATE: 150 BPM
MCH RBC QN AUTO: 29.4 PG (ref 27–32.7)
MCHC RBC AUTO-ENTMCNC: 33.1 G/DL (ref 32.6–36.4)
MCV RBC AUTO: 88.7 FL (ref 79.8–96.2)
MONOCYTES # BLD AUTO: 0.67 10*3/MM3 (ref 0.2–1.2)
MONOCYTES NFR BLD AUTO: 8.5 % (ref 5–12)
NEUTROPHILS # BLD AUTO: 5.49 10*3/MM3 (ref 1.9–8.1)
NEUTROPHILS NFR BLD AUTO: 69.8 % (ref 42.7–76)
NITRITE UR QL STRIP: NEGATIVE
PH UR STRIP.AUTO: 6.5 [PH] (ref 5–8)
PHOSPHATE SERPL-MCNC: 3.1 MG/DL (ref 2.5–4.5)
PLATELET # BLD AUTO: 190 10*3/MM3 (ref 140–500)
PMV BLD AUTO: 9.8 FL (ref 6–12)
POTASSIUM BLD-SCNC: 3.5 MMOL/L (ref 3.5–5.2)
PROCALCITONIN SERPL-MCNC: 0.19 NG/ML (ref 0.1–0.25)
PROT UR QL STRIP: NEGATIVE
RBC # BLD AUTO: 3.81 10*6/MM3 (ref 4.6–6)
SODIUM BLD-SCNC: 141 MMOL/L (ref 136–145)
SP GR UR STRIP: 1.02 (ref 1–1.03)
STRESS TARGET HR: 128 BPM
UROBILINOGEN UR QL STRIP: ABNORMAL
WBC NRBC COR # BLD: 7.87 10*3/MM3 (ref 4.5–10.7)

## 2019-01-25 PROCEDURE — G0378 HOSPITAL OBSERVATION PER HR: HCPCS

## 2019-01-25 PROCEDURE — 36415 COLL VENOUS BLD VENIPUNCTURE: CPT | Performed by: HOSPITALIST

## 2019-01-25 PROCEDURE — 63710000001 INSULIN LISPRO (HUMAN) PER 5 UNITS: Performed by: HOSPITALIST

## 2019-01-25 PROCEDURE — 82962 GLUCOSE BLOOD TEST: CPT

## 2019-01-25 PROCEDURE — 87081 CULTURE SCREEN ONLY: CPT | Performed by: HOSPITALIST

## 2019-01-25 PROCEDURE — 93306 TTE W/DOPPLER COMPLETE: CPT | Performed by: INTERNAL MEDICINE

## 2019-01-25 PROCEDURE — 81003 URINALYSIS AUTO W/O SCOPE: CPT | Performed by: HOSPITALIST

## 2019-01-25 PROCEDURE — 87040 BLOOD CULTURE FOR BACTERIA: CPT | Performed by: HOSPITALIST

## 2019-01-25 PROCEDURE — 83036 HEMOGLOBIN GLYCOSYLATED A1C: CPT | Performed by: HOSPITALIST

## 2019-01-25 PROCEDURE — 83735 ASSAY OF MAGNESIUM: CPT | Performed by: HOSPITALIST

## 2019-01-25 PROCEDURE — 25010000002 VANCOMYCIN 10 G RECONSTITUTED SOLUTION: Performed by: HOSPITALIST

## 2019-01-25 PROCEDURE — 99222 1ST HOSP IP/OBS MODERATE 55: CPT | Performed by: INTERNAL MEDICINE

## 2019-01-25 PROCEDURE — 84145 PROCALCITONIN (PCT): CPT | Performed by: HOSPITALIST

## 2019-01-25 PROCEDURE — 93306 TTE W/DOPPLER COMPLETE: CPT

## 2019-01-25 PROCEDURE — 85025 COMPLETE CBC W/AUTO DIFF WBC: CPT | Performed by: HOSPITALIST

## 2019-01-25 PROCEDURE — 85652 RBC SED RATE AUTOMATED: CPT | Performed by: HOSPITALIST

## 2019-01-25 PROCEDURE — 80048 BASIC METABOLIC PNL TOTAL CA: CPT | Performed by: HOSPITALIST

## 2019-01-25 PROCEDURE — 84100 ASSAY OF PHOSPHORUS: CPT | Performed by: HOSPITALIST

## 2019-01-25 PROCEDURE — 25010000002 CEFTRIAXONE PER 250 MG: Performed by: HOSPITALIST

## 2019-01-25 PROCEDURE — 87899 AGENT NOS ASSAY W/OPTIC: CPT | Performed by: HOSPITALIST

## 2019-01-25 PROCEDURE — 25010000002 MAGNESIUM SULFATE IN D5W 1G/100ML (PREMIX) 1-5 GM/100ML-% SOLUTION: Performed by: HOSPITALIST

## 2019-01-25 RX ORDER — GLIPIZIDE 10 MG/1
10 TABLET ORAL
Status: DISCONTINUED | OUTPATIENT
Start: 2019-01-25 | End: 2019-01-25 | Stop reason: HOSPADM

## 2019-01-25 RX ORDER — SODIUM CHLORIDE 0.9 % (FLUSH) 0.9 %
3-10 SYRINGE (ML) INJECTION AS NEEDED
Status: DISCONTINUED | OUTPATIENT
Start: 2019-01-25 | End: 2019-01-25 | Stop reason: HOSPADM

## 2019-01-25 RX ORDER — MAGNESIUM SULFATE 1 G/100ML
1 INJECTION INTRAVENOUS
Status: COMPLETED | OUTPATIENT
Start: 2019-01-25 | End: 2019-01-25

## 2019-01-25 RX ORDER — SODIUM CHLORIDE 9 MG/ML
50 INJECTION, SOLUTION INTRAVENOUS CONTINUOUS
Status: DISCONTINUED | OUTPATIENT
Start: 2019-01-25 | End: 2019-01-25 | Stop reason: HOSPADM

## 2019-01-25 RX ORDER — ONDANSETRON 4 MG/1
4 TABLET, FILM COATED ORAL EVERY 6 HOURS PRN
Status: DISCONTINUED | OUTPATIENT
Start: 2019-01-25 | End: 2019-01-25 | Stop reason: HOSPADM

## 2019-01-25 RX ORDER — ENALAPRIL MALEATE 5 MG/1
5 TABLET ORAL 2 TIMES DAILY
Status: DISCONTINUED | OUTPATIENT
Start: 2019-01-25 | End: 2019-01-25 | Stop reason: HOSPADM

## 2019-01-25 RX ORDER — ATORVASTATIN CALCIUM 20 MG/1
40 TABLET, FILM COATED ORAL DAILY
Status: DISCONTINUED | OUTPATIENT
Start: 2019-01-25 | End: 2019-01-25 | Stop reason: HOSPADM

## 2019-01-25 RX ORDER — SODIUM CHLORIDE 0.9 % (FLUSH) 0.9 %
3 SYRINGE (ML) INJECTION EVERY 12 HOURS SCHEDULED
Status: DISCONTINUED | OUTPATIENT
Start: 2019-01-25 | End: 2019-01-25 | Stop reason: HOSPADM

## 2019-01-25 RX ORDER — ONDANSETRON 4 MG/1
4 TABLET, ORALLY DISINTEGRATING ORAL EVERY 6 HOURS PRN
Status: DISCONTINUED | OUTPATIENT
Start: 2019-01-25 | End: 2019-01-25 | Stop reason: HOSPADM

## 2019-01-25 RX ORDER — PANTOPRAZOLE SODIUM 40 MG/1
40 TABLET, DELAYED RELEASE ORAL EVERY MORNING
Status: DISCONTINUED | OUTPATIENT
Start: 2019-01-25 | End: 2019-01-25 | Stop reason: HOSPADM

## 2019-01-25 RX ORDER — ACETAMINOPHEN 325 MG/1
650 TABLET ORAL EVERY 6 HOURS PRN
Status: DISCONTINUED | OUTPATIENT
Start: 2019-01-25 | End: 2019-01-25

## 2019-01-25 RX ORDER — ONDANSETRON 2 MG/ML
4 INJECTION INTRAMUSCULAR; INTRAVENOUS EVERY 6 HOURS PRN
Status: DISCONTINUED | OUTPATIENT
Start: 2019-01-25 | End: 2019-01-25 | Stop reason: HOSPADM

## 2019-01-25 RX ORDER — ACETAMINOPHEN 325 MG/1
650 TABLET ORAL EVERY 4 HOURS PRN
Status: DISCONTINUED | OUTPATIENT
Start: 2019-01-25 | End: 2019-01-25 | Stop reason: HOSPADM

## 2019-01-25 RX ORDER — METOPROLOL TARTRATE 50 MG/1
50 TABLET, FILM COATED ORAL EVERY 12 HOURS SCHEDULED
Status: DISCONTINUED | OUTPATIENT
Start: 2019-01-25 | End: 2019-01-25 | Stop reason: HOSPADM

## 2019-01-25 RX ORDER — CLOPIDOGREL BISULFATE 75 MG/1
75 TABLET ORAL DAILY
Status: DISCONTINUED | OUTPATIENT
Start: 2019-01-25 | End: 2019-01-25 | Stop reason: HOSPADM

## 2019-01-25 RX ORDER — NITROGLYCERIN 0.4 MG/1
0.4 TABLET SUBLINGUAL
Status: DISCONTINUED | OUTPATIENT
Start: 2019-01-25 | End: 2019-01-25 | Stop reason: HOSPADM

## 2019-01-25 RX ADMIN — PANTOPRAZOLE SODIUM 40 MG: 40 TABLET, DELAYED RELEASE ORAL at 09:56

## 2019-01-25 RX ADMIN — APIXABAN 5 MG: 5 TABLET, FILM COATED ORAL at 02:53

## 2019-01-25 RX ADMIN — ATORVASTATIN CALCIUM 40 MG: 20 TABLET, FILM COATED ORAL at 09:58

## 2019-01-25 RX ADMIN — SODIUM CHLORIDE, PRESERVATIVE FREE: 5 INJECTION INTRAVENOUS at 03:10

## 2019-01-25 RX ADMIN — ENALAPRIL MALEATE 5 MG: 5 TABLET ORAL at 09:57

## 2019-01-25 RX ADMIN — SODIUM CHLORIDE, PRESERVATIVE FREE 3 ML: 5 INJECTION INTRAVENOUS at 02:32

## 2019-01-25 RX ADMIN — METFORMIN HYDROCHLORIDE 1000 MG: 1000 TABLET ORAL at 09:57

## 2019-01-25 RX ADMIN — MAGNESIUM SULFATE HEPTAHYDRATE 1 G: 1 INJECTION, SOLUTION INTRAVENOUS at 15:35

## 2019-01-25 RX ADMIN — METOPROLOL TARTRATE 50 MG: 50 TABLET, FILM COATED ORAL at 09:56

## 2019-01-25 RX ADMIN — VANCOMYCIN HYDROCHLORIDE 2000 MG: 10 INJECTION, POWDER, LYOPHILIZED, FOR SOLUTION INTRAVENOUS at 02:29

## 2019-01-25 RX ADMIN — SODIUM CHLORIDE 500 ML: 9 INJECTION, SOLUTION INTRAVENOUS at 01:28

## 2019-01-25 RX ADMIN — METOPROLOL TARTRATE 50 MG: 50 TABLET, FILM COATED ORAL at 02:53

## 2019-01-25 RX ADMIN — SODIUM CHLORIDE 50 ML/HR: 9 INJECTION, SOLUTION INTRAVENOUS at 04:48

## 2019-01-25 RX ADMIN — INSULIN LISPRO 2 UNITS: 100 INJECTION, SOLUTION INTRAVENOUS; SUBCUTANEOUS at 09:58

## 2019-01-25 RX ADMIN — VANCOMYCIN HYDROCHLORIDE 1500 MG: 10 INJECTION, POWDER, LYOPHILIZED, FOR SOLUTION INTRAVENOUS at 12:50

## 2019-01-25 RX ADMIN — APIXABAN 5 MG: 5 TABLET, FILM COATED ORAL at 10:00

## 2019-01-25 RX ADMIN — CEFTRIAXONE SODIUM 1 G: 1 INJECTION, SOLUTION INTRAVENOUS at 01:29

## 2019-01-25 RX ADMIN — GLIPIZIDE 10 MG: 10 TABLET ORAL at 09:57

## 2019-01-25 RX ADMIN — CLOPIDOGREL 75 MG: 75 TABLET, FILM COATED ORAL at 09:57

## 2019-01-25 RX ADMIN — ACETAMINOPHEN 650 MG: 325 TABLET, FILM COATED ORAL at 02:54

## 2019-01-25 RX ADMIN — MAGNESIUM SULFATE HEPTAHYDRATE 1 G: 1 INJECTION, SOLUTION INTRAVENOUS at 16:47

## 2019-01-25 NOTE — PROGRESS NOTES
"Adult Nutrition  Assessment/PES    Patient Name:  Harry Sierra  YOB: 1950  MRN: [id number]  Admit Date:  1/24/2019    Assessment Date:  1/25/2019    Nutrition assessment triggered by MST score-4. Spoke with patient's family-patient off floor at time of visit.  Decreased po intake over the past month, 5# weight loss in that timeframe. He drinks protein shakes at home-ordered TID.  Gave menu for anytime available foods. RD to monitor/follow.    Reason for Assessment     Row Name 01/25/19 1339          Reason for Assessment    Reason For Assessment  identified at risk by screening criteria     Diagnosis  Primary Problem:  Multifocal pneumonia ; DM, HTN, Afib     Identified At Risk by Screening Criteria  MST SCORE 2+         Nutrition/Diet History     Row Name 01/25/19 1335          Nutrition/Diet History    Typical Food/Fluid Intake  poor appetite for the past month-5# weight loss         Anthropometrics     Row Name 01/25/19 1339 01/25/19 1304       Anthropometrics    Height    177.8 cm (70\")    Weight    73.5 kg (162 lb)       Ideal Body Weight (IBW)    Ideal Body Weight (IBW) (kg)    76.48    % Ideal Body Weight    96.08       Body Mass Index (BMI)    BMI (kg/m2)    23.29    BMI Assessment  BMI 18.5-24.9: normal                                         Labs/Tests/Procedures/Meds     Row Name 01/25/19 5581          Labs/Procedures/Meds    Lab Results Reviewed  reviewed, pertinent        Diagnostic Tests/Procedures    Diagnostic Test/Procedure Reviewed  reviewed, pertinent        Medications    Pertinent Medications Reviewed  reviewed, pertinent     Pertinent Medications Comments  glipizide, insulin, metformin, PPI, NaCl         Physical Findings     Row Name 01/25/19 8543          Physical Findings    Overall Physical Appearance  -- B=21; fatigue         Estimated/Assessed Needs     Row Name 01/25/19 1339 01/25/19 1304       Calculation Measurements    Weight Used For Calculations  73.5 kg (162 lb 0.6 " "oz)  --    Height  --  177.8 cm (70\")       Estimated/Assessed Needs    Additional Documentation  KCAL/KG (Group);Protein Requirements (Group);Fluid Requirements (Group)  --       KCAL/KG                                                                kcal/kg (Specify)   2585-3860         Sassafras-St. Jeor Equation    RMR (Sassafras-St. Jeor Equation)  1501.25  --       Protein Requirements    Est Protein Requirement Amount (gms/kg)  1.2 gm protein  --    Estimated Protein Requirements (gms/day)  88.2  --       Fluid Requirements    Estimated Fluid Requirements (mL/day)  1800  --    RDA Method (mL)  1800  --    Karuna-Segar Method (over 20 kg)  2970  --        Nutrition Prescription Ordered     Row Name 01/25/19 1339          Nutrition Prescription PO    Common Modifiers  Cardiac;Consistent Carbohydrate         Evaluation of Received Nutrient/Fluid Intake     Row Name 01/25/19 1339 01/25/19 1304       Calculation Measurements    Weight Used For Calculations  73.5 kg (162 lb 0.6 oz)  --    Height  --  177.8 cm (70\")        Evaluation of Prescribed Nutrient/Fluid Intake     Row Name 01/25/19 1339 01/25/19 1304       Calculation Measurements    Weight Used For Calculations  73.5 kg (162 lb 0.6 oz)  --    Height  --  177.8 cm (70\")            Problem/Interventions:  Problem 1     Row Name 01/25/19 1340          Nutrition Diagnoses Problem 1    Problem 1  Inadequate Nutrient Intake     Etiology (related to)  MNT for Treatment/Condition     Signs/Symptoms (evidenced by)  Report of Mnimal PO Intake                 Intervention Goal     Row Name 01/25/19 1340          Intervention Goal    General  Maintain nutrition;Meet nutritional needs for age/condition     PO  Tolerate PO;Increase intake     Weight  Maintain weight         Nutrition Intervention     Row Name 01/25/19 1340          Nutrition Intervention    RD/Tech Action  Interview for preference;Follow Tx progress;Care plan reviewd;Encourage intake;Menu " provided;Supplement provided         Nutrition Prescription     Row Name 01/25/19 1340          Nutrition Prescription PO    PO Prescription  Begin/change supplement     Supplement  Boost Glucose Control     Supplement Frequency  3 times a day     New PO Prescription Ordered?  Yes         Education/Evaluation     Row Name 01/25/19 9651          Education    Education  Will Instruct as appropriate        Monitor/Evaluation    Monitor  Per protocol           Electronically signed by:  Marsha Sidhu RD  01/25/19 1:40 PM

## 2019-01-25 NOTE — PLAN OF CARE
Problem: Patient Care Overview  Goal: Plan of Care Review  Outcome: Ongoing (interventions implemented as appropriate)   01/25/19 2815   Coping/Psychosocial   Plan of Care Reviewed With patient   Plan of Care Review   Progress no change   OTHER   Outcome Summary Pt admitted with soa, and fatigue. Pt has not been feeling well for several days. HR elevated. NS started and IV abx. Pt c/o generalized pain, tylenol given. Cont to monitor, safety maintained.        Problem: Pneumonia (Adult)  Goal: Signs and Symptoms of Listed Potential Problems Will be Absent, Minimized or Managed (Pneumonia)  Outcome: Ongoing (interventions implemented as appropriate)

## 2019-01-25 NOTE — CONSULTS
Referring Provider: Chinedu Steele MD  Reason for Consultation:       Subjective   History of present illness:    This is a very nice 68-year-old gentleman we are asked to provide evaluation and opinion regarding possible endocarditis and multifocal pneumonia.    The patient is known to me from prior hospitalization 11/2018. He had parainfluenza 2 pneumonia that developed a secondary bacterial pneumonia that was caught very early and he improved on IV ceftriaxone and was later change to Omnicef 300 mg orally every 12 hours through 11/20/2018. Patient reports that after discharge he improved and was steadily improving. He got about 85% of his previous baseline. Three days prior to admission, he developed some chest congestion, pleuritic pain especially in the left, nonproductive cough, diffuse muscle aches, shaking chills, and fever as high as 102. He felt this was very similar to prior episode of viral pneumonia complicated by bacterial pneumonia and went to an urgent care center. There, he was found to be tachycardic to the 130s and was referred to the [facility] emergency department. A CT of the chest was obtained here that showed multifocal pneumonia, especially prominent on the left side, more so than the right. Procalcitonin was negative x2. MRSA screen is pending. Legionella and streptococcal antigens were also negative. Patient had a dental extraction earlier this week and there was concern for potential subacute bacterial endocarditis. He has not required any oxygen. He does feel markedly better with IV fluids as well as antibiotics. He does not have GI symptoms or  symptoms other than chronic diarrhea which he relates to metformin use.    Past medical history: Skin cancer, diabetes mellitus type 2, prostate cancer, sepsis secondary to urinary catheter, TIA, colonoscopy, parainfluenza and secondary bacterial pna in Nov 2018  Allergies: Aspirin  No family history of infectious  diseases  Social history: He is  and lives with his wife here in [place], [place].  He is retired.  Travel extensively throughout the globe for business and pleasure.      Allergies   Allergen Reactions   • Shellfish-Derived Products Anaphylaxis   • Aleve [Naproxen] Other (See Comments)     Bleeding     • Asa [Aspirin] Other (See Comments)     bleeding   • Bee Venom    • Shellfish Allergy Rash     Review of Systems  Pertinent items are noted in HPI, all other systems reviewed and negative    Objective     Physical Exam:   Vital Signs   Temp:  [97.1 °F (36.2 °C)-100 °F (37.8 °C)] 97.1 °F (36.2 °C)  Heart Rate:  [] 94  Resp:  [18] 18  BP: (139-166)/() 139/87    GENERAL: Awake and alert, in no acute distress.   HEENT: Oropharynx is clear. Hearing is grossly normal.   EYES: PERRL. No conjunctival injection. No lid lag.   LYMPHATICS: No lymphadenopathy of the neck or axillary regions.   HEART: Regular rate and rhythm. No peripheral edema.   LUNGS: Clear to auscultation anteriorly with normal respiratory effort.   GI: Soft, nontender, nondistended. No appreciable organomegaly.   SKIN: Warm and dry without cutaneous eruptions   PSYCHIATRIC: Appropriate mood, affect, insight, and judgment.     Results Review:       Lab Results   Component Value Date    WBC 7.87 01/25/2019    HGB 11.2 (L) 01/25/2019    HCT 33.8 (L) 01/25/2019    MCV 88.7 01/25/2019     01/25/2019     Cr 0.6      Estimated Creatinine Clearance: 89.6 mL/min (A) (by C-G formula based on SCr of 0.64 mg/dL (L)).      Microbiology:bcx ngtd  Radiology: as below and hpi      Assessment/Plan   1. Viral Pna    I think he probably has a viral pneumonia again and this is not a continuation of the same process he had in November. His procalcitonin is negative and he is not on oxygen.  Bacterial endocarditis from an odontogenic source is a good thought.  His CT chest findings, by my read, really don't seem c/w septic emboli and generally  odontogenic endocarditis causes left sided endocarditis some weeks after dental exctractation.  Regardless, blood cultures will give answer. Will d/c abx.  D/w pt s/s of worsening infection and when to seek medical attn.  No objection to discharge when okay with others.     Thank you for this consult.  We will continue to follow along and tailor antibiotics as the patient's clinical course evolves.        Gomez Adames MD  01/25/19  9:12 AM

## 2019-01-25 NOTE — ED PROVIDER NOTES
MD ATTESTATION NOTE    The KALLIE and I have discussed this patient's history, physical exam, and treatment plan.  I have reviewed the documentation and personally had a face to face interaction with the patient. I affirm the documentation and agree with the treatment and plan.  The attached note describes my personal findings.    The patient presents complaining of cough and SOA which initially began 2 months ago and has not improved. Discussed the plan to admit the pt for treatment of his pneumonia.     PEx:  Normal exam    Documentation assistance provided by alex Claudio for Dr. Quintanilla.  Information recorded by the alex was done at my direction and has been verified and validated by me.             Prema Claudio  01/24/19 2051       Nicholas Quintanilla MD  01/24/19 2713

## 2019-01-25 NOTE — CONSULTS
Patient Name: Harry Sierra  :1950  68 y.o.    Date of Admission: 2019  Date of Consultation:  19  Encounter Provider: Brayden Franco III, MD  Place of Service: [facility] CARDIOLOGY  Referring Provider: Chinedu Steele MD  Patient Care Team:  Rhett Blackman MD as PCP - General (Internal Medicine)  Sakshi Pearce RN as Care Coordinator (Population Health)      Chief complaint: Cough with shortness of air    Reason for Consult: Near syncope, history of Paroxymal atrial fibrillation, possible subacute bacterial endocarditis    History of Present Illness:   Mr. Sierra is a 68 year-old patient with a history of paroxymal atrial fibrillation, prostate cancer, DM, HTN, and TIA (hx of carotid stenosis on Plavix). I saw him in the hospital this past November for paroxymal atrial fibrillation, with RVR, in the setting of an acute parainfluenza viral infection. He was started on metoprolol and returned to sinus rhythm. Patient was on Plavix prior to admit, but was started on Eliquis for his PAF. He had an echo on 11/15/2018 that showed normal LV function, EF 56%, mild calcification of the aortic valve, and mild regurgitation of the mitral and tricuspid valves.     He presented to the ED yesterday with c/o of cough with worsening SOA and generalized weakness that began 4 days prior. Patient also reports having recent dental extraction starting 1 week ago (4 teeth), with an additional 2 teeth extracted 2 day ago. He took amoxicillin prior to extractions and Plavix and Eliquis held. Patient also reports becoming pale, diaphoretic, and lightheaded during dinner out the evening of the latest extraction.  He did not have any chest pain or chest discomfort.  He did not have any sensation tachycardia or palpitations.  No lower extremity edema.  No syncope.  Patient went home and did not seek medical care at that time. Patient was tachycardic, but sinus, and afebrile upon  presentation to the ED. PCR negative, but patient found to have multifocal pneumonia. Patient has been admitted for further treatment. We have been consulted for dizziness, history of PAF, and possible subacute bacterial endocarditis. Patient states he was told by Dr Carranza that he was to take the Eliquis only to take for 60 days, then switch back to Plavix.    Echocardiogram pending and blood cultures pending.      Chest XR 1/24/2019  IMPRESSION:  Since prior chest x-ray 12/19/2018, the patient has  developed patchy airspace consolidation projecting over the left heart  and diaphragm on the PA view, the inferior tip of the lingular segment  of the left upper lobe and the left lung base on the lateral view  compatible with left basilar atelectasis and/or pneumonia.  Correlate  clinically.  Otherwise, no additional active disease is seen in the  chest.    CT Chest 1/24/2019  FINDINGS: There are multifocal areas of patchy consolidation located at  the base of the right middle lobe, right lower lobe and left lower lobe.  The appearance suggests atypical pneumonia. No associated pleural  effusion. Recommend followup CT chest to document clearing.     There are mildly enlarged mediastinal lymph nodes without gross  adenopathy. There is cardiac enlargement, no pericardial abnormality.  The esophagus is satisfactory in course and caliber. Limited images  through the upper abdomen are satisfactory in appearance.     CONCLUSION: Patchy areas of consolidation at the base of the right  middle lobe, right lower lobe and left lower lobe.    Previous Cardiac Testing:  Echo 11/15/2018  · Left ventricular systolic function is normal. Calculated EF = 56%. Estimated EF was in agreement with the calculated EF. Normal left ventricular cavity size and wall thickness noted. All left ventricular wall segments contract normally.  · Left ventricular diastolic function is normal.  · There is mild calcification of the aortic valve.  · Mild  mitral valve regurgitation is present.  · Mild tricuspid valve regurgitation is present. Estimated right ventricular systolic pressure from tricuspid regurgitation is moderately elevated (49 mmHg).    Echo 9/5/2017  · Left ventricular systolic function is normal. Estimated EF = 66%    Past Medical History:   Diagnosis Date   • Cancer (CMS/HCC)     Basal Cell   • Diabetes mellitus (CMS/HCC)    • Hypertension    • Sepsis (CMS/HCC) 09/2016   • Shingles    • UTI (urinary tract infection)    • Vertebral artery insufficiency        Past Surgical History:   Procedure Laterality Date   • COLONOSCOPY      2011   • ENDOSCOPY N/A 12/15/2016    Procedure: ESOPHAGOGASTRODUODENOSCOPY WITH COLD BIOPSIES;  Surgeon: Moshe Lux MD;  Location: [facility] ENDOSCOPY;  Service:          Prior to Admission medications    Medication Sig Start Date End Date Taking? Authorizing Provider   acetaminophen (TYLENOL) 500 MG tablet Take 1 tablet by mouth 4 (Four) Times a Day As Needed for mild pain (1-3) or fever. 9/29/16  Yes Eduardo Davis MD   atorvastatin (LIPITOR) 40 MG tablet Take 1 tablet by mouth every night at bedtime. 1/24/19  Yes Artie Carranza MD   clopidogrel (PLAVIX) 75 MG tablet Take 75 mg by mouth Daily.   Yes ProviderSonny MD   Continuous Blood Gluc Sensor ([employer] MACRINA SENSOR SYSTEM) Apply  topically Every 10 (Ten) Days. 1/24/19  Yes Artie Carranza MD   enalapril (VASOTEC) 5 MG tablet Take 1 tablet by mouth 2 (Two) Times a Day. 1/24/19  Yes Artie Carranza MD   glimepiride (AMARYL) 4 MG tablet Take 1 tablet by mouth Every Morning Before Breakfast. 1/24/19  Yes Artie Carranza MD   metFORMIN (GLUCOPHAGE) 1000 MG tablet Take 1 tablet by mouth Daily With Breakfast. 1/24/19  Yes Artie Carranza MD   Multiple Vitamins-Minerals (CENTRUM SILVER 50+MEN) tablet Take 1 tablet by mouth Daily.   Yes ProviderSonny MD   pantoprazole (PROTONIX) 40 MG EC tablet Take 1 tablet by mouth Daily. 1/24/19  Yes Artie Carranza  MD MELVA   polyethylene glycol (MIRALAX) packet Take 17 g by mouth 2 (Two) Times a Day.  Patient taking differently: Take 17 g by mouth 2 (Two) Times a Day As Needed. 11/16/18  Yes Adrian Hernandes MD   apixaban (ELIQUIS) 5 MG tablet tablet Take 1 tablet by mouth Every 12 (Twelve) Hours.  Patient taking differently: Take 5 mg by mouth Every 12 (Twelve) Hours. ON HOLD THE PAST SEVERAL DAYS FOR DENTAL SURGERY 11/16/18   Adrian Hernandes MD   metoprolol tartrate (LOPRESSOR) 50 MG tablet Take 1 tablet by mouth Every 12 (Twelve) Hours. 1/24/19   Artie Carranza MD       Allergies   Allergen Reactions   • Shellfish-Derived Products Anaphylaxis   • Aleve [Naproxen] Other (See Comments)     Bleeding     • Asa [Aspirin] Other (See Comments)     bleeding   • Bee Venom    • Shellfish Allergy Rash       Social History     Socioeconomic History   • Marital status:      Spouse name: Not on file   • Number of children: 4   • Years of education: Not on file   • Highest education level: Not on file   Tobacco Use   • Smoking status: Never Smoker   • Smokeless tobacco: Never Used   Substance and Sexual Activity   • Alcohol use: No     Comment: 1/2 beer once every 3 months   • Drug use: No   • Sexual activity: Defer       Family History   Problem Relation Age of Onset   • Diabetes Mother    • Heart disease Mother    • Uterine cancer Mother    • Diabetes Father    • Heart disease Father    • Kidney disease Father    • Skin cancer Father         melanoma   • Cancer Father         testicle       REVIEW OF SYSTEMS:   All systems reviewed.  Pertinent positives identified in HPI.  All other systems are negative.      Objective:     Vitals:    01/25/19 0255 01/25/19 0256 01/25/19 0500 01/25/19 0731   BP: (S) 141/89 (S) 152/92  139/87   BP Location:  Left arm  Left arm   Patient Position:  Standing  Lying   Pulse:  98 88 94   Resp:  18  18   Temp:  97.8 °F (36.6 °C)  97.1 °F (36.2 °C)   TempSrc:  Oral  Oral   SpO2:  98%  97%   Weight:        Height:         Body mass index is 23.31 kg/m².    Physical Exam:  General Appearance:    Alert, cooperative, in no acute distress   Head:    Normocephalic, without obvious abnormality, atraumatic   Eyes:            Lids and lashes normal, conjunctivae and sclerae normal, no   icterus, no pallor, corneas clear, PERRLA   Ears:    Ears appear intact with no abnormalities noted   Throat:   No oral lesions, no thrush, oral mucosa moist   Neck:   No adenopathy, supple, trachea midline, no thyromegaly, no   carotid bruit, no JVD   Back:     No kyphosis present, no scoliosis present, no skin lesions, erythema or scars, no tenderness to percussion or palpation, range of motion normal   Lungs:     Coarse BS, rhonchi,respirations regular, even and unlabored    Heart:    Regular rhythm and normal rate, normal S1 and S2, no murmur, no gallop, no rub, no click   Chest Wall:    No abnormalities observed   Abdomen:     Normal bowel sounds, no masses, no organomegaly, soft        non-tender, non-distended, no guarding, no rebound  tenderness   Extremities:   Moves all extremities well, no edema, no cyanosis, no redness   Pulses:   Pulses palpable and equal bilaterally. Normal radial, carotid, femoral, dorsalis pedis and posterior tibial pulses bilaterally. Normal abdominal aorta   Skin:  Psychiatric:   No bleeding, bruising or rash    Alert and oriented x 3, normal mood and affect         Lab Review:     Results from last 7 days   Lab Units 01/25/19 0414 01/24/19 1928   SODIUM mmol/L 141 137   POTASSIUM mmol/L 3.5 3.6   CHLORIDE mmol/L 104 98   CO2 mmol/L 24.2 27.0   BUN mg/dL 9 13   CREATININE mg/dL 0.64* 0.78   CALCIUM mg/dL 8.7 9.5   BILIRUBIN mg/dL  --  0.7   ALK PHOS U/L  --  109   ALT (SGPT) U/L  --  26   AST (SGOT) U/L  --  14   GLUCOSE mg/dL 161* 258*     Results from last 7 days   Lab Units 01/24/19 1928   TROPONIN T ng/mL <0.010     Results from last 7 days   Lab Units 01/25/19 0414   WBC 10*3/mm3 7.87   HEMOGLOBIN  g/dL 11.2*   HEMATOCRIT % 33.8*   PLATELETS 10*3/mm3 190         Results from last 7 days   Lab Units 01/25/19  0414   MAGNESIUM mg/dL 1.4*                   Telemetry 1/25/2019         EKG 1/24/2019      EKG 11/16/2018      EKG 11/13/2019      EKG 9/14/2017      I personally viewed and interpreted the patient's EKG/Telemetry data.      Current Facility-Administered Medications:   •  acetaminophen (TYLENOL) tablet 650 mg, 650 mg, Oral, Q4H PRN, Chinedu Steele MD, 650 mg at 01/25/19 0254  •  apixaban (ELIQUIS) tablet 5 mg, 5 mg, Oral, Q12H, Chinedu Steele MD, 5 mg at 01/25/19 1000  •  atorvastatin (LIPITOR) tablet 40 mg, 40 mg, Oral, Daily, Chinedu Steele MD, 40 mg at 01/25/19 0958  •  cefTRIAXone (ROCEPHIN) IVPB 1 g, 1 g, Intravenous, Q24H, Chinedu Steele MD, Last Rate: 100 mL/hr at 01/25/19 0129, 1 g at 01/25/19 0129  •  clopidogrel (PLAVIX) tablet 75 mg, 75 mg, Oral, Daily, Chinedu Steele MD, 75 mg at 01/25/19 0957  •  dextrose (D50W) 25 g/ 50mL Intravenous Solution 25 g, 25 g, Intravenous, Q15 Min PRN, Chinedu Steele MD  •  dextrose (GLUTOSE) oral gel 15 g, 15 g, Oral, Q15 Min PRN, Chinedu Steele MD  •  enalapril (VASOTEC) tablet 5 mg, 5 mg, Oral, BID, Chinedu Steele MD, 5 mg at 01/25/19 0957  •  glipiZIDE (GLUCOTROL) tablet 10 mg, 10 mg, Oral, QAM AC, Chinedu Steele MD, 10 mg at 01/25/19 0957  •  glucagon (human recombinant) (GLUCAGEN DIAGNOSTIC) injection 1 mg, 1 mg, Subcutaneous, PRN, Chinedu Steele MD  •  insulin lispro (humaLOG) injection 0-7 Units, 0-7 Units, Subcutaneous, 4x Daily With Meals & Nightly, Chinedu Steele MD, 2 Units at 01/25/19 0958  •  metFORMIN (GLUCOPHAGE) tablet 1,000 mg, 1,000 mg, Oral, Daily With Breakfast, Chinedu Steele MD, 1,000 mg at 01/25/19 0957  •  metoprolol tartrate (LOPRESSOR) tablet 50 mg, 50 mg, Oral, Q12H, Chinedu Steele MD, 50 mg at 01/25/19 0956  •  nitroglycerin (NITROSTAT) SL tablet 0.4 mg, 0.4  mg, Sublingual, Q5 Min PRN, Chinedu Steele MD  •  ondansetron (ZOFRAN) tablet 4 mg, 4 mg, Oral, Q6H PRN **OR** ondansetron ODT (ZOFRAN-ODT) disintegrating tablet 4 mg, 4 mg, Oral, Q6H PRN **OR** ondansetron (ZOFRAN) injection 4 mg, 4 mg, Intravenous, Q6H PRN, Chinedu Steele MD  •  pantoprazole (PROTONIX) EC tablet 40 mg, 40 mg, Oral, QAM, Chinedu Steele MD, 40 mg at 01/25/19 0956  •  Pharmacy to dose vancomycin, , Does not apply, Continuous PRN, Chinedu Steele MD  •  polyethylene glycol 3350 powder (packet), 17 g, Oral, BID PRN, Chinedu Steele MD  •  sodium chloride 0.9 % flush 3 mL, 3 mL, Intravenous, Q12H, Chinedu Steele MD  •  sodium chloride 0.9 % flush 3-10 mL, 3-10 mL, Intravenous, PRN, Chinedu Steele MD  •  sodium chloride 0.9 % infusion, 50 mL/hr, Intravenous, Continuous, Chinedu Steele MD, Last Rate: 50 mL/hr at 01/25/19 0448, 50 mL/hr at 01/25/19 0448  •  vancomycin 1500 mg/500 mL 0.9% NS IVPB ([facility]), 1,500 mg, Intravenous, Q12H, Chinedu Steele MD    Assessment and Plan:       Active Hospital Problems    Diagnosis Date Noted   • **Multifocal pneumonia [J18.9] 01/24/2019   • Diabetes mellitus (CMS/HCC) [E11.9] 01/24/2019   • HTN (hypertension) [I10] 01/24/2019   • Long term current use of anticoagulant therapy [Z79.01] 01/24/2019   • Atrial fibrillation (CMS/HCC) [I48.91] 01/24/2019   • Near syncope [R55] 01/24/2019      Resolved Hospital Problems   No resolved problems to display.     1.  Multifocal pneumonia-treatment underway  2.  Dizziness-I doubt this is cardiac related.  Most likely secondary to the acute infectious process, we will continue to monitor.  We will await the results of the echocardiogram  3.  Paroxysmal atrial fibrillation-patient is currently in sinus rhythm -we will continue to follow telemetry  4.  Cerebrovascular disease-patient was on chronic Plavix therapy for his history of cerebrovascular disease.    5.  We had started the  patient on chronic anticoagulation with Eliquis.  Patient states,that Dr. Carranza did not anticipate the patient would be on this for a long term.  Patient does have an CHADS-Vasc score of 4 and meets criteria for chronic OAC.  Eliquis has been resumed and I would recommend that this be continued at discharge.  6.  Given his recurrent infectious process and a recent dental extraction we will also.  Attention to his valvular structure on the echocardiogram to assess for any evidence of endocarditis.    Brayden Franco III, MD  01/25/19  10:47 AM

## 2019-01-25 NOTE — PROGRESS NOTES
"Pharmacokinetic Consult - Vancomycin Dosing (Initial Note)    Harry ALCALA Mariela has a consult for pharmacy to dose vancomycin x 5 days for PNA/possible endocarditis.  Pharmacy dosing vancomycin per Dr. Steele' request.   Goal trough: 15-20 mg/L     Relevant clinical data and objective history reviewed:  68 y.o. male 177.8 cm (70\") 81.6 kg (180 lb)    Past Medical History:   Diagnosis Date   • Cancer (CMS/[facility])     Basal Cell   • Diabetes mellitus (CMS/[facility])    • Hypertension    • Sepsis (CMS/[facility]) 09/2016   • Shingles    • UTI (urinary tract infection)    • Vertebral artery insufficiency      Creatinine   Date Value Ref Range Status   01/24/2019 0.78 0.76 - 1.27 mg/dL Final     BUN   Date Value Ref Range Status   01/24/2019 13 8 - 23 mg/dL Final     Estimated Creatinine Clearance: 99.2 mL/min (by C-G formula based on SCr of 0.78 mg/dL).    Lab Results   Component Value Date    WBC 10.45 01/24/2019     Temp Readings from Last 3 Encounters:   01/24/19 100 °F (37.8 °C) (Tympanic)   12/04/18 97.4 °F (36.3 °C)   11/16/18 97.9 °F (36.6 °C) (Oral)      Assessment/Plan  Will start vancomycin 2000 mg IV x1 followed by Vanco 1500mg IV q12h. Vancomycin level to be drawn 1130 1/26. Pharmacy will continue to follow daily while on vancomycin and adjust as needed.     Alexa Lara, [facility]    "

## 2019-01-25 NOTE — PROGRESS NOTES
Clinical Pharmacy Services: Medication History    Harry Sierra is a 68 y.o. male presenting to [facility] for   Chief Complaint   Patient presents with   • Cough   • Shortness of Breath       He  has a past medical history of Cancer (CMS/[facility]), Diabetes mellitus (CMS/[facility]), Hypertension, Sepsis (CMS/[facility]) (09/2016), Shingles, UTI (urinary tract infection), and Vertebral artery insufficiency.    Allergies as of 01/24/2019 - Reviewed 01/24/2019   Allergen Reaction Noted   • Shellfish-derived products Anaphylaxis 08/30/2016   • Aleve [naproxen] Other (See Comments) 11/13/2018   • Asa [aspirin] Other (See Comments) 11/13/2018   • Bee venom  09/26/2016   • Shellfish allergy Rash 06/28/2018       Medication information was obtained from: Spouse, medication bottles, pharmacy  Pharmacy and Phone Number: [facility] [phone]    Prior to Admission Medications     Prescriptions Last Dose Informant Patient Reported? Taking?    acetaminophen (TYLENOL) 500 MG tablet  Medication Bottle No Yes    Take 1 tablet by mouth 4 (Four) Times a Day As Needed for mild pain (1-3) or fever.    atorvastatin (LIPITOR) 40 MG tablet  Medication Bottle No Yes    Take 1 tablet by mouth every night at bedtime.    clopidogrel (PLAVIX) 75 MG tablet  Medication Bottle Yes Yes    Take 75 mg by mouth Daily.    Continuous Blood Gluc Sensor ([employer] MACRINA SENSOR SYSTEM)  Medication Bottle No Yes    Apply  topically Every 10 (Ten) Days.    enalapril (VASOTEC) 5 MG tablet  Medication Bottle No Yes    Take 1 tablet by mouth 2 (Two) Times a Day.    glimepiride (AMARYL) 4 MG tablet  Medication Bottle No Yes    Take 1 tablet by mouth Every Morning Before Breakfast.    metFORMIN (GLUCOPHAGE) 1000 MG tablet  Medication Bottle No Yes    Take 1 tablet by mouth Daily With Breakfast.    Multiple Vitamins-Minerals (CENTRUM SILVER 50+MEN) tablet  Medication Bottle Yes Yes    Take 1 tablet by mouth Daily.    pantoprazole (PROTONIX) 40 MG EC tablet   Medication Bottle No Yes    Take 1 tablet by mouth Daily.    polyethylene glycol (MIRALAX) packet  Spouse/Significant Other No Yes    Take 17 g by mouth 2 (Two) Times a Day.    Patient taking differently:  Take 17 g by mouth 2 (Two) Times a Day As Needed.    apixaban (ELIQUIS) 5 MG tablet tablet  Spouse/Significant Other No No    Take 1 tablet by mouth Every 12 (Twelve) Hours.    Patient taking differently:  Take 5 mg by mouth Every 12 (Twelve) Hours. ON HOLD THE PAST SEVERAL DAYS FOR DENTAL SURGERY    metoprolol tartrate (LOPRESSOR) 50 MG tablet  Pharmacy No No    Take 1 tablet by mouth Every 12 (Twelve) Hours.            Medication notes: Patient has not taken Eliquis in the past several days due to dental surgery. Plavix added per patient medication bottles. Per pharmacy records, Metoprolol is a new RX sent to the pharmacy today which patient has not picked up.    This medication list is complete to the best of my knowledge as of 1/24/2019    Please call if questions.    Reyna Christie, Medication History Technician  1/24/2019 9:20 PM

## 2019-01-25 NOTE — DISCHARGE SUMMARY
[facility]    Date of Discharge:  1/25/2019    PCP: Rhett Blackman MD    Discharge Diagnosis:   Active Hospital Problems    Diagnosis Date Noted   • **Multifocal pneumonia [J18.9] 01/24/2019   • Diabetes mellitus (CMS/HCC) [E11.9] 01/24/2019   • HTN (hypertension) [I10] 01/24/2019   • Long term current use of anticoagulant therapy [Z79.01] 01/24/2019   • Atrial fibrillation (CMS/HCC) [I48.91] 01/24/2019   • Near syncope [R55] 01/24/2019      Resolved Hospital Problems   No resolved problems to display.     Procedures Performed       Consults     Date and Time Order Name Status Description    1/24/2019 2258 Inpatient Cardiology Consult Completed     1/24/2019 2245 Inpatient Infectious Diseases Consult Completed     1/24/2019 2019 [facility] (on-call MD unless specified) Completed         Hospital Course  Please see history and physical for details. Patient is a 68 y.o. male initially admitted by my partner Dr. Steele for complaints of cough.  He was admitted under observation status.  Patient seen in consultation by infectious disease  and he recommends no antibiotics at this time as he feels everything is viral in regards to pneumonia etiology and patient likely improve due to supportive care and IV fluids.  Cardiology also saw in consultation and asked that Eliquis be continued due to chads score 4 and they are checking an echocardiogram.  If this echocardiogram is okay from their perspective the patient will be granted disposition home today.  I've also asked nursing staff to clarify with cardiology if Plavix can be discontinued since they wish to continue with Eliquis.  Patient feels much better at this juncture.  Vital signs are stable as well as afebrile.  I went down to echocardiogram to see this patient and he is very much amenable to the above plan.  I even went to the room separately and discussed the case with family members at bedside and all  of all seem to be okay with disposition they deny any additional discharge needs.        Condition on Discharge: Improved.     Temp:  [97.1 °F (36.2 °C)-100 °F (37.8 °C)] 97.1 °F (36.2 °C)  Heart Rate:  [] 90  Resp:  [16-18] 16  BP: (139-166)/() 162/97  Body mass index is 23.24 kg/m².    Physical Exam   Constitutional: He is oriented to person, place, and time. He appears well-developed and well-nourished.   HENT:   Head: Normocephalic.   Neck: Neck supple.   No meningismus   Cardiovascular: Normal rate and regular rhythm.   Pulmonary/Chest: Effort normal and breath sounds normal. No respiratory distress.   Abdominal: Soft. Bowel sounds are normal. He exhibits no distension. There is no tenderness.   Musculoskeletal: He exhibits no edema.   Neurological: He is alert and oriented to person, place, and time. No cranial nerve deficit.   Psychiatric: He has a normal mood and affect. His behavior is normal.        Discharge Medications      Changes to Medications      Instructions Start Date   ELIQUIS 5 MG tablet tablet  Generic drug:  apixaban  What changed:  additional instructions   5 mg, Oral, Every 12 Hours Scheduled      polyethylene glycol packet  Commonly known as:  MIRALAX  What changed:    · when to take this  · reasons to take this   17 g, Oral, 2 Times Daily         Continue These Medications      Instructions Start Date   acetaminophen 500 MG tablet  Commonly known as:  TYLENOL   500 mg, Oral, 4 Times Daily PRN      atorvastatin 40 MG tablet  Commonly known as:  LIPITOR   40 mg, Oral, Every Night at Bedtime      CENTRUM SILVER 50+MEN tablet   1 tablet, Oral, Daily      enalapril 5 MG tablet  Commonly known as:  VASOTEC   5 mg, Oral, 2 Times Daily      FREESTYLE MACRINA SENSOR SYSTEM   Apply externally, Every 10 Days      glimepiride 4 MG tablet  Commonly known as:  AMARYL   4 mg, Oral, Every Morning Before Breakfast      metFORMIN 1000 MG tablet  Commonly known as:  GLUCOPHAGE   1,000 mg, Oral,  Daily With Breakfast      metoprolol tartrate 50 MG tablet  Commonly known as:  LOPRESSOR   50 mg, Oral, Every 12 Hours Scheduled      pantoprazole 40 MG EC tablet  Commonly known as:  PROTONIX   40 mg, Oral, Daily         Stop These Medications    clopidogrel 75 MG tablet  Commonly known as:  PLAVIX             Additional Instructions for the Follow-ups that You Need to Schedule     Discharge Follow-up with PCP   As directed       Currently Documented PCP:    Rhett Blackman MD    PCP Phone Number:    [phone]     Follow Up Details:  pcp 2 weeks - ID/Cards per their recs           Follow-up Information     Rhett Blackman MD .    Specialty:  Internal Medicine  Why:  pcp 2 weeks - ID/Cards per their recs  Contact information:  [address] [address]  [phone]                 Test Results Pending at Discharge   Order Current Status    MRSA Screen Culture - Swab, Nares In process    Blood Culture - Blood, Arm, Right Preliminary result    Blood Culture - Blood, Hand, Left Preliminary result         Heriberto Boss MD  01/25/19  2:50 PM    Discharge time spent greater than 30 minutes.

## 2019-01-25 NOTE — PROGRESS NOTES
Discharge Planning Assessment  [facility]     Patient Name: Harry Sierra  MRN: [id number]  Today's Date: 1/25/2019    Admit Date: 1/24/2019    Discharge Needs Assessment     Row Name 01/25/19 1033       Living Environment    Primary Care Provided by  self    Quality of Family Relationships  supportive;involved       Discharge Needs Assessment    Equipment Needed After Discharge  none    Row Name 01/25/19 1029       Living Environment    Lives With  spouse    Current Living Arrangements  home/apartment/condo    Primary Care Provided by  self    Family Caregiver if Needed  spouse    Family Caregiver Names  Hanny Sierra spouse [phone]     Quality of Family Relationships  involved;supportive       Resource/Environmental Concerns    Resource/Environmental Concerns  none    Transportation Concerns  car, none       Transition Planning    Patient/Family Anticipates Transition to  home with family    Patient/Family Anticipated Services at Transition  none    Transportation Anticipated  family or friend will provide       Discharge Needs Assessment    Concerns to be Addressed  no discharge needs identified    Equipment Currently Used at Home  none    Equipment Needed After Discharge  none    Offered/Gave Vendor List  no        Discharge Plan     Row Name 01/25/19 1033       Plan    Plan  Home no needs     Plan Comments  Spoke with patient at bedside, face sheet verified. Patient lives in a single story house with his spouse Hanny [phone]. Patient stated he is independent with ADL's. Informed the patient if he is discharged home today the Doctor will change him from Inpatient to Observation Patient and spouse at bedside voiced understanding.  Patient plans to return home denies any discharge needs. Elaine Morales RN        Destination      No service coordination in this encounter.      Durable Medical Equipment      No service coordination in this encounter.      Dialysis/Infusion      No service coordination in this  encounter.      Home Medical Care      No service coordination in this encounter.      Community Resources      No service coordination in this encounter.          Demographic Summary     Row Name 01/25/19 1028       General Information    Arrived From  emergency department    Referral Source  admission list    Reason for Consult  discharge planning    Preferred Language  English        Functional Status     Row Name 01/25/19 1029       Functional Status    Usual Activity Tolerance  good    Current Activity Tolerance  good       Functional Status, IADL    Medications  independent    Meal Preparation  independent    Housekeeping  independent    Laundry  independent    Shopping  independent        Psychosocial    No documentation.       Abuse/Neglect    No documentation.       Legal    No documentation.       Substance Abuse    No documentation.       Patient Forms    No documentation.           Elaine Morales, RN

## 2019-01-25 NOTE — H&P
"HISTORY AND PHYSICAL   [facility]        Patient Identification:  Name: Harry Sierra  Age: 68 y.o.  Sex: male  :  1950  MRN: [id number]                     Primary Care Physician: Artie Carranza MD    Chief Complaint:  Cough    History of Present Illness:   Pleasant 68-year-old gentleman.  He presents with complaints of nonproductive cough and generalized malaise for about 3 or 4 days now.  He first started began feeling ill about 4 days ago.  He underwent dental extractions.  (He does have a history of atrial fibrillation and was taken off his anticoagulants for this and he is still not taking them at this point).  He did take preop amoxicillin.  Later that day he had an episode of feeling very nauseated and very lightheaded.  He did not vomit.  His wife is at bedside and notes that he turned \"white as a sheet\" and broke out in a cold sweat.  This episode lasted for nearly an hour.  During this episode he could not stand up.  This did finally earnestine but he has felt very weak and tired with generalized malaise as well as myalgias since then.  Later that evening he developed a dry cough which is been persistent.  He has not noted any chest pain or palpitations.  No vomiting, no diarrhea.  No shortness of air.    Of interest he was admitted in November with what initially appeared to be an upper respiratory tract infection of viral origin.  However pro-calcitonin level eliza very quickly and ended up being treated for probable bacterial pneumonia.      Past Medical History:  Past Medical History:   Diagnosis Date   • Cancer (CMS/HCC)     Basal Cell   • Diabetes mellitus (CMS/HCC)    • Hypertension    • Sepsis (CMS/HCC) 2016   • Shingles    • UTI (urinary tract infection)    • Vertebral artery insufficiency      Past Surgical History:  Past Surgical History:   Procedure Laterality Date   • COLONOSCOPY         • ENDOSCOPY N/A 12/15/2016    Procedure: ESOPHAGOGASTRODUODENOSCOPY WITH COLD " BIOPSIES;  Surgeon: Moshe Lux MD;  Location: [facility] ENDOSCOPY;  Service:       Home Meds:    (Not in a hospital admission)    Allergies:  Allergies   Allergen Reactions   • Shellfish-Derived Products Anaphylaxis   • Aleve [Naproxen] Other (See Comments)     Bleeding     • Asa [Aspirin] Other (See Comments)     bleeding   • Bee Venom    • Shellfish Allergy Rash     Immunizations:  Immunization History   Administered Date(s) Administered   • Flu Vaccine High Dose PF 65YR+ 11/04/2016, 10/03/2017, 09/19/2018   • Pneumococcal Conjugate 13-Valent (PCV13) 07/05/2016, 11/04/2016   • Zostavax 08/18/2017     Social History:   Social History     Social History Narrative   • Not on file     Social History     Tobacco Use   • Smoking status: Never Smoker   • Smokeless tobacco: Never Used   Substance Use Topics   • Alcohol use: No     Comment: 1/2 beer once every 3 months     Family History:  Family History   Problem Relation Age of Onset   • Diabetes Mother    • Heart disease Mother    • Uterine cancer Mother    • Diabetes Father    • Heart disease Father    • Kidney disease Father    • Skin cancer Father         melanoma   • Cancer Father         testicle        Review of Systems  Review of Systems   Constitutional:        As per history of present illness.  Otherwise negative.   HENT:        As per history of present illness.  Otherwise negative.   Eyes: Negative.    Respiratory:        As per history of present illness.  Otherwise negative.   Cardiovascular:        As per history of present illness.  Otherwise negative.   Gastrointestinal:        As per history of present illness.  Otherwise negative.   Endocrine: Negative.    Genitourinary: Negative.    Musculoskeletal:        As per history of present illness.  Otherwise negative.   Skin: Negative.    Allergic/Immunologic: Negative.    Neurological: Negative.    Hematological: Negative.    Psychiatric/Behavioral: Negative.        Objective:  tMax 24 hrs: Temp  (24hrs), Av °F (37.8 °C), Min:100 °F (37.8 °C), Max:100 °F (37.8 °C)    Vitals Ranges:   Temp:  [100 °F (37.8 °C)] 100 °F (37.8 °C)  Heart Rate:  [121-134] 121  Resp:  [18] 18  BP: (158-166)/() 158/86      Exam:  Physical Exam   Constitutional: He is oriented to person, place, and time. He appears well-developed and well-nourished. No distress.   HENT:   Head: Normocephalic and atraumatic.   Right Ear: External ear normal.   Left Ear: External ear normal.   Nose: Nose normal.   Mouth/Throat: Oropharynx is clear and moist.   Eyes: Conjunctivae and EOM are normal. Right eye exhibits no discharge. Left eye exhibits no discharge. No scleral icterus.   Neck: Neck supple. No JVD present. No tracheal deviation present. No thyromegaly present.   Cardiovascular: Regular rhythm and intact distal pulses. Exam reveals no gallop and no friction rub.   Murmur ( to 2/6 systolic murmur precordial.) heard.  Tachycardic.   Pulmonary/Chest: Effort normal. No stridor. No respiratory distress. He has no wheezes. He has rales (Basilar rales bilaterally.). He exhibits no tenderness.   Abdominal: Soft. Bowel sounds are normal. He exhibits no distension and no mass. There is no tenderness. There is no rebound and no guarding.   Musculoskeletal: He exhibits no edema.   Neurological: He is alert and oriented to person, place, and time. No cranial nerve deficit. He exhibits normal muscle tone. Coordination normal.   Skin: Skin is warm and dry. He is not diaphoretic.   Psychiatric: He has a normal mood and affect. His behavior is normal. Judgment and thought content normal.       Data Review:  All labs and radiology reviewed.    Assessment:    Multifocal pneumonia:  With a multifocal pattern, the association with recent dental extraction... I'm concerned about the possibility of cardioembolic events i.e. SBE.  I'll proceed with blood cultures, antibiotic coverage and bring infectious disease in on the case.  Echocardiogram ordered.     Near syncope:  Check orthostatics,  EKG, cardiac enzymes, echocardiogram as above, radiology consultation.    Diabetes mellitus:  Continue home meds.  Provide sliding-scale insulin.    HTN (hypertension)    Long term current use of anticoagulant therapy:  Presently off his anticoagulants.  I will resume them at this point.    Atrial fibrillation:  At present sounds like he is in the sinus tachycardia.  Check EKG.      Plan:  Please see above.    Chinedu Steele MD  1/24/2019  10:48 PM    EMR Dragon/Transcription disclaimer:   Much of this encounter note is an electronic transcription/translation of spoken language to printed text. The electronic translation of spoken language may permit erroneous, or at times, nonsensical words or phrases to be inadvertently transcribed; Although I have reviewed the note for such errors, some may still exist.

## 2019-01-26 ENCOUNTER — READMISSION MANAGEMENT (OUTPATIENT)
Dept: CALL CENTER | Facility: HOSPITAL | Age: 69
End: 2019-01-26

## 2019-01-26 NOTE — OUTREACH NOTE
Prep Survey      Responses   Facility patient discharged from?  [facility]   Is patient eligible?  Yes   Discharge diagnosis  Multifocal pneumonia    Does the patient have one of the following disease processes/diagnoses(primary or secondary)?  COPD/Pneumonia   Does the patient have Home health ordered?  No   Is there a DME ordered?  No   Prep survey completed?  Yes          Silvia Green RN

## 2019-01-27 ENCOUNTER — READMISSION MANAGEMENT (OUTPATIENT)
Dept: CALL CENTER | Facility: HOSPITAL | Age: 69
End: 2019-01-27

## 2019-01-27 NOTE — OUTREACH NOTE
COPD/PN Week 1 Survey      Responses   Facility patient discharged from?  [facility]   Does the patient have one of the following disease processes/diagnoses(primary or secondary)?  COPD/Pneumonia   Is there a successful TCM telephone encounter documented?  No   Was the primary reason for admission:  Pneumonia   Week 1 attempt successful?  Yes   Call start time  1754   Revoke  Decline to participate   Call end time  1755          Elizabeth Lara, RN

## 2019-01-28 ENCOUNTER — EPISODE CHANGES (OUTPATIENT)
Dept: CASE MANAGEMENT | Facility: OTHER | Age: 69
End: 2019-01-28

## 2019-01-28 ENCOUNTER — PATIENT OUTREACH (OUTPATIENT)
Dept: CASE MANAGEMENT | Facility: OTHER | Age: 69
End: 2019-01-28

## 2019-01-28 LAB — MRSA SPEC QL CULT: NORMAL

## 2019-01-29 ENCOUNTER — TELEPHONE (OUTPATIENT)
Dept: CARDIOLOGY | Facility: CLINIC | Age: 69
End: 2019-01-29

## 2019-01-30 LAB
BACTERIA SPEC AEROBE CULT: NORMAL
BACTERIA SPEC AEROBE CULT: NORMAL

## 2019-02-04 DIAGNOSIS — E11.8 TYPE 2 DIABETES MELLITUS WITH COMPLICATION (HCC): ICD-10-CM

## 2019-02-04 RX ORDER — GLIMEPIRIDE 4 MG/1
TABLET ORAL
Qty: 60 TABLET | Refills: 0 | Status: SHIPPED | OUTPATIENT
Start: 2019-02-04 | End: 2019-02-21 | Stop reason: SDUPTHER

## 2019-02-12 ENCOUNTER — TELEPHONE (OUTPATIENT)
Dept: INTERNAL MEDICINE | Age: 69
End: 2019-02-12

## 2019-02-12 NOTE — TELEPHONE ENCOUNTER
I have no problem with that but we will need to know whether he got a Pneumovax first or a Prevnar 13 first

## 2019-02-12 NOTE — TELEPHONE ENCOUNTER
Patient has had pneumonia in Nov 2018 (bacterial) and January 2019 (viral). He only had the first Pneumonia shot. He is coming in for appt on 3/1/2019 and was wondering if he can get the second on when he is here.    Thank you

## 2019-02-15 ENCOUNTER — OFFICE VISIT (OUTPATIENT)
Dept: CARDIOLOGY | Facility: CLINIC | Age: 69
End: 2019-02-15

## 2019-02-15 VITALS
HEART RATE: 88 BPM | HEIGHT: 69 IN | DIASTOLIC BLOOD PRESSURE: 80 MMHG | WEIGHT: 165 LBS | SYSTOLIC BLOOD PRESSURE: 120 MMHG | BODY MASS INDEX: 24.44 KG/M2

## 2019-02-15 DIAGNOSIS — Z79.01 LONG TERM CURRENT USE OF ANTICOAGULANT THERAPY: ICD-10-CM

## 2019-02-15 DIAGNOSIS — R91.8 MULTILOBAR LUNG INFILTRATE: Primary | ICD-10-CM

## 2019-02-15 DIAGNOSIS — I48.0 PAROXYSMAL ATRIAL FIBRILLATION (HCC): ICD-10-CM

## 2019-02-15 PROCEDURE — 99214 OFFICE O/P EST MOD 30 MIN: CPT | Performed by: INTERNAL MEDICINE

## 2019-02-15 NOTE — PROGRESS NOTES
Subjective:     Encounter Date:02/15/2019      Patient ID: Harry Sierra is a 68 y.o. male.    Chief Complaint: PAF  History of Present Illness    Mr. Sierra is a 68 year-old patient with a history of paroxymal atrial fibrillation, prostate cancer, DM, HTN, and TIA (hx of carotid stenosis on Plavix). I saw him in the hospital this past November for paroxymal atrial fibrillation, with RVR, in the setting of an acute parainfluenza viral infection. He was started on metoprolol and returned to sinus rhythm. Patient was on Plavix prior to admit, but was started on Eliquis for his PAF. He had an echo on 11/15/2018 that showed normal LV function, EF 56%, mild calcification of the aortic valve, and mild regurgitation of the mitral and tricuspid valves.     He was just admitted with pneumonia.  He has been on Eliquis since that hospitalization.  Dr. Carranza had told him he would only need to be on it for 60 days, since he had his atrial fibrillation in the setting of acute viral infection.  The plan was to resume aspirin and Plavix, which he used to take for his vascular disease, and stop the Eliquis.  There is been no recurrent atrial fibrillation-he did not have any atrial fibrillation when he was in the hospital this time.  He understands that there is some risk with coming off the Eliquis, but he would really like to get off the Eliquis and back on the aspirin and Plavix.  He has not had any cardiac complaints.  This patient denies any chest pain, pressure, tightness, squeezing, or heartburn.  This patient has not experienced any feeling of palpitations, tachycardia or heart racing and no presyncope or syncope.  There has not been any problems with dizziness or lightheadedness.  There has not been any orthopnea or PND, and no problems with lower extremity edema.  This patient denies any shortness of breath at rest or with activity and has not had any wheezing.  This patient has not had any problems with unexplained nausea  or vomiting. The patient has continued to perform daily activities of living without any specific problem or change in the level of activity.        The following portions of the patient's history were reviewed and updated as appropriate: allergies, current medications, past family history, past medical history, past social history, past surgical history and problem list.    Past Medical History:   Diagnosis Date   • Atrial fibrillation (CMS/HCC)    • Cancer (CMS/HCC)     Basal Cell   • Diabetes mellitus (CMS/HCC)    • Hypertension    • Long term current use of anticoagulant therapy    • Multifocal pneumonia    • Near syncope    • Sepsis (CMS/HCC) 09/2016   • Shingles    • UTI (urinary tract infection)    • Vertebral artery insufficiency        Past Surgical History:   Procedure Laterality Date   • COLONOSCOPY      2011   • ENDOSCOPY N/A 12/15/2016    Procedure: ESOPHAGOGASTRODUODENOSCOPY WITH COLD BIOPSIES;  Surgeon: Moshe Lux MD;  Location: [facility] ENDOSCOPY;  Service:        Social History     Socioeconomic History   • Marital status:      Spouse name: Not on file   • Number of children: 4   • Years of education: Not on file   • Highest education level: Not on file   Social Needs   • Financial resource strain: Not on file   • Food insecurity - worry: Not on file   • Food insecurity - inability: Not on file   • Transportation needs - medical: Not on file   • Transportation needs - non-medical: Not on file   Occupational History   • Not on file   Tobacco Use   • Smoking status: Never Smoker   • Smokeless tobacco: Never Used   Substance and Sexual Activity   • Alcohol use: No     Comment: 1/2 beer once every 3 months   • Drug use: No   • Sexual activity: Defer   Other Topics Concern   • Not on file   Social History Narrative   • Not on file       Review of Systems   Constitution: Negative for chills, decreased appetite, fever and night sweats.   HENT: Negative for ear discharge, ear pain, hearing loss,  "nosebleeds and sore throat.    Eyes: Negative for blurred vision, double vision and pain.   Cardiovascular: Negative for cyanosis.   Respiratory: Negative for hemoptysis and sputum production.    Endocrine: Negative for cold intolerance and heat intolerance.   Hematologic/Lymphatic: Negative for adenopathy.   Skin: Negative for dry skin, itching, nail changes, rash and suspicious lesions.   Musculoskeletal: Negative for arthritis, gout, muscle cramps, muscle weakness, myalgias and neck pain.   Gastrointestinal: Negative for anorexia, bowel incontinence, constipation, diarrhea, dysphagia, hematemesis and jaundice.   Genitourinary: Negative for bladder incontinence, dysuria, flank pain, frequency, hematuria and nocturia.   Neurological: Negative for focal weakness, numbness, paresthesias and seizures.   Psychiatric/Behavioral: Negative for altered mental status, hallucinations, hypervigilance, suicidal ideas and thoughts of violence.   Allergic/Immunologic: Negative for persistent infections.       Procedures       Objective:     Vitals:    02/15/19 1323   BP: 120/80   Pulse: 88   Weight: 74.8 kg (165 lb)   Height: 175.3 cm (69\")         Physical Exam   Constitutional: He is oriented to person, place, and time. He appears well-developed and well-nourished. No distress.   HENT:   Head: Normocephalic and atraumatic.   Nose: Nose normal.   Mouth/Throat: Oropharynx is clear and moist.   Eyes: Conjunctivae and EOM are normal. Pupils are equal, round, and reactive to light. Right eye exhibits no discharge. Left eye exhibits no discharge.   Neck: Normal range of motion. Neck supple. No tracheal deviation present. No thyromegaly present.   Cardiovascular: Normal rate, regular rhythm, S1 normal, S2 normal, normal heart sounds and normal pulses. Exam reveals no S3.   Pulmonary/Chest: Effort normal and breath sounds normal. No stridor. No respiratory distress. He exhibits no tenderness.   Abdominal: Soft. Bowel sounds are " normal. He exhibits no distension and no mass. There is no tenderness. There is no rebound and no guarding.   Musculoskeletal: Normal range of motion. He exhibits no tenderness or deformity.   Lymphadenopathy:     He has no cervical adenopathy.   Neurological: He is alert and oriented to person, place, and time. He has normal reflexes.   Skin: Skin is warm and dry. No rash noted. He is not diaphoretic. No erythema.   Psychiatric: He has a normal mood and affect. Thought content normal.       Lab Review:             Performed        Assessment:          Diagnosis Plan   1. Multilobar lung infiltrate, secondary bacterial infection  Holter Monitor - 24 Hour   2. Paroxysmal atrial fibrillation (CMS/HCC)  Holter Monitor - 24 Hour   3. Long term current use of anticoagulant therapy  Holter Monitor - 24 Hour          Plan:       1.  History of paroxysmal atrial fibrillation in the setting of acute viral illness.  He does have an elevated CHADS Vasc score but he has had no documented recurrent A. fib.  He is very interested in getting off the Eliquis and getting back on aspirin and Plavix.  We had a long discussion he does understand that there would be some risk and uncertainty if we moved him back on aspirin and Plavix.  We will get a place a Holter monitor and I will discussed this with him again.    Thank you very much for allowing us to participate in the care of this pleasant patient.  Please don't hesitate to call if I can be of assistance in any way.      Current Outpatient Medications:   •  acetaminophen (TYLENOL) 500 MG tablet, Take 1 tablet by mouth 4 (Four) Times a Day As Needed for mild pain (1-3) or fever., Disp: , Rfl: 0  •  apixaban (ELIQUIS) 5 MG tablet tablet, Take 1 tablet by mouth Every 12 (Twelve) Hours., Disp: 60 tablet, Rfl: 0  •  atorvastatin (LIPITOR) 40 MG tablet, Take 1 tablet by mouth every night at bedtime., Disp: 90 tablet, Rfl: 1  •  Continuous Blood Gluc Sensor (FREESTYLE MACRINA SENSOR  SYSTEM), Apply  topically Every 10 (Ten) Days., Disp: 10 each, Rfl: 1  •  enalapril (VASOTEC) 5 MG tablet, Take 1 tablet by mouth 2 (Two) Times a Day., Disp: 180 tablet, Rfl: 1  •  glimepiride (AMARYL) 4 MG tablet, TAKE TWO TABLETS BY MOUTH EVERY MORNING BEFORE BREAKFAST, Disp: 60 tablet, Rfl: 0  •  metFORMIN (GLUCOPHAGE) 1000 MG tablet, Take 1 tablet by mouth Daily With Breakfast. (Patient taking differently: Take 1,000 mg by mouth 2 (Two) Times a Day With Meals.), Disp: 90 tablet, Rfl: 1  •  Multiple Vitamins-Minerals (CENTRUM SILVER 50+MEN) tablet, Take 1 tablet by mouth Daily., Disp: , Rfl:   •  pantoprazole (PROTONIX) 40 MG EC tablet, Take 1 tablet by mouth Daily., Disp: 90 tablet, Rfl: 1  •  polyethylene glycol (MIRALAX) packet, Take 17 g by mouth 2 (Two) Times a Day. (Patient taking differently: Take 17 g by mouth 2 (Two) Times a Day As Needed.), Disp: , Rfl:   •  metoprolol tartrate (LOPRESSOR) 50 MG tablet, Take 1 tablet by mouth Every 12 (Twelve) Hours., Disp: 180 tablet, Rfl: 1

## 2019-02-21 DIAGNOSIS — E11.8 TYPE 2 DIABETES MELLITUS WITH COMPLICATION (HCC): ICD-10-CM

## 2019-02-21 RX ORDER — GLIMEPIRIDE 4 MG/1
8 TABLET ORAL
Qty: 60 TABLET | Refills: 2 | Status: SHIPPED | OUTPATIENT
Start: 2019-02-21 | End: 2019-05-19 | Stop reason: SDUPTHER

## 2019-03-01 ENCOUNTER — OFFICE VISIT (OUTPATIENT)
Dept: INTERNAL MEDICINE | Age: 69
End: 2019-03-01

## 2019-03-01 VITALS
WEIGHT: 167 LBS | SYSTOLIC BLOOD PRESSURE: 120 MMHG | OXYGEN SATURATION: 99 % | DIASTOLIC BLOOD PRESSURE: 60 MMHG | RESPIRATION RATE: 13 BRPM | TEMPERATURE: 97.7 F | HEART RATE: 91 BPM | HEIGHT: 69 IN | BODY MASS INDEX: 24.73 KG/M2

## 2019-03-01 DIAGNOSIS — Z09 FOLLOW UP: Primary | ICD-10-CM

## 2019-03-01 DIAGNOSIS — Z76.89 ESTABLISHING CARE WITH NEW DOCTOR, ENCOUNTER FOR: ICD-10-CM

## 2019-03-01 DIAGNOSIS — J18.9 PNEUMONIA OF BOTH LOWER LOBES DUE TO INFECTIOUS ORGANISM: ICD-10-CM

## 2019-03-01 PROCEDURE — 99214 OFFICE O/P EST MOD 30 MIN: CPT | Performed by: INTERNAL MEDICINE

## 2019-03-01 RX ORDER — CLOPIDOGREL BISULFATE 75 MG/1
75 TABLET ORAL DAILY
COMMUNITY
End: 2019-05-13 | Stop reason: SDUPTHER

## 2019-03-01 NOTE — PROGRESS NOTES
Harry Sierra is a 68 y.o. male who presents with   Chief Complaint   Patient presents with   • Establishing care with new physician     Former patient of Dr. Carranza   • Follow-up recent bilateral viral pneumonia     January 20/25th 2019   .    68-year-old male.  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  Former patient of Dr. Carranza.  Presents for continuity of medical care and follow-up for recent hospitalization for bilateral viral pneumonia.  Patient subjectively is feeling much better he says.         The following portions of the patient's history were reviewed and updated as appropriate: allergies, current medications, past medical history and problem list.    Review of Systems   Constitutional: Negative.    HENT: Negative.    Eyes: Negative.    Respiratory: Negative.    Cardiovascular: Negative.    Genitourinary: Negative.    Musculoskeletal: Negative.    Skin: Negative.    Neurological: Negative.    Psychiatric/Behavioral: Negative.        Objective   Physical Exam   Constitutional: He is oriented to person, place, and time. He appears well-developed and well-nourished. No distress.   HENT:   Head: Normocephalic and atraumatic.   Eyes: Conjunctivae and EOM are normal. Pupils are equal, round, and reactive to light.   Neck: Normal range of motion. Neck supple. No thyromegaly present.   Neck exam negative.  Carotid auscultation normal-no bruits heard.   Cardiovascular: Normal rate, regular rhythm, normal heart sounds and intact distal pulses. Exam reveals no gallop and no friction rub.   No murmur heard.  Pulmonary/Chest: Effort normal and breath sounds normal. No respiratory distress. He has no wheezes. He has no rales. He exhibits no tenderness.   Neurological: He is alert and oriented to person, place, and time.   Psychiatric: He has a normal mood and affect. His behavior is normal. Judgment and thought content normal.   Nursing note and vitals reviewed.      Assessment/Plan   Harry was seen today for  establishing care with new physician and follow-up recent bilateral viral pneumonia.    Diagnoses and all orders for this visit:    Follow up    Establishing care with new doctor, encounter for    Pneumonia of both lower lobes due to infectious organism (CMS/[facility])      Plan: It would appear the patient clinically is over his pneumonia.  He saw Dr. Carranza in October for a wellness visit and I have suggested that sometime after October 18 when his last wellness visit was we see him again for a follow-up 2019 wellness visit and lab update at that time.    We will also plan on seeing him in July for just a general checkup and any necessary lab updates at that time.  He is a diabetic and is monitoring his blood sugars at home and he says they are consistently between .    All medications that he is currently taking will be continued as of now.

## 2019-03-11 RX ORDER — FLASH GLUCOSE SENSOR
KIT MISCELLANEOUS
Qty: 10 EACH | Refills: 1 | Status: SHIPPED | OUTPATIENT
Start: 2019-03-11 | End: 2019-03-25 | Stop reason: DRUGHIGH

## 2019-03-25 ENCOUNTER — TELEPHONE (OUTPATIENT)
Dept: INTERNAL MEDICINE | Age: 69
End: 2019-03-25

## 2019-03-25 RX ORDER — FLASH GLUCOSE SCANNING READER
1 EACH MISCELLANEOUS
Qty: 1 DEVICE | Refills: 0 | Status: SHIPPED | OUTPATIENT
Start: 2019-03-25 | End: 2020-10-21

## 2019-03-25 RX ORDER — FLASH GLUCOSE SENSOR
1 KIT MISCELLANEOUS
Qty: 1 EACH | Refills: 0 | Status: SHIPPED | OUTPATIENT
Start: 2019-03-25 | End: 2019-04-09 | Stop reason: SDUPTHER

## 2019-03-25 NOTE — TELEPHONE ENCOUNTER
Patient needs us to call in a NEW rx for a freestyle antelmo 14 day sensor and reader.  His last rx was a 10 day and he does not want that.  He would like this new one.  Please call this into [facility].  Any questions call patient at [phone]

## 2019-04-09 RX ORDER — FLASH GLUCOSE SENSOR
1 KIT MISCELLANEOUS
Qty: 3 EACH | Refills: 3 | Status: SHIPPED | OUTPATIENT
Start: 2019-04-09 | End: 2019-10-23 | Stop reason: SDUPTHER

## 2019-05-10 DIAGNOSIS — I65.23 BILATERAL CAROTID ARTERY STENOSIS: ICD-10-CM

## 2019-05-10 DIAGNOSIS — G45.1 HEMISPHERIC CAROTID ARTERY SYNDROME: ICD-10-CM

## 2019-05-10 RX ORDER — ATORVASTATIN CALCIUM 40 MG/1
40 TABLET, FILM COATED ORAL
Qty: 90 TABLET | Refills: 1 | Status: SHIPPED | OUTPATIENT
Start: 2019-05-10 | End: 2019-07-24 | Stop reason: SDUPTHER

## 2019-05-13 RX ORDER — CLOPIDOGREL BISULFATE 75 MG/1
75 TABLET ORAL DAILY
Qty: 30 TABLET | Refills: 5 | Status: SHIPPED | OUTPATIENT
Start: 2019-05-13 | End: 2019-11-07 | Stop reason: SDUPTHER

## 2019-05-19 DIAGNOSIS — E11.8 TYPE 2 DIABETES MELLITUS WITH COMPLICATION (HCC): ICD-10-CM

## 2019-05-20 RX ORDER — GLIMEPIRIDE 4 MG/1
8 TABLET ORAL
Qty: 60 TABLET | Refills: 0 | Status: SHIPPED | OUTPATIENT
Start: 2019-05-20 | End: 2019-06-19 | Stop reason: SDUPTHER

## 2019-06-11 ENCOUNTER — TELEPHONE (OUTPATIENT)
Dept: INTERNAL MEDICINE | Age: 69
End: 2019-06-11

## 2019-06-11 NOTE — TELEPHONE ENCOUNTER
Send in Tradjenta 5 mg #30-take 1 every morning before breakfast.  We probably should see in about a month for an office checkup and fasting blood sugar reevaluation.

## 2019-06-11 NOTE — TELEPHONE ENCOUNTER
"All of these \"alternatives\" that he has listed have as part of their ingredient-Metformin.  Therefore it would appear that if the metformin itself causes diarrhea then any 1 of these other \"alternatives\" would do the same thing.  Would suggest trying Januvia or Tradjenta or Onglyza whichever 1 of these may be covered by his insurance.  Let me know and we can prescribe accordingly  "

## 2019-06-11 NOTE — TELEPHONE ENCOUNTER
BENI: Pt informed me that as of this AM-Tuesday, 6/11/19 he stopped taking the Metformin 1000mg. The rx causes chronic diarrhea & pt states he is tired of going all the time.    Pt will call back with comparable medications to the Metformin 1000mg that is covered by his drug plan.    Pt called back with the following alternatives:  Glumetza  Nasir  Synjardy    Pt prefers a medication that does not have a side effect of chronic diarrhea.    Pls advise.    Pt can be reached #[phone].

## 2019-06-11 NOTE — TELEPHONE ENCOUNTER
Spoke with pt regarding sending in new prescription. Pt f/u appt and fasting labs scheduled for 7/22/19. SR

## 2019-06-11 NOTE — TELEPHONE ENCOUNTER
Pt is calling his drug ins back to see if the Januvia, Tradjenta, or the Onglyza is covered & will call back to let us know.

## 2019-06-19 DIAGNOSIS — E11.8 TYPE 2 DIABETES MELLITUS WITH COMPLICATION (HCC): ICD-10-CM

## 2019-06-19 RX ORDER — GLIMEPIRIDE 4 MG/1
8 TABLET ORAL
Qty: 60 TABLET | Refills: 0 | Status: SHIPPED | OUTPATIENT
Start: 2019-06-19 | End: 2019-07-19 | Stop reason: SDUPTHER

## 2019-07-15 DIAGNOSIS — K21.00 REFLUX ESOPHAGITIS: ICD-10-CM

## 2019-07-15 RX ORDER — PANTOPRAZOLE SODIUM 40 MG/1
40 TABLET, DELAYED RELEASE ORAL DAILY
Qty: 90 TABLET | Refills: 0 | Status: SHIPPED | OUTPATIENT
Start: 2019-07-15 | End: 2019-10-08 | Stop reason: SDUPTHER

## 2019-07-19 DIAGNOSIS — E11.8 TYPE 2 DIABETES MELLITUS WITH COMPLICATION (HCC): ICD-10-CM

## 2019-07-19 RX ORDER — GLIMEPIRIDE 4 MG/1
8 TABLET ORAL
Qty: 60 TABLET | Refills: 0 | Status: SHIPPED | OUTPATIENT
Start: 2019-07-19 | End: 2019-08-09 | Stop reason: SDUPTHER

## 2019-07-22 ENCOUNTER — OFFICE VISIT (OUTPATIENT)
Dept: INTERNAL MEDICINE | Age: 69
End: 2019-07-22

## 2019-07-22 VITALS
WEIGHT: 160.8 LBS | DIASTOLIC BLOOD PRESSURE: 60 MMHG | BODY MASS INDEX: 23.82 KG/M2 | HEIGHT: 69 IN | HEART RATE: 100 BPM | SYSTOLIC BLOOD PRESSURE: 116 MMHG | OXYGEN SATURATION: 99 % | RESPIRATION RATE: 13 BRPM | TEMPERATURE: 98.3 F

## 2019-07-22 DIAGNOSIS — I10 ESSENTIAL HYPERTENSION: Primary | ICD-10-CM

## 2019-07-22 DIAGNOSIS — R97.20 ABNORMAL PSA: ICD-10-CM

## 2019-07-22 DIAGNOSIS — C61 PROSTATE CANCER (HCC): ICD-10-CM

## 2019-07-22 DIAGNOSIS — E11.9 TYPE 2 DIABETES MELLITUS WITHOUT COMPLICATION, WITHOUT LONG-TERM CURRENT USE OF INSULIN (HCC): ICD-10-CM

## 2019-07-22 LAB
ALBUMIN SERPL-MCNC: 4.4 G/DL (ref 3.5–5.2)
ALBUMIN/GLOB SERPL: 2.2 G/DL
ALP SERPL-CCNC: 104 U/L (ref 39–117)
ALT SERPL-CCNC: 10 U/L (ref 1–41)
AST SERPL-CCNC: 11 U/L (ref 1–40)
BILIRUB SERPL-MCNC: 0.5 MG/DL (ref 0.2–1.2)
BUN SERPL-MCNC: 15 MG/DL (ref 8–23)
BUN/CREAT SERPL: 19.5 (ref 7–25)
CALCIUM SERPL-MCNC: 9.1 MG/DL (ref 8.6–10.5)
CHLORIDE SERPL-SCNC: 103 MMOL/L (ref 98–107)
CHOLEST SERPL-MCNC: 85 MG/DL (ref 0–200)
CO2 SERPL-SCNC: 24.5 MMOL/L (ref 22–29)
CREAT SERPL-MCNC: 0.77 MG/DL (ref 0.76–1.27)
GLOBULIN SER CALC-MCNC: 2 GM/DL
GLUCOSE SERPL-MCNC: 163 MG/DL (ref 65–99)
HBA1C MFR BLD: 6.9 % (ref 4.8–5.6)
HDLC SERPL-MCNC: 41 MG/DL (ref 40–60)
LDLC SERPL CALC-MCNC: 31 MG/DL (ref 0–100)
POTASSIUM SERPL-SCNC: 4.2 MMOL/L (ref 3.5–5.2)
PROT SERPL-MCNC: 6.4 G/DL (ref 6–8.5)
PSA SERPL-MCNC: 6.66 NG/ML (ref 0–4)
SODIUM SERPL-SCNC: 142 MMOL/L (ref 136–145)
T4 FREE SERPL-MCNC: 1.5 NG/DL (ref 0.93–1.7)
TRIGL SERPL-MCNC: 64 MG/DL (ref 0–150)
TSH SERPL DL<=0.005 MIU/L-ACNC: 1.37 MIU/ML (ref 0.27–4.2)
VLDLC SERPL CALC-MCNC: 12.8 MG/DL

## 2019-07-22 PROCEDURE — 99214 OFFICE O/P EST MOD 30 MIN: CPT | Performed by: INTERNAL MEDICINE

## 2019-07-22 NOTE — PROGRESS NOTES
"  Harry Sierra is a 68 y.o. male who presents with   Chief Complaint   Patient presents with   • Hypertension     Enalapril 5 mg; metoprolol tartrate 50 mg   • Diabetes     Patient states he was having diarrhea on metformin 1000 mg twice daily.  He was prescribed Tradjenta but could not afford it so he is taking metformin 1000 mg before breakfast; 500 mg at midafternoon and 500 mg at supper and he says he is having \"much less diarrhea.   • PSA elevation/prostate cancer     He sees \"[facility]\".  Has been told he has a \"slow-growing prostate cancer\" and \"will probably die of something else\" but is requesting a PSA analysis on today's visit.   .    68-year-old male.  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 Vj Sierra--presents for his checkup/lab update visit.  No complaints or problems other than he could not afford Tradjenta as noted above and altered his course of metformin as noted above resulting in lesser amounts of diarrhea.      Hypertension   This is a chronic problem. The current episode started more than 1 year ago. The problem is controlled. Current antihypertension treatment includes ACE inhibitors and beta blockers. The current treatment provides moderate improvement. There are no compliance problems.    Diabetes   He presents for his follow-up diabetic visit. He has type 2 diabetes mellitus. His disease course has been stable. There are no hypoglycemic associated symptoms. There are no diabetic associated symptoms. There are no hypoglycemic complications. Symptoms are stable. Current diabetic treatment includes oral agent (dual therapy). He is compliant with treatment all of the time. Home blood sugar record trend: Morning blood sugars consistently in the 120 range he says. An ACE inhibitor/angiotensin II receptor blocker is being taken.        The following portions of the patient's history were reviewed and updated as appropriate: allergies, current medications, past medical history and problem " list.    Review of Systems   Constitutional: Negative.    HENT: Negative.    Eyes: Negative.    Respiratory: Negative.    Cardiovascular: Negative.    Genitourinary: Negative.    Musculoskeletal: Negative.    Skin: Negative.    Neurological: Negative.    Psychiatric/Behavioral: Negative.        Objective   Physical Exam   Constitutional: He is oriented to person, place, and time. He appears well-developed and well-nourished. No distress.   HENT:   Head: Normocephalic and atraumatic.   Eyes: Conjunctivae and EOM are normal. Pupils are equal, round, and reactive to light.   Neck: Normal range of motion. Neck supple. No thyromegaly present.   Neck exam negative.  Carotid auscultation normal-no bruits heard.   Cardiovascular: Normal rate, regular rhythm, normal heart sounds and intact distal pulses. Exam reveals no gallop and no friction rub.   No murmur heard.  He has a history of atrial fibrillation and is currently on anticoagulant therapy but on today's visit he appears to be in a sinus rhythm.   Pulmonary/Chest: Effort normal and breath sounds normal. No respiratory distress. He has no wheezes. He has no rales. He exhibits no tenderness.   Neurological: He is alert and oriented to person, place, and time.   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  He is currently seeing dermatology-Dr. Rod Becker for all of these issues.   Psychiatric: He has a normal mood and affect. His behavior is normal. Judgment and thought content normal.   Nursing note and vitals reviewed.      Assessment/Plan   Harry was seen today for hypertension, diabetes and psa elevation/prostate cancer.    Diagnoses and all orders for this visit:    Essential hypertension  -     Comprehensive Metabolic Panel  -     TSH+Free T4  -     Lipid Panel    Type 2 diabetes mellitus without complication, without long-term current use of insulin (CMS/[facility])  -     Comprehensive Metabolic Panel  -     Hemoglobin A1c  -      TSH+Free T4  -     Lipid Panel    Abnormal PSA  -     PSA DIAGNOSTIC    Prostate cancer (CMS/HCC)  -     PSA DIAGNOSTIC        Plan: Labs as above.Today's exam unremarkable for any new problems or events.  Continue all current treatment as prescribed.  A follow-up checkup/lab update visit is advised for 6 months assuming today's labs are all acceptable.

## 2019-07-24 DIAGNOSIS — G45.1 HEMISPHERIC CAROTID ARTERY SYNDROME: ICD-10-CM

## 2019-07-24 DIAGNOSIS — I10 ESSENTIAL HYPERTENSION: Primary | ICD-10-CM

## 2019-07-24 DIAGNOSIS — I10 ESSENTIAL HYPERTENSION: ICD-10-CM

## 2019-07-24 DIAGNOSIS — I65.23 BILATERAL CAROTID ARTERY STENOSIS: ICD-10-CM

## 2019-07-24 RX ORDER — ENALAPRIL MALEATE 5 MG/1
TABLET ORAL
Qty: 180 TABLET | Refills: 0 | Status: SHIPPED | OUTPATIENT
Start: 2019-07-24 | End: 2019-10-08 | Stop reason: SDUPTHER

## 2019-07-24 RX ORDER — ATORVASTATIN CALCIUM 40 MG/1
40 TABLET, FILM COATED ORAL
Qty: 90 TABLET | Refills: 0 | Status: SHIPPED | OUTPATIENT
Start: 2019-07-24 | End: 2019-10-08 | Stop reason: SDUPTHER

## 2019-07-24 RX ORDER — METOPROLOL TARTRATE 50 MG/1
50 TABLET, FILM COATED ORAL EVERY 12 HOURS SCHEDULED
Qty: 180 TABLET | Refills: 1 | Status: SHIPPED | OUTPATIENT
Start: 2019-07-24 | End: 2019-11-05

## 2019-08-09 DIAGNOSIS — E11.8 TYPE 2 DIABETES MELLITUS WITH COMPLICATION (HCC): ICD-10-CM

## 2019-08-09 RX ORDER — GLIMEPIRIDE 4 MG/1
8 TABLET ORAL
Qty: 60 TABLET | Refills: 0 | Status: SHIPPED | OUTPATIENT
Start: 2019-08-09 | End: 2019-09-03 | Stop reason: SDUPTHER

## 2019-08-19 ENCOUNTER — OFFICE VISIT (OUTPATIENT)
Dept: INTERNAL MEDICINE | Age: 69
End: 2019-08-19

## 2019-08-19 VITALS
TEMPERATURE: 98.5 F | HEART RATE: 87 BPM | OXYGEN SATURATION: 100 % | HEIGHT: 69 IN | WEIGHT: 159.8 LBS | BODY MASS INDEX: 23.67 KG/M2 | RESPIRATION RATE: 13 BRPM | SYSTOLIC BLOOD PRESSURE: 120 MMHG | DIASTOLIC BLOOD PRESSURE: 80 MMHG

## 2019-08-19 DIAGNOSIS — E16.2 HYPOGLYCEMIA: Primary | ICD-10-CM

## 2019-08-19 PROCEDURE — 99214 OFFICE O/P EST MOD 30 MIN: CPT | Performed by: INTERNAL MEDICINE

## 2019-08-19 RX ORDER — UREA 200 MG/G
1 GEL TOPICAL 2 TIMES DAILY
COMMUNITY
End: 2020-10-06

## 2019-08-19 NOTE — PROGRESS NOTES
Harry Sierra is a 68 y.o. male who presents with   Chief Complaint   Patient presents with   • Hypoglycemia     A week ago-blood sugar in the 40s.   .    68-year-old male presents with a history that last week he was having blood sugars in the 40s.  He also has lost a few pounds going from 165 pounds in February to his current weight of 159 pounds.      Hypoglycemia   This is a new problem. The current episode started in the past 7 days. The problem occurs intermittently. The problem has been resolved. He has tried nothing for the symptoms.        The following portions of the patient's history were reviewed and updated as appropriate: allergies, current medications, past medical history and problem list.    Review of Systems   Constitutional: Negative.    HENT: Negative.    Eyes: Negative.    Respiratory: Negative.    Cardiovascular: Negative.    Genitourinary: Negative.    Musculoskeletal: Negative.    Skin: Negative.    Neurological: Negative.    Psychiatric/Behavioral: Negative.        Objective   Physical Exam   Constitutional: He is oriented to person, place, and time. He appears well-developed and well-nourished. No distress.   HENT:   Head: Normocephalic and atraumatic.   Eyes: Conjunctivae and EOM are normal. Pupils are equal, round, and reactive to light.   Neck: Normal range of motion. Neck supple. No thyromegaly present.   Neck exam negative.  Carotid auscultation normal-no bruits heard.   Cardiovascular: Normal rate, regular rhythm, normal heart sounds and intact distal pulses. Exam reveals no gallop and no friction rub.   No murmur heard.  Pulmonary/Chest: Effort normal and breath sounds normal. No respiratory distress. He has no wheezes. He has no rales. He exhibits no tenderness.   Neurological: He is alert and oriented to person, place, and time.   Psychiatric: He has a normal mood and affect. His behavior is normal. Judgment and thought content normal.   Nursing note and vitals  reviewed.      Assessment/Plan   Harry was seen today for hypoglycemia.    Diagnoses and all orders for this visit:    Hypoglycemia  -     Basic Metabolic Panel      Plan: Reduce Amaryl back to 4 mg every morning before breakfast.  Continue Metformin thousand milligrams twice daily.  Recheck in 2 weeks and reassess sugars from home and we will do a basic metabolic panel to check on today's blood sugar level.

## 2019-08-20 LAB
BUN SERPL-MCNC: 12 MG/DL (ref 8–23)
BUN/CREAT SERPL: 18.2 (ref 7–25)
CALCIUM SERPL-MCNC: 9.4 MG/DL (ref 8.6–10.5)
CHLORIDE SERPL-SCNC: 103 MMOL/L (ref 98–107)
CO2 SERPL-SCNC: 26 MMOL/L (ref 22–29)
CREAT SERPL-MCNC: 0.66 MG/DL (ref 0.76–1.27)
GLUCOSE SERPL-MCNC: 107 MG/DL (ref 65–99)
POTASSIUM SERPL-SCNC: 4 MMOL/L (ref 3.5–5.2)
SODIUM SERPL-SCNC: 143 MMOL/L (ref 136–145)

## 2019-09-03 ENCOUNTER — OFFICE VISIT (OUTPATIENT)
Dept: INTERNAL MEDICINE | Age: 69
End: 2019-09-03

## 2019-09-03 VITALS
BODY MASS INDEX: 23.49 KG/M2 | OXYGEN SATURATION: 99 % | DIASTOLIC BLOOD PRESSURE: 60 MMHG | HEIGHT: 69 IN | HEART RATE: 92 BPM | RESPIRATION RATE: 12 BRPM | WEIGHT: 158.6 LBS | TEMPERATURE: 98.1 F | SYSTOLIC BLOOD PRESSURE: 120 MMHG

## 2019-09-03 DIAGNOSIS — I65.23 BILATERAL CAROTID ARTERY STENOSIS: ICD-10-CM

## 2019-09-03 DIAGNOSIS — E11.8 TYPE 2 DIABETES MELLITUS WITH COMPLICATION (HCC): ICD-10-CM

## 2019-09-03 DIAGNOSIS — E11.9 TYPE 2 DIABETES MELLITUS WITHOUT COMPLICATION, WITHOUT LONG-TERM CURRENT USE OF INSULIN (HCC): Primary | ICD-10-CM

## 2019-09-03 PROCEDURE — 99214 OFFICE O/P EST MOD 30 MIN: CPT | Performed by: INTERNAL MEDICINE

## 2019-09-03 RX ORDER — GLIMEPIRIDE 4 MG/1
6 TABLET ORAL
Qty: 120 TABLET | Refills: 0 | Status: SHIPPED | OUTPATIENT
Start: 2019-09-03 | End: 2019-09-03 | Stop reason: SDUPTHER

## 2019-09-03 RX ORDER — GLIMEPIRIDE 4 MG/1
TABLET ORAL
Qty: 135 TABLET | Refills: 0 | Status: SHIPPED | OUTPATIENT
Start: 2019-09-03 | End: 2019-10-09 | Stop reason: SDUPTHER

## 2019-09-03 NOTE — PROGRESS NOTES
Harry Sierra is a 68 y.o. male who presents with   Chief Complaint   Patient presents with   • Diabetes     Blood sugars at home averaging 124-154 fasting on current treatment   • Carotid stenosis     1%-49% bilateral per carotid Doppler study October 2018   .    68-year-old male.  History as above.  He said his blood sugar goal fasting is somewhere between .  He is wondering about the possibility of trying a 2 mg Amaryl in the evening with his metformin.      Diabetes   He presents for his follow-up diabetic visit. He has type 2 diabetes mellitus. His disease course has been stable. There are no hypoglycemic complications. Symptoms are stable. Current diabetic treatment includes oral agent (dual therapy). He is compliant with treatment all of the time. His home blood glucose trend is decreasing steadily.      Carotid stenosis: History as above.  He says he has a follow-up carotid Doppler scheduled for October 1.    The following portions of the patient's history were reviewed and updated as appropriate: allergies, current medications, past medical history and problem list.    Review of Systems   Constitutional: Negative.    HENT: Negative.    Eyes: Negative.    Respiratory: Negative.    Cardiovascular: Negative.    Genitourinary: Negative.    Musculoskeletal: Negative.    Skin: Negative.    Neurological: Negative.    Psychiatric/Behavioral: Negative.        Objective   Physical Exam   Constitutional: He is oriented to person, place, and time. He appears well-developed and well-nourished. No distress.   HENT:   Head: Normocephalic and atraumatic.   Eyes: Conjunctivae and EOM are normal. Pupils are equal, round, and reactive to light.   Neck: Normal range of motion. Neck supple. No thyromegaly present.   Neck exam negative.  Carotid auscultation normal-no bruits heard.   Cardiovascular: Normal rate, regular rhythm, normal heart sounds and intact distal pulses. Exam reveals no gallop and no friction rub.   No  murmur heard.  Pulmonary/Chest: Effort normal and breath sounds normal. No respiratory distress. He has no wheezes. He has no rales. He exhibits no tenderness.   Neurological: He is alert and oriented to person, place, and time.   Psychiatric: He has a normal mood and affect. His behavior is normal. Judgment and thought content normal.   Nursing note and vitals reviewed.      Assessment/Plan   Harry was seen today for diabetes and carotid stenosis.    Diagnoses and all orders for this visit:    Type 2 diabetes mellitus without complication, without long-term current use of insulin (CMS/[facility])    Bilateral carotid artery stenosis        Plan: We will continue Metformin 1000 mg in the morning along with Amaryl 4 mg and he will continue Metformin 1000 mg 12 hours later in the evening but will add Amaryl 2 mg to the treatment program at that time in the evening.    Follow-up in 4 weeks to reassess blood sugars then.

## 2019-10-02 DIAGNOSIS — E11.8 TYPE 2 DIABETES MELLITUS WITH COMPLICATION (HCC): ICD-10-CM

## 2019-10-02 RX ORDER — GLIMEPIRIDE 4 MG/1
8 TABLET ORAL
Qty: 60 TABLET | Refills: 5 | Status: SHIPPED | OUTPATIENT
Start: 2019-10-02 | End: 2020-03-30

## 2019-10-07 ENCOUNTER — TELEPHONE (OUTPATIENT)
Dept: ENDOCRINOLOGY | Age: 69
End: 2019-10-07

## 2019-10-07 NOTE — TELEPHONE ENCOUNTER
I S/W PT AND GAVE HIM NEW PT ENDO APPT INFO FOR DR FINNEGAN. PER DR VILLALPANDO LEVEL 1 OK TO SCHEDULE NEW PT APPT. PER PT REQUEST, APPT CONFIRMATION HAS BEEN MAILED TO HIS HOUSE.

## 2019-10-08 DIAGNOSIS — K21.00 REFLUX ESOPHAGITIS: ICD-10-CM

## 2019-10-08 DIAGNOSIS — G45.1 HEMISPHERIC CAROTID ARTERY SYNDROME: ICD-10-CM

## 2019-10-08 DIAGNOSIS — I10 ESSENTIAL HYPERTENSION: ICD-10-CM

## 2019-10-08 DIAGNOSIS — I65.23 BILATERAL CAROTID ARTERY STENOSIS: ICD-10-CM

## 2019-10-08 RX ORDER — PANTOPRAZOLE SODIUM 40 MG/1
40 TABLET, DELAYED RELEASE ORAL DAILY
Qty: 90 TABLET | Refills: 0 | Status: SHIPPED | OUTPATIENT
Start: 2019-10-08 | End: 2020-01-02

## 2019-10-08 RX ORDER — ATORVASTATIN CALCIUM 40 MG/1
40 TABLET, FILM COATED ORAL
Qty: 90 TABLET | Refills: 0 | Status: SHIPPED | OUTPATIENT
Start: 2019-10-08 | End: 2020-01-02

## 2019-10-08 RX ORDER — ENALAPRIL MALEATE 5 MG/1
TABLET ORAL
Qty: 180 TABLET | Refills: 0 | Status: SHIPPED | OUTPATIENT
Start: 2019-10-08 | End: 2020-01-02

## 2019-10-09 DIAGNOSIS — E11.8 TYPE 2 DIABETES MELLITUS WITH COMPLICATION (HCC): ICD-10-CM

## 2019-10-09 RX ORDER — GLIMEPIRIDE 4 MG/1
8 TABLET ORAL
Qty: 60 TABLET | Refills: 5 | OUTPATIENT
Start: 2019-10-09

## 2019-10-09 RX ORDER — GLIMEPIRIDE 4 MG/1
8 TABLET ORAL
Qty: 60 TABLET | Refills: 0 | OUTPATIENT
Start: 2019-10-09

## 2019-10-15 ENCOUNTER — OFFICE VISIT (OUTPATIENT)
Dept: INTERNAL MEDICINE | Age: 69
End: 2019-10-15

## 2019-10-15 VITALS
TEMPERATURE: 97.9 F | HEIGHT: 69 IN | RESPIRATION RATE: 12 BRPM | SYSTOLIC BLOOD PRESSURE: 110 MMHG | DIASTOLIC BLOOD PRESSURE: 70 MMHG | WEIGHT: 158.4 LBS | OXYGEN SATURATION: 99 % | BODY MASS INDEX: 23.46 KG/M2 | HEART RATE: 84 BPM

## 2019-10-15 DIAGNOSIS — I10 ESSENTIAL HYPERTENSION: ICD-10-CM

## 2019-10-15 DIAGNOSIS — E11.9 TYPE 2 DIABETES MELLITUS WITHOUT COMPLICATION, WITHOUT LONG-TERM CURRENT USE OF INSULIN (HCC): Primary | ICD-10-CM

## 2019-10-15 LAB
BUN SERPL-MCNC: 15 MG/DL (ref 8–23)
BUN/CREAT SERPL: 24.2 (ref 7–25)
CALCIUM SERPL-MCNC: 9.3 MG/DL (ref 8.6–10.5)
CHLORIDE SERPL-SCNC: 104 MMOL/L (ref 98–107)
CO2 SERPL-SCNC: 26.7 MMOL/L (ref 22–29)
CREAT SERPL-MCNC: 0.62 MG/DL (ref 0.76–1.27)
GLUCOSE SERPL-MCNC: 175 MG/DL (ref 65–99)
HBA1C MFR BLD: 7.4 % (ref 4.8–5.6)
POTASSIUM SERPL-SCNC: 4.8 MMOL/L (ref 3.5–5.2)
SODIUM SERPL-SCNC: 144 MMOL/L (ref 136–145)

## 2019-10-15 PROCEDURE — 99214 OFFICE O/P EST MOD 30 MIN: CPT | Performed by: INTERNAL MEDICINE

## 2019-10-15 RX ORDER — PREDNISOLONE ACETATE 10 MG/ML
1 SUSPENSION/ DROPS OPHTHALMIC 4 TIMES DAILY
Refills: 0 | COMMUNITY
Start: 2019-09-25 | End: 2020-02-05

## 2019-10-15 NOTE — PROGRESS NOTES
Harry Sierra is a 68 y.o. male who presents with   Chief Complaint   Patient presents with   • Diabetes     Currently taking glimepiride 4 mg-2 every morning and metformin 1000 mg in the morning and 500 mg in the evening and his fasting blood sugars are consistently around 116 he says.   • Hypertension     Enalapril 5 mg; metoprolol tartrate 50 mg   .    68-year-old male presents for blood pressure/diabetes follow-up.  History as outlined above.      Diabetes   He presents for his follow-up diabetic visit. He has type 2 diabetes mellitus. His disease course has been stable. There are no hypoglycemic complications. Symptoms are stable. Current diabetic treatment includes oral agent (dual therapy). He is compliant with treatment all of the time. There is no change in his home blood glucose trend. An ACE inhibitor/angiotensin II receptor blocker is being taken.   Hypertension   This is a chronic problem. The current episode started more than 1 year ago. The problem is controlled. Current antihypertension treatment includes ACE inhibitors and beta blockers. The current treatment provides moderate improvement.        The following portions of the patient's history were reviewed and updated as appropriate: allergies, current medications, past medical history and problem list.    Review of Systems   Constitutional: Negative.    HENT: Negative.    Eyes: Negative.    Respiratory: Negative.    Cardiovascular: Negative.    Genitourinary: Negative.    Musculoskeletal: Negative.    Skin: Negative.    Neurological: Negative.    Psychiatric/Behavioral: Negative.        Objective   Physical Exam   Constitutional: He is oriented to person, place, and time. He appears well-developed and well-nourished. No distress.   HENT:   Head: Normocephalic and atraumatic.   Eyes: Conjunctivae and EOM are normal. Pupils are equal, round, and reactive to light.   Neck: Normal range of motion. Neck supple. No thyromegaly present.   Neck exam  negative.  Carotid auscultation normal-no bruits heard.   Cardiovascular: Normal rate, regular rhythm, normal heart sounds and intact distal pulses. Exam reveals no gallop and no friction rub.   No murmur heard.  Pulmonary/Chest: Effort normal and breath sounds normal. No respiratory distress. He has no wheezes. He has no rales. He exhibits no tenderness.   Neurological: He is alert and oriented to person, place, and time.   Psychiatric: He has a normal mood and affect. His behavior is normal. Judgment and thought content normal.   Nursing note and vitals reviewed.      Assessment/Plan   Harry was seen today for diabetes and hypertension.    Diagnoses and all orders for this visit:    Type 2 diabetes mellitus without complication, without long-term current use of insulin (CMS/[facility])  -     Basic Metabolic Panel  -     Hemoglobin A1c    Essential hypertension  -     Basic Metabolic Panel        Plan: Labs as above.  Continue all treatment as prescribed.  Follow-up checkup/lab updates in March.    Patient's PSA 6.6 in July.  He has a known history of prostate cancer he says.  His urologist is Dr. Ramos who is following this and will be seeing him in a couple of weeks he says.

## 2019-10-23 RX ORDER — FLASH GLUCOSE SENSOR
KIT MISCELLANEOUS
Qty: 1 EACH | Refills: 2 | Status: SHIPPED | OUTPATIENT
Start: 2019-10-23 | End: 2019-12-05 | Stop reason: SDUPTHER

## 2019-11-05 ENCOUNTER — OFFICE VISIT (OUTPATIENT)
Dept: ENDOCRINOLOGY | Age: 69
End: 2019-11-05

## 2019-11-05 VITALS
SYSTOLIC BLOOD PRESSURE: 98 MMHG | HEART RATE: 91 BPM | BODY MASS INDEX: 23.52 KG/M2 | WEIGHT: 158.8 LBS | HEIGHT: 69 IN | DIASTOLIC BLOOD PRESSURE: 60 MMHG

## 2019-11-05 DIAGNOSIS — IMO0002 DIABETES MELLITUS TYPE 2, UNCONTROLLED, WITH COMPLICATIONS: Primary | ICD-10-CM

## 2019-11-05 DIAGNOSIS — E78.5 HYPERLIPIDEMIA ASSOCIATED WITH TYPE 2 DIABETES MELLITUS (HCC): ICD-10-CM

## 2019-11-05 DIAGNOSIS — IMO0002 TYPE II DIABETES MELLITUS WITH NEUROLOGICAL MANIFESTATIONS, UNCONTROLLED: ICD-10-CM

## 2019-11-05 DIAGNOSIS — E16.1 HYPERINSULINISM: ICD-10-CM

## 2019-11-05 DIAGNOSIS — E11.69 HYPERLIPIDEMIA ASSOCIATED WITH TYPE 2 DIABETES MELLITUS (HCC): ICD-10-CM

## 2019-11-05 PROCEDURE — 99205 OFFICE O/P NEW HI 60 MIN: CPT | Performed by: INTERNAL MEDICINE

## 2019-11-07 RX ORDER — CLOPIDOGREL BISULFATE 75 MG/1
75 TABLET ORAL DAILY
Qty: 30 TABLET | Refills: 0 | Status: SHIPPED | OUTPATIENT
Start: 2019-11-07 | End: 2020-02-03

## 2019-11-25 ENCOUNTER — TELEPHONE (OUTPATIENT)
Dept: INTERNAL MEDICINE | Age: 69
End: 2019-11-25

## 2019-11-25 NOTE — TELEPHONE ENCOUNTER
I see no need for metoprolol.  Clopidogrel (Plavix) is on his medication list to be taken so I would take that as prescribed

## 2019-11-25 NOTE — TELEPHONE ENCOUNTER
Pt has Metoprolol in his medicine cabinet that he has never taken (prescribed by Dr. Carranza) and wants to know if he should take the Metoprolol or the clopidogrel or both?

## 2019-12-05 RX ORDER — FLASH GLUCOSE SENSOR
KIT MISCELLANEOUS
Qty: 100 EACH | Refills: 3 | Status: SHIPPED | OUTPATIENT
Start: 2019-12-05 | End: 2020-02-03

## 2019-12-08 PROBLEM — E11.69 HYPERLIPIDEMIA ASSOCIATED WITH TYPE 2 DIABETES MELLITUS: Status: ACTIVE | Noted: 2019-12-08

## 2019-12-08 PROBLEM — IMO0002 DIABETES MELLITUS TYPE 2, UNCONTROLLED, WITH COMPLICATIONS: Status: ACTIVE | Noted: 2019-01-24

## 2019-12-08 PROBLEM — E16.1 HYPERINSULINISM: Status: ACTIVE | Noted: 2019-12-08

## 2019-12-08 PROBLEM — E78.5 HYPERLIPIDEMIA ASSOCIATED WITH TYPE 2 DIABETES MELLITUS (HCC): Status: ACTIVE | Noted: 2019-12-08

## 2020-01-01 DIAGNOSIS — G45.1 HEMISPHERIC CAROTID ARTERY SYNDROME: ICD-10-CM

## 2020-01-01 DIAGNOSIS — I10 ESSENTIAL HYPERTENSION: ICD-10-CM

## 2020-01-01 DIAGNOSIS — K21.00 REFLUX ESOPHAGITIS: ICD-10-CM

## 2020-01-01 DIAGNOSIS — I65.23 BILATERAL CAROTID ARTERY STENOSIS: ICD-10-CM

## 2020-01-02 RX ORDER — ATORVASTATIN CALCIUM 40 MG/1
40 TABLET, FILM COATED ORAL
Qty: 90 TABLET | Refills: 0 | Status: SHIPPED | OUTPATIENT
Start: 2020-01-02 | End: 2020-03-30 | Stop reason: SDUPTHER

## 2020-01-02 RX ORDER — ENALAPRIL MALEATE 5 MG/1
TABLET ORAL
Qty: 180 TABLET | Refills: 0 | Status: SHIPPED | OUTPATIENT
Start: 2020-01-02 | End: 2020-03-30

## 2020-01-02 RX ORDER — PANTOPRAZOLE SODIUM 40 MG/1
40 TABLET, DELAYED RELEASE ORAL DAILY
Qty: 90 TABLET | Refills: 0 | Status: SHIPPED | OUTPATIENT
Start: 2020-01-02 | End: 2020-03-13 | Stop reason: SDUPTHER

## 2020-02-03 RX ORDER — FLASH GLUCOSE SENSOR
KIT MISCELLANEOUS
Qty: 6 EACH | Refills: 6 | Status: SHIPPED | OUTPATIENT
Start: 2020-02-03 | End: 2020-03-23

## 2020-02-03 RX ORDER — CLOPIDOGREL BISULFATE 75 MG/1
75 TABLET ORAL DAILY
Qty: 30 TABLET | Refills: 3 | Status: SHIPPED | OUTPATIENT
Start: 2020-02-03 | End: 2020-07-21

## 2020-02-05 ENCOUNTER — OFFICE VISIT (OUTPATIENT)
Dept: ENDOCRINOLOGY | Age: 70
End: 2020-02-05

## 2020-02-05 VITALS
DIASTOLIC BLOOD PRESSURE: 78 MMHG | HEIGHT: 69 IN | BODY MASS INDEX: 23.7 KG/M2 | OXYGEN SATURATION: 99 % | HEART RATE: 81 BPM | SYSTOLIC BLOOD PRESSURE: 122 MMHG | WEIGHT: 160 LBS

## 2020-02-05 DIAGNOSIS — E78.5 HYPERLIPIDEMIA ASSOCIATED WITH TYPE 2 DIABETES MELLITUS (HCC): ICD-10-CM

## 2020-02-05 DIAGNOSIS — E11.9 TYPE 2 DIABETES MELLITUS WITHOUT COMPLICATION, WITHOUT LONG-TERM CURRENT USE OF INSULIN (HCC): Primary | ICD-10-CM

## 2020-02-05 DIAGNOSIS — IMO0002 TYPE II DIABETES MELLITUS WITH NEUROLOGICAL MANIFESTATIONS, UNCONTROLLED: ICD-10-CM

## 2020-02-05 DIAGNOSIS — E16.1 HYPERINSULINISM: ICD-10-CM

## 2020-02-05 DIAGNOSIS — E11.69 HYPERLIPIDEMIA ASSOCIATED WITH TYPE 2 DIABETES MELLITUS (HCC): ICD-10-CM

## 2020-02-05 PROCEDURE — 95251 CONT GLUC MNTR ANALYSIS I&R: CPT | Performed by: INTERNAL MEDICINE

## 2020-02-05 PROCEDURE — 99214 OFFICE O/P EST MOD 30 MIN: CPT | Performed by: INTERNAL MEDICINE

## 2020-02-05 NOTE — PROGRESS NOTES
69 y.o.    Patient Care Team:  Rhett Blackman MD as PCP - General (Internal Medicine)  Rhett Blackman MD as PCP - Claims Attributed    Chief Complaint:      F/U TYPE 2 DIABETES, UNCONTROLLED.  NO RECENT LABS.  Subjective     HPI   Patient is a 69-year-old male with history of uncontrolled type 2 diabetes mellitus came for follow-up    Uncontrolled type 2 diabetes mellitus  Patient is currently taking Amaryl 8 mg daily in the morning  Metformin 500 mg twice daily.  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    Past Medical History:   Diagnosis Date   • Atrial fibrillation (CMS/HCC)    • Cancer (CMS/HCC)     Basal Cell   • Diabetes mellitus (CMS/HCC)    • Hypertension    • Long term current use of anticoagulant therapy    • Multifocal pneumonia    • Near syncope    • Sepsis (CMS/HCC) 09/2016   • Shingles    • UTI (urinary tract infection)    • Vertebral artery insufficiency      Family History   Problem Relation Age of Onset   • Diabetes Mother    • Heart disease Mother    • Uterine cancer Mother    • Diabetes Father    • Heart disease Father    • Kidney disease Father    • Skin cancer Father         melanoma   • Cancer Father         testicle     Social History     Socioeconomic History   • Marital status:      Spouse name: Not on file   • Number of children: 4   • Years of  education: Not on file   • Highest education level: Not on file   Tobacco Use   • Smoking status: Never Smoker   • Smokeless tobacco: Never Used   Substance and Sexual Activity   • Alcohol use: No     Comment: 1/2 beer once every 3 months   • Drug use: No   • Sexual activity: Defer     Allergies   Allergen Reactions   • Shellfish-Derived Products Anaphylaxis   • Aleve [Naproxen] Other (See Comments)     Bleeding     • Asa [Aspirin] Other (See Comments)     bleeding   • Bee Venom    • Shellfish Allergy Rash       Current Outpatient Medications:   •  acetaminophen (TYLENOL) 500 MG tablet, Take 1 tablet by mouth 4 (Four) Times a Day As Needed for mild pain (1-3) or fever., Disp: , Rfl: 0  •  atorvastatin (LIPITOR) 40 MG tablet, TAKE 1 TABLET BY MOUTH EVERY NIGHT AT BEDTIME, Disp: 90 tablet, Rfl: 0  •  clopidogrel (PLAVIX) 75 MG tablet, TAKE 1 TABLET BY MOUTH DAILY, Disp: 30 tablet, Rfl: 3  •  Continuous Blood Gluc  (FREESTYLE MACRINA 14 DAY READER) device, 1 Device Every 14 (Fourteen) Days., Disp: 1 Device, Rfl: 0  •  Continuous Blood Gluc Sensor (FREESTYLE MACRINA 14 DAY SENSOR) misc, AS DIRECTED FOR 14 DAYS, Disp: 6 each, Rfl: 6  •  enalapril (VASOTEC) 5 MG tablet, TAKE 1 TABLET BY MOUTH TWICE DAILY, Disp: 180 tablet, Rfl: 0  •  glimepiride (AMARYL) 4 MG tablet, TAKE 2 TABLETS BY MOUTH EVERY MORNING BEFORE BREAKFAST, Disp: 60 tablet, Rfl: 5  •  metFORMIN (GLUCOPHAGE) 1000 MG tablet, TAKE 1 TABLET BY MOUTH TWICE DAILY WITH MEALS (Patient taking differently: 500 mg 2 (Two) Times a Day With Meals.), Disp: 180 tablet, Rfl: 3  •  Multiple Vitamins-Minerals (CENTRUM SILVER 50+MEN) tablet, Take 1 tablet by mouth Daily., Disp: , Rfl:   •  mupirocin (BACTROBAN) 2 % ointment, 1 application 2 (Two) Times a Day., Disp: , Rfl: 3  •  pantoprazole (PROTONIX) 40 MG EC tablet, TAKE 1 TABLET BY MOUTH DAILY, Disp: 90 tablet, Rfl: 0  •  Undecylenic Acid (FUNGI-NAIL EX), Apply 1 application topically 2 (Two) Times a Day., Disp: ,  "Rfl:   •  urea (CARMOL) 20 % cream, Apply 1 application topically to the appropriate area as directed 2 (Two) Times a Day., Disp: , Rfl:   •  insulin degludec (TRESIBA FLEXTOUCH) 100 UNIT/ML solution pen-injector injection, 15 UNITS SC QAM, Disp: 15 mL, Rfl: 3  •  Insulin Pen Needle (BD PEN NEEDLE IVAN 2ND GEN) 32G X 4 MM misc, DAILY SC USE, Disp: 100 each, Rfl: 1        Review of Systems   Constitutional: Negative for chills, fatigue and fever.   Cardiovascular: Negative for chest pain and palpitations.   Gastrointestinal: Negative for abdominal pain, constipation, diarrhea, nausea and vomiting.   Endocrine: Negative for cold intolerance and heat intolerance.   All other systems reviewed and are negative.      Objective       Vitals:    02/05/20 0917   BP: 122/78   Pulse: 81   SpO2: 99%   Weight: 72.6 kg (160 lb)   Height: 175.3 cm (69\")     Body mass index is 23.63 kg/m².      Physical Exam   Constitutional: He is oriented to person, place, and time.   Eyes: Pupils are equal, round, and reactive to light. EOM are normal.   Neck: Normal range of motion. Neck supple. No thyromegaly present.   Cardiovascular: Normal rate, regular rhythm, normal heart sounds and intact distal pulses.   Pulmonary/Chest: Effort normal and breath sounds normal.   Abdominal: Soft. Bowel sounds are normal. He exhibits no distension. There is no tenderness.   Musculoskeletal: Normal range of motion. He exhibits no edema.   Neurological: He is alert and oriented to person, place, and time.   Skin: Skin is warm and dry.   Negative for acanthosis and vitiligo or purple striae   Psychiatric: He has a normal mood and affect. His behavior is normal.   Nursing note and vitals reviewed.    Results Review:     I reviewed the patient's new clinical results.    Medical records reviewed  Summary:      Office Visit on 10/15/2019   Component Date Value Ref Range Status   • Glucose 10/15/2019 175* 65 - 99 mg/dL Final   • BUN 10/15/2019 15  8 - 23 mg/dL " Final   • Creatinine 10/15/2019 0.62* 0.76 - 1.27 mg/dL Final   • eGFR Non African Am 10/15/2019 128  >60 mL/min/1.73 Final   • eGFR African Am 10/15/2019 >150  >60 mL/min/1.73 Final   • BUN/Creatinine Ratio 10/15/2019 24.2  7.0 - 25.0 Final   • Sodium 10/15/2019 144  136 - 145 mmol/L Final   • Potassium 10/15/2019 4.8  3.5 - 5.2 mmol/L Final   • Chloride 10/15/2019 104  98 - 107 mmol/L Final   • Total CO2 10/15/2019 26.7  22.0 - 29.0 mmol/L Final   • Calcium 10/15/2019 9.3  8.6 - 10.5 mg/dL Final   • Hemoglobin A1C 10/15/2019 7.40* 4.80 - 5.60 % Final    Comment: Hemoglobin A1C Ranges:  Increased Risk for Diabetes  5.7% to 6.4%  Diabetes                     >= 6.5%  Diabetic Goal                < 7.0%       Lab Results   Component Value Date    HGBA1C 7.40 (H) 10/15/2019    HGBA1C 6.90 (H) 07/22/2019    HGBA1C 7.39 (H) 01/25/2019     Lab Results   Component Value Date    MICROALBUR 11.0 10/18/2018    CREATININE 0.62 (L) 10/15/2019     Imaging Results (Most Recent)     None                                            Assessment and Plan: At 7.4%        Harry was seen today for diabetes.    Diagnoses and all orders for this visit:    Type 2 diabetes mellitus without complication, without long-term current use of insulin (CMS/[facility])  -     Comprehensive Metabolic Panel  -     Hemoglobin A1c  -     TSH  -     Microalbumin / Creatinine Urine Ratio - Urine, Clean Catch    Type II diabetes mellitus with neurological manifestations, uncontrolled (CMS/[facility])    Hyperlipidemia associated with type 2 diabetes mellitus (CMS/[facility])    Hyperinsulinism    Other orders  -     insulin degludec (TRESIBA FLEXTOUCH) 100 UNIT/ML solution pen-injector injection; 15 UNITS SC QAM  -     Insulin Pen Needle (BD PEN NEEDLE IVAN 2ND GEN) 32G X 4 MM misc; DAILY SC USE        Freestyle CGM download reviewed  Patient has trends for hypoglycemia in the early morning and midafternoon  Otherwise his blood sugars are elevated throughout the day  Last  "hemoglobin A1c was also higher patient is currently taking metformin 500 mg twice daily  He also takes glimepiride 4 mg 2 tablets before breakfast    I recommended that he start long-acting insulin Tresiba  15 units at breakfast  Patient was given a sample and was given training and he took 5 units without any effort and appears to be comfortable with the insulin injection technique    Patient will get lab testing done today  After starting insulin if the blood sugars start trending down on the freestyle CGM I recommend that he decrease the Amaryl to 1 tablet daily in the morning  Patient verbalized understanding    Patient return to follow-up in 3 months  Gonzalo Esqueda MD. FACE    02/05/20      EMR Dragon / transcription disclaimer:     \"Dictated utilizing Dragon dictation\".         "

## 2020-02-05 NOTE — PATIENT INSTRUCTIONS
Take insulin 15 units daily before breakfast  If the blood sugars are trending down during the day then decrease the glimepiride to 1 tablet only in the morning    Continue metformin 500 mg twice daily

## 2020-02-06 LAB
ALBUMIN SERPL-MCNC: 4.6 G/DL (ref 3.5–5.2)
ALBUMIN/GLOB SERPL: 2.4 G/DL
ALP SERPL-CCNC: 100 U/L (ref 39–117)
ALT SERPL-CCNC: 10 U/L (ref 1–41)
AST SERPL-CCNC: 11 U/L (ref 1–40)
BILIRUB SERPL-MCNC: 0.5 MG/DL (ref 0.2–1.2)
BUN SERPL-MCNC: 18 MG/DL (ref 8–23)
BUN/CREAT SERPL: 24.3 (ref 7–25)
CALCIUM SERPL-MCNC: 9.5 MG/DL (ref 8.6–10.5)
CHLORIDE SERPL-SCNC: 103 MMOL/L (ref 98–107)
CO2 SERPL-SCNC: 25.8 MMOL/L (ref 22–29)
CREAT SERPL-MCNC: 0.74 MG/DL (ref 0.76–1.27)
GLOBULIN SER CALC-MCNC: 1.9 GM/DL
GLUCOSE SERPL-MCNC: 167 MG/DL (ref 65–99)
HBA1C MFR BLD: 8.1 % (ref 4.8–5.6)
POTASSIUM SERPL-SCNC: 4.8 MMOL/L (ref 3.5–5.2)
PROT SERPL-MCNC: 6.5 G/DL (ref 6–8.5)
SODIUM SERPL-SCNC: 142 MMOL/L (ref 136–145)
TSH SERPL DL<=0.005 MIU/L-ACNC: 1.62 UIU/ML (ref 0.27–4.2)
UNABLE TO VOID: NORMAL

## 2020-02-11 ENCOUNTER — TELEPHONE (OUTPATIENT)
Dept: ENDOCRINOLOGY | Age: 70
End: 2020-02-11

## 2020-02-11 NOTE — TELEPHONE ENCOUNTER
Patient states that the insulin which was prescribed to him he doesn't know the name of it    Its new to him     Patient states that it needs a PA     Patient uses [facility] [address] in [facility]

## 2020-02-12 RX ORDER — INSULIN GLARGINE 300 U/ML
15 INJECTION, SOLUTION SUBCUTANEOUS EVERY MORNING
Qty: 15 ML | Refills: 0 | Status: SHIPPED | OUTPATIENT
Start: 2020-02-12 | End: 2020-05-12 | Stop reason: CLARIF

## 2020-03-13 DIAGNOSIS — K21.00 REFLUX ESOPHAGITIS: ICD-10-CM

## 2020-03-13 RX ORDER — PANTOPRAZOLE SODIUM 40 MG/1
40 TABLET, DELAYED RELEASE ORAL DAILY
Qty: 90 TABLET | Refills: 0 | Status: SHIPPED | OUTPATIENT
Start: 2020-03-13 | End: 2020-05-18

## 2020-03-23 RX ORDER — FLASH GLUCOSE SENSOR
KIT MISCELLANEOUS
Qty: 8 EACH | Refills: 3 | Status: SHIPPED | OUTPATIENT
Start: 2020-03-23 | End: 2021-04-02

## 2020-03-30 DIAGNOSIS — I65.23 BILATERAL CAROTID ARTERY STENOSIS: ICD-10-CM

## 2020-03-30 DIAGNOSIS — E11.8 TYPE 2 DIABETES MELLITUS WITH COMPLICATION (HCC): ICD-10-CM

## 2020-03-30 DIAGNOSIS — G45.1 HEMISPHERIC CAROTID ARTERY SYNDROME: ICD-10-CM

## 2020-03-30 DIAGNOSIS — I10 ESSENTIAL HYPERTENSION: ICD-10-CM

## 2020-03-30 RX ORDER — ATORVASTATIN CALCIUM 40 MG/1
40 TABLET, FILM COATED ORAL
Qty: 90 TABLET | Refills: 2 | Status: SHIPPED | OUTPATIENT
Start: 2020-03-30 | End: 2021-09-03

## 2020-03-30 RX ORDER — ENALAPRIL MALEATE 5 MG/1
TABLET ORAL
Qty: 180 TABLET | Refills: 0 | Status: SHIPPED | OUTPATIENT
Start: 2020-03-30 | End: 2020-07-30

## 2020-03-30 RX ORDER — GLIMEPIRIDE 4 MG/1
8 TABLET ORAL
Qty: 60 TABLET | Refills: 5 | Status: SHIPPED | OUTPATIENT
Start: 2020-03-30 | End: 2020-05-07 | Stop reason: SDUPTHER

## 2020-05-07 ENCOUNTER — OFFICE VISIT (OUTPATIENT)
Dept: ENDOCRINOLOGY | Age: 70
End: 2020-05-07

## 2020-05-07 DIAGNOSIS — E16.1 HYPERINSULINISM: ICD-10-CM

## 2020-05-07 DIAGNOSIS — IMO0002 DIABETES MELLITUS TYPE 2, UNCONTROLLED, WITH COMPLICATIONS: Primary | ICD-10-CM

## 2020-05-07 DIAGNOSIS — E11.8 TYPE 2 DIABETES MELLITUS WITH COMPLICATION (HCC): ICD-10-CM

## 2020-05-07 DIAGNOSIS — E78.5 HYPERLIPIDEMIA ASSOCIATED WITH TYPE 2 DIABETES MELLITUS (HCC): ICD-10-CM

## 2020-05-07 DIAGNOSIS — E11.69 HYPERLIPIDEMIA ASSOCIATED WITH TYPE 2 DIABETES MELLITUS (HCC): ICD-10-CM

## 2020-05-07 DIAGNOSIS — IMO0002 TYPE II DIABETES MELLITUS WITH NEUROLOGICAL MANIFESTATIONS, UNCONTROLLED: ICD-10-CM

## 2020-05-07 PROCEDURE — 99442 PR PHYS/QHP TELEPHONE EVALUATION 11-20 MIN: CPT | Performed by: INTERNAL MEDICINE

## 2020-05-07 RX ORDER — GLIMEPIRIDE 4 MG/1
4 TABLET ORAL
Qty: 30 TABLET | Refills: 5 | Status: SHIPPED | OUTPATIENT
Start: 2020-05-07 | End: 2021-03-15

## 2020-05-07 NOTE — PROGRESS NOTES
69 y.o.    Patient Care Team:  Rhett Blackman MD as PCP - General (Internal Medicine)  Rhett Blackman MD as PCP - Claims Attributed    Chief Complaint:    Uncontrolled type 2 diabetes mellitus with complications  Subjective   Visit type: Video visit Doximity  Total time spent:16 min  Consent:  You have chosen to receive care through a telehealth visit.  Do you consent to use a video/audio connection for your medical care today? Yes  Unable to complete visit using a video connection to the patient. A phone visit was used to complete this visits. Total time of discussion was 16 minutes.  HPI   Patient is a 69-year-old white male with a history of uncontrolled type 2 diabetes mellitus with complications came for follow-up    Uncontrolled type 2 diabetes mellitus  Patient is currently taking Amaryl 8 mg in the morning and metformin 1000 mg twice daily  Apparently due to communication issues he did not get Tresiba coverage through insurance and forgot about it  My staff apparently got approval for Toujeo but the [employer] never called him on that.  So for the past 3 months he has not taken any insulin  He is finally getting it tomorrow and will start from Saturday morning  Metformin is causing him diarrhea at 1000 dose  Hypoglycemia  Patient had significant hypoglycemia on Amaryl 8 mg as was shown on the freestyle professional CGM at the last visit  Once the patient starts insulin he will need to cut back on Amaryl  Hyperlipidemia associated with diabetes  Patient is currently on Lipitor daily  Denies side effects and reports compliance  Patient denies any knowledge of diabetic retinopathy or nephropathy  Uncontrolled type 2 diabetes mellitus and neuropathy  Patient is symptoms are tingling in the left toes      Interval History      The following portions of the patient's history were reviewed and updated as appropriate: allergies, current medications, past family history, past medical history, past social history,  past surgical history and problem list.    Past Medical History:   Diagnosis Date   • Atrial fibrillation (CMS/HCC)    • Cancer (CMS/HCC)     Basal Cell   • Diabetes mellitus (CMS/HCC)    • Hypertension    • Long term current use of anticoagulant therapy    • Multifocal pneumonia    • Near syncope    • Sepsis (CMS/HCC) 09/2016   • Shingles    • UTI (urinary tract infection)    • Vertebral artery insufficiency      Family History   Problem Relation Age of Onset   • Diabetes Mother    • Heart disease Mother    • Uterine cancer Mother    • Diabetes Father    • Heart disease Father    • Kidney disease Father    • Skin cancer Father         melanoma   • Cancer Father         testicle     Social History     Socioeconomic History   • Marital status:      Spouse name: Not on file   • Number of children: 4   • Years of education: Not on file   • Highest education level: Not on file   Tobacco Use   • Smoking status: Never Smoker   • Smokeless tobacco: Never Used   Substance and Sexual Activity   • Alcohol use: No     Comment: 1/2 beer once every 3 months   • Drug use: No   • Sexual activity: Defer     Allergies   Allergen Reactions   • Shellfish-Derived Products Anaphylaxis   • Aleve [Naproxen] Other (See Comments)     Bleeding     • Asa [Aspirin] Other (See Comments)     bleeding   • Bee Venom    • Shellfish Allergy Rash       Current Outpatient Medications:   •  acetaminophen (TYLENOL) 500 MG tablet, Take 1 tablet by mouth 4 (Four) Times a Day As Needed for mild pain (1-3) or fever., Disp: , Rfl: 0  •  atorvastatin (LIPITOR) 40 MG tablet, Take 1 tablet by mouth every night at bedtime., Disp: 90 tablet, Rfl: 2  •  clopidogrel (PLAVIX) 75 MG tablet, TAKE 1 TABLET BY MOUTH DAILY, Disp: 30 tablet, Rfl: 3  •  Continuous Blood Gluc  (FREESTYLE MACRINA 14 DAY READER) device, 1 Device Every 14 (Fourteen) Days., Disp: 1 Device, Rfl: 0  •  Continuous Blood Gluc Sensor (FREESTYLE MACRINA 14 DAY SENSOR) [facility], AS DIRECTED FOR  14 DAYS, Disp: 8 each, Rfl: 3  •  enalapril (VASOTEC) 5 MG tablet, TAKE 1 TABLET BY MOUTH TWICE DAILY, Disp: 180 tablet, Rfl: 0  •  glimepiride (AMARYL) 4 MG tablet, Take 1 tablet by mouth Every Morning Before Breakfast., Disp: 30 tablet, Rfl: 5  •  insulin degludec (TRESIBA FLEXTOUCH) 100 UNIT/ML solution pen-injector injection, 15 UNITS SC QAM, Disp: 15 mL, Rfl: 3  •  Insulin Pen Needle (BD PEN NEEDLE IVAN 2ND GEN) 32G X 4 MM misc, DAILY SC USE, Disp: 100 each, Rfl: 1  •  metFORMIN (GLUCOPHAGE) 1000 MG tablet, TAKE 1 TABLET BY MOUTH TWICE DAILY WITH MEALS (Patient taking differently: 500 mg 2 (Two) Times a Day With Meals.), Disp: 180 tablet, Rfl: 3  •  Multiple Vitamins-Minerals (CENTRUM SILVER 50+MEN) tablet, Take 1 tablet by mouth Daily., Disp: , Rfl:   •  mupirocin (BACTROBAN) 2 % ointment, 1 application 2 (Two) Times a Day., Disp: , Rfl: 3  •  pantoprazole (PROTONIX) 40 MG EC tablet, Take 1 tablet by mouth Daily., Disp: 90 tablet, Rfl: 0  •  TOUJEO SOLOSTAR 300 UNIT/ML solution pen-injector injection, Inject 15 Units under the skin into the appropriate area as directed Every Morning. Replaces Tresiba, Disp: 15 mL, Rfl: 0  •  Undecylenic Acid (FUNGI-NAIL EX), Apply 1 application topically 2 (Two) Times a Day., Disp: , Rfl:   •  urea (CARMOL) 20 % cream, Apply 1 application topically to the appropriate area as directed 2 (Two) Times a Day., Disp: , Rfl:         Review of Systems   Constitutional: Negative.    Eyes: Negative.    Respiratory: Negative.    Cardiovascular: Negative.    Gastrointestinal: Negative.    Neurological: Positive for numbness.   All other systems reviewed and are negative.      Objective       There were no vitals filed for this visit.  There is no height or weight on file to calculate BMI.  Vital signs and physical examination not documented due to video visit    Physical Exam  Results Review:     I reviewed the patient's new clinical results.    Medical records  reviewed  Summary:      Office Visit on 02/05/2020   Component Date Value Ref Range Status   • Glucose 02/05/2020 167* 65 - 99 mg/dL Final   • BUN 02/05/2020 18  8 - 23 mg/dL Final   • Creatinine 02/05/2020 0.74* 0.76 - 1.27 mg/dL Final   • eGFR Non African Am 02/05/2020 104  >60 mL/min/1.73 Final    Comment: The MDRD GFR formula is only valid for adults with stable  renal function between ages 18 and 70.     • eGFR  Am 02/05/2020 126  >60 mL/min/1.73 Final   • BUN/Creatinine Ratio 02/05/2020 24.3  7.0 - 25.0 Final   • Sodium 02/05/2020 142  136 - 145 mmol/L Final   • Potassium 02/05/2020 4.8  3.5 - 5.2 mmol/L Final   • Chloride 02/05/2020 103  98 - 107 mmol/L Final   • Total CO2 02/05/2020 25.8  22.0 - 29.0 mmol/L Final   • Calcium 02/05/2020 9.5  8.6 - 10.5 mg/dL Final   • Total Protein 02/05/2020 6.5  6.0 - 8.5 g/dL Final   • Albumin 02/05/2020 4.60  3.50 - 5.20 g/dL Final   • Globulin 02/05/2020 1.9  gm/dL Final   • A/G Ratio 02/05/2020 2.4  g/dL Final   • Total Bilirubin 02/05/2020 0.5  0.2 - 1.2 mg/dL Final   • Alkaline Phosphatase 02/05/2020 100  39 - 117 U/L Final   • AST (SGOT) 02/05/2020 11  1 - 40 U/L Final   • ALT (SGPT) 02/05/2020 10  1 - 41 U/L Final   • Hemoglobin A1C 02/05/2020 8.10* 4.80 - 5.60 % Final    Comment: Hemoglobin A1C Ranges:  Increased Risk for Diabetes  5.7% to 6.4%  Diabetes                     >= 6.5%  Diabetic Goal                < 7.0%     • TSH 02/05/2020 1.620  0.270 - 4.200 uIU/mL Final   • Unable to Void 02/05/2020 Comment   Final    Comment: The patient was not able to render a urine sample and has been  instructed to return for a urine collection at their earliest  convenience.  The urine testing that you have requested has  been deleted from this report.  When the patient returns and  provides a urine specimen, the urine testing will be performed  and separately reported.       Lab Results   Component Value Date    HGBA1C 8.10 (H) 02/05/2020    HGBA1C 7.40 (H)  "10/15/2019    HGBA1C 6.90 (H) 07/22/2019     Lab Results   Component Value Date    MICROALBUR 11.0 10/18/2018    CREATININE 0.74 (L) 02/05/2020     Imaging Results (Most Recent)     None                Assessment and Plan:    Diagnoses and all orders for this visit:    Diabetes mellitus type 2, uncontrolled, with complications (CMS/HCC)    Type II diabetes mellitus with neurological manifestations, uncontrolled (CMS/HCC)    Hyperlipidemia associated with type 2 diabetes mellitus (CMS/HCC)    Hyperinsulinism    Type 2 diabetes mellitus with complication (CMS/HCC)  -     glimepiride (AMARYL) 4 MG tablet; Take 1 tablet by mouth Every Morning Before Breakfast.    As mentioned above in HPI patient did not get insulin for the past 3 months as recommended  He was taking metformin 1000 twice daily and Amaryl 8 mg in the morning  He is continuing to experience hypoglycemia  He is also experiencing diarrhea with 1000 mg dose metformin    I advised him to decrease metformin 500 twice daily  Decrease Amaryl 4 mg daily in the morning  Start Toujeo 15 units in the morning from Saturday morning apparently it is only available at [facility] Saturday morning in 2 days  Advised the patient to come for follow-up in 4 weeks via video visit          Gonzalo Esqueda MD. FACE    05/07/20      EMR Dragon / transcription disclaimer:     \"Dictated utilizing Dragon dictation\".         "

## 2020-05-12 ENCOUNTER — PRIOR AUTHORIZATION (OUTPATIENT)
Dept: ENDOCRINOLOGY | Age: 70
End: 2020-05-12

## 2020-05-12 DIAGNOSIS — E16.1 HYPERINSULINISM: ICD-10-CM

## 2020-05-12 DIAGNOSIS — IMO0002 DIABETES MELLITUS TYPE 2, UNCONTROLLED, WITH COMPLICATIONS: Primary | ICD-10-CM

## 2020-05-12 NOTE — TELEPHONE ENCOUNTER
Pa obtained thru cover my meds for Tresiba   denied per [employer] rx new meds Levemir sent into rx for pt

## 2020-05-13 ENCOUNTER — TELEPHONE (OUTPATIENT)
Dept: ENDOCRINOLOGY | Age: 70
End: 2020-05-13

## 2020-05-18 DIAGNOSIS — K21.00 REFLUX ESOPHAGITIS: ICD-10-CM

## 2020-05-18 RX ORDER — PANTOPRAZOLE SODIUM 40 MG/1
40 TABLET, DELAYED RELEASE ORAL DAILY
Qty: 90 TABLET | Refills: 0 | Status: SHIPPED | OUTPATIENT
Start: 2020-05-18 | End: 2020-07-30

## 2020-07-20 DIAGNOSIS — G45.1 HEMISPHERIC CAROTID ARTERY SYNDROME: Primary | ICD-10-CM

## 2020-07-21 RX ORDER — CLOPIDOGREL BISULFATE 75 MG/1
75 TABLET ORAL DAILY
Qty: 30 TABLET | Refills: 3 | Status: SHIPPED | OUTPATIENT
Start: 2020-07-21 | End: 2020-10-20

## 2020-07-30 DIAGNOSIS — I10 ESSENTIAL HYPERTENSION: ICD-10-CM

## 2020-07-30 DIAGNOSIS — K21.00 REFLUX ESOPHAGITIS: ICD-10-CM

## 2020-07-30 RX ORDER — ENALAPRIL MALEATE 5 MG/1
TABLET ORAL
Qty: 180 TABLET | Refills: 1 | Status: SHIPPED | OUTPATIENT
Start: 2020-07-30 | End: 2021-01-19

## 2020-07-30 RX ORDER — PANTOPRAZOLE SODIUM 40 MG/1
40 TABLET, DELAYED RELEASE ORAL DAILY
Qty: 90 TABLET | Refills: 1 | Status: SHIPPED | OUTPATIENT
Start: 2020-07-30 | End: 2021-01-19

## 2020-10-06 ENCOUNTER — OFFICE VISIT (OUTPATIENT)
Dept: SURGERY | Facility: CLINIC | Age: 70
End: 2020-10-06

## 2020-10-06 VITALS — WEIGHT: 171 LBS | BODY MASS INDEX: 24.48 KG/M2 | HEIGHT: 70 IN

## 2020-10-06 DIAGNOSIS — D03.62 MELANOMA IN SITU OF LEFT UPPER EXTREMITY INCLUDING SHOULDER (HCC): Primary | ICD-10-CM

## 2020-10-06 PROCEDURE — 99204 OFFICE O/P NEW MOD 45 MIN: CPT | Performed by: SURGERY

## 2020-10-06 RX ORDER — CEFAZOLIN SODIUM 2 G/100ML
2 INJECTION, SOLUTION INTRAVENOUS ONCE
Status: CANCELLED | OUTPATIENT
Start: 2020-10-27 | End: 2020-10-06

## 2020-10-06 RX ORDER — PREDNISOLONE ACETATE 10 MG/ML
1 SUSPENSION/ DROPS OPHTHALMIC 2 TIMES DAILY
COMMUNITY
Start: 2020-09-15 | End: 2020-12-28

## 2020-10-06 RX ORDER — CEPHALEXIN 500 MG/1
CAPSULE ORAL
COMMUNITY
Start: 2020-09-30 | End: 2020-10-21

## 2020-10-06 RX ORDER — UREA 200 MG/G
GEL TOPICAL AS NEEDED
COMMUNITY
End: 2021-08-02
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
## 2020-10-15 ENCOUNTER — TRANSCRIBE ORDERS (OUTPATIENT)
Dept: PREADMISSION TESTING | Facility: HOSPITAL | Age: 70
End: 2020-10-15

## 2020-10-15 DIAGNOSIS — Z01.818 OTHER SPECIFIED PRE-OPERATIVE EXAMINATION: Primary | ICD-10-CM

## 2020-10-20 DIAGNOSIS — G45.1 HEMISPHERIC CAROTID ARTERY SYNDROME: ICD-10-CM

## 2020-10-20 DIAGNOSIS — E11.8 TYPE 2 DIABETES MELLITUS WITH COMPLICATION (HCC): ICD-10-CM

## 2020-10-20 RX ORDER — CLOPIDOGREL BISULFATE 75 MG/1
75 TABLET ORAL DAILY
Qty: 30 TABLET | Refills: 0 | Status: SHIPPED | OUTPATIENT
Start: 2020-10-20 | End: 2020-10-20

## 2020-10-20 RX ORDER — CLOPIDOGREL BISULFATE 75 MG/1
TABLET ORAL
Qty: 90 TABLET | Refills: 0 | Status: SHIPPED | OUTPATIENT
Start: 2020-10-20 | End: 2020-11-30

## 2020-10-20 RX ORDER — GLIMEPIRIDE 4 MG/1
TABLET ORAL
Qty: 60 TABLET | OUTPATIENT
Start: 2020-10-20

## 2020-10-20 NOTE — TELEPHONE ENCOUNTER
LOV  10.15.19  NOV  12.12.19 NO SHOW     03.04.20 NO SHOW     03.30.20 PT CANCELED     NO APPT SCHEDULED

## 2020-10-21 ENCOUNTER — APPOINTMENT (OUTPATIENT)
Dept: PREADMISSION TESTING | Facility: HOSPITAL | Age: 70
End: 2020-10-21

## 2020-10-21 VITALS
SYSTOLIC BLOOD PRESSURE: 127 MMHG | OXYGEN SATURATION: 98 % | HEIGHT: 69 IN | TEMPERATURE: 97.3 F | RESPIRATION RATE: 20 BRPM | WEIGHT: 171.9 LBS | DIASTOLIC BLOOD PRESSURE: 73 MMHG | HEART RATE: 94 BPM | BODY MASS INDEX: 25.46 KG/M2

## 2020-10-21 LAB
ANION GAP SERPL CALCULATED.3IONS-SCNC: 9.6 MMOL/L (ref 5–15)
BUN SERPL-MCNC: 16 MG/DL (ref 8–23)
BUN/CREAT SERPL: 18.8 (ref 7–25)
CALCIUM SPEC-SCNC: 9.4 MG/DL (ref 8.6–10.5)
CHLORIDE SERPL-SCNC: 104 MMOL/L (ref 98–107)
CO2 SERPL-SCNC: 25.4 MMOL/L (ref 22–29)
CREAT SERPL-MCNC: 0.85 MG/DL (ref 0.76–1.27)
DEPRECATED RDW RBC AUTO: 40.7 FL (ref 37–54)
ERYTHROCYTE [DISTWIDTH] IN BLOOD BY AUTOMATED COUNT: 13.2 % (ref 12.3–15.4)
GFR SERPL CREATININE-BSD FRML MDRD: 89 ML/MIN/1.73
GLUCOSE SERPL-MCNC: 241 MG/DL (ref 65–99)
HCT VFR BLD AUTO: 38.6 % (ref 37.5–51)
HGB BLD-MCNC: 12.8 G/DL (ref 13–17.7)
MCH RBC QN AUTO: 28.2 PG (ref 26.6–33)
MCHC RBC AUTO-ENTMCNC: 33.2 G/DL (ref 31.5–35.7)
MCV RBC AUTO: 85 FL (ref 79–97)
PLATELET # BLD AUTO: 224 10*3/MM3 (ref 140–450)
PMV BLD AUTO: 10.1 FL (ref 6–12)
POTASSIUM SERPL-SCNC: 4.6 MMOL/L (ref 3.5–5.2)
RBC # BLD AUTO: 4.54 10*6/MM3 (ref 4.14–5.8)
SODIUM SERPL-SCNC: 139 MMOL/L (ref 136–145)
WBC # BLD AUTO: 5.83 10*3/MM3 (ref 3.4–10.8)

## 2020-10-21 PROCEDURE — 93005 ELECTROCARDIOGRAM TRACING: CPT

## 2020-10-21 PROCEDURE — 93010 ELECTROCARDIOGRAM REPORT: CPT | Performed by: INTERNAL MEDICINE

## 2020-10-21 PROCEDURE — 85027 COMPLETE CBC AUTOMATED: CPT | Performed by: SURGERY

## 2020-10-21 PROCEDURE — 36415 COLL VENOUS BLD VENIPUNCTURE: CPT

## 2020-10-21 PROCEDURE — 80048 BASIC METABOLIC PNL TOTAL CA: CPT | Performed by: SURGERY

## 2020-10-21 RX ORDER — CHLORHEXIDINE GLUCONATE 500 MG/1
CLOTH TOPICAL
COMMUNITY
End: 2020-10-27 | Stop reason: HOSPADM

## 2020-10-21 NOTE — DISCHARGE INSTRUCTIONS
CHLORHEXIDINE CLOTH INSTRUCTIONS  The morning of surgery follow these instructions using the Chlorhexidine cloths you've been given.  These steps reduce bacteria on the body.  Do not use the cloths near your eyes, ears mouth, genitalia or on open wounds.  Throw the cloths away after use but do not try to flush them down a toilet.      • Open and remove one cloth at a time from the package.    • Leave the cloth unfolded and begin the bathing.  • Massage the skin with the cloths using gentle pressure to remove bacteria.  Do not scrub harshly.   • Follow the steps below with one 2% CHG cloth per area (6 total cloths).  • One cloth for neck, shoulders and chest.  • One cloth for both arms, hands, fingers and underarms (do underarms last).  • One cloth for the abdomen followed by groin.  • One cloth for right leg and foot including between the toes.  • One cloth for left leg and foot including between the toes.  • The last cloth is to be used for the back of the neck, back and buttocks.    Allow the CHG to air dry 3 minutes on the skin which will give it time to work and decrease the chance of irritation.  The skin may feel sticky until it is dry.  Do not rinse with water or any other liquid or you will lose the beneficial effects of the CHG.  If mild skin irritation occurs, do rinse the skin to remove the CHG.  Report this to the nurse at time of admission.  Do not apply lotions, creams, ointments, deodorants or perfumes after using the clothes. Dress in clean clothes before coming to the hospital.    Take the following medications the morning of surgery:  PANTOPRAZOLE    ARRIVAL TIME 1100 TO MAIN OR       If you are on prescription narcotic pain medication to control your pain you may also take that medication the morning of surgery.    General Instructions:  • Do not eat solid food after midnight the night before surgery.  • You may drink clear liquids day of surgery but must stop at least one hour before your  hospital arrival time  -CUTOFF TIME 1000  • It is beneficial for you to have a clear drink that contains carbohydrates the day of surgery.  We suggest a 12 to 20 ounce bottle of Gatorade or Powerade for non-diabetic patients or a 12 to 20 ounce bottle of G2 or Powerade Zero for diabetic patients. (Pediatric patients, are not advised to drink a 12 to 20 ounce carbohydrate drink)    Clear liquids are liquids you can see through.  Nothing red in color.     Plain water                               Sports drinks  Sodas                                   Gelatin (Jell-O)  Fruit juices without pulp such as white grape juice and apple juice  Popsicles that contain no fruit or yogurt  Tea or coffee (no cream or milk added)  Gatorade / Powerade  G2 / Powerade Zero    • Infants may have breast milk up to four hours before surgery.  • Infants drinking formula may drink formula up to six hours before surgery.   • Patients who avoid smoking, chewing tobacco and alcohol for 4 weeks prior to surgery have a reduced risk of post-operative complications.  Quit smoking as many days before surgery as you can.  • Do not smoke, use chewing tobacco or drink alcohol the day of surgery.   • If applicable bring your C-PAP/ BI-PAP machine.  • Bring any papers given to you in the doctor’s office.  • Wear clean comfortable clothes.  • Do not wear contact lenses, false eyelashes or make-up.  Bring a case for your glasses.   • Bring crutches or walker if applicable.  • Remove all piercings.  Leave jewelry and any other valuables at home.  • Hair extensions with metal clips must be removed prior to surgery.  • The Pre-Admission Testing nurse will instruct you to bring medications if unable to obtain an accurate list in Pre-Admission Testing.            Preventing a Surgical Site Infection:  • For 2 to 3 days before surgery, avoid shaving with a razor because the razor can irritate skin and make it easier to develop an infection.    • Any areas of  open skin can increase the risk of a post-operative wound infection by allowing bacteria to enter and travel throughout the body.  Notify your surgeon if you have any skin wounds / rashes even if it is not near the expected surgical site.  The area will need assessed to determine if surgery should be delayed until it is healed.  • The night prior to surgery shower using a fresh bar of anti-bacterial soap (such as Dial) and clean washcloth.  Sleep in a clean bed with clean clothing.  Do not allow pets to sleep with you.  • Shower on the morning of surgery using a fresh bar of anti-bacterial soap (such as Dial) and clean washcloth.  Dry with a clean towel and dress in clean clothing.  • Ask your surgeon if you will be receiving antibiotics prior to surgery.  • Make sure you, your family, and all healthcare providers clean their hands with soap and water or an alcohol based hand  before caring for you or your wound.    Day of surgery:  Your arrival time is approximately two hours before your scheduled surgery time.  Upon arrival, a Pre-op nurse and Anesthesiologist will review your health history, obtain vital signs, and answer questions you may have.  The only belongings needed at this time will be a list of your home medications and if applicable your C-PAP/BI-PAP machine.  If you are staying overnight your family can leave the rest of your belongings in the car and bring them to your room later.  A Pre-op nurse will start an IV and you may receive medication in preparation for surgery, including something to help you relax.  While you are in surgery your family should notify the waiting room  if they leave the waiting room area and provide a contact phone number.    Please be aware that surgery does come with discomfort.  We want to make every effort to control your discomfort so please discuss any uncontrolled symptoms with your nurse.   Your doctor will most likely have prescribed pain  medications.      If you are going home after surgery you will receive individualized written care instructions before being discharged.  A responsible adult must drive you to and from the hospital on the day of your surgery and stay with you for 24 hours.    If you are staying overnight following surgery, you will be transported to your hospital room following the recovery period.  [facility] has [facility] private rooms.    If you have any questions please call Pre-Admission Testing at [phone].  Deductibles and co-payments are collected on the day of service. Please be prepared to pay the required co-pay, deductible or deposit on the day of service as defined by your plan.    Patient Education for Self-Quarantine Process    Following your COVID testing, we strongly recommend that you do not leave your home after you have been tested for COVID except to get medical care. This includes not going to work, school or to public areas.  If this is not possible for you to do please limit your activities to only required outings.  Be sure to wear a mask when you are with other people, practice social distancing and wash your hands frequently.      The following items provide additional details to keep you safe.  • Wash your hands with soap and water frequently for at least 20 seconds.   • Avoid touching your eyes, nose and mouth with unwashed hands.  • Do not share anything - utensils, towels, food from the same bowl.   • Have your own utensils, drinking glass, dishes, towels and bedding.   • Do not have visitors.   • Do use FaceTime to stay in touch with family and friends.  • You should stay in a specific room away from others if possible.   • Stay at least 6 feet away from others in the home if you cannot have a dedicated room to yourself.   • Do not snuggle with your pet. While the CDC says there is no evidence that pets can spread COVID-19 or be infected from humans, it is probably best to avoid  “petting, snuggling, being kissed or licked and sharing food (during self-quarantine)”, according to the CDC.   • Sanitize household surfaces daily. Include all high touch areas (door handles, light switches, phones, countertops, etc.)  • Do not share a bathroom with others, if possible.   • Wear a mask around others in your home if you are unable to stay in a separate room or 6 feet apart. If  you are unable to wear a mask, have your family member wear a mask if they must be within 6 feet of you.   Call your surgeon immediately if you experience any of the following symptoms:  • Sore Throat  • Shortness of Breath or difficulty breathing  • Cough  • Chills  • Body soreness or muscle pain  • Headache  • Fever  • New loss of taste or smell  • Do not arrive for your surgery ill.  Your procedure will need to be rescheduled to another time.  You will need to call your physician before the day of surgery to avoid any unnecessary exposure to hospital staff as well as other patients.

## 2020-10-24 ENCOUNTER — LAB (OUTPATIENT)
Dept: LAB | Facility: HOSPITAL | Age: 70
End: 2020-10-24

## 2020-10-24 DIAGNOSIS — Z01.818 OTHER SPECIFIED PRE-OPERATIVE EXAMINATION: ICD-10-CM

## 2020-10-24 PROCEDURE — C9803 HOPD COVID-19 SPEC COLLECT: HCPCS

## 2020-10-24 PROCEDURE — U0004 COV-19 TEST NON-CDC HGH THRU: HCPCS | Performed by: INTERNAL MEDICINE

## 2020-10-26 LAB — SARS-COV-2 RNA RESP QL NAA+PROBE: NOT DETECTED

## 2020-10-27 ENCOUNTER — HOSPITAL ENCOUNTER (OUTPATIENT)
Facility: HOSPITAL | Age: 70
Setting detail: HOSPITAL OUTPATIENT SURGERY
Discharge: HOME | End: 2020-10-27
Attending: SURGERY | Admitting: SURGERY

## 2020-10-27 ENCOUNTER — ANESTHESIA EVENT (OUTPATIENT)
Dept: PERIOP | Facility: HOSPITAL | Age: 70
End: 2020-10-27

## 2020-10-27 ENCOUNTER — ANESTHESIA (OUTPATIENT)
Dept: PERIOP | Facility: HOSPITAL | Age: 70
End: 2020-10-27

## 2020-10-27 VITALS
TEMPERATURE: 97.6 F | HEIGHT: 69 IN | WEIGHT: 171.5 LBS | BODY MASS INDEX: 25.4 KG/M2 | OXYGEN SATURATION: 100 % | DIASTOLIC BLOOD PRESSURE: 99 MMHG | HEART RATE: 74 BPM | SYSTOLIC BLOOD PRESSURE: 180 MMHG | RESPIRATION RATE: 16 BRPM

## 2020-10-27 DIAGNOSIS — D03.62 MELANOMA IN SITU OF LEFT UPPER EXTREMITY INCLUDING SHOULDER (HCC): Primary | ICD-10-CM

## 2020-10-27 LAB
GLUCOSE BLDC GLUCOMTR-MCNC: 153 MG/DL (ref 70–130)
GLUCOSE BLDC GLUCOMTR-MCNC: 194 MG/DL (ref 70–130)

## 2020-10-27 PROCEDURE — 25010000002 ONDANSETRON PER 1 MG: Performed by: NURSE ANESTHETIST, CERTIFIED REGISTERED

## 2020-10-27 PROCEDURE — 88342 IMHCHEM/IMCYTCHM 1ST ANTB: CPT | Performed by: SURGERY

## 2020-10-27 PROCEDURE — 88305 TISSUE EXAM BY PATHOLOGIST: CPT | Performed by: SURGERY

## 2020-10-27 PROCEDURE — 88341 IMHCHEM/IMCYTCHM EA ADD ANTB: CPT | Performed by: SURGERY

## 2020-10-27 PROCEDURE — 15200 FTH/GFT FR TRNK 20 SQ CM/<: CPT | Performed by: SURGERY

## 2020-10-27 PROCEDURE — 25010000002 FENTANYL CITRATE (PF) 100 MCG/2ML SOLUTION: Performed by: NURSE ANESTHETIST, CERTIFIED REGISTERED

## 2020-10-27 PROCEDURE — 25010000003 CEFAZOLIN IN DEXTROSE 2-4 GM/100ML-% SOLUTION: Performed by: SURGERY

## 2020-10-27 PROCEDURE — 25010000002 PROPOFOL 10 MG/ML EMULSION: Performed by: NURSE ANESTHETIST, CERTIFIED REGISTERED

## 2020-10-27 PROCEDURE — 11602 EXC TR-EXT MAL+MARG 1.1-2 CM: CPT | Performed by: SURGERY

## 2020-10-27 PROCEDURE — 82962 GLUCOSE BLOOD TEST: CPT

## 2020-10-27 RX ORDER — FENTANYL CITRATE 50 UG/ML
INJECTION, SOLUTION INTRAMUSCULAR; INTRAVENOUS AS NEEDED
Status: DISCONTINUED | OUTPATIENT
Start: 2020-10-27 | End: 2020-10-27 | Stop reason: SURG

## 2020-10-27 RX ORDER — DIPHENHYDRAMINE HCL 25 MG
25 CAPSULE ORAL
Status: DISCONTINUED | OUTPATIENT
Start: 2020-10-27 | End: 2020-10-27 | Stop reason: HOSPADM

## 2020-10-27 RX ORDER — DIPHENHYDRAMINE HYDROCHLORIDE 50 MG/ML
12.5 INJECTION INTRAMUSCULAR; INTRAVENOUS
Status: DISCONTINUED | OUTPATIENT
Start: 2020-10-27 | End: 2020-10-27 | Stop reason: HOSPADM

## 2020-10-27 RX ORDER — LIDOCAINE HYDROCHLORIDE 10 MG/ML
0.5 INJECTION, SOLUTION EPIDURAL; INFILTRATION; INTRACAUDAL; PERINEURAL ONCE AS NEEDED
Status: DISCONTINUED | OUTPATIENT
Start: 2020-10-27 | End: 2020-10-27 | Stop reason: HOSPADM

## 2020-10-27 RX ORDER — ONDANSETRON 2 MG/ML
4 INJECTION INTRAMUSCULAR; INTRAVENOUS ONCE AS NEEDED
Status: DISCONTINUED | OUTPATIENT
Start: 2020-10-27 | End: 2020-10-27 | Stop reason: HOSPADM

## 2020-10-27 RX ORDER — CEFAZOLIN SODIUM 2 G/100ML
2 INJECTION, SOLUTION INTRAVENOUS ONCE
Status: COMPLETED | OUTPATIENT
Start: 2020-10-27 | End: 2020-10-27

## 2020-10-27 RX ORDER — ONDANSETRON 2 MG/ML
INJECTION INTRAMUSCULAR; INTRAVENOUS AS NEEDED
Status: DISCONTINUED | OUTPATIENT
Start: 2020-10-27 | End: 2020-10-27 | Stop reason: SURG

## 2020-10-27 RX ORDER — PROPOFOL 10 MG/ML
VIAL (ML) INTRAVENOUS AS NEEDED
Status: DISCONTINUED | OUTPATIENT
Start: 2020-10-27 | End: 2020-10-27 | Stop reason: SURG

## 2020-10-27 RX ORDER — FLUMAZENIL 0.1 MG/ML
0.2 INJECTION INTRAVENOUS AS NEEDED
Status: DISCONTINUED | OUTPATIENT
Start: 2020-10-27 | End: 2020-10-27 | Stop reason: HOSPADM

## 2020-10-27 RX ORDER — MIDAZOLAM HYDROCHLORIDE 1 MG/ML
1 INJECTION INTRAMUSCULAR; INTRAVENOUS
Status: DISCONTINUED | OUTPATIENT
Start: 2020-10-27 | End: 2020-10-27 | Stop reason: HOSPADM

## 2020-10-27 RX ORDER — MAGNESIUM HYDROXIDE 1200 MG/15ML
LIQUID ORAL AS NEEDED
Status: DISCONTINUED | OUTPATIENT
Start: 2020-10-27 | End: 2020-10-27 | Stop reason: HOSPADM

## 2020-10-27 RX ORDER — HYDROCODONE BITARTRATE AND ACETAMINOPHEN 5; 325 MG/1; MG/1
1 TABLET ORAL EVERY 6 HOURS PRN
Qty: 12 TABLET | Refills: 0 | Status: SHIPPED | OUTPATIENT
Start: 2020-10-27 | End: 2020-11-06

## 2020-10-27 RX ORDER — NALOXONE HCL 0.4 MG/ML
0.2 VIAL (ML) INJECTION AS NEEDED
Status: DISCONTINUED | OUTPATIENT
Start: 2020-10-27 | End: 2020-10-27 | Stop reason: HOSPADM

## 2020-10-27 RX ORDER — OXYCODONE AND ACETAMINOPHEN 7.5; 325 MG/1; MG/1
1 TABLET ORAL ONCE AS NEEDED
Status: DISCONTINUED | OUTPATIENT
Start: 2020-10-27 | End: 2020-10-27 | Stop reason: HOSPADM

## 2020-10-27 RX ORDER — SODIUM CHLORIDE 0.9 % (FLUSH) 0.9 %
3 SYRINGE (ML) INJECTION EVERY 12 HOURS SCHEDULED
Status: DISCONTINUED | OUTPATIENT
Start: 2020-10-27 | End: 2020-10-27 | Stop reason: HOSPADM

## 2020-10-27 RX ORDER — EPHEDRINE SULFATE 50 MG/ML
5 INJECTION, SOLUTION INTRAVENOUS ONCE AS NEEDED
Status: DISCONTINUED | OUTPATIENT
Start: 2020-10-27 | End: 2020-10-27 | Stop reason: HOSPADM

## 2020-10-27 RX ORDER — HYDRALAZINE HYDROCHLORIDE 20 MG/ML
5 INJECTION INTRAMUSCULAR; INTRAVENOUS
Status: DISCONTINUED | OUTPATIENT
Start: 2020-10-27 | End: 2020-10-27 | Stop reason: HOSPADM

## 2020-10-27 RX ORDER — BUPIVACAINE HYDROCHLORIDE 5 MG/ML
INJECTION, SOLUTION PERINEURAL AS NEEDED
Status: DISCONTINUED | OUTPATIENT
Start: 2020-10-27 | End: 2020-10-27 | Stop reason: HOSPADM

## 2020-10-27 RX ORDER — SODIUM CHLORIDE 0.9 % (FLUSH) 0.9 %
3-10 SYRINGE (ML) INJECTION AS NEEDED
Status: DISCONTINUED | OUTPATIENT
Start: 2020-10-27 | End: 2020-10-27 | Stop reason: HOSPADM

## 2020-10-27 RX ORDER — HYDROMORPHONE HYDROCHLORIDE 1 MG/ML
0.5 INJECTION, SOLUTION INTRAMUSCULAR; INTRAVENOUS; SUBCUTANEOUS
Status: DISCONTINUED | OUTPATIENT
Start: 2020-10-27 | End: 2020-10-27 | Stop reason: HOSPADM

## 2020-10-27 RX ORDER — GINSENG 100 MG
CAPSULE ORAL AS NEEDED
Status: DISCONTINUED | OUTPATIENT
Start: 2020-10-27 | End: 2020-10-27 | Stop reason: HOSPADM

## 2020-10-27 RX ORDER — LABETALOL HYDROCHLORIDE 5 MG/ML
5 INJECTION, SOLUTION INTRAVENOUS
Status: DISCONTINUED | OUTPATIENT
Start: 2020-10-27 | End: 2020-10-27 | Stop reason: HOSPADM

## 2020-10-27 RX ORDER — HYDROCODONE BITARTRATE AND ACETAMINOPHEN 7.5; 325 MG/1; MG/1
1 TABLET ORAL ONCE AS NEEDED
Status: DISCONTINUED | OUTPATIENT
Start: 2020-10-27 | End: 2020-10-27 | Stop reason: HOSPADM

## 2020-10-27 RX ORDER — SODIUM CHLORIDE, SODIUM LACTATE, POTASSIUM CHLORIDE, CALCIUM CHLORIDE 600; 310; 30; 20 MG/100ML; MG/100ML; MG/100ML; MG/100ML
9 INJECTION, SOLUTION INTRAVENOUS CONTINUOUS
Status: DISCONTINUED | OUTPATIENT
Start: 2020-10-27 | End: 2020-10-27 | Stop reason: HOSPADM

## 2020-10-27 RX ORDER — FENTANYL CITRATE 50 UG/ML
50 INJECTION, SOLUTION INTRAMUSCULAR; INTRAVENOUS
Status: DISCONTINUED | OUTPATIENT
Start: 2020-10-27 | End: 2020-10-27 | Stop reason: HOSPADM

## 2020-10-27 RX ORDER — LIDOCAINE HYDROCHLORIDE 20 MG/ML
INJECTION, SOLUTION INFILTRATION; PERINEURAL AS NEEDED
Status: DISCONTINUED | OUTPATIENT
Start: 2020-10-27 | End: 2020-10-27 | Stop reason: SURG

## 2020-10-27 RX ADMIN — FENTANYL CITRATE 50 MCG: 50 INJECTION INTRAMUSCULAR; INTRAVENOUS at 13:17

## 2020-10-27 RX ADMIN — SODIUM CHLORIDE, POTASSIUM CHLORIDE, SODIUM LACTATE AND CALCIUM CHLORIDE 9 ML/HR: 600; 310; 30; 20 INJECTION, SOLUTION INTRAVENOUS at 12:00

## 2020-10-27 RX ADMIN — FENTANYL CITRATE 25 MCG: 50 INJECTION INTRAMUSCULAR; INTRAVENOUS at 13:47

## 2020-10-27 RX ADMIN — LIDOCAINE HYDROCHLORIDE 100 MG: 20 INJECTION, SOLUTION INFILTRATION; PERINEURAL at 13:17

## 2020-10-27 RX ADMIN — ONDANSETRON HYDROCHLORIDE 4 MG: 2 SOLUTION INTRAMUSCULAR; INTRAVENOUS at 14:19

## 2020-10-27 RX ADMIN — FENTANYL CITRATE 25 MCG: 50 INJECTION INTRAMUSCULAR; INTRAVENOUS at 13:25

## 2020-10-27 RX ADMIN — CEFAZOLIN SODIUM 2 G: 2 INJECTION, SOLUTION INTRAVENOUS at 13:20

## 2020-10-27 RX ADMIN — PROPOFOL 200 MG: 10 INJECTION, EMULSION INTRAVENOUS at 13:17

## 2020-10-27 RX ADMIN — FENTANYL CITRATE 25 MCG: 50 INJECTION INTRAMUSCULAR; INTRAVENOUS at 14:26

## 2020-10-27 NOTE — ANESTHESIA PREPROCEDURE EVALUATION
Anesthesia Evaluation     Patient summary reviewed and Nursing notes reviewed   no history of anesthetic complications:  NPO Solid Status: > 8 hours  NPO Liquid Status: > 2 hours           Airway   Mallampati: II  TM distance: >3 FB  Neck ROM: full  No difficulty expected  Dental    (+) upper dentures    Pulmonary     breath sounds clear to auscultation  (+) pneumonia resolved ,   Cardiovascular   Exercise tolerance: good (4-7 METS)    ECG reviewed  Rhythm: regular  Rate: normal    (+) hypertension, dysrhythmias Atrial Fib, hyperlipidemia,  carotid artery disease carotid bilateral    ROS comment: Echo reviewed: EF 52%, Grade II DD    Neuro/Psych  (+) TIA, dizziness/light headedness,     GI/Hepatic/Renal/Endo    (+)  GERD,  diabetes mellitus type 2 using insulin,     Musculoskeletal     Abdominal    Substance History      OB/GYN          Other                        Anesthesia Plan    ASA 3     general     intravenous induction     Anesthetic plan, all risks, benefits, and alternatives have been provided, discussed and informed consent has been obtained with: patient.    Plan discussed with CRNA.

## 2020-10-27 NOTE — ANESTHESIA PROCEDURE NOTES
Airway  Urgency: elective    Date/Time: 10/27/2020 1:18 PM  Airway not difficult    General Information and Staff    Patient location during procedure: OR  Anesthesiologist: Soren Willingham MD  CRNA: Yolette Spencer CRNA    Indications and Patient Condition  Indications for airway management: airway protection    Preoxygenated: yes  MILS not maintained throughout  Mask difficulty assessment: 1 - vent by mask    Final Airway Details  Final airway type: supraglottic airway      Successful airway: classic and LMA  Size 5    Number of attempts at approach: 1  Assessment: lips, teeth, and gum same as pre-op    Additional Comments  Inflated to seal.

## 2020-10-27 NOTE — ANESTHESIA POSTPROCEDURE EVALUATION
Patient: Harry Sierra    Procedure Summary     Date: 10/27/20 Room / Location: [facility] OR  / [facility] MAIN OR    Anesthesia Start: 1308 Anesthesia Stop: 1436    Procedure: EXCISION OF MELANOMA FROM THE LEFT FOREARM REQUIRING FULL THICKNESS SKIN GRAFT (Left ) Diagnosis:       Melanoma in situ of left upper extremity including shoulder (CMS/HCC)      (Melanoma in situ of left upper extremity including shoulder (CMS/HCC) [D03.62])    Surgeon: Nick Nuñez MD Provider: Soren Willingham MD    Anesthesia Type: general ASA Status: 3          Anesthesia Type: general    Vitals  Vitals Value Taken Time   /99 10/27/20 1440   Temp     Pulse 88 10/27/20 1442   Resp     SpO2 97 % 10/27/20 1442   Vitals shown include unvalidated device data.        Post Anesthesia Care and Evaluation    Patient location during evaluation: PACU  Patient participation: complete - patient participated  Level of consciousness: awake and alert  Pain management: adequate  Airway patency: patent  Anesthetic complications: No anesthetic complications    Cardiovascular status: acceptable  Respiratory status: acceptable  Hydration status: acceptable    Comments: ---------------------------               10/27/20                      1143         ---------------------------   BP:          174/98         Pulse:         97           Resp:          18           Temp:   36.6 °C (97.9 °F)   SpO2:         100%         ---------------------------

## 2020-10-29 LAB
LAB AP CASE REPORT: NORMAL
LAB AP CLINICAL INFORMATION: NORMAL
LAB AP SYNOPTIC CHECKLIST: NORMAL
PATH REPORT.FINAL DX SPEC: NORMAL
PATH REPORT.GROSS SPEC: NORMAL

## 2020-11-06 ENCOUNTER — OFFICE VISIT (OUTPATIENT)
Dept: SURGERY | Facility: CLINIC | Age: 70
End: 2020-11-06

## 2020-11-06 DIAGNOSIS — D03.62 MELANOMA IN SITU OF LEFT UPPER EXTREMITY INCLUDING SHOULDER (HCC): Primary | ICD-10-CM

## 2020-11-06 PROCEDURE — 99024 POSTOP FOLLOW-UP VISIT: CPT | Performed by: SURGERY

## 2020-11-06 NOTE — PROGRESS NOTES
CHIEF COMPLAINT:     Follow-up wide local excision of melanoma in situ     HISTORY OF PRESENT ILLNESS:     Harry Sierra is a 69 y.o. male who underwent excision of melanoma in situ on 10/27/2020.  I placed a full-thickness skin graft harvested from the left abdominal wall.  A Rest-On sponge is in place over the skin graft.   He is here today to have the Rest-On sponge removed from the skin graft.     EXAM:     Alert. Oriented.  Lungs: Clear.  Heart: Regular.  Abdomen: Soft.  Nondistended.  The skin graft harvest site is healing.  Extremities: Warm. I removed Rest-On sponge. The full-thickness skin graft has 100% adherence.  There is no surrounding cellulitis.  There is no extremity edema.     We discussed the path report.  There was residual melanoma in situ in the specimen, but margins were negative.     ASSESSMENT:     Excision melanoma in situ of the left arm with full-thickness skin grafting on 10/27/2020     PLAN:    I removed Rest-On sponge from the left arm today.  He will apply bacitracin or Neosporin ointment to the skin surface and keep the grafted area covered.  Follow-up in 1 week

## 2020-11-17 ENCOUNTER — OFFICE VISIT (OUTPATIENT)
Dept: SURGERY | Facility: CLINIC | Age: 70
End: 2020-11-17

## 2020-11-17 DIAGNOSIS — D03.62 MELANOMA IN SITU OF LEFT UPPER EXTREMITY INCLUDING SHOULDER (HCC): Primary | ICD-10-CM

## 2020-11-17 PROCEDURE — 99024 POSTOP FOLLOW-UP VISIT: CPT | Performed by: SURGERY

## 2020-11-17 NOTE — PROGRESS NOTES
CHIEF COMPLAINT:    Follow-up wide local excision of melanoma in situ    HISTORY OF PRESENT ILLNESS:    Harry Sierra is a 69 y.o. male who underwent excision of melanoma in situ on 10/27/2020.  I placed a full-thickness skin graft harvested from the left abdominal wall.  At his last office visit the rest on sponge was removed.  He has been applying Neosporin ointment to the skin surface and keeping it covered.  There is 100% adherence of the skin graft.    EXAM:    Alert. Oriented.  Lungs: Clear.  Heart: Regular.  Abdomen: Soft.  Nondistended.  The skin graft harvest site is healed..  Extremities: Warm.  The full-thickness skin graft has 100% adherence.  There is no surrounding cellulitis.  There is no extremity edema.    We again discussed the path report.  There was residual melanoma in situ in the specimen, but margins were negative.    ASSESSMENT:    Excision melanoma in situ of the left arm with full-thickness skin grafting on 10/27/2020    PLAN:    It is okay to wet the skin graft in the shower.    It is not necessary to keep the grafted area covered.  He has follow-up arranged with Dr Becker for surveillance.  Will return to see me as needed.

## 2020-11-26 DIAGNOSIS — G45.1 HEMISPHERIC CAROTID ARTERY SYNDROME: ICD-10-CM

## 2020-11-30 RX ORDER — CLOPIDOGREL BISULFATE 75 MG/1
TABLET ORAL
Qty: 30 TABLET | Refills: 1 | Status: SHIPPED | OUTPATIENT
Start: 2020-11-30 | End: 2021-03-09

## 2020-12-28 ENCOUNTER — OFFICE VISIT (OUTPATIENT)
Dept: FAMILY MEDICINE CLINIC | Facility: CLINIC | Age: 70
End: 2020-12-28

## 2020-12-28 VITALS
SYSTOLIC BLOOD PRESSURE: 120 MMHG | DIASTOLIC BLOOD PRESSURE: 80 MMHG | HEIGHT: 69 IN | WEIGHT: 177 LBS | HEART RATE: 87 BPM | OXYGEN SATURATION: 99 % | TEMPERATURE: 97.6 F | BODY MASS INDEX: 26.22 KG/M2

## 2020-12-28 DIAGNOSIS — R97.20 ABNORMAL PSA: ICD-10-CM

## 2020-12-28 DIAGNOSIS — E78.5 HYPERLIPIDEMIA LDL GOAL <70: ICD-10-CM

## 2020-12-28 DIAGNOSIS — I65.23 BILATERAL CAROTID ARTERY STENOSIS: ICD-10-CM

## 2020-12-28 DIAGNOSIS — C61 PROSTATE CANCER (HCC): ICD-10-CM

## 2020-12-28 DIAGNOSIS — E78.5 HYPERLIPIDEMIA ASSOCIATED WITH TYPE 2 DIABETES MELLITUS (HCC): ICD-10-CM

## 2020-12-28 DIAGNOSIS — Z79.899 HIGH RISK MEDICATION USE: ICD-10-CM

## 2020-12-28 DIAGNOSIS — Z79.01 LONG TERM CURRENT USE OF ANTICOAGULANT THERAPY: ICD-10-CM

## 2020-12-28 DIAGNOSIS — E11.69 HYPERLIPIDEMIA ASSOCIATED WITH TYPE 2 DIABETES MELLITUS (HCC): ICD-10-CM

## 2020-12-28 DIAGNOSIS — I10 ESSENTIAL HYPERTENSION: ICD-10-CM

## 2020-12-28 DIAGNOSIS — IMO0002 DIABETES MELLITUS TYPE 2, UNCONTROLLED, WITH COMPLICATIONS: ICD-10-CM

## 2020-12-28 DIAGNOSIS — I48.91 ATRIAL FIBRILLATION, UNSPECIFIED TYPE (HCC): Primary | ICD-10-CM

## 2020-12-28 DIAGNOSIS — IMO0002 TYPE II DIABETES MELLITUS WITH NEUROLOGICAL MANIFESTATIONS, UNCONTROLLED: ICD-10-CM

## 2020-12-28 PROBLEM — B34.8 INFECTION DUE TO PARAINFLUENZA VIRUS 2: Status: RESOLVED | Noted: 2018-11-13 | Resolved: 2020-12-28

## 2020-12-28 PROCEDURE — 99214 OFFICE O/P EST MOD 30 MIN: CPT | Performed by: FAMILY MEDICINE

## 2020-12-28 NOTE — PROGRESS NOTES
MARIA E Sierra is a 70 y.o. male who is here to establish a new primary care physician.  Previous physician retired.  Also previous endocrinologist left the practice?  Patient apparently is on Levemir as well has other diabetic medication and has a subcutaneous glucose monitor and reports blood sugars have been under good control.  Patient's father was a local physician.  Other medical issues reviewed and discussed.  Also reviewed old records including reported low-grade prostate cancer.  Hospitalization 2 years ago with severe pneumonia and says almost .  Recently had wide excision of a melanoma.      Review of Systems   Constitutional: Negative for unexpected weight change.   Respiratory: Negative for shortness of breath.    Cardiovascular: Negative for chest pain, palpitations and leg swelling.   All other systems reviewed and are negative.        Past Medical History:   Diagnosis Date   • Claustrophobia    • Diabetes mellitus (CMS/HCC)    • GERD (gastroesophageal reflux disease)    • History of gastric ulcer    • History of pneumonia 2018    MULTIFOCAL, HOSPITALIZED X 6 DAYS   • History of shingles 2016   • History of TIA (transient ischemic attack)     X2   • Hyperlipidemia    • Hypertension    • Infection due to parainfluenza virus 2 2018   • Melanoma (CMS/HCC)     LEFT FOREARM    • Prostate cancer (CMS/HCC)     FOLLOWED BY DR ALVARADO   • Vertebral artery insufficiency    • Vertigo        Past Surgical History:   Procedure Laterality Date   • CATARACT EXTRACTION     • COLONOSCOPY       Dr Reyn[facility]   • ENDOSCOPY N/A 12/15/2016    Procedure: ESOPHAGOGASTRODUODENOSCOPY WITH COLD BIOPSIES;  Surgeon: Moshe Lux MD;  Location: [facility] ENDOSCOPY;  Service:    • SKIN GRAFT SPLIT THICKNESS Left 10/27/2020    Procedure: EXCISION OF MELANOMA FROM THE LEFT FOREARM REQUIRING FULL THICKNESS SKIN GRAFT;  Surgeon: Nick Nuñez MD;  Location: [facility] MAIN OR;  Service: General;   Laterality: Left;       Family History   Problem Relation Age of Onset   • Diabetes Mother    • Heart disease Mother    • Uterine cancer Mother    • Diabetes Father    • Heart disease Father    • Kidney disease Father    • Skin cancer Father         melanoma   • Cancer Father         testicle   • Malig Hyperthermia Neg Hx        Social History     Socioeconomic History   • Marital status:      Spouse name: Not on file   • Number of children: 4   • Years of education: Not on file   • Highest education level: Not on file   Tobacco Use   • Smoking status: Never Smoker   • Smokeless tobacco: Never Used   Substance and Sexual Activity   • Alcohol use: No   • Drug use: No   • Sexual activity: Defer       Vitals:    12/28/20 1453   BP: 120/80   Pulse: 87   Temp: 97.6 °F (36.4 °C)   SpO2: 99%        Body mass index is 25.95 kg/m².      Physical Exam  Vitals signs and nursing note reviewed.   Constitutional:       General: He is not in acute distress.     Appearance: Normal appearance. He is well-developed and normal weight. He is not ill-appearing.   HENT:      Head: Normocephalic and atraumatic.      Nose:      Comments: Patient wearing mask.  Physician wearing mask and shield  Eyes:      Conjunctiva/sclera: Conjunctivae normal.      Pupils: Pupils are equal, round, and reactive to light.   Neck:      Musculoskeletal: Normal range of motion and neck supple.      Thyroid: No thyromegaly.   Cardiovascular:      Rate and Rhythm: Normal rate and regular rhythm.      Heart sounds: Normal heart sounds.   Pulmonary:      Effort: Pulmonary effort is normal. No respiratory distress.      Breath sounds: Normal breath sounds.   Abdominal:      General: There is no distension.      Palpations: Abdomen is soft. There is no mass.      Tenderness: There is no abdominal tenderness.      Hernia: No hernia is present.   Musculoskeletal: Normal range of motion.         General: No tenderness or deformity.   Lymphadenopathy:       Cervical: No cervical adenopathy.   Skin:     General: Skin is warm and dry.      Coloration: Skin is not pale.      Findings: No rash.   Neurological:      General: No focal deficit present.      Mental Status: He is alert and oriented to person, place, and time. Mental status is at baseline.      Motor: No abnormal muscle tone.      Coordination: Coordination normal.   Psychiatric:         Mood and Affect: Mood normal.         Behavior: Behavior normal.         Thought Content: Thought content normal.         Judgment: Judgment normal.           Assessment/Plan    Diagnoses and all orders for this visit:    1. Atrial fibrillation, unspecified type (CMS/HCC) (Primary)    2. Bilateral carotid artery stenosis    3. Essential hypertension    4. Type II diabetes mellitus with neurological manifestations, uncontrolled (CMS/HCC)    5. Hyperlipidemia associated with type 2 diabetes mellitus (CMS/HCC)    6. Diabetes mellitus type 2, uncontrolled, with complications (CMS/HCC)    7. Abnormal PSA    8. Long term current use of anticoagulant therapy    9. Prostate cancer (CMS/HCC)        Patient establishing a new primary care physician.  Previous physicians retired?  Will return in the morning for routine lab work as discussed.  Otherwise recommend appointment for Medicare wellness evaluation at next appointment in 3 months.    This note includes information entered using a voice recognition dictation system.  Though reviewed, some nonsensible errors may remain.

## 2021-01-11 DIAGNOSIS — G45.1 HEMISPHERIC CAROTID ARTERY SYNDROME: ICD-10-CM

## 2021-01-11 RX ORDER — CLOPIDOGREL BISULFATE 75 MG/1
TABLET ORAL
Qty: 90 TABLET | OUTPATIENT
Start: 2021-01-11

## 2021-01-14 ENCOUNTER — TELEPHONE (OUTPATIENT)
Dept: FAMILY MEDICINE CLINIC | Facility: CLINIC | Age: 71
End: 2021-01-14

## 2021-01-14 ENCOUNTER — HOSPITAL ENCOUNTER (EMERGENCY)
Facility: HOSPITAL | Age: 71
Discharge: HOME | End: 2021-01-14
Attending: EMERGENCY MEDICINE | Admitting: EMERGENCY MEDICINE

## 2021-01-14 ENCOUNTER — APPOINTMENT (OUTPATIENT)
Dept: GENERAL RADIOLOGY | Facility: HOSPITAL | Age: 71
End: 2021-01-14

## 2021-01-14 VITALS
SYSTOLIC BLOOD PRESSURE: 156 MMHG | OXYGEN SATURATION: 100 % | WEIGHT: 169 LBS | BODY MASS INDEX: 25.03 KG/M2 | HEART RATE: 86 BPM | DIASTOLIC BLOOD PRESSURE: 86 MMHG | TEMPERATURE: 97.2 F | HEIGHT: 69 IN | RESPIRATION RATE: 17 BRPM

## 2021-01-14 DIAGNOSIS — R07.89 ATYPICAL CHEST PAIN: Primary | ICD-10-CM

## 2021-01-14 DIAGNOSIS — R00.2 PALPITATIONS: ICD-10-CM

## 2021-01-14 LAB
ALBUMIN SERPL-MCNC: 4.4 G/DL (ref 3.5–5.2)
ALBUMIN/GLOB SERPL: 2.1 G/DL
ALP SERPL-CCNC: 108 U/L (ref 39–117)
ALT SERPL W P-5'-P-CCNC: 13 U/L (ref 1–41)
ANION GAP SERPL CALCULATED.3IONS-SCNC: 9.6 MMOL/L (ref 5–15)
AST SERPL-CCNC: 14 U/L (ref 1–40)
BASOPHILS # BLD AUTO: 0.01 10*3/MM3 (ref 0–0.2)
BASOPHILS NFR BLD AUTO: 0.2 % (ref 0–1.5)
BILIRUB SERPL-MCNC: 0.5 MG/DL (ref 0–1.2)
BUN SERPL-MCNC: 17 MG/DL (ref 8–23)
BUN/CREAT SERPL: 24.6 (ref 7–25)
CALCIUM SPEC-SCNC: 9.3 MG/DL (ref 8.6–10.5)
CHLORIDE SERPL-SCNC: 105 MMOL/L (ref 98–107)
CO2 SERPL-SCNC: 24.4 MMOL/L (ref 22–29)
CREAT SERPL-MCNC: 0.69 MG/DL (ref 0.76–1.27)
DEPRECATED RDW RBC AUTO: 41.1 FL (ref 37–54)
EOSINOPHIL # BLD AUTO: 0.03 10*3/MM3 (ref 0–0.4)
EOSINOPHIL NFR BLD AUTO: 0.6 % (ref 0.3–6.2)
ERYTHROCYTE [DISTWIDTH] IN BLOOD BY AUTOMATED COUNT: 13.5 % (ref 12.3–15.4)
GFR SERPL CREATININE-BSD FRML MDRD: 113 ML/MIN/1.73
GLOBULIN UR ELPH-MCNC: 2.1 GM/DL
GLUCOSE SERPL-MCNC: 95 MG/DL (ref 65–99)
HCT VFR BLD AUTO: 37.9 % (ref 37.5–51)
HGB BLD-MCNC: 13.3 G/DL (ref 13–17.7)
HOLD SPECIMEN: NORMAL
HOLD SPECIMEN: NORMAL
IMM GRANULOCYTES # BLD AUTO: 0.03 10*3/MM3 (ref 0–0.05)
IMM GRANULOCYTES NFR BLD AUTO: 0.6 % (ref 0–0.5)
LYMPHOCYTES # BLD AUTO: 1.63 10*3/MM3 (ref 0.7–3.1)
LYMPHOCYTES NFR BLD AUTO: 31.5 % (ref 19.6–45.3)
MCH RBC QN AUTO: 29.4 PG (ref 26.6–33)
MCHC RBC AUTO-ENTMCNC: 35.1 G/DL (ref 31.5–35.7)
MCV RBC AUTO: 83.8 FL (ref 79–97)
MONOCYTES # BLD AUTO: 0.56 10*3/MM3 (ref 0.1–0.9)
MONOCYTES NFR BLD AUTO: 10.8 % (ref 5–12)
NEUTROPHILS NFR BLD AUTO: 2.91 10*3/MM3 (ref 1.7–7)
NEUTROPHILS NFR BLD AUTO: 56.3 % (ref 42.7–76)
NRBC BLD AUTO-RTO: 0 /100 WBC (ref 0–0.2)
PLATELET # BLD AUTO: 211 10*3/MM3 (ref 140–450)
PMV BLD AUTO: 10.5 FL (ref 6–12)
POTASSIUM SERPL-SCNC: 4.1 MMOL/L (ref 3.5–5.2)
PROT SERPL-MCNC: 6.5 G/DL (ref 6–8.5)
QT INTERVAL: 372 MS
RBC # BLD AUTO: 4.52 10*6/MM3 (ref 4.14–5.8)
SODIUM SERPL-SCNC: 139 MMOL/L (ref 136–145)
TROPONIN T SERPL-MCNC: <0.01 NG/ML (ref 0–0.03)
TROPONIN T SERPL-MCNC: <0.01 NG/ML (ref 0–0.03)
WBC # BLD AUTO: 5.17 10*3/MM3 (ref 3.4–10.8)
WHOLE BLOOD HOLD SPECIMEN: NORMAL
WHOLE BLOOD HOLD SPECIMEN: NORMAL

## 2021-01-14 PROCEDURE — 84484 ASSAY OF TROPONIN QUANT: CPT

## 2021-01-14 PROCEDURE — 99284 EMERGENCY DEPT VISIT MOD MDM: CPT

## 2021-01-14 PROCEDURE — 71045 X-RAY EXAM CHEST 1 VIEW: CPT

## 2021-01-14 PROCEDURE — 93010 ELECTROCARDIOGRAM REPORT: CPT | Performed by: INTERNAL MEDICINE

## 2021-01-14 PROCEDURE — 93005 ELECTROCARDIOGRAM TRACING: CPT

## 2021-01-14 PROCEDURE — 80053 COMPREHEN METABOLIC PANEL: CPT

## 2021-01-14 PROCEDURE — 36415 COLL VENOUS BLD VENIPUNCTURE: CPT

## 2021-01-14 PROCEDURE — 85025 COMPLETE CBC W/AUTO DIFF WBC: CPT

## 2021-01-14 RX ORDER — SODIUM CHLORIDE 0.9 % (FLUSH) 0.9 %
10 SYRINGE (ML) INJECTION AS NEEDED
Status: DISCONTINUED | OUTPATIENT
Start: 2021-01-14 | End: 2021-01-14 | Stop reason: HOSPADM

## 2021-01-14 NOTE — ED NOTES
Pt verbalized understanding to f/u with his pcp and cardiology.      Trupti Huston, SANJUANA  01/14/21 3722

## 2021-01-14 NOTE — ED NOTES
"Pt arrives with intermittent \"heavy\" and \"quivering\" in the chest since about 1830 yesterday. Reports he received the 1st dose of the covid vaccine yesterday around 1430. Pt a&ox4, abc's intact, NAD noted.     Pt noted to have mask on when this RN entered the room. This RN wore appropriate PPE throughout our encounter. Hand hygiene performed upon entering and exiting room.         Tanna Earl, RN  01/14/21 1407    "

## 2021-01-14 NOTE — TELEPHONE ENCOUNTER
Caller: Harry Sierra    Relationship to patient: Self    Best call back number: [phone]    Chief complaint: Patient received the Covid-19 vaccine yesterday at 2:30 pm and stated for 12-14 hours he has experienced heart heaviness and fluttering . Hub advised to go to ER .     Patient directed to call 911 or go to their nearest emergency room.     Patient verbalized understanding: [x] Yes  [] No  If no, why?    Additional notes:n/a

## 2021-01-14 NOTE — ED TRIAGE NOTES
"Pt to ER via PV. Pt c/o intermittent left anterior cp that started last night around 1830. Pt states it's a \"heavinesss\" and \"quivering\" feeling     Denies cardiac hx     Patient in mask. This RN in appropriate PPE - including mask, goggles, and gloves during all of patient care.     "

## 2021-01-14 NOTE — ED PROVIDER NOTES
EMERGENCY DEPARTMENT ENCOUNTER    Room Number:  24/24  PCP: Kurt Alvarado MD  Historian: Patient  History Limited By: Nothing      HPI  Chief Complaint: Chest pain  Context: Harry Sierra is a 70 y.o. male who presents to the ED c/o chest pain.  Patient states he received his Covid vaccination yesterday at 1430 patient states.  Since then he has been having discomfort in his chest.  He describes it as a quivering in his left chest.  States it is been intermittent and lasts for about 2 to 3 seconds.  He denies chest pressure or chest tightness.  Has had no radiation of symptoms.  Has had no swelling of his lower extremities.  Has had no vomiting or diarrhea.  Patient has had no other rash or allergic symptoms from vaccination.  Patient states it feels like his heart is fluttering.      Location: Left chest  Radiation: None  Character: Quivering or fluttering  Duration: Since 1830 yesterday  Severity: Moderate  Progression: Not improving  Aggravating Factors: Nothing  Alleviating Factors: Nothing        MEDICAL RECORD REVIEW    Patient has no cardiac history          PAST MEDICAL HISTORY  Active Ambulatory Problems     Diagnosis Date Noted   • Hypertension 08/30/2016   • Diabetes mellitus type 2, uncontrolled, with complications (CMS/[facility]) 08/30/2016   • Routine health maintenance 08/30/2016   • Abnormal PSA 09/01/2016   • Reflux esophagitis 12/01/2016   • Melena 12/01/2016   • Bilateral carotid artery stenosis 12/01/2016   • Transient cerebral ischemia 09/14/2017   • Hyperlipidemia LDL goal <70 12/12/2017   • Prostate cancer (CMS/[facility]) 03/13/2018   • Sleep disturbance 09/18/2018   • Multilobar lung infiltrate, secondary bacterial infection 11/14/2018   • Sepsis - present on admission 11/14/2018   • Constipation 11/14/2018   • Valvular heart disease 11/15/2018   • Multifocal pneumonia 01/24/2019   • Type II diabetes mellitus with neurological manifestations, uncontrolled (CMS/[facility]) 01/24/2019   • HTN (hypertension)  01/24/2019   • Near syncope 01/24/2019   • Hyperlipidemia associated with type 2 diabetes mellitus (CMS/HCC) 12/08/2019   • Hyperinsulinism 12/08/2019   • Melanoma in situ of left upper extremity including shoulder (CMS/HCC) 10/06/2020     Resolved Ambulatory Problems     Diagnosis Date Noted   • Prostatitis 09/26/2016   • UTI (urinary tract infection) due to urinary indwelling catheter (CMS/HCC) 09/27/2016   • Infection due to parainfluenza virus 2 11/13/2018   • Dehydration 11/13/2018   • Atrial fibrillation (new) 11/13/2018   • Long term current use of anticoagulant therapy 01/24/2019   • Atrial fibrillation (CMS/HCC) 01/24/2019     Past Medical History:   Diagnosis Date   • Claustrophobia    • Diabetes mellitus (CMS/HCC)    • GERD (gastroesophageal reflux disease)    • History of gastric ulcer    • History of pneumonia 2018   • History of shingles 2016   • History of TIA (transient ischemic attack)    • Hyperlipidemia    • Melanoma (CMS/HCC)    • Vertebral artery insufficiency    • Vertigo          PAST SURGICAL HISTORY  Past Surgical History:   Procedure Laterality Date   • CATARACT EXTRACTION     • COLONOSCOPY      2011 Dr Reynoso [facility]   • ENDOSCOPY N/A 12/15/2016    Procedure: ESOPHAGOGASTRODUODENOSCOPY WITH COLD BIOPSIES;  Surgeon: Moshe Lux MD;  Location: [facility] ENDOSCOPY;  Service:    • SKIN GRAFT SPLIT THICKNESS Left 10/27/2020    Procedure: EXCISION OF MELANOMA FROM THE LEFT FOREARM REQUIRING FULL THICKNESS SKIN GRAFT;  Surgeon: Nick Nuñez MD;  Location: [facility] OR;  Service: General;  Laterality: Left;         FAMILY HISTORY  Family History   Problem Relation Age of Onset   • Diabetes Mother    • Heart disease Mother    • Uterine cancer Mother    • Diabetes Father    • Heart disease Father    • Kidney disease Father    • Skin cancer Father         melanoma   • Cancer Father         testicle   • Malig Hyperthermia Neg Hx          SOCIAL HISTORY  Social History      Socioeconomic History   • Marital status:      Spouse name: Not on file   • Number of children: 4   • Years of education: Not on file   • Highest education level: Not on file   Tobacco Use   • Smoking status: Never Smoker   • Smokeless tobacco: Never Used   Substance and Sexual Activity   • Alcohol use: No   • Drug use: No   • Sexual activity: Defer         ALLERGIES  Bee venom, Shellfish-derived products, Aleve [naproxen], Asa [aspirin], and Ibuprofen        REVIEW OF SYSTEMS  Review of Systems   Constitutional: Negative for activity change, appetite change and fever.   HENT: Negative for congestion and sore throat.    Eyes: Negative.    Respiratory: Negative for cough and shortness of breath.    Cardiovascular: Positive for palpitations. Negative for chest pain and leg swelling.   Gastrointestinal: Negative for abdominal pain, diarrhea and vomiting.   Endocrine: Negative.    Genitourinary: Negative for decreased urine volume and dysuria.   Musculoskeletal: Negative for neck pain.   Skin: Negative for rash and wound.   Allergic/Immunologic: Negative.    Neurological: Negative for weakness, numbness and headaches.   Hematological: Negative.    Psychiatric/Behavioral: Negative.    All other systems reviewed and are negative.           PHYSICAL EXAM  ED Triage Vitals   Temp Heart Rate Resp BP SpO2   01/14/21 1224 01/14/21 1224 01/14/21 1224 01/14/21 1321 01/14/21 1224   97.2 °F (36.2 °C) 90 18 129/79 100 %      Temp src Heart Rate Source Patient Position BP Location FiO2 (%)   01/14/21 1224 01/14/21 1408 01/14/21 1408 01/14/21 1408 --   Tympanic Monitor Lying Right arm        Physical Exam   Constitutional: He is oriented to person, place, and time. No distress.   HENT:   Head: Normocephalic and atraumatic.   Eyes: Pupils are equal, round, and reactive to light. EOM are normal.   Neck: Normal range of motion. Neck supple.   Cardiovascular: Normal rate, regular rhythm and normal heart sounds.    Pulmonary/Chest: Effort normal and breath sounds normal. No respiratory distress.   Abdominal: Soft. There is no abdominal tenderness. There is no rebound and no guarding.   Musculoskeletal: Normal range of motion.         General: No edema.   Neurological: He is alert and oriented to person, place, and time. He has normal sensation and normal strength.   Skin: Skin is warm and dry.   Psychiatric: Mood and affect normal.   Nursing note and vitals reviewed.    Patient was wearing a face mask when I entered the room and they continued to wear a mask throughout their stay in the ED.  I wore PPE, including gloves, face mask with shield or face mask with goggles whenever I was in the room with patient.       LAB RESULTS  Recent Results (from the past 24 hour(s))   ECG 12 Lead    Collection Time: 01/14/21 12:33 PM   Result Value Ref Range    QT Interval 372 ms   Comprehensive Metabolic Panel    Collection Time: 01/14/21  1:30 PM    Specimen: Blood   Result Value Ref Range    Glucose 95 65 - 99 mg/dL    BUN 17 8 - 23 mg/dL    Creatinine 0.69 (L) 0.76 - 1.27 mg/dL    Sodium 139 136 - 145 mmol/L    Potassium 4.1 3.5 - 5.2 mmol/L    Chloride 105 98 - 107 mmol/L    CO2 24.4 22.0 - 29.0 mmol/L    Calcium 9.3 8.6 - 10.5 mg/dL    Total Protein 6.5 6.0 - 8.5 g/dL    Albumin 4.40 3.50 - 5.20 g/dL    ALT (SGPT) 13 1 - 41 U/L    AST (SGOT) 14 1 - 40 U/L    Alkaline Phosphatase 108 39 - 117 U/L    Total Bilirubin 0.5 0.0 - 1.2 mg/dL    eGFR Non African Amer 113 >60 mL/min/1.73    Globulin 2.1 gm/dL    A/G Ratio 2.1 g/dL    BUN/Creatinine Ratio 24.6 7.0 - 25.0    Anion Gap 9.6 5.0 - 15.0 mmol/L   Troponin    Collection Time: 01/14/21  1:30 PM    Specimen: Blood   Result Value Ref Range    Troponin T <0.010 0.000 - 0.030 ng/mL   Light Blue Top    Collection Time: 01/14/21  1:30 PM   Result Value Ref Range    Extra Tube hold for add-on    Green Top (Gel)    Collection Time: 01/14/21  1:30 PM   Result Value Ref Range    Extra Tube Hold  for add-ons.    Lavender Top    Collection Time: 01/14/21  1:30 PM   Result Value Ref Range    Extra Tube hold for add-on    Gold Top - SST    Collection Time: 01/14/21  1:30 PM   Result Value Ref Range    Extra Tube Hold for add-ons.    CBC Auto Differential    Collection Time: 01/14/21  1:30 PM    Specimen: Blood   Result Value Ref Range    WBC 5.17 3.40 - 10.80 10*3/mm3    RBC 4.52 4.14 - 5.80 10*6/mm3    Hemoglobin 13.3 13.0 - 17.7 g/dL    Hematocrit 37.9 37.5 - 51.0 %    MCV 83.8 79.0 - 97.0 fL    MCH 29.4 26.6 - 33.0 pg    MCHC 35.1 31.5 - 35.7 g/dL    RDW 13.5 12.3 - 15.4 %    RDW-SD 41.1 37.0 - 54.0 fl    MPV 10.5 6.0 - 12.0 fL    Platelets 211 140 - 450 10*3/mm3    Neutrophil % 56.3 42.7 - 76.0 %    Lymphocyte % 31.5 19.6 - 45.3 %    Monocyte % 10.8 5.0 - 12.0 %    Eosinophil % 0.6 0.3 - 6.2 %    Basophil % 0.2 0.0 - 1.5 %    Immature Grans % 0.6 (H) 0.0 - 0.5 %    Neutrophils, Absolute 2.91 1.70 - 7.00 10*3/mm3    Lymphocytes, Absolute 1.63 0.70 - 3.10 10*3/mm3    Monocytes, Absolute 0.56 0.10 - 0.90 10*3/mm3    Eosinophils, Absolute 0.03 0.00 - 0.40 10*3/mm3    Basophils, Absolute 0.01 0.00 - 0.20 10*3/mm3    Immature Grans, Absolute 0.03 0.00 - 0.05 10*3/mm3    nRBC 0.0 0.0 - 0.2 /100 WBC   Troponin    Collection Time: 01/14/21  3:50 PM    Specimen: Blood   Result Value Ref Range    Troponin T <0.010 0.000 - 0.030 ng/mL       Ordered the above labs and reviewed the results.        RADIOLOGY  XR Chest 1 View   Final Result   No evidence for acute pulmonary process. Follow-up as   clinical indications persist.       This report was finalized on 1/14/2021 2:35 PM by Dr. Rhett Barnard M.D.               Ordered the above noted radiological studies. Reviewed by me in PACS.            PROCEDURES  Procedures      EKG:          EKG time: 1233  Rhythm/Rate: Normal sinus rhythm 87  P waves and AR: Normal P waves  QRS, axis: Normal QRS  ST and T waves: Normal ST-T waves    Interpreted Contemporaneously by  me, independently viewed  Unchanged compared to prior 10/21/2020        MEDICATIONS GIVEN IN ER  Medications   sodium chloride 0.9 % flush 10 mL (has no administration in time range)             PROGRESS AND CONSULTS  ED Course as of Jan 14 1820   Thu Jan 14, 2021   1705 17:05 EST   Patient presents for evaluation of chest pain.  Patient states he did get his immunization yesterday.  Today patient has had quivering in his left chest.  States the symptoms last for a few seconds.  States he has had them here however has had normal telemetry here.  Patient has serial troponins that are negative and heart score that is 3.  Doubt cardiac etiology.  Will be given [facility] to follow-up with.  Patient understands there is diagnostic uncertainty and he is to return for any worsening symptoms.    [SL]      ED Course User Index  [SL] Jayjay Coreas MD           MEDICAL DECISION MAKING      MDM  Number of Diagnoses or Management Options  Atypical chest pain:   Palpitations:      Amount and/or Complexity of Data Reviewed  Clinical lab tests: reviewed and ordered (Serial troponin negative)  Tests in the radiology section of CPT®: reviewed and ordered (Chest x-ray negative)  Tests in the medicine section of CPT®: reviewed               DIAGNOSIS  Final diagnoses:   Atypical chest pain   Palpitations           DISPOSITION  DISCHARGE    Patient discharged in stable condition.    Reviewed implications of results, diagnosis, meds, responsibility to follow up, warning signs and symptoms of possible worsening, potential complications and reasons to return to ER, including worsening palpitations.    Patient/Family voiced understanding of above instructions.    Discussed plan for discharge, as there is no emergent indication for admission. Patient referred to primary care provider for BP management due to today's BP. Pt/family is agreeable and understands need for follow up and repeat testing.  Pt is aware that discharge  does not mean that nothing is wrong but it indicates no emergency is present that requires admission and they must continue care with follow-up as given below or physician of their choice.     FOLLOW-UP  [facility] CARDIOLOG[facility]  [address]  [address]  [facility] [address]  [phone]  Schedule an appointment as soon as possible for a visit           Medication List      Changed    metFORMIN 1000 MG tablet  Commonly known as: GLUCOPHAGE  TAKE 1 TABLET BY MOUTH TWICE DAILY WITH MEALS  What changed: how much to take                Latest Documented Vital Signs:  As of 18:20 EST  BP- 156/86 HR- 86 Temp- 97.2 °F (36.2 °C) (Tympanic) O2 sat- 100%                         Jayjay Coreas MD  01/14/21 1577

## 2021-01-14 NOTE — ED NOTES
Pt reporting feeling as though his sugar was dropping, states he took all his insulin today but hasn't eaten lunch. Measured his BS and it was 65. MD Coreas made aware. Orders to feed patient. Patient given peanut butter, hoda crackers, and pepsi per request.      Tanna Earl, RN  01/14/21 9933

## 2021-01-18 PROBLEM — R07.89 CHEST PAIN, ATYPICAL: Status: ACTIVE | Noted: 2021-01-18

## 2021-01-18 PROBLEM — Z86.79 HISTORY OF ATRIAL FIBRILLATION: Status: ACTIVE | Noted: 2021-01-18

## 2021-01-19 DIAGNOSIS — K21.00 REFLUX ESOPHAGITIS: ICD-10-CM

## 2021-01-19 DIAGNOSIS — I10 ESSENTIAL HYPERTENSION: ICD-10-CM

## 2021-01-19 RX ORDER — ENALAPRIL MALEATE 5 MG/1
TABLET ORAL
Qty: 180 TABLET | Refills: 1 | Status: SHIPPED | OUTPATIENT
Start: 2021-01-19 | End: 2021-07-02 | Stop reason: SINTOL

## 2021-01-19 RX ORDER — PANTOPRAZOLE SODIUM 40 MG/1
40 TABLET, DELAYED RELEASE ORAL DAILY
Qty: 90 TABLET | Refills: 1 | Status: SHIPPED | OUTPATIENT
Start: 2021-01-19 | End: 2021-07-20 | Stop reason: SDUPTHER

## 2021-03-02 DIAGNOSIS — Z23 IMMUNIZATION DUE: ICD-10-CM

## 2021-03-08 DIAGNOSIS — IMO0002 DIABETES MELLITUS TYPE 2, UNCONTROLLED, WITH COMPLICATIONS: Primary | ICD-10-CM

## 2021-03-08 RX ORDER — FLASH GLUCOSE SCANNING READER
EACH MISCELLANEOUS SEE ADMIN INSTRUCTIONS
COMMUNITY
Start: 2021-02-08 | End: 2021-04-02

## 2021-03-08 RX ORDER — PEN NEEDLE, DIABETIC 32GX 5/32"
NEEDLE, DISPOSABLE MISCELLANEOUS
Qty: 100 EACH | Refills: 5 | Status: SHIPPED | OUTPATIENT
Start: 2021-03-08 | End: 2022-12-07 | Stop reason: SDUPTHER

## 2021-03-08 NOTE — TELEPHONE ENCOUNTER
Pt would like to get labs done before his visit on 3/15 due to him not being able to fast until 11:00. Can you please put these orders in?

## 2021-03-09 DIAGNOSIS — G45.1 HEMISPHERIC CAROTID ARTERY SYNDROME: ICD-10-CM

## 2021-03-09 RX ORDER — CLOPIDOGREL BISULFATE 75 MG/1
TABLET ORAL
Qty: 90 TABLET | Refills: 3 | Status: SHIPPED | OUTPATIENT
Start: 2021-03-09 | End: 2022-02-11

## 2021-03-09 NOTE — TELEPHONE ENCOUNTER
Eliana in surgery notified that surgery will need to be cancelled.  Cannot clear for surgery.  Needs to see urology first.    Left non-detailed message for patient to return a call to the clinic RN.       Pt was scheduled for hernia repair tomorrow with Dr Cyr.    LUCRETIA Magallanes, RN     LOV 12/28/20

## 2021-03-10 ENCOUNTER — TELEPHONE (OUTPATIENT)
Dept: FAMILY MEDICINE CLINIC | Facility: CLINIC | Age: 71
End: 2021-03-10

## 2021-03-11 ENCOUNTER — TELEPHONE (OUTPATIENT)
Dept: FAMILY MEDICINE CLINIC | Facility: CLINIC | Age: 71
End: 2021-03-11

## 2021-03-11 NOTE — TELEPHONE ENCOUNTER
[id number] - [place], [place] - [address] AT [address] - [phone]  - [phone]   [phone]    metFORMIN (GLUCOPHAGE) 1000 MG tablet      PATIENT CALLED WANTING TO GET A 500MG TWICE DAILY.     PATIENT IS CUTTING THE TABLETS IN HALF AND PREFERS TAKING A 500 INSTEAD OF CUTTING THE 1000MG      PLEASE ADVISE :     Harry Sierra (Self) [phone] (M)     PATIENT IS LEAVING FOR VACATION SOON ALSO ON Tuesday.

## 2021-03-15 ENCOUNTER — OFFICE VISIT (OUTPATIENT)
Dept: FAMILY MEDICINE CLINIC | Facility: CLINIC | Age: 71
End: 2021-03-15

## 2021-03-15 VITALS
OXYGEN SATURATION: 100 % | TEMPERATURE: 97.6 F | DIASTOLIC BLOOD PRESSURE: 70 MMHG | HEART RATE: 82 BPM | SYSTOLIC BLOOD PRESSURE: 110 MMHG | HEIGHT: 69 IN | RESPIRATION RATE: 18 BRPM | BODY MASS INDEX: 26.66 KG/M2 | WEIGHT: 180 LBS

## 2021-03-15 DIAGNOSIS — IMO0002 DIABETES MELLITUS TYPE 2, UNCONTROLLED, WITH COMPLICATIONS: ICD-10-CM

## 2021-03-15 DIAGNOSIS — Z00.00 MEDICARE ANNUAL WELLNESS VISIT, SUBSEQUENT: Primary | ICD-10-CM

## 2021-03-15 DIAGNOSIS — Z86.79 HISTORY OF ATRIAL FIBRILLATION: ICD-10-CM

## 2021-03-15 DIAGNOSIS — IMO0002 TYPE II DIABETES MELLITUS WITH NEUROLOGICAL MANIFESTATIONS, UNCONTROLLED: ICD-10-CM

## 2021-03-15 DIAGNOSIS — I65.23 BILATERAL CAROTID ARTERY STENOSIS: ICD-10-CM

## 2021-03-15 DIAGNOSIS — I10 ESSENTIAL HYPERTENSION: ICD-10-CM

## 2021-03-15 PROCEDURE — G0439 PPPS, SUBSEQ VISIT: HCPCS | Performed by: FAMILY MEDICINE

## 2021-03-15 NOTE — PROGRESS NOTES
The ABCs of the Annual Wellness Visit  Subsequent Medicare Wellness Visit    Chief Complaint   Patient presents with   • Medicare Wellness-subsequent       Subjective   History of Present Illness:  Harry Sierra is a 70 y.o. male who presents for a Subsequent Medicare Wellness Visit.    HEALTH RISK ASSESSMENT    Recent Hospitalizations:  No hospitalization(s) within the last year.    Current Medical Providers:  Patient Care Team:  Kurt Alvarado MD as PCP - General (Family Medicine)    Smoking Status:  Social History     Tobacco Use   Smoking Status Never Smoker   Smokeless Tobacco Never Used       Alcohol Consumption:  Social History     Substance and Sexual Activity   Alcohol Use No       Depression Screen:   PHQ-2/PHQ-9 Depression Screening 3/15/2021   Little interest or pleasure in doing things 0   Feeling down, depressed, or hopeless 0   Trouble falling or staying asleep, or sleeping too much 0   Feeling tired or having little energy 0   Poor appetite or overeating 0   Feeling bad about yourself - or that you are a failure or have let yourself or your family down 0   Trouble concentrating on things, such as reading the newspaper or watching television 0   Moving or speaking so slowly that other people could have noticed. Or the opposite - being so fidgety or restless that you have been moving around a lot more than usual 0   Thoughts that you would be better off dead, or of hurting yourself in some way 0   Total Score 0   If you checked off any problems, how difficult have these problems made it for you to do your work, take care of things at home, or get along with other people? Not difficult at all       Fall Risk Screen:  STEADI Fall Risk Assessment was completed, and patient is at LOW risk for falls.Assessment completed on:3/15/2021    Health Habits and Functional and Cognitive Screening:  Functional & Cognitive Status 3/15/2021   Do you have difficulty preparing food and eating? No   Do you have  difficulty bathing yourself, getting dressed or grooming yourself? No   Do you have difficulty using the toilet? No   Do you have difficulty moving around from place to place? No   Do you have trouble with steps or getting out of a bed or a chair? No   Current Diet Well Balanced Diet   Dental Exam Up to date   Eye Exam Up to date   Exercise (times per week) 3 times per week   Current Exercises Include Walking   Do you need help using the phone?  No   Are you deaf or do you have serious difficulty hearing?  No   Do you need help with transportation? No   Do you need help shopping? No   Do you need help preparing meals?  No   Do you need help with housework?  No   Do you need help with laundry? No   Do you need help taking your medications? No   Do you need help managing money? No   Do you ever drive or ride in a car without wearing a seat belt? No   Have you felt unusual stress, anger or loneliness in the last month? No   Who do you live with? Spouse   If you need help, do you have trouble finding someone available to you? No   Have you been bothered in the last four weeks by sexual problems? No   Do you have difficulty concentrating, remembering or making decisions? No         Does the patient have evidence of cognitive impairment? No    Asprin use counseling:Contraindicated from taking ASA    Age-appropriate Screening Schedule:  Refer to the list below for future screening recommendations based on patient's age, sex and/or medical conditions. Orders for these recommended tests are listed in the plan section. The patient has been provided with a written plan.    Health Maintenance   Topic Date Due   • URINE MICROALBUMIN  10/18/2019   • HEMOGLOBIN A1C  06/29/2021   • COLONOSCOPY  08/29/2021   • DIABETIC FOOT EXAM  12/28/2021   • LIPID PANEL  12/29/2021   • DIABETIC EYE EXAM  03/08/2022   • TDAP/TD VACCINES (2 - Td) 08/28/2025   • INFLUENZA VACCINE  Completed   • ZOSTER VACCINE  Completed          The following  portions of the patient's history were reviewed and updated as appropriate: past family history, past social history and past surgical history.    Outpatient Medications Prior to Visit   Medication Sig Dispense Refill   • atorvastatin (LIPITOR) 40 MG tablet Take 1 tablet by mouth every night at bedtime. 90 tablet 2   • clopidogrel (PLAVIX) 75 MG tablet TAKE 1 TABLET BY MOUTH DAILY 90 tablet 3   • Continuous Blood Gluc  (FreeStyle Vaishali 14 Day [place]) device See Admin Instructions.     • Continuous Blood Gluc Sensor (FREESTYLE VAISHALI 14 DAY SENSOR) misc AS DIRECTED FOR 14 DAYS 8 each 3   • enalapril (VASOTEC) 5 MG tablet TAKE 1 TABLET BY MOUTH TWICE DAILY 180 tablet 1   • insulin detemir (LEVEMIR) 100 UNIT/ML injection Inject 15 Units under the skin into the appropriate area as directed Daily. (Patient taking differently: Inject 21 Units under the skin into the appropriate area as directed Daily.) 6 pen 3   • Insulin Pen Needle (BD Pen Needle An U/F) 32G X 4 MM misc Use daily to check blood sugar DX: E11.9 100 each 5   • metFORMIN (GLUCOPHAGE) 500 MG tablet Take 1 tablet by mouth 2 (Two) Times a Day With Meals. 180 tablet 3   • Multiple Vitamins-Minerals (CENTRUM SILVER 50+MEN) tablet Take 1 tablet by mouth Daily. HELD FOR OR     • pantoprazole (PROTONIX) 40 MG EC tablet TAKE 1 TABLET BY MOUTH DAILY 90 tablet 1   • urea (CARMOL) 20 % cream Apply  topically to the appropriate area as directed As Needed for Dry Skin.     • glimepiride (AMARYL) 4 MG tablet Take 1 tablet by mouth Every Morning Before Breakfast. 30 tablet 5   • Undecylenic Acid (FUNGI-NAIL EX) Apply 1 application topically 2 (Two) Times a Day.       No facility-administered medications prior to visit.       Patient Active Problem List   Diagnosis   • Hypertension   • Diabetes mellitus type 2, uncontrolled, with complications (CMS/[facility])   • Routine health maintenance   • Abnormal PSA   • Reflux esophagitis   • Melena   • Bilateral carotid artery  "stenosis   • Transient cerebral ischemia   • Hyperlipidemia LDL goal <70   • Prostate cancer (CMS/HCC)   • Sleep disturbance   • Multilobar lung infiltrate, secondary bacterial infection   • Sepsis - present on admission   • Constipation   • Valvular heart disease   • Multifocal pneumonia   • Type II diabetes mellitus with neurological manifestations, uncontrolled (CMS/HCC)   • HTN (hypertension)   • Near syncope   • Hyperlipidemia associated with type 2 diabetes mellitus (CMS/HCC)   • Hyperinsulinism   • Melanoma in situ of left upper extremity including shoulder (CMS/HCC)   • Chest pain, atypical   • History of atrial fibrillation       Advanced Care Planning:  ACP discussion was held with the patient during this visit. Patient does not have an advance directive, information provided.    Review of Systems   Eyes:        Recent cataract surgery with some complications.  Vision improved with glasses   All other systems reviewed and are negative.      Compared to one year ago, the patient feels his physical health is the same.  Compared to one year ago, the patient feels his mental health is the same.    Reviewed chart for potential of high risk medication in the elderly: no  Reviewed chart for potential of harmful drug interactions in the elderly:no    Objective         Vitals:    03/15/21 1109   BP: 110/70   BP Location: Left arm   Pulse: 82   Resp: 18   Temp: 97.6 °F (36.4 °C)   SpO2: 100%   Weight: 81.6 kg (180 lb)   Height: 175.3 cm (69\")       Body mass index is 26.58 kg/m².  Discussed the patient's BMI with him. The BMI is in the acceptable range.    Physical Exam  Vitals and nursing note reviewed.   Constitutional:       General: He is not in acute distress.     Appearance: Normal appearance. He is well-developed. He is not ill-appearing.   HENT:      Head: Normocephalic and atraumatic.      Nose:      Comments: Patient with mask.  Provider with mask and shield  Eyes:      Conjunctiva/sclera: Conjunctivae " normal.      Pupils: Pupils are equal, round, and reactive to light.   Neck:      Thyroid: No thyromegaly.   Cardiovascular:      Rate and Rhythm: Normal rate and regular rhythm.      Heart sounds: Normal heart sounds.   Pulmonary:      Effort: Pulmonary effort is normal. No respiratory distress.      Breath sounds: Normal breath sounds.   Abdominal:      General: Abdomen is flat. There is no distension.      Palpations: There is no mass.      Tenderness: There is no abdominal tenderness.      Hernia: No hernia is present.   Musculoskeletal:         General: No tenderness or deformity. Normal range of motion.      Cervical back: Normal range of motion.   Lymphadenopathy:      Cervical: No cervical adenopathy.   Skin:     General: Skin is warm and dry.      Coloration: Skin is not pale.      Findings: No rash.   Neurological:      General: No focal deficit present.      Mental Status: He is alert and oriented to person, place, and time. Mental status is at baseline.      Motor: No abnormal muscle tone.      Coordination: Coordination normal.   Psychiatric:         Mood and Affect: Mood normal.         Behavior: Behavior normal.         Thought Content: Thought content normal.         Judgment: Judgment normal.         Lab Results   Component Value Date     (H) 12/29/2020    CHLPL 108 12/29/2020    TRIG 55 12/29/2020    HDL 44 12/29/2020    LDL 51 12/29/2020    VLDL 13 12/29/2020    HGBA1C 8.40 (H) 12/29/2020        Assessment/Plan   Medicare Risks and Personalized Health Plan  CMS Preventative Services Quick Reference  Advance Directive Discussion  Cardiovascular risk    The above risks/problems have been discussed with the patient.  Pertinent information has been shared with the patient in the After Visit Summary.  Follow up plans and orders are seen below in the Assessment/Plan Section.    Diagnoses and all orders for this visit:    1. Medicare annual wellness visit, subsequent (Primary)    2. Diabetes  mellitus type 2, uncontrolled, with complications (CMS/HCC)  -     Microalbumin / Creatinine Urine Ratio - Urine, Clean Catch    3. History of atrial fibrillation    4. Bilateral carotid artery stenosis    5. Essential hypertension    6. Type II diabetes mellitus with neurological manifestations, uncontrolled (CMS/[facility])      Follow Up:  Patient here for routine follow-up of above-noted medical issues.  Did have some recent vision problems which seem to be improving.  Recent lab work reviewed and patient reports blood sugars have improved with increased insulin dose.  Some concern with low blood sugars when he goes hiking etc. and this was discussed.  Patient does take cookies to use as needed.  Will be going to [place] for the next 15 days, when returns will recheck A1c as well as urine for microalbumin as discussed.  Has received both Covid vaccines, apparently did have some arrhythmias after initial dose.    An After Visit Summary and PPPS were given to the patient.

## 2021-04-02 DIAGNOSIS — IMO0002 DIABETES MELLITUS TYPE 2, UNCONTROLLED, WITH COMPLICATIONS: Primary | ICD-10-CM

## 2021-04-02 RX ORDER — FLASH GLUCOSE SENSOR
KIT MISCELLANEOUS
Qty: 8 EACH | Refills: 3 | Status: SHIPPED | OUTPATIENT
Start: 2021-04-02 | End: 2022-11-14

## 2021-04-02 RX ORDER — FLASH GLUCOSE SCANNING READER
EACH MISCELLANEOUS
Qty: 1 EACH | Refills: 2 | Status: SHIPPED | OUTPATIENT
Start: 2021-04-02 | End: 2021-10-27 | Stop reason: SDUPTHER

## 2021-04-05 ENCOUNTER — TELEPHONE (OUTPATIENT)
Dept: FAMILY MEDICINE CLINIC | Facility: CLINIC | Age: 71
End: 2021-04-05

## 2021-04-05 DIAGNOSIS — IMO0002 DIABETES MELLITUS TYPE 2, UNCONTROLLED, WITH COMPLICATIONS: Primary | ICD-10-CM

## 2021-04-13 DIAGNOSIS — E16.1 HYPERINSULINISM: ICD-10-CM

## 2021-04-13 DIAGNOSIS — IMO0002 DIABETES MELLITUS TYPE 2, UNCONTROLLED, WITH COMPLICATIONS: ICD-10-CM

## 2021-06-29 ENCOUNTER — TELEPHONE (OUTPATIENT)
Dept: FAMILY MEDICINE CLINIC | Facility: CLINIC | Age: 71
End: 2021-06-29

## 2021-06-29 NOTE — TELEPHONE ENCOUNTER
Caller: Harry Sierra    Relationship to patient: Self    Best call back number: [phone]    Patient is needing: PATIENT STATES HE HAS HAD A COUGH FOR A FEW MONTHS NOW . HE COUGHS DAY AND NIGHT . PATIENT STATED HE IS UNSURE IF THERE IS SOMETHING HE COULD TAKE OR WOULD DR. BARAJAS LIKE TO SEE HIM ?? WHAT WOULD HE RECOMMEND ??    PLEASE ADVISE

## 2021-06-30 ENCOUNTER — TELEPHONE (OUTPATIENT)
Dept: FAMILY MEDICINE CLINIC | Facility: CLINIC | Age: 71
End: 2021-06-30

## 2021-07-01 ENCOUNTER — HOSPITAL ENCOUNTER (OUTPATIENT)
Dept: GENERAL RADIOLOGY | Facility: HOSPITAL | Age: 71
Discharge: HOME | End: 2021-07-01
Admitting: FAMILY MEDICINE

## 2021-07-01 DIAGNOSIS — J40 BRONCHITIS: Primary | ICD-10-CM

## 2021-07-01 DIAGNOSIS — I10 ESSENTIAL HYPERTENSION: ICD-10-CM

## 2021-07-01 DIAGNOSIS — J40 BRONCHITIS: ICD-10-CM

## 2021-07-01 PROCEDURE — 71046 X-RAY EXAM CHEST 2 VIEWS: CPT

## 2021-07-02 ENCOUNTER — TELEPHONE (OUTPATIENT)
Dept: FAMILY MEDICINE CLINIC | Facility: CLINIC | Age: 71
End: 2021-07-02

## 2021-07-02 DIAGNOSIS — IMO0002 DIABETES MELLITUS TYPE 2, UNCONTROLLED, WITH COMPLICATIONS: Primary | ICD-10-CM

## 2021-07-02 DIAGNOSIS — I10 ESSENTIAL HYPERTENSION: ICD-10-CM

## 2021-07-02 RX ORDER — LOSARTAN POTASSIUM 25 MG/1
25 TABLET ORAL DAILY
Qty: 30 TABLET | Refills: 5 | Status: SHIPPED | OUTPATIENT
Start: 2021-07-02 | End: 2021-12-07

## 2021-07-02 NOTE — TELEPHONE ENCOUNTER
Caller: Harry iSerra    Relationship: Self    Best call back number: [phone]    What medication are you requesting: BLOOD PRESSURE MEDICATION    What are your current symptoms: HYPERTENSION    If a prescription is needed, what is your preferred pharmacy and phone number: [id number] - [place], [place] - [address] AT [facility] [address] - [phone]  - [phone] FX     Additional notes: PATIENT STATES DR. BARAJAS WANTED HIM TO CONSIDER CHANGING MEDICATION FOR HIGH BLOOD PRESSURE AS ENALAPRIL MAY NOT BE THE BEST CHOICE FOR HIM. PATIENT CALLING TO FIGURE OUT WHAT OTHER MEDICATION HE CAN TAKE AND IF DR. BARAJAS CAN GET IT CALLED IN. CALLBACK REQUESTED.

## 2021-07-02 NOTE — TELEPHONE ENCOUNTER
Prescription sent for losartan 25 mg daily.  Less likely to cause cough.  Will adjust dose depending on blood pressure response.

## 2021-07-20 DIAGNOSIS — K21.00 REFLUX ESOPHAGITIS: ICD-10-CM

## 2021-07-20 DIAGNOSIS — I10 ESSENTIAL HYPERTENSION: ICD-10-CM

## 2021-07-20 RX ORDER — PANTOPRAZOLE SODIUM 40 MG/1
40 TABLET, DELAYED RELEASE ORAL DAILY
Qty: 90 TABLET | Refills: 1 | OUTPATIENT
Start: 2021-07-20

## 2021-07-20 RX ORDER — ENALAPRIL MALEATE 5 MG/1
TABLET ORAL
Qty: 180 TABLET | Refills: 1 | OUTPATIENT
Start: 2021-07-20

## 2021-07-20 RX ORDER — PANTOPRAZOLE SODIUM 40 MG/1
40 TABLET, DELAYED RELEASE ORAL DAILY
Qty: 90 TABLET | Refills: 1 | Status: SHIPPED | OUTPATIENT
Start: 2021-07-20 | End: 2022-01-12

## 2021-08-02 ENCOUNTER — OFFICE VISIT (OUTPATIENT)
Dept: FAMILY MEDICINE CLINIC | Facility: CLINIC | Age: 71
End: 2021-08-02

## 2021-08-02 VITALS
TEMPERATURE: 96.9 F | BODY MASS INDEX: 25.53 KG/M2 | DIASTOLIC BLOOD PRESSURE: 90 MMHG | OXYGEN SATURATION: 98 % | HEIGHT: 69 IN | SYSTOLIC BLOOD PRESSURE: 140 MMHG | WEIGHT: 172.4 LBS | HEART RATE: 80 BPM | RESPIRATION RATE: 18 BRPM

## 2021-08-02 DIAGNOSIS — Z79.899 HIGH RISK MEDICATION USE: ICD-10-CM

## 2021-08-02 DIAGNOSIS — IMO0002 DIABETES MELLITUS TYPE 2, UNCONTROLLED, WITH COMPLICATIONS: ICD-10-CM

## 2021-08-02 DIAGNOSIS — R97.20 ABNORMAL PSA: ICD-10-CM

## 2021-08-02 DIAGNOSIS — E78.5 HYPERLIPIDEMIA LDL GOAL <70: ICD-10-CM

## 2021-08-02 DIAGNOSIS — I10 ESSENTIAL HYPERTENSION: Primary | ICD-10-CM

## 2021-08-02 PROCEDURE — 99213 OFFICE O/P EST LOW 20 MIN: CPT | Performed by: FAMILY MEDICINE

## 2021-08-02 NOTE — PROGRESS NOTES
MARIA E Sierra is a 70 y.o. male who is here for follow up of diabetes hypertension and other cardiac history.  Denies any new complaints.  Continues with cough much improved after blood pressure medicine was changed from Lasix to ARB.  Most recent A1c remains above goal and will recheck.  Remains on insulin and Metformin.  Also followed by urologist and apparently has low-grade prostate cancer.      Review of Systems   Skin: Positive for rash.   All other systems reviewed and are negative.        Past Medical History:   Diagnosis Date   • Claustrophobia    • Diabetes mellitus (CMS/HCC)    • GERD (gastroesophageal reflux disease)    • History of gastric ulcer    • History of pneumonia 2018    MULTIFOCAL, HOSPITALIZED X 6 DAYS   • History of shingles 2016   • History of TIA (transient ischemic attack)     X2   • Hyperlipidemia    • Hypertension    • Infection due to parainfluenza virus 2 11/13/2018   • Melanoma (CMS/HCC)     LEFT FOREARM    • Prostate cancer (CMS/HCC)     FOLLOWED BY DR ALVARADO   • Vertebral artery insufficiency    • Vertigo        Past Surgical History:   Procedure Laterality Date   • CATARACT EXTRACTION     • COLONOSCOPY      2011 Dr Reynoso [facility]   • ENDOSCOPY N/A 12/15/2016    Procedure: ESOPHAGOGASTRODUODENOSCOPY WITH COLD BIOPSIES;  Surgeon: Moshe Lux MD;  Location: [facility] ENDOSCOPY;  Service:    • SKIN GRAFT SPLIT THICKNESS Left 10/27/2020    Procedure: EXCISION OF MELANOMA FROM THE LEFT FOREARM REQUIRING FULL THICKNESS SKIN GRAFT;  Surgeon: Nick Nuñez MD;  Location: [facility] MAIN OR;  Service: General;  Laterality: Left;       Family History   Problem Relation Age of Onset   • Diabetes Mother    • Heart disease Mother    • Uterine cancer Mother    • Diabetes Father    • Heart disease Father    • Kidney disease Father    • Skin cancer Father         melanoma   • Cancer Father         testicle   • Malig Hyperthermia Neg Hx        Social History     Socioeconomic History    • Marital status:      Spouse name: Not on file   • Number of children: 4   • Years of education: Not on file   • Highest education level: Not on file   Tobacco Use   • Smoking status: Never Smoker   • Smokeless tobacco: Never Used   Vaping Use   • Vaping Use: Never used   Substance and Sexual Activity   • Alcohol use: No   • Drug use: No   • Sexual activity: Defer       Vitals:    08/02/21 0922   BP: 140/90   Pulse: 80   Resp: 18   Temp: 96.9 °F (36.1 °C)   SpO2: 98%        Body mass index is 25.46 kg/m².      Physical Exam  Vitals and nursing note reviewed.   Constitutional:       General: He is not in acute distress.     Appearance: He is well-developed.   HENT:      Head: Normocephalic and atraumatic.      Nose:      Comments: Patient with mask.  Provider with mask and shield  Eyes:      Conjunctiva/sclera: Conjunctivae normal.      Pupils: Pupils are equal, round, and reactive to light.   Neck:      Thyroid: No thyromegaly.   Cardiovascular:      Rate and Rhythm: Normal rate and regular rhythm.   Pulmonary:      Effort: Pulmonary effort is normal. No respiratory distress.      Breath sounds: Normal breath sounds.   Abdominal:      General: There is no distension.      Palpations: There is no mass.      Tenderness: There is no abdominal tenderness.      Hernia: No hernia is present.   Musculoskeletal:         General: No tenderness or deformity. Normal range of motion.      Cervical back: Normal range of motion.   Lymphadenopathy:      Cervical: No cervical adenopathy.   Skin:     General: Skin is warm and dry.      Coloration: Skin is not pale.      Findings: Rash present.   Neurological:      General: No focal deficit present.      Mental Status: He is alert and oriented to person, place, and time.      Motor: No abnormal muscle tone.      Coordination: Coordination normal.   Psychiatric:         Mood and Affect: Mood normal.         Behavior: Behavior normal.         Thought Content: Thought content  normal.         Judgment: Judgment normal.           Assessment/Plan    Diagnoses and all orders for this visit:    1. Essential hypertension (Primary)  -     Comprehensive Metabolic Panel    2. Hyperlipidemia LDL goal <70  -     Lipid Panel    3. Diabetes mellitus type 2, uncontrolled, with complications (CMS/HCC)  -     Lipid Panel  -     Microalbumin / Creatinine Urine Ratio - Urine, Clean Catch    4. Abnormal PSA    5. High risk medication use  -     CBC & Differential  -     Comprehensive Metabolic Panel      Patient here for follow-up especially of diabetes hypertension and hyperlipidemia.  Overall seems to be doing well on current regimen.  Most recent A1c above goal and will be rechecked.  Otherwise continue current therapy with probable follow-up in 3 months.  Health maintenance issues discussed especially has received Covid vaccinations.    This note includes information entered using a voice recognition dictation system.

## 2021-08-03 LAB
ALBUMIN SERPL-MCNC: 4.3 G/DL (ref 3.7–4.7)
ALBUMIN/GLOB SERPL: 2.2 {RATIO} (ref 1.2–2.2)
ALP SERPL-CCNC: 105 IU/L (ref 48–121)
ALT SERPL-CCNC: 12 IU/L (ref 0–44)
AST SERPL-CCNC: 14 IU/L (ref 0–40)
BASOPHILS # BLD AUTO: 0 X10E3/UL (ref 0–0.2)
BASOPHILS NFR BLD AUTO: 0 %
BILIRUB SERPL-MCNC: 0.4 MG/DL (ref 0–1.2)
BUN SERPL-MCNC: 13 MG/DL (ref 8–27)
BUN/CREAT SERPL: 18 (ref 10–24)
CALCIUM SERPL-MCNC: 9.1 MG/DL (ref 8.6–10.2)
CHLORIDE SERPL-SCNC: 106 MMOL/L (ref 96–106)
CHOLEST SERPL-MCNC: 91 MG/DL (ref 100–199)
CO2 SERPL-SCNC: 24 MMOL/L (ref 20–29)
CREAT SERPL-MCNC: 0.72 MG/DL (ref 0.76–1.27)
CREAT UR-MCNC: NORMAL MG/DL
EOSINOPHIL # BLD AUTO: 0 X10E3/UL (ref 0–0.4)
EOSINOPHIL NFR BLD AUTO: 0 %
ERYTHROCYTE [DISTWIDTH] IN BLOOD BY AUTOMATED COUNT: 13.4 % (ref 11.6–15.4)
GLOBULIN SER CALC-MCNC: 2 G/DL (ref 1.5–4.5)
GLUCOSE SERPL-MCNC: 193 MG/DL (ref 65–99)
HCT VFR BLD AUTO: 35.8 % (ref 37.5–51)
HDLC SERPL-MCNC: 40 MG/DL
HGB BLD-MCNC: 12.2 G/DL (ref 13–17.7)
IMM GRANULOCYTES # BLD AUTO: 0 X10E3/UL (ref 0–0.1)
IMM GRANULOCYTES NFR BLD AUTO: 1 %
LDLC SERPL CALC-MCNC: 38 MG/DL (ref 0–99)
LYMPHOCYTES # BLD AUTO: 1.6 X10E3/UL (ref 0.7–3.1)
LYMPHOCYTES NFR BLD AUTO: 30 %
MCH RBC QN AUTO: 29.1 PG (ref 26.6–33)
MCHC RBC AUTO-ENTMCNC: 34.1 G/DL (ref 31.5–35.7)
MCV RBC AUTO: 85 FL (ref 79–97)
MICROALBUMIN UR-MCNC: NORMAL
MONOCYTES # BLD AUTO: 0.5 X10E3/UL (ref 0.1–0.9)
MONOCYTES NFR BLD AUTO: 9 %
NEUTROPHILS # BLD AUTO: 3.1 X10E3/UL (ref 1.4–7)
NEUTROPHILS NFR BLD AUTO: 60 %
PLATELET # BLD AUTO: 177 X10E3/UL (ref 150–450)
POTASSIUM SERPL-SCNC: 4.4 MMOL/L (ref 3.5–5.2)
PROT SERPL-MCNC: 6.3 G/DL (ref 6–8.5)
RBC # BLD AUTO: 4.19 X10E6/UL (ref 4.14–5.8)
REQUEST PROBLEM: NORMAL
SODIUM SERPL-SCNC: 143 MMOL/L (ref 134–144)
TRIGL SERPL-MCNC: 56 MG/DL (ref 0–149)
VLDLC SERPL CALC-MCNC: 13 MG/DL (ref 5–40)
WBC # BLD AUTO: 5.3 X10E3/UL (ref 3.4–10.8)

## 2021-09-03 DIAGNOSIS — I65.23 BILATERAL CAROTID ARTERY STENOSIS: ICD-10-CM

## 2021-09-03 DIAGNOSIS — G45.1 HEMISPHERIC CAROTID ARTERY SYNDROME: ICD-10-CM

## 2021-09-03 RX ORDER — ATORVASTATIN CALCIUM 40 MG/1
40 TABLET, FILM COATED ORAL
Qty: 90 TABLET | Refills: 2 | Status: SHIPPED | OUTPATIENT
Start: 2021-09-03 | End: 2022-05-23

## 2021-10-27 DIAGNOSIS — IMO0002 DIABETES MELLITUS TYPE 2, UNCONTROLLED, WITH COMPLICATIONS: ICD-10-CM

## 2021-10-27 RX ORDER — FLASH GLUCOSE SCANNING READER
1 EACH MISCELLANEOUS SEE ADMIN INSTRUCTIONS
Qty: 1 EACH | Refills: 2 | Status: SHIPPED | OUTPATIENT
Start: 2021-10-27 | End: 2022-11-14

## 2021-10-27 RX ORDER — FLASH GLUCOSE SCANNING READER
EACH MISCELLANEOUS
Qty: 1 EACH | Refills: 2 | Status: SHIPPED | OUTPATIENT
Start: 2021-10-27 | End: 2023-01-31

## 2021-10-27 RX ORDER — FLASH GLUCOSE SCANNING READER
EACH MISCELLANEOUS
Qty: 1 EACH | Refills: 2 | Status: CANCELLED | OUTPATIENT
Start: 2021-10-27

## 2021-10-27 NOTE — TELEPHONE ENCOUNTER
Rx Refill Note  Requested Prescriptions     Pending Prescriptions Disp Refills   • Continuous Blood Gluc  (FreeStyle Vaishali 14 Day [place]) device 1 each 2     Sig: See Admin Instructions.      Last office visit with prescribing clinician: 8/2/2021      Next office visit with prescribing clinician: Visit date not found            Len Becker MA  10/27/21, 15:59 EDT

## 2021-10-27 NOTE — TELEPHONE ENCOUNTER
Rx Refill Note  Requested Prescriptions     Pending Prescriptions Disp Refills   • Continuous Blood Gluc  (FreeStyle Vaishali 14 Day [place]) device [Pharmacy Med Name: FREESTYLE VAISHALI 14DAY [place] DEVICE] 1 each 2     Sig: AS DIRECTED      Last office visit with prescribing clinician: 8/2/2021      Next office visit with prescribing clinician: Visit date not found            Lavonne Bains MA  10/27/21, 16:21 EDT

## 2021-10-27 NOTE — TELEPHONE ENCOUNTER
Caller: [id number] - [place], [place] - [address] AT [address] [facility] [place] - [phone] [facility] [phone] FX    Relationship: Pharmacy      Medication requested (name and dosage):     Requested Prescriptions:   Requested Prescriptions     Pending Prescriptions Disp Refills   • Continuous Blood Gluc  (FreeStyle Vaishali 14 Day [place]) device 1 each 2     Sig: See Admin Instructions.        Pharmacy where request should be sent: [id number] - [place], [place] - [address] AT [address] [facility] [place] - [phone] [facility] [phone] FX        Additional details provided by patient: OUT OF READERS    Best call back number: [phone]    Does the patient have less than a 3 day supply:  [x] Yes  [] No    Anna Marie Pacheco Rep   10/27/21 15:57 EDT

## 2021-11-16 ENCOUNTER — OFFICE VISIT (OUTPATIENT)
Dept: FAMILY MEDICINE CLINIC | Facility: CLINIC | Age: 71
End: 2021-11-16

## 2021-11-16 ENCOUNTER — HOSPITAL ENCOUNTER (OUTPATIENT)
Dept: GENERAL RADIOLOGY | Facility: HOSPITAL | Age: 71
Discharge: HOME | End: 2021-11-16

## 2021-11-16 VITALS
TEMPERATURE: 97.1 F | DIASTOLIC BLOOD PRESSURE: 80 MMHG | BODY MASS INDEX: 25.33 KG/M2 | OXYGEN SATURATION: 98 % | RESPIRATION RATE: 18 BRPM | SYSTOLIC BLOOD PRESSURE: 122 MMHG | WEIGHT: 171 LBS | HEART RATE: 95 BPM | HEIGHT: 69 IN

## 2021-11-16 DIAGNOSIS — Z79.899 HIGH RISK MEDICATION USE: ICD-10-CM

## 2021-11-16 DIAGNOSIS — C61 PROSTATE CANCER (HCC): ICD-10-CM

## 2021-11-16 DIAGNOSIS — M25.561 RIGHT KNEE PAIN, UNSPECIFIED CHRONICITY: Primary | ICD-10-CM

## 2021-11-16 DIAGNOSIS — M25.561 ARTHRALGIA OF RIGHT LOWER LEG: ICD-10-CM

## 2021-11-16 DIAGNOSIS — M25.561 RIGHT KNEE PAIN, UNSPECIFIED CHRONICITY: ICD-10-CM

## 2021-11-16 DIAGNOSIS — IMO0002 DIABETES MELLITUS TYPE 2, UNCONTROLLED, WITH COMPLICATIONS: ICD-10-CM

## 2021-11-16 DIAGNOSIS — E78.5 HYPERLIPIDEMIA ASSOCIATED WITH TYPE 2 DIABETES MELLITUS (HCC): ICD-10-CM

## 2021-11-16 DIAGNOSIS — E11.69 HYPERLIPIDEMIA ASSOCIATED WITH TYPE 2 DIABETES MELLITUS (HCC): ICD-10-CM

## 2021-11-16 PROCEDURE — 73560 X-RAY EXAM OF KNEE 1 OR 2: CPT

## 2021-11-16 PROCEDURE — 99214 OFFICE O/P EST MOD 30 MIN: CPT | Performed by: FAMILY MEDICINE

## 2021-11-16 PROCEDURE — 73590 X-RAY EXAM OF LOWER LEG: CPT

## 2021-11-16 RX ORDER — METHYLPREDNISOLONE 4 MG/1
TABLET ORAL
Qty: 21 TABLET | Refills: 0 | Status: SHIPPED | OUTPATIENT
Start: 2021-11-16 | End: 2022-04-01

## 2021-11-16 NOTE — PROGRESS NOTES
MARIA E Sierra is a 70 y.o. male who is here for swelling of right knee with pain.  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  Unable to take ibuprofen etc. though seems to help but causes GI bleeding.  Patient has diabetes and has not been seen for several months.  Previous diabetic doctor left town and no longer available.  Apparently does have history of prostate cancer and most recent PSA level was somewhat elevated and will be rechecked as well as other routine lab.  Patient will need to make appointment for follow-up and further therapy of diabetes.  Remains on insulin and very likely dose may need to be increased.  Expect blood sugars will increase significantly with cortisone treatment as discussed below.      Review of Systems   Musculoskeletal: Positive for arthralgias, gait problem and joint swelling.   All other systems reviewed and are negative.        Past Medical History:   Diagnosis Date   • Claustrophobia    • Diabetes mellitus (HCC)    • GERD (gastroesophageal reflux disease)    • History of gastric ulcer    • History of pneumonia 2018    MULTIFOCAL, HOSPITALIZED X 6 DAYS   • History of shingles 2016   • History of TIA (transient ischemic attack)     X2   • Hyperlipidemia    • Hypertension    • Infection due to parainfluenza virus 2 11/13/2018   • Melanoma (HCC)     LEFT FOREARM    • Prostate cancer (HCC)     FOLLOWED BY DR ALVARADO   • Vertebral artery insufficiency    • Vertigo        Past Surgical History:   Procedure Laterality Date   • CATARACT EXTRACTION     • COLONOSCOPY      2011 Dr Reynoso [facility]   • ENDOSCOPY N/A 12/15/2016    Procedure: ESOPHAGOGASTRODUODENOSCOPY WITH COLD BIOPSIES;  Surgeon: Moshe Lux MD;  Location: [facility] ENDOSCOPY;  Service:    • SKIN GRAFT SPLIT THICKNESS Left 10/27/2020    Procedure: EXCISION OF MELANOMA FROM THE LEFT FOREARM REQUIRING FULL THICKNESS SKIN GRAFT;  Surgeon:  Nick Nuñez MD;  Location: [facility] OR;  Service: General;  Laterality: Left;       Family History   Problem Relation Age of Onset   • Diabetes Mother    • Heart disease Mother    • Uterine cancer Mother    • Diabetes Father    • Heart disease Father    • Kidney disease Father    • Skin cancer Father         melanoma   • Cancer Father         testicle   • Malig Hyperthermia Neg Hx        Social History     Socioeconomic History   • Marital status:    • Number of children: 4   Tobacco Use   • Smoking status: Never Smoker   • Smokeless tobacco: Never Used   Vaping Use   • Vaping Use: Never used   Substance and Sexual Activity   • Alcohol use: No   • Drug use: No   • Sexual activity: Defer       Vitals:    11/16/21 1005   BP: 122/80   Pulse: 95   Resp: 18   Temp: 97.1 °F (36.2 °C)   SpO2: 98%        Body mass index is 25.25 kg/m².      Physical Exam  Vitals and nursing note reviewed.   Constitutional:       General: He is not in acute distress.     Appearance: He is well-developed.   HENT:      Head: Normocephalic and atraumatic.      Nose:      Comments: Patient with mask.  Provider with mask and shield  Eyes:      Conjunctiva/sclera: Conjunctivae normal.      Pupils: Pupils are equal, round, and reactive to light.   Neck:      Thyroid: No thyromegaly.   Cardiovascular:      Rate and Rhythm: Normal rate and regular rhythm.      Heart sounds: Normal heart sounds.   Pulmonary:      Effort: Pulmonary effort is normal. No respiratory distress.      Breath sounds: Normal breath sounds.   Abdominal:      General: There is no distension.      Palpations: Abdomen is soft. There is no mass.      Tenderness: There is no abdominal tenderness.      Hernia: No hernia is present.   Musculoskeletal:         General: No tenderness. Normal range of motion.      Cervical back: Normal range of motion.      Right knee: Swelling present.      Right lower leg: Swelling and deformity present.      Right ankle: Normal.    Lymphadenopathy:      Cervical: No cervical adenopathy.   Skin:     General: Skin is warm and dry.      Coloration: Skin is not pale.      Findings: No rash.   Neurological:      Mental Status: He is alert and oriented to person, place, and time.      Motor: No abnormal muscle tone.      Coordination: Coordination normal.   Psychiatric:         Behavior: Behavior normal.         Thought Content: Thought content normal.         Judgment: Judgment normal.           Assessment/Plan    Diagnoses and all orders for this visit:    1. Right knee pain, unspecified chronicity (Primary)  -     CBC & Differential  -     Sedimentation Rate  -     C-reactive Protein  -     methylPREDNISolone (Medrol) 4 MG dose pack; follow package directions  Dispense: 21 tablet; Refill: 0  -     XR Knee 1 or 2 View Right; Future    2. Prostate cancer (HCC)  -     PSA DIAGNOSTIC    3. Hyperlipidemia associated with type 2 diabetes mellitus (HCC)  -     Lipid Panel    4. Diabetes mellitus type 2, uncontrolled, with complications (HCC)  -     Comprehensive Metabolic Panel  -     Hemoglobin A1c    5. High risk medication use  -     CBC & Differential  -     Comprehensive Metabolic Panel    6. Arthralgia of right lower leg  -     XR Knee 1 or 2 View Right; Future  -     XR tibia fibula 2 vw right; Future      Patient presents mostly with persistent pain in the right knee he relates to doing work several weeks ago.  Pain and swelling have persisted.  Exam shows slightly warm swollen right knee but significant swelling noted in the right calf area just below the knee.  Some concern because of prostate cancer history and will send for x-rays as noted.  Will give empiric therapy with Medrol Dosepak as discussed.  Patient cannot tolerate NSAIDs because of GI bleed history and Plavix etc.  Patient is diabetic and steroids obviously will increase blood sugar levels but feel benefits outweigh risks at this point.  Patient is past due for follow-up of his  diabetes.  Previous diabetic doctor left town.  Does need to return for more aggressive intervention and will get routine blood work as noted above.  Very likely will need to increase insulin coverage and remain on Metformin.

## 2021-11-17 ENCOUNTER — TELEPHONE (OUTPATIENT)
Dept: FAMILY MEDICINE CLINIC | Facility: CLINIC | Age: 71
End: 2021-11-17

## 2021-11-17 DIAGNOSIS — M79.89 SWOLLEN LEG: Primary | ICD-10-CM

## 2021-11-17 LAB
ALBUMIN SERPL-MCNC: 4.7 G/DL (ref 3.7–4.7)
ALBUMIN/GLOB SERPL: 2 {RATIO} (ref 1.2–2.2)
ALP SERPL-CCNC: 131 IU/L (ref 44–121)
ALT SERPL-CCNC: 14 IU/L (ref 0–44)
AST SERPL-CCNC: 11 IU/L (ref 0–40)
BASOPHILS # BLD AUTO: 0 X10E3/UL (ref 0–0.2)
BASOPHILS NFR BLD AUTO: 0 %
BILIRUB SERPL-MCNC: 0.5 MG/DL (ref 0–1.2)
BUN SERPL-MCNC: 12 MG/DL (ref 8–27)
BUN/CREAT SERPL: 16 (ref 10–24)
CALCIUM SERPL-MCNC: 9.6 MG/DL (ref 8.6–10.2)
CHLORIDE SERPL-SCNC: 101 MMOL/L (ref 96–106)
CHOLEST SERPL-MCNC: 98 MG/DL (ref 100–199)
CO2 SERPL-SCNC: 25 MMOL/L (ref 20–29)
CREAT SERPL-MCNC: 0.76 MG/DL (ref 0.76–1.27)
CRP SERPL-MCNC: 1 MG/L (ref 0–10)
EOSINOPHIL # BLD AUTO: 0 X10E3/UL (ref 0–0.4)
EOSINOPHIL NFR BLD AUTO: 1 %
ERYTHROCYTE [DISTWIDTH] IN BLOOD BY AUTOMATED COUNT: 13.4 % (ref 11.6–15.4)
ERYTHROCYTE [SEDIMENTATION RATE] IN BLOOD BY WESTERGREN METHOD: 6 MM/HR (ref 0–30)
GLOBULIN SER CALC-MCNC: 2.3 G/DL (ref 1.5–4.5)
GLUCOSE SERPL-MCNC: 257 MG/DL (ref 65–99)
HBA1C MFR BLD: 9.5 % (ref 4.8–5.6)
HCT VFR BLD AUTO: 38.2 % (ref 37.5–51)
HDLC SERPL-MCNC: 39 MG/DL
HGB BLD-MCNC: 12.6 G/DL (ref 13–17.7)
IMM GRANULOCYTES # BLD AUTO: 0 X10E3/UL (ref 0–0.1)
IMM GRANULOCYTES NFR BLD AUTO: 1 %
LDLC SERPL CALC-MCNC: 46 MG/DL (ref 0–99)
LYMPHOCYTES # BLD AUTO: 1.4 X10E3/UL (ref 0.7–3.1)
LYMPHOCYTES NFR BLD AUTO: 32 %
MCH RBC QN AUTO: 29 PG (ref 26.6–33)
MCHC RBC AUTO-ENTMCNC: 33 G/DL (ref 31.5–35.7)
MCV RBC AUTO: 88 FL (ref 79–97)
MONOCYTES # BLD AUTO: 0.5 X10E3/UL (ref 0.1–0.9)
MONOCYTES NFR BLD AUTO: 11 %
NEUTROPHILS # BLD AUTO: 2.4 X10E3/UL (ref 1.4–7)
NEUTROPHILS NFR BLD AUTO: 55 %
PLATELET # BLD AUTO: 214 X10E3/UL (ref 150–450)
POTASSIUM SERPL-SCNC: 4.5 MMOL/L (ref 3.5–5.2)
PROT SERPL-MCNC: 7 G/DL (ref 6–8.5)
PSA SERPL-MCNC: 7.4 NG/ML (ref 0–4)
RBC # BLD AUTO: 4.35 X10E6/UL (ref 4.14–5.8)
SODIUM SERPL-SCNC: 141 MMOL/L (ref 134–144)
TRIGL SERPL-MCNC: 57 MG/DL (ref 0–149)
VLDLC SERPL CALC-MCNC: 13 MG/DL (ref 5–40)
WBC # BLD AUTO: 4.3 X10E3/UL (ref 3.4–10.8)

## 2021-11-17 NOTE — TELEPHONE ENCOUNTER
PLEASE CALL PT WITH X-RAY AND LAB RESULTS.  DOES HE NEED A DOPPLER TO RULE OUT BLOOD CLOT?  PLEASE CALL [phone]

## 2021-11-18 DIAGNOSIS — M79.89 SWOLLEN LEG: Primary | ICD-10-CM

## 2021-11-19 ENCOUNTER — HOSPITAL ENCOUNTER (OUTPATIENT)
Dept: CARDIOLOGY | Facility: HOSPITAL | Age: 71
Discharge: HOME | End: 2021-11-19
Admitting: FAMILY MEDICINE

## 2021-11-19 DIAGNOSIS — M79.89 SWOLLEN LEG: ICD-10-CM

## 2021-11-19 LAB
BH CV LOWER VASCULAR LEFT COMMON FEMORAL AUGMENT: NORMAL
BH CV LOWER VASCULAR LEFT COMMON FEMORAL COMPETENT: NORMAL
BH CV LOWER VASCULAR LEFT COMMON FEMORAL COMPRESS: NORMAL
BH CV LOWER VASCULAR LEFT COMMON FEMORAL PHASIC: NORMAL
BH CV LOWER VASCULAR LEFT COMMON FEMORAL SPONT: NORMAL
BH CV LOWER VASCULAR RIGHT COMMON FEMORAL AUGMENT: NORMAL
BH CV LOWER VASCULAR RIGHT COMMON FEMORAL COMPETENT: NORMAL
BH CV LOWER VASCULAR RIGHT COMMON FEMORAL COMPRESS: NORMAL
BH CV LOWER VASCULAR RIGHT COMMON FEMORAL PHASIC: NORMAL
BH CV LOWER VASCULAR RIGHT COMMON FEMORAL SPONT: NORMAL
BH CV LOWER VASCULAR RIGHT DISTAL FEMORAL COMPRESS: NORMAL
BH CV LOWER VASCULAR RIGHT GASTRONEMIUS COMPRESS: NORMAL
BH CV LOWER VASCULAR RIGHT GREATER SAPH AK COMPRESS: NORMAL
BH CV LOWER VASCULAR RIGHT GREATER SAPH BK COMPRESS: NORMAL
BH CV LOWER VASCULAR RIGHT LESSER SAPH COMPRESS: NORMAL
BH CV LOWER VASCULAR RIGHT MID FEMORAL AUGMENT: NORMAL
BH CV LOWER VASCULAR RIGHT MID FEMORAL COMPETENT: NORMAL
BH CV LOWER VASCULAR RIGHT MID FEMORAL COMPRESS: NORMAL
BH CV LOWER VASCULAR RIGHT MID FEMORAL PHASIC: NORMAL
BH CV LOWER VASCULAR RIGHT MID FEMORAL SPONT: NORMAL
BH CV LOWER VASCULAR RIGHT PERONEAL COMPRESS: NORMAL
BH CV LOWER VASCULAR RIGHT POPLITEAL AUGMENT: NORMAL
BH CV LOWER VASCULAR RIGHT POPLITEAL COMPETENT: NORMAL
BH CV LOWER VASCULAR RIGHT POPLITEAL COMPRESS: NORMAL
BH CV LOWER VASCULAR RIGHT POPLITEAL PHASIC: NORMAL
BH CV LOWER VASCULAR RIGHT POPLITEAL SPONT: NORMAL
BH CV LOWER VASCULAR RIGHT POSTERIOR TIBIAL COMPRESS: NORMAL
BH CV LOWER VASCULAR RIGHT PROFUNDA FEMORAL COMPRESS: NORMAL
BH CV LOWER VASCULAR RIGHT PROXIMAL FEMORAL COMPRESS: NORMAL
BH CV LOWER VASCULAR RIGHT SAPHENOFEMORAL JUNCTION COMPRESS: NORMAL
MAXIMAL PREDICTED HEART RATE: 148 BPM
STRESS TARGET HR: 126 BPM

## 2021-11-19 PROCEDURE — 93971 EXTREMITY STUDY: CPT

## 2021-12-07 DIAGNOSIS — IMO0002 DIABETES MELLITUS TYPE 2, UNCONTROLLED, WITH COMPLICATIONS: ICD-10-CM

## 2021-12-07 DIAGNOSIS — I10 ESSENTIAL HYPERTENSION: ICD-10-CM

## 2021-12-07 RX ORDER — LOSARTAN POTASSIUM 25 MG/1
25 TABLET ORAL DAILY
Qty: 90 TABLET | Refills: 3 | Status: SHIPPED | OUTPATIENT
Start: 2021-12-07 | End: 2022-12-12 | Stop reason: SDUPTHER

## 2021-12-07 NOTE — TELEPHONE ENCOUNTER
Rx Refill Note  Requested Prescriptions     Pending Prescriptions Disp Refills   • losartan (COZAAR) 25 MG tablet [Pharmacy Med Name: LOSARTAN 25MG TABLETS] 30 tablet 5     Sig: TAKE 1 TABLET BY MOUTH DAILY      Last office visit with prescribing clinician: 11/16/2021      Next office visit with prescribing clinician: Visit date not found            Lavonne Bains MA  12/07/21, 09:23 EST

## 2022-01-12 DIAGNOSIS — K21.00 REFLUX ESOPHAGITIS: ICD-10-CM

## 2022-01-12 RX ORDER — PANTOPRAZOLE SODIUM 40 MG/1
40 TABLET, DELAYED RELEASE ORAL DAILY
Qty: 90 TABLET | Refills: 1 | Status: SHIPPED | OUTPATIENT
Start: 2022-01-12 | End: 2022-07-11

## 2022-02-11 DIAGNOSIS — G45.1 HEMISPHERIC CAROTID ARTERY SYNDROME: ICD-10-CM

## 2022-02-11 RX ORDER — CLOPIDOGREL BISULFATE 75 MG/1
TABLET ORAL
Qty: 90 TABLET | Refills: 3 | Status: SHIPPED | OUTPATIENT
Start: 2022-02-11 | End: 2022-12-12 | Stop reason: SDUPTHER

## 2022-04-01 ENCOUNTER — OFFICE VISIT (OUTPATIENT)
Dept: FAMILY MEDICINE CLINIC | Facility: CLINIC | Age: 72
End: 2022-04-01

## 2022-04-01 ENCOUNTER — HOSPITAL ENCOUNTER (OUTPATIENT)
Dept: GENERAL RADIOLOGY | Facility: HOSPITAL | Age: 72
Discharge: HOME | End: 2022-04-01
Admitting: FAMILY MEDICINE

## 2022-04-01 VITALS
HEART RATE: 86 BPM | HEIGHT: 69 IN | WEIGHT: 172 LBS | BODY MASS INDEX: 25.48 KG/M2 | OXYGEN SATURATION: 97 % | DIASTOLIC BLOOD PRESSURE: 72 MMHG | TEMPERATURE: 97 F | SYSTOLIC BLOOD PRESSURE: 140 MMHG | RESPIRATION RATE: 20 BRPM

## 2022-04-01 DIAGNOSIS — J40 BRONCHITIS: ICD-10-CM

## 2022-04-01 DIAGNOSIS — Z79.4 TYPE 2 DIABETES MELLITUS WITH DIABETIC NEUROPATHY, WITH LONG-TERM CURRENT USE OF INSULIN: ICD-10-CM

## 2022-04-01 DIAGNOSIS — E11.40 TYPE 2 DIABETES MELLITUS WITH DIABETIC NEUROPATHY, WITH LONG-TERM CURRENT USE OF INSULIN: ICD-10-CM

## 2022-04-01 DIAGNOSIS — J06.9 UPPER RESPIRATORY TRACT INFECTION, UNSPECIFIED TYPE: Primary | ICD-10-CM

## 2022-04-01 LAB
EXPIRATION DATE: NORMAL
INTERNAL CONTROL: NORMAL
Lab: NORMAL
SARS-COV-2 AG UPPER RESP QL IA.RAPID: NOT DETECTED

## 2022-04-01 PROCEDURE — 71046 X-RAY EXAM CHEST 2 VIEWS: CPT

## 2022-04-01 PROCEDURE — 87426 SARSCOV CORONAVIRUS AG IA: CPT | Performed by: FAMILY MEDICINE

## 2022-04-01 PROCEDURE — 99213 OFFICE O/P EST LOW 20 MIN: CPT | Performed by: FAMILY MEDICINE

## 2022-04-01 NOTE — PROGRESS NOTES
MARIA E Sierra is a 71 y.o. male who is here for follow up for the last 12 days.  Concerned because of previous history of pneumonia and bronchitis is.  Cough has been relieved with over-the-counter medicines including Mucinex DM and DayQuil.  Is fully vaccinated for COVID including booster.      Review of Systems   Constitutional: Negative for chills, fatigue and fever.   Respiratory: Positive for cough. Negative for shortness of breath.    Musculoskeletal: Negative for myalgias.   Allergic/Immunologic: Positive for environmental allergies.   All other systems reviewed and are negative.        Past Medical History:   Diagnosis Date   • Claustrophobia    • Diabetes mellitus (HCC)    • GERD (gastroesophageal reflux disease)    • History of gastric ulcer    • History of pneumonia 2018    MULTIFOCAL, HOSPITALIZED X 6 DAYS   • History of shingles 2016   • History of TIA (transient ischemic attack)     X2   • Hyperlipidemia    • Hypertension    • Infection due to parainfluenza virus 2 11/13/2018   • Melanoma (HCC)     LEFT FOREARM    • Prostate cancer (HCC)     FOLLOWED BY DR ALVARADO   • Vertebral artery insufficiency    • Vertigo        Past Surgical History:   Procedure Laterality Date   • CATARACT EXTRACTION     • COLONOSCOPY      2011 Dr Reynoso [facility]   • ENDOSCOPY N/A 12/15/2016    Procedure: ESOPHAGOGASTRODUODENOSCOPY WITH COLD BIOPSIES;  Surgeon: Moshe Lux MD;  Location: [facility] ENDOSCOPY;  Service:    • SKIN GRAFT SPLIT THICKNESS Left 10/27/2020    Procedure: EXCISION OF MELANOMA FROM THE LEFT FOREARM REQUIRING FULL THICKNESS SKIN GRAFT;  Surgeon: Nick Nuñez MD;  Location: [facility] MAIN OR;  Service: General;  Laterality: Left;       Family History   Problem Relation Age of Onset   • Diabetes Mother    • Heart disease Mother    • Uterine cancer Mother    • Diabetes Father    • Heart disease Father    • Kidney disease Father    • Skin cancer Father         melanoma   • Cancer Father          testicle   • Malig Hyperthermia Neg Hx        Social History     Socioeconomic History   • Marital status:    • Number of children: 4   Tobacco Use   • Smoking status: Never Smoker   • Smokeless tobacco: Never Used   Vaping Use   • Vaping Use: Never used   Substance and Sexual Activity   • Alcohol use: No   • Drug use: No   • Sexual activity: Defer       Vitals:    04/01/22 0825   BP: 140/72   Pulse: 86   Resp: 20   Temp: 97 °F (36.1 °C)   SpO2: 97%        Body mass index is 25.4 kg/m².      Physical Exam  Vitals and nursing note reviewed.   Constitutional:       General: He is not in acute distress.     Appearance: He is well-developed.   HENT:      Head: Normocephalic and atraumatic.      Nose:      Comments: Patient with mask.  Provider with N95 mask, gloves, gown, cap and shield  Eyes:      Conjunctiva/sclera: Conjunctivae normal.      Pupils: Pupils are equal, round, and reactive to light.   Neck:      Thyroid: No thyromegaly.   Cardiovascular:      Rate and Rhythm: Normal rate and regular rhythm.      Heart sounds: Normal heart sounds.   Pulmonary:      Effort: Pulmonary effort is normal. No respiratory distress.      Breath sounds: Normal breath sounds. No wheezing or rhonchi.   Abdominal:      General: There is no distension.      Palpations: Abdomen is soft. There is no mass.      Tenderness: There is no abdominal tenderness.      Hernia: No hernia is present.   Musculoskeletal:         General: No tenderness or deformity. Normal range of motion.      Cervical back: Normal range of motion.   Lymphadenopathy:      Cervical: No cervical adenopathy.   Skin:     General: Skin is warm and dry.      Coloration: Skin is not pale.      Findings: No rash.   Neurological:      General: No focal deficit present.      Mental Status: He is alert and oriented to person, place, and time.      Motor: No abnormal muscle tone.      Coordination: Coordination normal.   Psychiatric:         Mood and Affect: Mood normal.          Behavior: Behavior normal.         Thought Content: Thought content normal.         Judgment: Judgment normal.           Assessment/Plan    Diagnoses and all orders for this visit:    1. Upper respiratory tract infection, unspecified type (Primary)  -     POCT SARS-CoV-2 Antigen CHEN    2. Bronchitis  -     XR Chest PA & Lateral; Future    3. Type 2 diabetes mellitus with diabetic neuropathy, with long-term current use of insulin ([facility])  -     Microalbumin / Creatinine Urine Ratio - Urine, Clean Catch        Patient here for a persistent cough.  Apparently controlled with over-the-counter medication.  Concerned because of history of pneumonia and bronchitis with lung damage etc.  Covid test is negative and lungs sound clear.  Oxygen saturation normal.  Will send for chest x-ray but otherwise continue current therapy and return for routine diabetes follow-up visit and annual Medicare wellness evaluation.

## 2022-04-02 LAB
ALBUMIN/CREAT UR: 15 MG/G CREAT (ref 0–29)
CREAT UR-MCNC: 63.6 MG/DL
MICROALBUMIN UR-MCNC: 9.8 UG/ML

## 2022-04-14 ENCOUNTER — OFFICE VISIT (OUTPATIENT)
Dept: FAMILY MEDICINE CLINIC | Facility: CLINIC | Age: 72
End: 2022-04-14

## 2022-04-14 VITALS
DIASTOLIC BLOOD PRESSURE: 88 MMHG | SYSTOLIC BLOOD PRESSURE: 140 MMHG | WEIGHT: 168.4 LBS | HEART RATE: 95 BPM | OXYGEN SATURATION: 99 % | BODY MASS INDEX: 24.94 KG/M2 | RESPIRATION RATE: 16 BRPM | HEIGHT: 69 IN

## 2022-04-14 DIAGNOSIS — R30.0 DYSURIA: Primary | ICD-10-CM

## 2022-04-14 DIAGNOSIS — J40 BRONCHITIS: ICD-10-CM

## 2022-04-14 LAB
BILIRUB BLD-MCNC: NEGATIVE MG/DL
CLARITY, POC: CLEAR
COLOR UR: ABNORMAL
EXPIRATION DATE: ABNORMAL
GLUCOSE UR STRIP-MCNC: ABNORMAL MG/DL
KETONES UR QL: ABNORMAL
LEUKOCYTE EST, POC: NEGATIVE
Lab: ABNORMAL
NITRITE UR-MCNC: NEGATIVE MG/ML
PH UR: 5 [PH] (ref 5–8)
PROT UR STRIP-MCNC: NEGATIVE MG/DL
RBC # UR STRIP: NEGATIVE /UL
SP GR UR: 1.01 (ref 1–1.03)
UROBILINOGEN UR QL: NORMAL

## 2022-04-14 PROCEDURE — 99213 OFFICE O/P EST LOW 20 MIN: CPT | Performed by: FAMILY MEDICINE

## 2022-04-14 PROCEDURE — 81003 URINALYSIS AUTO W/O SCOPE: CPT | Performed by: FAMILY MEDICINE

## 2022-04-14 RX ORDER — CEPHALEXIN 500 MG/1
500 CAPSULE ORAL 3 TIMES DAILY
Qty: 30 CAPSULE | Refills: 0 | Status: SHIPPED | OUTPATIENT
Start: 2022-04-14 | End: 2022-04-25

## 2022-04-14 NOTE — PROGRESS NOTES
MARIA E Sierra is a 71 y.o. male who is here for follow up of a persistent cough.  Patient reports history of severe kidney infection in the past requiring hospitalization and IV antibiotics.  Reports aches and pains and some confusion.  Urinalysis is basically negative and informed do not feel urinary tract infection is likely.  Does report a persistent cough productive of discolored sputum.  This apparently has been going on for several weeks.  Previous chest x-ray clear.  Known history of atrial fibrillation and apparently previous treatment for prostate cancer.      Review of Systems   Respiratory: Positive for cough. Negative for shortness of breath.    Cardiovascular: Negative for chest pain.   All other systems reviewed and are negative.        Past Medical History:   Diagnosis Date   • Claustrophobia    • Diabetes mellitus (HCC)    • GERD (gastroesophageal reflux disease)    • History of gastric ulcer    • History of pneumonia 2018    MULTIFOCAL, HOSPITALIZED X 6 DAYS   • History of shingles 2016   • History of TIA (transient ischemic attack)     X2   • Hyperlipidemia    • Hypertension    • Infection due to parainfluenza virus 2 11/13/2018   • Melanoma (HCC)     LEFT FOREARM    • Prostate cancer (HCC)     FOLLOWED BY DR ALVARADO   • Vertebral artery insufficiency    • Vertigo        Past Surgical History:   Procedure Laterality Date   • CATARACT EXTRACTION     • COLONOSCOPY      2011 Dr Reynoso [facility]   • ENDOSCOPY N/A 12/15/2016    Procedure: ESOPHAGOGASTRODUODENOSCOPY WITH COLD BIOPSIES;  Surgeon: Moshe Lux MD;  Location: [facility] ENDOSCOPY;  Service:    • SKIN GRAFT SPLIT THICKNESS Left 10/27/2020    Procedure: EXCISION OF MELANOMA FROM THE LEFT FOREARM REQUIRING FULL THICKNESS SKIN GRAFT;  Surgeon: Nick Nuñez MD;  Location: [facility] OR;  Service: General;  Laterality: Left;       Family History   Problem Relation Age of Onset   • Diabetes Mother    • Heart disease Mother    •  Uterine cancer Mother    • Diabetes Father    • Heart disease Father    • Kidney disease Father    • Skin cancer Father         melanoma   • Cancer Father         testicle   • Malig Hyperthermia Neg Hx        Social History     Socioeconomic History   • Marital status:    • Number of children: 4   Tobacco Use   • Smoking status: Never Smoker   • Smokeless tobacco: Never Used   Vaping Use   • Vaping Use: Never used   Substance and Sexual Activity   • Alcohol use: No   • Drug use: No   • Sexual activity: Defer       Vitals:    04/14/22 1239   BP:    Pulse: 95   Resp:    SpO2:         Body mass index is 24.87 kg/m².      Physical Exam  Vitals and nursing note reviewed.   Constitutional:       General: He is not in acute distress.     Appearance: He is well-developed. He is not ill-appearing.   HENT:      Head: Normocephalic and atraumatic.      Nose:      Comments: Patient with mask.  Provider with mask and shield  Eyes:      Conjunctiva/sclera: Conjunctivae normal.      Pupils: Pupils are equal, round, and reactive to light.   Neck:      Thyroid: No thyromegaly.   Cardiovascular:      Rate and Rhythm: Normal rate and regular rhythm.      Heart sounds: Normal heart sounds.   Pulmonary:      Effort: Pulmonary effort is normal. No respiratory distress.      Breath sounds: Normal breath sounds. No wheezing or rhonchi.   Abdominal:      General: There is no distension.      Palpations: Abdomen is soft. There is no mass.      Tenderness: There is no abdominal tenderness.      Hernia: No hernia is present.   Musculoskeletal:         General: No tenderness or deformity. Normal range of motion.      Cervical back: Normal range of motion.   Lymphadenopathy:      Cervical: No cervical adenopathy.   Skin:     General: Skin is warm and dry.      Coloration: Skin is not pale.      Findings: No rash.   Neurological:      General: No focal deficit present.      Mental Status: He is alert and oriented to person, place, and  time.      Motor: No abnormal muscle tone.      Coordination: Coordination normal.   Psychiatric:         Mood and Affect: Mood normal.         Behavior: Behavior normal.         Thought Content: Thought content normal.         Judgment: Judgment normal.           Assessment/Plan    Diagnoses and all orders for this visit:    1. Dysuria (Primary)  -     POCT urinalysis dipstick, automated    Other orders  -     cephalexin (Keflex) 500 MG capsule; Take 1 capsule by mouth 3 (Three) Times a Day.  Dispense: 30 capsule; Refill: 0      Patient presents with a persistent cough and does have history of pneumonia and previous pulmonary infections.  In view of persistence will give empiric antibiotic therapy as noted above and told to call if symptoms worsen or do not improve by next week.  Lungs basically sound clear and do not feel repeat chest x-ray necessary at this time.

## 2022-04-19 ENCOUNTER — OFFICE VISIT (OUTPATIENT)
Dept: FAMILY MEDICINE CLINIC | Facility: CLINIC | Age: 72
End: 2022-04-19

## 2022-04-19 VITALS
TEMPERATURE: 96.9 F | HEIGHT: 69 IN | DIASTOLIC BLOOD PRESSURE: 80 MMHG | RESPIRATION RATE: 18 BRPM | WEIGHT: 166.6 LBS | SYSTOLIC BLOOD PRESSURE: 110 MMHG | BODY MASS INDEX: 24.68 KG/M2 | OXYGEN SATURATION: 99 % | HEART RATE: 85 BPM

## 2022-04-19 DIAGNOSIS — E11.40 TYPE 2 DIABETES MELLITUS WITH DIABETIC NEUROPATHY, WITH LONG-TERM CURRENT USE OF INSULIN: ICD-10-CM

## 2022-04-19 DIAGNOSIS — I10 PRIMARY HYPERTENSION: ICD-10-CM

## 2022-04-19 DIAGNOSIS — E11.65 TYPE 2 DIABETES MELLITUS WITH HYPERGLYCEMIA, WITHOUT LONG-TERM CURRENT USE OF INSULIN: ICD-10-CM

## 2022-04-19 DIAGNOSIS — E78.5 HYPERLIPIDEMIA ASSOCIATED WITH TYPE 2 DIABETES MELLITUS: ICD-10-CM

## 2022-04-19 DIAGNOSIS — Z79.4 TYPE 2 DIABETES MELLITUS WITH DIABETIC NEUROPATHY, WITH LONG-TERM CURRENT USE OF INSULIN: ICD-10-CM

## 2022-04-19 DIAGNOSIS — C61 PROSTATE CANCER: ICD-10-CM

## 2022-04-19 DIAGNOSIS — E11.69 HYPERLIPIDEMIA ASSOCIATED WITH TYPE 2 DIABETES MELLITUS: ICD-10-CM

## 2022-04-19 DIAGNOSIS — Z79.899 HIGH RISK MEDICATION USE: ICD-10-CM

## 2022-04-19 DIAGNOSIS — I38 VALVULAR HEART DISEASE: ICD-10-CM

## 2022-04-19 DIAGNOSIS — Z00.00 MEDICARE ANNUAL WELLNESS VISIT, SUBSEQUENT: Primary | ICD-10-CM

## 2022-04-19 PROCEDURE — 1170F FXNL STATUS ASSESSED: CPT | Performed by: FAMILY MEDICINE

## 2022-04-19 PROCEDURE — 1160F RVW MEDS BY RX/DR IN RCRD: CPT | Performed by: FAMILY MEDICINE

## 2022-04-19 PROCEDURE — G0439 PPPS, SUBSEQ VISIT: HCPCS | Performed by: FAMILY MEDICINE

## 2022-04-20 LAB
ALBUMIN SERPL-MCNC: 4.2 G/DL (ref 3.7–4.7)
ALBUMIN/GLOB SERPL: 1.9 {RATIO} (ref 1.2–2.2)
ALP SERPL-CCNC: 120 IU/L (ref 44–121)
ALT SERPL-CCNC: 15 IU/L (ref 0–44)
AST SERPL-CCNC: 12 IU/L (ref 0–40)
BASOPHILS # BLD AUTO: 0 X10E3/UL (ref 0–0.2)
BASOPHILS NFR BLD AUTO: 0 %
BILIRUB SERPL-MCNC: 0.3 MG/DL (ref 0–1.2)
BUN SERPL-MCNC: 15 MG/DL (ref 8–27)
BUN/CREAT SERPL: 21 (ref 10–24)
CALCIUM SERPL-MCNC: 9 MG/DL (ref 8.6–10.2)
CHLORIDE SERPL-SCNC: 104 MMOL/L (ref 96–106)
CHOLEST SERPL-MCNC: 89 MG/DL (ref 100–199)
CO2 SERPL-SCNC: 20 MMOL/L (ref 20–29)
CREAT SERPL-MCNC: 0.72 MG/DL (ref 0.76–1.27)
EGFRCR SERPLBLD CKD-EPI 2021: 96 ML/MIN/1.73
EOSINOPHIL # BLD AUTO: 0 X10E3/UL (ref 0–0.4)
EOSINOPHIL NFR BLD AUTO: 0 %
ERYTHROCYTE [DISTWIDTH] IN BLOOD BY AUTOMATED COUNT: 12.8 % (ref 11.6–15.4)
GLOBULIN SER CALC-MCNC: 2.2 G/DL (ref 1.5–4.5)
GLUCOSE SERPL-MCNC: 166 MG/DL (ref 65–99)
HBA1C MFR BLD: 9.8 % (ref 4.8–5.6)
HCT VFR BLD AUTO: 35 % (ref 37.5–51)
HDLC SERPL-MCNC: 29 MG/DL
HGB BLD-MCNC: 11.7 G/DL (ref 13–17.7)
IMM GRANULOCYTES # BLD AUTO: 0.1 X10E3/UL (ref 0–0.1)
IMM GRANULOCYTES NFR BLD AUTO: 2 %
LDLC SERPL CALC-MCNC: 42 MG/DL (ref 0–99)
LYMPHOCYTES # BLD AUTO: 1.6 X10E3/UL (ref 0.7–3.1)
LYMPHOCYTES NFR BLD AUTO: 42 %
MCH RBC QN AUTO: 29.9 PG (ref 26.6–33)
MCHC RBC AUTO-ENTMCNC: 33.4 G/DL (ref 31.5–35.7)
MCV RBC AUTO: 90 FL (ref 79–97)
MONOCYTES # BLD AUTO: 0.3 X10E3/UL (ref 0.1–0.9)
MONOCYTES NFR BLD AUTO: 9 %
NEUTROPHILS # BLD AUTO: 1.7 X10E3/UL (ref 1.4–7)
NEUTROPHILS NFR BLD AUTO: 47 %
PLATELET # BLD AUTO: 221 X10E3/UL (ref 150–450)
POTASSIUM SERPL-SCNC: 4.3 MMOL/L (ref 3.5–5.2)
PROT SERPL-MCNC: 6.4 G/DL (ref 6–8.5)
PSA SERPL-MCNC: 8.2 NG/ML (ref 0–4)
RBC # BLD AUTO: 3.91 X10E6/UL (ref 4.14–5.8)
SODIUM SERPL-SCNC: 142 MMOL/L (ref 134–144)
TRIGL SERPL-MCNC: 87 MG/DL (ref 0–149)
VLDLC SERPL CALC-MCNC: 18 MG/DL (ref 5–40)
WBC # BLD AUTO: 3.7 X10E3/UL (ref 3.4–10.8)

## 2022-04-25 RX ORDER — CEPHALEXIN 500 MG/1
CAPSULE ORAL
Qty: 30 CAPSULE | Refills: 0 | Status: SHIPPED | OUTPATIENT
Start: 2022-04-25 | End: 2022-06-14

## 2022-04-25 NOTE — TELEPHONE ENCOUNTER
Rx Refill Note  Requested Prescriptions     Pending Prescriptions Disp Refills   • cephalexin (KEFLEX) 500 MG capsule [Pharmacy Med Name: CEPHALEXIN 500MG CAPSULES] 30 capsule 0     Sig: TAKE 1 CAPSULE BY MOUTH THREE TIMES DAILY      Last office visit with prescribing clinician: 4/19/2022      Next office visit with prescribing clinician: Visit date not found            Lavonne Bains MA  04/25/22, 16:04 EDT

## 2022-05-22 DIAGNOSIS — G45.1 HEMISPHERIC CAROTID ARTERY SYNDROME: ICD-10-CM

## 2022-05-22 DIAGNOSIS — I65.23 BILATERAL CAROTID ARTERY STENOSIS: ICD-10-CM

## 2022-05-23 RX ORDER — ATORVASTATIN CALCIUM 40 MG/1
40 TABLET, FILM COATED ORAL
Qty: 90 TABLET | Refills: 0 | Status: SHIPPED | OUTPATIENT
Start: 2022-05-23 | End: 2022-10-05 | Stop reason: SDUPTHER

## 2022-05-29 DIAGNOSIS — E16.1 HYPERINSULINISM: ICD-10-CM

## 2022-05-31 RX ORDER — INSULIN DETEMIR 100 [IU]/ML
30 INJECTION, SOLUTION SUBCUTANEOUS DAILY
Qty: 18 ML | Refills: 1 | Status: SHIPPED | OUTPATIENT
Start: 2022-05-31 | End: 2022-12-07 | Stop reason: SDUPTHER

## 2022-05-31 NOTE — TELEPHONE ENCOUNTER
Rx Refill Note  Requested Prescriptions     Pending Prescriptions Disp Refills   • Levemir FlexTouch 100 UNIT/ML injection [Pharmacy Med Name: LEVEMIR FLEX TOUCH PEN INJ 3ML] 18 mL      Sig: INJECT 21 UNITS UNDER THE SKIN AS DIRECTED DAILY      Last office visit with prescribing clinician: 4/19/2022      Next office visit with prescribing clinician: Visit date not found            Lavonne Bains MA  05/31/22, 08:15 EDT

## 2022-06-07 ENCOUNTER — TELEPHONE (OUTPATIENT)
Dept: FAMILY MEDICINE CLINIC | Facility: CLINIC | Age: 72
End: 2022-06-07

## 2022-06-07 NOTE — TELEPHONE ENCOUNTER
Caller: Harry Sierra    Relationship: Self    Best call back number: [phone]    What form or medical record are you requesting: LETTER INFORMING PATIENT OF DR BARAJAS OFFBOARDING WITH LIST OF PROVIDERS HE CAN TRANSFER CARE     Who is requesting this form or medical record from you:  SELF    How would you like to receive the form or medical records (pick-up, mail, fax): UPLOAD TO [employer]    Additional notes: PATIENT LOST THE LETTER AND WOULD LIKE TO GET ANOTHER ONE.

## 2022-06-14 ENCOUNTER — OFFICE VISIT (OUTPATIENT)
Dept: FAMILY MEDICINE CLINIC | Facility: CLINIC | Age: 72
End: 2022-06-14

## 2022-06-14 VITALS
OXYGEN SATURATION: 97 % | SYSTOLIC BLOOD PRESSURE: 118 MMHG | TEMPERATURE: 97.1 F | HEIGHT: 69 IN | DIASTOLIC BLOOD PRESSURE: 78 MMHG | HEART RATE: 58 BPM | WEIGHT: 167.8 LBS | BODY MASS INDEX: 24.85 KG/M2 | RESPIRATION RATE: 18 BRPM

## 2022-06-14 DIAGNOSIS — M72.2 PLANTAR FASCIITIS OF LEFT FOOT: Primary | ICD-10-CM

## 2022-06-14 PROCEDURE — 99213 OFFICE O/P EST LOW 20 MIN: CPT | Performed by: FAMILY MEDICINE

## 2022-06-14 NOTE — PROGRESS NOTES
MARIA E Sierra is a 71 y.o. male who is here for follow up of left foot pain which started after walking for exercise.  Has been treating at home with special shoes, rolling on bottle of ice etc.  Pain worse in the morning when he first starts walking.  Avoids NSAID therapy because of history of GI bleed.      Review of Systems   Musculoskeletal: Positive for gait problem.   All other systems reviewed and are negative.        Past Medical History:   Diagnosis Date   • Claustrophobia    • Diabetes mellitus (HCC)    • GERD (gastroesophageal reflux disease)    • History of gastric ulcer    • History of pneumonia 2018    MULTIFOCAL, HOSPITALIZED X 6 DAYS   • History of shingles 2016   • History of TIA (transient ischemic attack)     X2   • Hyperlipidemia    • Hypertension    • Infection due to parainfluenza virus 2 11/13/2018   • Melanoma (HCC)     LEFT FOREARM    • Prostate cancer (HCC)     FOLLOWED BY DR ALVARADO   • Vertebral artery insufficiency    • Vertigo        Past Surgical History:   Procedure Laterality Date   • CATARACT EXTRACTION     • COLONOSCOPY      2011 Dr Reynoso [facility]   • ENDOSCOPY N/A 12/15/2016    Procedure: ESOPHAGOGASTRODUODENOSCOPY WITH COLD BIOPSIES;  Surgeon: Moshe Lux MD;  Location: [facility] ENDOSCOPY;  Service:    • SKIN GRAFT SPLIT THICKNESS Left 10/27/2020    Procedure: EXCISION OF MELANOMA FROM THE LEFT FOREARM REQUIRING FULL THICKNESS SKIN GRAFT;  Surgeon: Nick Nuñez MD;  Location: [facility] MAIN OR;  Service: General;  Laterality: Left;       Family History   Problem Relation Age of Onset   • Diabetes Mother    • Heart disease Mother    • Uterine cancer Mother    • Diabetes Father    • Heart disease Father    • Kidney disease Father    • Skin cancer Father         melanoma   • Cancer Father         testicle   • Malig Hyperthermia Neg Hx        Social History     Socioeconomic History   • Marital status:    • Number of children: 4   Tobacco Use   • Smoking  status: Never Smoker   • Smokeless tobacco: Never Used   Vaping Use   • Vaping Use: Never used   Substance and Sexual Activity   • Alcohol use: No   • Drug use: No   • Sexual activity: Defer       Vitals:    06/14/22 1513   BP: 118/78   Pulse: 58   Resp: 18   Temp: 97.1 °F (36.2 °C)   SpO2: 97%        Body mass index is 24.78 kg/m².      Physical Exam  Vitals and nursing note reviewed.   Constitutional:       General: He is not in acute distress.     Appearance: He is well-developed.   HENT:      Head: Normocephalic and atraumatic.   Eyes:      Conjunctiva/sclera: Conjunctivae normal.      Pupils: Pupils are equal, round, and reactive to light.   Neck:      Thyroid: No thyromegaly.   Cardiovascular:      Rate and Rhythm: Normal rate and regular rhythm.      Heart sounds: Normal heart sounds.   Pulmonary:      Effort: Pulmonary effort is normal. No respiratory distress.      Breath sounds: Normal breath sounds.   Abdominal:      General: There is no distension.      Palpations: Abdomen is soft. There is no mass.      Tenderness: There is no abdominal tenderness.      Hernia: No hernia is present.   Musculoskeletal:         General: No tenderness or deformity. Normal range of motion.      Cervical back: Normal range of motion.      Left foot: No deformity.        Feet:    Feet:      Left foot:      Skin integrity: No skin breakdown.   Lymphadenopathy:      Cervical: No cervical adenopathy.   Skin:     General: Skin is warm and dry.      Coloration: Skin is not pale.      Findings: No rash.   Neurological:      General: No focal deficit present.      Mental Status: He is alert and oriented to person, place, and time.      Motor: No abnormal muscle tone.      Coordination: Coordination normal.   Psychiatric:         Mood and Affect: Mood normal.         Behavior: Behavior normal.         Thought Content: Thought content normal.         Judgment: Judgment normal.           Assessment/Plan    Diagnoses and all orders for  this visit:    1. Plantar fasciitis of left foot (Primary)  -     Diclofenac Sodium (Voltaren) 1 % gel gel; Apply 4 g topically to the appropriate area as directed 4 (Four) Times a Day As Needed (Arthritis).  Dispense: 300 g; Refill: 5      Patient presents with complaints most consistent with Planter fasciitis.  Stretching exercises and other treatment discussed.  Also should be safe to use Voltaren gel.  NSAID issues related to GI intolerance.  Discussed with patient if symptoms persist may need to consider referral to podiatrist for more aggressive intervention?    After patient left the office reviewing records indicates past due for colonoscopy and also diabetic eye exam.  Called patient's home number and left message to contact office to discuss?

## 2022-07-11 DIAGNOSIS — K21.00 REFLUX ESOPHAGITIS: ICD-10-CM

## 2022-07-11 RX ORDER — PANTOPRAZOLE SODIUM 40 MG/1
40 TABLET, DELAYED RELEASE ORAL DAILY
Qty: 90 TABLET | Refills: 0 | Status: SHIPPED | OUTPATIENT
Start: 2022-07-11 | End: 2022-10-05 | Stop reason: SDUPTHER

## 2022-10-05 DIAGNOSIS — I65.23 BILATERAL CAROTID ARTERY STENOSIS: ICD-10-CM

## 2022-10-05 DIAGNOSIS — K21.00 REFLUX ESOPHAGITIS: ICD-10-CM

## 2022-10-05 DIAGNOSIS — G45.1 HEMISPHERIC CAROTID ARTERY SYNDROME: ICD-10-CM

## 2022-10-05 RX ORDER — PANTOPRAZOLE SODIUM 40 MG/1
40 TABLET, DELAYED RELEASE ORAL DAILY
Qty: 90 TABLET | Refills: 0 | Status: SHIPPED | OUTPATIENT
Start: 2022-10-05 | End: 2022-12-12 | Stop reason: SDUPTHER

## 2022-10-05 RX ORDER — ATORVASTATIN CALCIUM 40 MG/1
40 TABLET, FILM COATED ORAL
Qty: 90 TABLET | Refills: 0 | Status: SHIPPED | OUTPATIENT
Start: 2022-10-05 | End: 2022-12-12 | Stop reason: SDUPTHER

## 2022-10-05 NOTE — TELEPHONE ENCOUNTER
Caller: Harry Sierra    Relationship: Self    Best call back number: [phone]    Requested Prescriptions:   Requested Prescriptions     Pending Prescriptions Disp Refills   • atorvastatin (LIPITOR) 40 MG tablet 90 tablet 0     Sig: Take 1 tablet by mouth every night at bedtime.   • pantoprazole (PROTONIX) 40 MG EC tablet 90 tablet 0     Sig: Take 1 tablet by mouth Daily.        Pharmacy where request should be sent: [id number] [address] AT [facility] [place] - [phone] [facility] [phone]      Additional details provided by patient: PATIENT STATED HE HAS A NEW PATIENT APPOINTMENT WITH [facility] 01/24/23.    PATIENT IS OUT OF BOTH OF THESE MEDICATIONS AND WOULD LIKE TO KNOW IF THIS CAN BE REFILLED BEFORE THAT UPCOMING APPOINTMENT.    PATIENT WOULD LIKE A CALL IF THESE CAN BE REFILLED, OR IF THEY CANNOT.    Does the patient have less than a 3 day supply:  [x] Yes  [] No    Anna Marie Granda Rep   10/05/22 09:54 EDT

## 2022-11-14 ENCOUNTER — OFFICE VISIT (OUTPATIENT)
Dept: INTERNAL MEDICINE | Facility: CLINIC | Age: 72
End: 2022-11-14

## 2022-11-14 VITALS
SYSTOLIC BLOOD PRESSURE: 127 MMHG | OXYGEN SATURATION: 97 % | DIASTOLIC BLOOD PRESSURE: 90 MMHG | HEART RATE: 106 BPM | BODY MASS INDEX: 21.42 KG/M2 | TEMPERATURE: 98.7 F | HEIGHT: 69 IN | WEIGHT: 144.6 LBS

## 2022-11-14 DIAGNOSIS — I10 PRIMARY HYPERTENSION: Primary | ICD-10-CM

## 2022-11-14 DIAGNOSIS — R63.4 WEIGHT LOSS: ICD-10-CM

## 2022-11-14 DIAGNOSIS — E11.40 TYPE 2 DIABETES MELLITUS WITH DIABETIC NEUROPATHY, WITH LONG-TERM CURRENT USE OF INSULIN: ICD-10-CM

## 2022-11-14 DIAGNOSIS — Z79.4 TYPE 2 DIABETES MELLITUS WITH DIABETIC NEUROPATHY, WITH LONG-TERM CURRENT USE OF INSULIN: ICD-10-CM

## 2022-11-14 PROCEDURE — 99214 OFFICE O/P EST MOD 30 MIN: CPT

## 2022-11-14 RX ORDER — SILODOSIN 8 MG/1
8 CAPSULE ORAL DAILY
COMMUNITY
Start: 2022-11-08 | End: 2023-01-31

## 2022-11-14 NOTE — PATIENT INSTRUCTIONS
Continue medications as prescribed. Call Dr. Esqueda for endocrinology appointment. Recheck blood sugar at home. If blood sugar above 350, go to ER. Labs today. Follow-up 1 month.

## 2022-11-14 NOTE — PROGRESS NOTES
"Chief Complaint  Weight Loss    Subjective        Harry Sierra presents to [facility] PRIMARY [facility]ARE  History of Present Illness  71 y.o. male presenting with concerns of weight loss. Seen by [facility] and has to self catheterize 3-4 times a day. Prostate cancer MRI study for restaging on 10/19 no evidence of malignancy of prostate or metastatic disease in pelvis. Had COVID in July after visiting [place]. Has had 20 pound weight loss since then. States fatigue and SOA since COVID. Decreased appetite recently. Lips are chapped all the time and stick together. States blood sugars were running in 140s to 300 but has not checked them since returning from [place] in July. Takes 30 units of detemir daily and 500 mg metformin twice a day. Bowels are moving normally.           Objective   Vital Signs:  /90 (BP Location: Right arm, Patient Position: Sitting, Cuff Size: Large Adult)   Pulse 106   Temp 98.7 °F (37.1 °C) (Infrared)   Ht 175.3 cm (69\")   Wt 65.6 kg (144 lb 9.6 oz)   SpO2 97%   BMI 21.35 kg/m²   Estimated body mass index is 21.35 kg/m² as calculated from the following:    Height as of this encounter: 175.3 cm (69\").    Weight as of this encounter: 65.6 kg (144 lb 9.6 oz).    BMI is within normal parameters. No other follow-up for BMI required.      Physical Exam  Vitals reviewed.   Constitutional:       Appearance: Normal appearance.   HENT:      Head: Normocephalic.   Cardiovascular:      Rate and Rhythm: Normal rate.      Pulses: Normal pulses.      Heart sounds: Normal heart sounds.   Pulmonary:      Effort: Pulmonary effort is normal.      Breath sounds: Normal breath sounds.   Abdominal:      General: Abdomen is flat.      Palpations: Abdomen is soft.   Musculoskeletal:         General: Normal range of motion.      Cervical back: Normal range of motion.   Skin:     General: Skin is warm and dry.      Capillary Refill: Capillary refill takes less than 2 seconds.   Neurological: "      General: No focal deficit present.      Mental Status: He is alert and oriented to person, place, and time.   Psychiatric:         Mood and Affect: Mood normal.         Behavior: Behavior normal.         Thought Content: Thought content normal.         Judgment: Judgment normal.        Result Review :    Common labs    Common Labs 4/1/22 4/19/22 4/19/22 4/19/22 4/19/22 4/19/22     1000 1000 1000 1000 1000   Glucose   166 (A)      BUN   15      Creatinine   0.72 (A)      Sodium   142      Potassium   4.3      Chloride   104      Calcium   9.0      Total Protein   6.4      Albumin   4.2      Total Bilirubin   0.3      Alkaline Phosphatase   120      AST (SGOT)   12      ALT (SGPT)   15      WBC  3.7       Hemoglobin  11.7 (A)       Hematocrit  35.0 (A)       Platelets  221       Total Cholesterol    89 (A)     Triglycerides    87     HDL Cholesterol    29 (A)     LDL Cholesterol     42     Hemoglobin A1C      9.8 (A)   Microalbumin, Urine 9.8        PSA     8.2 (A)    (A) Abnormal value       Comments are available for some flowsheets but are not being displayed.           Current Outpatient Medications on File Prior to Visit   Medication Sig Dispense Refill   • atorvastatin (LIPITOR) 40 MG tablet Take 1 tablet by mouth every night at bedtime. 90 tablet 0   • clopidogrel (PLAVIX) 75 MG tablet TAKE 1 TABLET BY MOUTH DAILY 90 tablet 3   • Continuous Blood Gluc  (FreeStyle Vaishali 14 Day [place]) device AS DIRECTED 1 each 2   • insulin detemir (Levemir FlexTouch) 100 UNIT/ML injection Inject 30 Units under the skin into the appropriate area as directed Daily. 18 mL 1   • Insulin Pen Needle (BD Pen Needle An U/F) 32G X 4 MM misc Use daily to check blood sugar DX: E11.9 100 each 5   • losartan (COZAAR) 25 MG tablet TAKE 1 TABLET BY MOUTH DAILY 90 tablet 3   • metFORMIN (GLUCOPHAGE) 500 MG tablet TAKE 1 TABLET BY MOUTH TWICE DAILY WITH MEALS 180 tablet 3   • Multiple Vitamins-Minerals (CENTRUM SILVER 50+MEN)  tablet Take 1 tablet by mouth Daily. HELD FOR OR     • pantoprazole (PROTONIX) 40 MG EC tablet Take 1 tablet by mouth Daily. 90 tablet 0   • silodosin (RAPAFLO) 8 MG capsule capsule Take 1 capsule by mouth Daily.     • Diclofenac Sodium (Voltaren) 1 % gel gel Apply 4 g topically to the appropriate area as directed 4 (Four) Times a Day As Needed (Arthritis). 300 g 5   • [DISCONTINUED] Continuous Blood Gluc  (FreeStyle Vaishali 14 Day [place]) device 1 each by Other route See Admin Instructions. 1 each 2   • [DISCONTINUED] Continuous Blood Gluc Sensor (FreeStyle Vaishali 14 Day Sensor) misc USE AS DIRECTED FOR 14 DAYS 8 each 3     No current facility-administered medications on file prior to visit.                 Assessment and Plan   Diagnoses and all orders for this visit:    1. Primary hypertension (Primary)  -     Comprehensive Metabolic Panel  -     CBC (No Diff)    2. Type 2 diabetes mellitus with diabetic neuropathy, with long-term current use of insulin (HCC)  -     Hemoglobin A1c  -     Comprehensive Metabolic Panel    3. Weight loss      Continue medications as prescribed. Call Dr. Esqueda for endocrinology appointment. Recheck blood sugar at home. If blood sugar above 350, go to ER. Labs today. Follow-up 1 month.        Follow Up   Return in about 4 weeks (around 12/12/2022) for Annual physical.  Patient was given instructions and counseling regarding his condition or for health maintenance advice. Please see specific information pulled into the AVS if appropriate.

## 2022-11-15 LAB
ALBUMIN SERPL-MCNC: 4 G/DL (ref 3.5–5.2)
ALBUMIN/GLOB SERPL: 1.7 G/DL
ALP SERPL-CCNC: 131 U/L (ref 39–117)
ALT SERPL-CCNC: 35 U/L (ref 1–41)
AST SERPL-CCNC: 21 U/L (ref 1–40)
BILIRUB SERPL-MCNC: 0.3 MG/DL (ref 0–1.2)
BUN SERPL-MCNC: 10 MG/DL (ref 8–23)
BUN/CREAT SERPL: 14.7 (ref 7–25)
CALCIUM SERPL-MCNC: 9 MG/DL (ref 8.6–10.5)
CHLORIDE SERPL-SCNC: 99 MMOL/L (ref 98–107)
CO2 SERPL-SCNC: 27 MMOL/L (ref 22–29)
CREAT SERPL-MCNC: 0.68 MG/DL (ref 0.76–1.27)
EGFRCR SERPLBLD CKD-EPI 2021: 98.1 ML/MIN/1.73
ERYTHROCYTE [DISTWIDTH] IN BLOOD BY AUTOMATED COUNT: 13.3 % (ref 12.3–15.4)
GLOBULIN SER CALC-MCNC: 2.3 GM/DL
GLUCOSE SERPL-MCNC: 287 MG/DL (ref 65–99)
HBA1C MFR BLD: 11.7 % (ref 4.8–5.6)
HCT VFR BLD AUTO: 31 % (ref 37.5–51)
HGB BLD-MCNC: 10.4 G/DL (ref 13–17.7)
MCH RBC QN AUTO: 30.4 PG (ref 26.6–33)
MCHC RBC AUTO-ENTMCNC: 33.5 G/DL (ref 31.5–35.7)
MCV RBC AUTO: 90.6 FL (ref 79–97)
PLATELET # BLD AUTO: 219 10*3/MM3 (ref 140–450)
POTASSIUM SERPL-SCNC: 3.8 MMOL/L (ref 3.5–5.2)
PROT SERPL-MCNC: 6.3 G/DL (ref 6–8.5)
RBC # BLD AUTO: 3.42 10*6/MM3 (ref 4.14–5.8)
SODIUM SERPL-SCNC: 139 MMOL/L (ref 136–145)
WBC # BLD AUTO: 7.27 10*3/MM3 (ref 3.4–10.8)

## 2022-11-15 NOTE — PROGRESS NOTES
CBC shows hemoglobin is low at 10.4 which could be a causing some fatigue. Normal level is closer to 13.    Non-fasting blood sugar is elevated in the metabolic panel at 287 and hemoglobin A1c is elevated at 11.7. Elevated blood sugars could be reason for weight loss. Limit intake of sugar and carbohydrates (potatoes, pasta, rice and breads). Follow-up with endocrinology.

## 2022-11-18 ENCOUNTER — TELEPHONE (OUTPATIENT)
Dept: INTERNAL MEDICINE | Facility: CLINIC | Age: 72
End: 2022-11-18

## 2022-11-18 NOTE — TELEPHONE ENCOUNTER
DARCIE GLORIA TO READ  ----- Message from JO Rivera sent at 11/15/2022  9:16 AM EST -----  CBC shows hemoglobin is low at 10.4 which could be a causing some fatigue. Normal level is closer to 13.    Non-fasting blood sugar is elevated in the metabolic panel at 287 and hemoglobin A1c is elevated at 11.7. Elevated blood sugars could be reason for weight loss. Limit intake of sugar and carbohydrates (potatoes, pasta, rice and breads). Follow-up with endocrinology.

## 2022-11-22 DIAGNOSIS — E11.40 TYPE 2 DIABETES MELLITUS WITH DIABETIC NEUROPATHY, WITH LONG-TERM CURRENT USE OF INSULIN: Primary | ICD-10-CM

## 2022-11-22 DIAGNOSIS — Z79.4 TYPE 2 DIABETES MELLITUS WITH DIABETIC NEUROPATHY, WITH LONG-TERM CURRENT USE OF INSULIN: Primary | ICD-10-CM

## 2022-11-30 ENCOUNTER — TELEPHONE (OUTPATIENT)
Dept: INTERNAL MEDICINE | Facility: CLINIC | Age: 72
End: 2022-11-30

## 2022-11-30 NOTE — TELEPHONE ENCOUNTER
Caller: Harry Sierra    Relationship: Self    Best call back number:[phone]  What is the best time to reach you: ANYTIME  Who are you requesting to speak with (clinical staff, provider,  specific staff member): CLINICAL STAFF  Do you know the name of the person who called:SELF  What was the call regarding:PATIENT CALLED AND STATED IF HE COULD GET FREE STYLE MACRINA. PATIENT STATED HE NEEDS A PRESCRIPTION FOR THIS .PLEASE SEND PRESCRIPTION TO [facility] ON [facility] [phone]. THANKS     Do you require a callback:YES

## 2022-12-01 ENCOUNTER — TELEPHONE (OUTPATIENT)
Dept: INTERNAL MEDICINE | Facility: CLINIC | Age: 72
End: 2022-12-01

## 2022-12-01 DIAGNOSIS — D64.9 ANEMIA, UNSPECIFIED TYPE: Primary | ICD-10-CM

## 2022-12-01 NOTE — TELEPHONE ENCOUNTER
I put in some orders but we need to discuss at his upcoming appointment.  He has never established with me.  He is a new patient to me.

## 2022-12-01 NOTE — TELEPHONE ENCOUNTER
Caller: Harry Sierra    Relationship: Self    Best call back number: [phone]    What is the best time to reach you: ANY     Who are you requesting to speak with (clinical staff, provider,  specific staff member): JAMES    What was the call regarding: RETURNING A CALL     Do you require a callback:YES

## 2022-12-01 NOTE — TELEPHONE ENCOUNTER
Caller: Harry Sierra    Relationship: Self    Best call back number:     What is the best time to reach you: ANYTIME    Who are you requesting to speak with (clinical staff, provider,  specific staff member): PCP OR CLINICAL STAFF    Do you know the name of the person who called: SELF    What was the call regarding: THE PATIENT IS CONCERNED ABOUT HIS LOW HEMOGLOBIN LEVELS AND WOULD LIKE TO HAVE FURTHER TESTING TO FIND ANY UNDERLYING ISSUES HE MAY HAVE.     Do you require a callback: YES, PLEASE

## 2022-12-05 DIAGNOSIS — E16.1 HYPERINSULINISM: ICD-10-CM

## 2022-12-05 RX ORDER — PEN NEEDLE, DIABETIC 32GX 5/32"
NEEDLE, DISPOSABLE MISCELLANEOUS
Qty: 100 EACH | Refills: 5 | OUTPATIENT
Start: 2022-12-05

## 2022-12-05 RX ORDER — INSULIN DETEMIR 100 [IU]/ML
INJECTION, SOLUTION SUBCUTANEOUS
Qty: 18 ML | Refills: 1 | OUTPATIENT
Start: 2022-12-05

## 2022-12-07 ENCOUNTER — TELEPHONE (OUTPATIENT)
Dept: ENDOCRINOLOGY | Age: 72
End: 2022-12-07

## 2022-12-07 DIAGNOSIS — E11.9 TYPE 2 DIABETES MELLITUS WITHOUT COMPLICATION, WITH LONG-TERM CURRENT USE OF INSULIN: ICD-10-CM

## 2022-12-07 DIAGNOSIS — Z79.4 TYPE 2 DIABETES MELLITUS WITHOUT COMPLICATION, WITH LONG-TERM CURRENT USE OF INSULIN: ICD-10-CM

## 2022-12-07 DIAGNOSIS — E16.1 HYPERINSULINISM: ICD-10-CM

## 2022-12-07 NOTE — TELEPHONE ENCOUNTER
He is currently established with endocrinology.  He was last seen in 2020 and has an upcoming appt with them.  I have never seen him.  Ulises has for 1 visit.

## 2022-12-07 NOTE — TELEPHONE ENCOUNTER
I called the patient to move up his appt to 12/12/22 per Dr. Mohamud to establish with Dr. Mohamud.      Patient needs diabetic meds and his Free styler Sensor renewed if possible.  He has been trying to get it refilled and [facility] said they have not heard back from us.  He has been out of this for about 10 days.  Does have appt on Monday, 12/12/22.      Please call the patient and advise.

## 2022-12-07 NOTE — TELEPHONE ENCOUNTER
PT CALLED SAYING HIS PCP RETIRED AND HE HAD TO GET A NEW ONE AND HIS NEW PCP WILL NOT TAKE CARE OF HIS DIABETES MEDICATION UNTIL HE IS SEEN HERE. HE IS WONDERING IF SOMEONE CAN FILL HIS MEDICATIONS UNTIL HE COME FOR HIS APPOINTMENT IN MARCH.    HIS PHONE NUMBER [phone]

## 2022-12-08 RX ORDER — PEN NEEDLE, DIABETIC 32GX 5/32"
NEEDLE, DISPOSABLE MISCELLANEOUS
Qty: 100 EACH | Refills: 5 | Status: SHIPPED | OUTPATIENT
Start: 2022-12-08 | End: 2023-01-31

## 2022-12-08 RX ORDER — INSULIN DETEMIR 100 [IU]/ML
30 INJECTION, SOLUTION SUBCUTANEOUS DAILY
Qty: 18 ML | Refills: 1 | Status: SHIPPED | OUTPATIENT
Start: 2022-12-08 | End: 2022-12-12

## 2022-12-08 RX ORDER — FLASH GLUCOSE SENSOR
1 KIT MISCELLANEOUS
Qty: 1 EACH | Refills: 2 | Status: SHIPPED | OUTPATIENT
Start: 2022-12-08 | End: 2023-01-05 | Stop reason: SDUPTHER

## 2022-12-12 ENCOUNTER — OFFICE VISIT (OUTPATIENT)
Dept: INTERNAL MEDICINE | Facility: CLINIC | Age: 72
End: 2022-12-12

## 2022-12-12 VITALS
WEIGHT: 144 LBS | SYSTOLIC BLOOD PRESSURE: 120 MMHG | OXYGEN SATURATION: 95 % | HEART RATE: 89 BPM | BODY MASS INDEX: 21.33 KG/M2 | TEMPERATURE: 96.8 F | DIASTOLIC BLOOD PRESSURE: 71 MMHG | HEIGHT: 69 IN | RESPIRATION RATE: 18 BRPM

## 2022-12-12 DIAGNOSIS — I65.23 BILATERAL CAROTID ARTERY STENOSIS: Chronic | ICD-10-CM

## 2022-12-12 DIAGNOSIS — Z79.4 TYPE 2 DIABETES MELLITUS WITH HYPERGLYCEMIA, WITH LONG-TERM CURRENT USE OF INSULIN: Chronic | ICD-10-CM

## 2022-12-12 DIAGNOSIS — E11.69 HYPERLIPIDEMIA ASSOCIATED WITH TYPE 2 DIABETES MELLITUS: Chronic | ICD-10-CM

## 2022-12-12 DIAGNOSIS — R05.3 CHRONIC COUGH: ICD-10-CM

## 2022-12-12 DIAGNOSIS — Z85.46 HISTORY OF PROSTATE CANCER: ICD-10-CM

## 2022-12-12 DIAGNOSIS — D64.9 ANEMIA, UNSPECIFIED TYPE: ICD-10-CM

## 2022-12-12 DIAGNOSIS — E78.5 HYPERLIPIDEMIA ASSOCIATED WITH TYPE 2 DIABETES MELLITUS: Chronic | ICD-10-CM

## 2022-12-12 DIAGNOSIS — Z13.89 SCREENING FOR BLOOD OR PROTEIN IN URINE: ICD-10-CM

## 2022-12-12 DIAGNOSIS — R63.4 ABNORMAL WEIGHT LOSS: ICD-10-CM

## 2022-12-12 DIAGNOSIS — Z76.89 ESTABLISHING CARE WITH NEW DOCTOR, ENCOUNTER FOR: Primary | ICD-10-CM

## 2022-12-12 DIAGNOSIS — I10 PRIMARY HYPERTENSION: Chronic | ICD-10-CM

## 2022-12-12 DIAGNOSIS — K21.00 GASTROESOPHAGEAL REFLUX DISEASE WITH ESOPHAGITIS WITHOUT HEMORRHAGE: Chronic | ICD-10-CM

## 2022-12-12 DIAGNOSIS — E11.65 TYPE 2 DIABETES MELLITUS WITH HYPERGLYCEMIA, WITH LONG-TERM CURRENT USE OF INSULIN: Chronic | ICD-10-CM

## 2022-12-12 DIAGNOSIS — R74.8 ELEVATED ALKALINE PHOSPHATASE LEVEL: ICD-10-CM

## 2022-12-12 DIAGNOSIS — F40.240 CLAUSTROPHOBIA: ICD-10-CM

## 2022-12-12 PROBLEM — E11.40 TYPE 2 DIABETES MELLITUS WITH DIABETIC NEUROPATHY, WITH LONG-TERM CURRENT USE OF INSULIN: Chronic | Status: ACTIVE | Noted: 2019-01-24

## 2022-12-12 PROCEDURE — 99215 OFFICE O/P EST HI 40 MIN: CPT | Performed by: FAMILY MEDICINE

## 2022-12-12 RX ORDER — LORAZEPAM 1 MG/1
TABLET ORAL
Qty: 2 TABLET | Refills: 0 | Status: SHIPPED | OUTPATIENT
Start: 2022-12-12 | End: 2023-02-14

## 2022-12-12 RX ORDER — PANTOPRAZOLE SODIUM 40 MG/1
40 TABLET, DELAYED RELEASE ORAL DAILY
Qty: 90 TABLET | Refills: 0 | Status: SHIPPED | OUTPATIENT
Start: 2022-12-12 | End: 2023-02-14 | Stop reason: SDUPTHER

## 2022-12-12 RX ORDER — LOSARTAN POTASSIUM 25 MG/1
25 TABLET ORAL DAILY
Qty: 90 TABLET | Refills: 4 | Status: SHIPPED | OUTPATIENT
Start: 2022-12-12

## 2022-12-12 RX ORDER — INSULIN DETEMIR 100 [IU]/ML
40 INJECTION, SOLUTION SUBCUTANEOUS DAILY
Qty: 18 ML | Refills: 1
Start: 2022-12-12 | End: 2023-02-20 | Stop reason: SDUPTHER

## 2022-12-12 RX ORDER — ATORVASTATIN CALCIUM 40 MG/1
40 TABLET, FILM COATED ORAL
Qty: 90 TABLET | Refills: 4 | Status: SHIPPED | OUTPATIENT
Start: 2022-12-12

## 2022-12-12 RX ORDER — CLOPIDOGREL BISULFATE 75 MG/1
75 TABLET ORAL DAILY
Qty: 90 TABLET | Refills: 4 | Status: SHIPPED | OUTPATIENT
Start: 2022-12-12 | End: 2023-02-08 | Stop reason: HOSPADM

## 2022-12-12 NOTE — PROGRESS NOTES
Chief Complaint  Establish Care, Shortness of Breath, and Fatigue    Subjective        Harry Sierra presents to [facility]  History of Present Illness     He is a very pleasant new patient to me.  He is a former patient of Dr. Alvarado.    He had recently seen my colleague in November 2022.  Note reviewed from Ulises De Leon.  It discusses that he was concerned about loss of weight (loss about 20+ pounds over the past 5 months unintentionally).  Of note he has a history of prostate cancer (self catheterizes).  However he is a insulin-dependent diabetic.  His most recent A1c had increased from 9.8-11.70 which indicates poorly controlled insulin-dependent diabetes mellitus.  His fasting sugar was 287.  He had seen endocrinology about 2 years ago however has not followed up with them since.  He has been referred back to them but pending appointment for March.  He is currently on Levemir 30 units daily, metformin 500 mg twice daily.  He has a Freestyle antelmo but had not renewed the strips.  His sugars in the morning are 98 now.  He went up to 40 units and sometimes he will go too low.  He is trying to strike a balance between 30-40 units.      His recent labs also showed anemia of 10.4 hemoglobin with hematocrit 31.0.  Looks like he has had a mild anemia since about a year ago but has been slowly trending down.  7 months ago hemoglobin was 11.7.  Iron shows that he has enough ferritin but his total iron is a little low.  This looks more like anemia of chronic disease possibly.    He has hypercholesterolemia and goal should be around 70 or less.  This is also secondary to bilateral carotid artery stenosis.  He is treated with atorvastatin 40 mg.  He is also on clopidogrel 75 mg daily.    He has hypertension treated with losartan 25 mg daily.      He is on pantoprazole for chronic GERD.  He had a colonoscopy 7 years ago at [facility] and denies any polyps/or concerning findings.      He has  "carotid artery stenosis and was told he was 60% blocked.  Not sure which side but last report in 2017 that I have in our system was on the right.    He has not had any CT of chest or abdomen in the last 6 months.  He had a persistent cough with his previous PCP in the spring of this year which did not show any concerning findings.  I reviewed the report from 4/1/2022.  Never smoker.          Objective   Vital Signs:  /71 (BP Location: Right arm, Patient Position: Sitting, Cuff Size: Small Adult)   Pulse 89   Temp 96.8 °F (36 °C)   Resp 18   Ht 175.3 cm (69\")   Wt 65.3 kg (144 lb)   SpO2 95%   BMI 21.27 kg/m²   Estimated body mass index is 21.27 kg/m² as calculated from the following:    Height as of this encounter: 175.3 cm (69\").    Weight as of this encounter: 65.3 kg (144 lb).    BMI is within normal parameters. No other follow-up for BMI required.      Physical Exam  Vitals and nursing note reviewed.   Constitutional:       General: He is not in acute distress.     Appearance: Normal appearance. He is not ill-appearing or toxic-appearing.   Neck:      Vascular: No carotid bruit.   Cardiovascular:      Rate and Rhythm: Normal rate and regular rhythm.      Heart sounds: Normal heart sounds. No murmur heard.  Pulmonary:      Effort: Pulmonary effort is normal.      Breath sounds: Normal breath sounds and air entry.   Abdominal:      General: Abdomen is flat. Bowel sounds are normal. There is no distension.      Palpations: There is no mass.      Tenderness: There is no abdominal tenderness.   Lymphadenopathy:      Cervical:      Right cervical: No superficial, deep or posterior cervical adenopathy.     Left cervical: No superficial, deep or posterior cervical adenopathy.   Neurological:      General: No focal deficit present.   Psychiatric:         Mood and Affect: Mood normal.         Behavior: Behavior normal.        Result Review :  The following data was reviewed by: Warren Mohamud MD on " 12/12/2022:  Common labs    Common Labs 4/19/22 4/19/22 4/19/22 4/19/22 4/19/22 11/14/22 11/14/22 11/14/22 12/5/22    1000 1000 1000 1000 1000 1422 1422 1422    Glucose  166 (A)     287 (A)     BUN  15     10     Creatinine  0.72 (A)     0.68 (A)     Sodium  142     139     Potassium  4.3     3.8     Chloride  104     99     Calcium  9.0     9.0     Total Protein  6.4     6.3     Albumin  4.2     4.00     Total Bilirubin  0.3     0.3     Alkaline Phosphatase  120     131 (A)     AST (SGOT)  12     21     ALT (SGPT)  15     35     WBC 3.7       7.27 5.25   Hemoglobin 11.7 (A)       10.4 (A) 10.3 (A)   Hematocrit 35.0 (A)       31.0 (A) 31.2 (A)   Platelets 221       219 240   Total Cholesterol   89 (A)         Triglycerides   87         HDL Cholesterol   29 (A)         LDL Cholesterol    42         Hemoglobin A1C     9.8 (A) 11.70 (A)      PSA    8.2 (A)        (A) Abnormal value       Comments are available for some flowsheets but are not being displayed.                     Assessment and Plan {CC Problem List  Visit Diagnosis   ROS  Review (Popup)  Health Maintenance  Quality  BestPractice  Medications  SmartSets  SnapShot Encounters  Media :23}  Diagnoses and all orders for this visit:    1. Establishing care with new doctor, encounter for (Primary)    2. Type 2 diabetes mellitus with hyperglycemia, with long-term current use of insulin (HCC)  -     metFORMIN (GLUCOPHAGE) 500 MG tablet; Take 1 tablet by mouth 2 (Two) Times a Day With Meals.  Dispense: 180 tablet; Refill: 3  -     insulin detemir (Levemir FlexTouch) 100 UNIT/ML injection; Inject 40 Units under the skin into the appropriate area as directed Daily.  Dispense: 18 mL; Refill: 1  -     Comprehensive Metabolic Panel  -     TSH Rfx On Abnormal To Free T4    3. Primary hypertension  -     losartan (COZAAR) 25 MG tablet; Take 1 tablet by mouth Daily.  Dispense: 90 tablet; Refill: 4  -     Comprehensive Metabolic Panel    4. Bilateral carotid  artery stenosis  -     clopidogrel (PLAVIX) 75 MG tablet; Take 1 tablet by mouth Daily.  Dispense: 90 tablet; Refill: 4  -     atorvastatin (LIPITOR) 40 MG tablet; Take 1 tablet by mouth every night at bedtime.  Dispense: 90 tablet; Refill: 4  -     US Carotid Bilateral; Future    5. Hyperlipidemia associated with type 2 diabetes mellitus (HCC)  -     Lipid Panel With LDL / HDL Ratio  -     Comprehensive Metabolic Panel    6. Gastroesophageal reflux disease with esophagitis without hemorrhage  -     pantoprazole (PROTONIX) 40 MG EC tablet; Take 1 tablet by mouth Daily.  Dispense: 90 tablet; Refill: 0    7. Chronic cough  -     CT Chest Without Contrast; Future    8. Abnormal weight loss  -     CT Chest Without Contrast; Future  -     CT Abdomen Pelvis With Contrast; Future  -     Prealbumin  -     Comprehensive Metabolic Panel  -     TSH Rfx On Abnormal To Free T4    9. History of prostate cancer  -     CT Abdomen Pelvis With Contrast; Future    10. Anemia, unspecified type    11. Elevated alkaline phosphatase level  -     Comprehensive Metabolic Panel  -     Alkaline Phosphatase, Bone Specific    12. Claustrophobia  -     LORazepam (Ativan) 1 MG tablet; Take 1 tablet about 45 minutes prior to the CT scan.  Take the other tablet if still anxious just prior to study.  MUST HAVE A RIDE HOME.  Dispense: 2 tablet; Refill: 0    13. Screening for blood or protein in urine  -     Urinalysis With Microscopic - Urine, Clean Catch    Plan as above.  We will continue to work with his insulin to get the correct amount of blood sugar over the course of the day.  He said he was lazy before but he is now going to be more diligent about it.  I am thinking the out-of-control diabetes could be contributing to the anemia.  However I also want to check and make sure nothing else is going on that could be explaining the significant weight loss.  He does have a history of prostate cancer and should be evaluated for malignancy.  He also  has this persistent cough that has never gone away and his CT can evaluate for that as well.  He has an elevated alkaline phosphatase so I will get a bone specific alkaline phosphatase this time.  He mentioned he has some claustrophobia so for the test I will give him some lorazepam.  Blood pressure is controlled today so continue his current medication.  I will get an updated ultrasound of his carotids to see how they are doing.  No carotid bruit on exam.  Continue all medications as above.       I spent 40+17 (57 total) minutes caring for Harry on this date of service. This time includes time spent by me in the following activities:preparing for the visit, reviewing tests, obtaining and/or reviewing a separately obtained history, performing a medically appropriate examination and/or evaluation , counseling and educating the patient/family/caregiver, ordering medications, tests, or procedures, referring and communicating with other health care professionals , documenting information in the medical record, independently interpreting results and communicating that information with the patient/family/caregiver and care coordination  Follow Up   Return in about 3 weeks (around 1/2/2023) for Recheck - abnormal weight loss.  Patient was given instructions and counseling regarding his condition or for health maintenance advice. Please see specific information pulled into the AVS if appropriate.

## 2022-12-14 LAB
ALBUMIN SERPL-MCNC: 4.1 G/DL (ref 3.5–5.2)
ALBUMIN/GLOB SERPL: 1.6 G/DL
ALP BONE SERPL-MCNC: 17 UG/L (ref 7.6–24.8)
ALP SERPL-CCNC: 172 U/L (ref 39–117)
ALT SERPL-CCNC: 24 U/L (ref 1–41)
AST SERPL-CCNC: 21 U/L (ref 1–40)
BILIRUB SERPL-MCNC: 0.4 MG/DL (ref 0–1.2)
BUN SERPL-MCNC: 16 MG/DL (ref 8–23)
BUN/CREAT SERPL: 27.6 (ref 7–25)
CALCIUM SERPL-MCNC: 9.5 MG/DL (ref 8.6–10.5)
CHLORIDE SERPL-SCNC: 102 MMOL/L (ref 98–107)
CHOLEST SERPL-MCNC: 99 MG/DL (ref 0–200)
CO2 SERPL-SCNC: 29.9 MMOL/L (ref 22–29)
CREAT SERPL-MCNC: 0.58 MG/DL (ref 0.76–1.27)
EGFRCR SERPLBLD CKD-EPI 2021: 102.3 ML/MIN/1.73
GLOBULIN SER CALC-MCNC: 2.6 GM/DL
GLUCOSE SERPL-MCNC: 139 MG/DL (ref 65–99)
HDLC SERPL-MCNC: 38 MG/DL (ref 40–60)
LDLC SERPL CALC-MCNC: 45 MG/DL (ref 0–100)
LDLC/HDLC SERPL: 1.21 {RATIO}
POTASSIUM SERPL-SCNC: 4.2 MMOL/L (ref 3.5–5.2)
PREALB SERPL-MCNC: 23 MG/DL (ref 9–32)
PROT SERPL-MCNC: 6.7 G/DL (ref 6–8.5)
SODIUM SERPL-SCNC: 140 MMOL/L (ref 136–145)
TRIGL SERPL-MCNC: 75 MG/DL (ref 0–150)
TSH SERPL DL<=0.005 MIU/L-ACNC: 1.91 UIU/ML (ref 0.27–4.2)
VLDLC SERPL CALC-MCNC: 16 MG/DL (ref 5–40)

## 2022-12-16 ENCOUNTER — PATIENT ROUNDING (BHMG ONLY) (OUTPATIENT)
Dept: INTERNAL MEDICINE | Facility: CLINIC | Age: 72
End: 2022-12-16

## 2022-12-29 DIAGNOSIS — I65.23 BILATERAL CAROTID ARTERY STENOSIS: Primary | ICD-10-CM

## 2022-12-30 ENCOUNTER — APPOINTMENT (OUTPATIENT)
Dept: CT IMAGING | Facility: HOSPITAL | Age: 72
End: 2022-12-30
Payer: MEDICARE

## 2022-12-30 ENCOUNTER — HOSPITAL ENCOUNTER (OUTPATIENT)
Dept: CT IMAGING | Facility: HOSPITAL | Age: 72
Discharge: HOME | End: 2022-12-30
Admitting: FAMILY MEDICINE
Payer: MEDICARE

## 2022-12-30 DIAGNOSIS — Z85.46 HISTORY OF PROSTATE CANCER: ICD-10-CM

## 2022-12-30 DIAGNOSIS — R05.3 CHRONIC COUGH: ICD-10-CM

## 2022-12-30 DIAGNOSIS — R63.4 ABNORMAL WEIGHT LOSS: ICD-10-CM

## 2022-12-30 PROCEDURE — 74177 CT ABD & PELVIS W/CONTRAST: CPT

## 2022-12-30 PROCEDURE — 25010000002 IOPAMIDOL 61 % SOLUTION: Performed by: FAMILY MEDICINE

## 2022-12-30 PROCEDURE — 71250 CT THORAX DX C-: CPT

## 2022-12-30 RX ADMIN — IOPAMIDOL 100 ML: 612 INJECTION, SOLUTION INTRAVENOUS at 16:21

## 2023-01-03 ENCOUNTER — TELEPHONE (OUTPATIENT)
Dept: INTERNAL MEDICINE | Facility: CLINIC | Age: 73
End: 2023-01-03
Payer: MEDICARE

## 2023-01-03 NOTE — TELEPHONE ENCOUNTER
Patient transferred from [facility], he states he is feeling worse today, almost as if he has the flu, SOB, right side chest pain. Went to [facility] negative for covid and flu.  Wanted some direction and look over his CT scans, he seen he his gallbladder is having issues  I advised him to go to ER, he feels that ER is for sick people, with the coughing people do and their pain. I told him if he felt it was his gallbladder he can take an antiacid, try to lay on left side to possibly get some relief other wise hospital if symptoms worsen or do not cease

## 2023-01-03 NOTE — TELEPHONE ENCOUNTER
He does have a gallstone in his gallbladder but I am not sure if that is accounting for his symptoms.  He does have some splenomegaly.  It looks like he has an appointment with Ulises in a couple of days.  I would recommend him waiting until he sees Ulises before doing too much more.  If his symptoms progress, I would recommend the ER.

## 2023-01-05 ENCOUNTER — OFFICE VISIT (OUTPATIENT)
Dept: INTERNAL MEDICINE | Facility: CLINIC | Age: 73
End: 2023-01-05
Payer: MEDICARE

## 2023-01-05 ENCOUNTER — HOSPITAL ENCOUNTER (OUTPATIENT)
Dept: CARDIOLOGY | Facility: HOSPITAL | Age: 73
Discharge: HOME | End: 2023-01-05
Admitting: FAMILY MEDICINE
Payer: MEDICARE

## 2023-01-05 VITALS
OXYGEN SATURATION: 99 % | WEIGHT: 140 LBS | HEART RATE: 117 BPM | BODY MASS INDEX: 20.73 KG/M2 | SYSTOLIC BLOOD PRESSURE: 114 MMHG | HEIGHT: 69 IN | TEMPERATURE: 98.6 F | DIASTOLIC BLOOD PRESSURE: 70 MMHG

## 2023-01-05 DIAGNOSIS — R10.30 LOWER ABDOMINAL PAIN: ICD-10-CM

## 2023-01-05 DIAGNOSIS — I65.23 BILATERAL CAROTID ARTERY STENOSIS: ICD-10-CM

## 2023-01-05 DIAGNOSIS — R63.4 ABNORMAL WEIGHT LOSS: Primary | ICD-10-CM

## 2023-01-05 DIAGNOSIS — Z79.4 TYPE 2 DIABETES MELLITUS WITH HYPERGLYCEMIA, WITH LONG-TERM CURRENT USE OF INSULIN: ICD-10-CM

## 2023-01-05 DIAGNOSIS — D64.9 ANEMIA, UNSPECIFIED TYPE: ICD-10-CM

## 2023-01-05 DIAGNOSIS — E11.65 TYPE 2 DIABETES MELLITUS WITH HYPERGLYCEMIA, WITH LONG-TERM CURRENT USE OF INSULIN: ICD-10-CM

## 2023-01-05 DIAGNOSIS — Z12.11 SCREEN FOR COLON CANCER: ICD-10-CM

## 2023-01-05 LAB
BH CV XLRA MEAS LEFT DIST CCA EDV: -22 CM/SEC
BH CV XLRA MEAS LEFT DIST CCA PSV: -113.7 CM/SEC
BH CV XLRA MEAS LEFT DIST ICA EDV: -23.8 CM/SEC
BH CV XLRA MEAS LEFT DIST ICA PSV: -95.3 CM/SEC
BH CV XLRA MEAS LEFT ICA/CCA RATIO: 0.99
BH CV XLRA MEAS LEFT MID ICA EDV: -23.1 CM/SEC
BH CV XLRA MEAS LEFT MID ICA PSV: -112.8 CM/SEC
BH CV XLRA MEAS LEFT PROX CCA EDV: 20.9 CM/SEC
BH CV XLRA MEAS LEFT PROX CCA PSV: 152.6 CM/SEC
BH CV XLRA MEAS LEFT PROX ECA EDV: -10.5 CM/SEC
BH CV XLRA MEAS LEFT PROX ECA PSV: -89.7 CM/SEC
BH CV XLRA MEAS LEFT PROX ICA EDV: -19.6 CM/SEC
BH CV XLRA MEAS LEFT PROX ICA PSV: -99.5 CM/SEC
BH CV XLRA MEAS LEFT PROX SCLA PSV: 161.3 CM/SEC
BH CV XLRA MEAS LEFT VERTEBRAL A EDV: -17.5 CM/SEC
BH CV XLRA MEAS LEFT VERTEBRAL A PSV: -86.9 CM/SEC
BH CV XLRA MEAS RIGHT DIST CCA EDV: -19.6 CM/SEC
BH CV XLRA MEAS RIGHT DIST CCA PSV: -103.7 CM/SEC
BH CV XLRA MEAS RIGHT DIST ICA EDV: -25.6 CM/SEC
BH CV XLRA MEAS RIGHT DIST ICA PSV: -108.5 CM/SEC
BH CV XLRA MEAS RIGHT ICA/CCA RATIO: 1.14
BH CV XLRA MEAS RIGHT MID ICA EDV: -27.7 CM/SEC
BH CV XLRA MEAS RIGHT MID ICA PSV: -101.4 CM/SEC
BH CV XLRA MEAS RIGHT PROX CCA EDV: 23.1 CM/SEC
BH CV XLRA MEAS RIGHT PROX CCA PSV: 124.7 CM/SEC
BH CV XLRA MEAS RIGHT PROX ECA EDV: -12.2 CM/SEC
BH CV XLRA MEAS RIGHT PROX ECA PSV: -221.9 CM/SEC
BH CV XLRA MEAS RIGHT PROX ICA EDV: -12.1 CM/SEC
BH CV XLRA MEAS RIGHT PROX ICA PSV: -117.8 CM/SEC
BH CV XLRA MEAS RIGHT PROX SCLA PSV: 150.4 CM/SEC
BH CV XLRA MEAS RIGHT VERTEBRAL A EDV: -13.2 CM/SEC
BH CV XLRA MEAS RIGHT VERTEBRAL A PSV: -64.8 CM/SEC
MAXIMAL PREDICTED HEART RATE: 146 BPM
STRESS TARGET HR: 124 BPM

## 2023-01-05 PROCEDURE — 99214 OFFICE O/P EST MOD 30 MIN: CPT

## 2023-01-05 PROCEDURE — 93880 EXTRACRANIAL BILAT STUDY: CPT

## 2023-01-05 RX ORDER — SULFAMETHOXAZOLE AND TRIMETHOPRIM 800; 160 MG/1; MG/1
1 TABLET ORAL 2 TIMES DAILY
Qty: 14 TABLET | Refills: 0 | Status: SHIPPED | OUTPATIENT
Start: 2023-01-05 | End: 2023-01-12

## 2023-01-05 RX ORDER — FLASH GLUCOSE SENSOR
1 KIT MISCELLANEOUS
Qty: 1 EACH | Refills: 2 | Status: SHIPPED | OUTPATIENT
Start: 2023-01-05 | End: 2023-01-31

## 2023-01-05 RX ORDER — FERROUS SULFATE 325(65) MG
325 TABLET ORAL 3 TIMES WEEKLY
Qty: 30 TABLET | Refills: 1 | Status: SHIPPED | OUTPATIENT
Start: 2023-01-05

## 2023-01-05 NOTE — PATIENT INSTRUCTIONS
Start Bactrim 1 tablet twice a day of 7 days. Start iron supplement 3 times a week (Monday, Wednesday, Friday). Refill of FreeStyle sensors sent. Colonoscopy to be ordered and scheduling to call with appointment. Follow-up after carotids completed.

## 2023-01-05 NOTE — PROGRESS NOTES
"Chief Complaint  Anorexia (Discuss CT scan), Back Pain, and Chest Pain (Ongoing chest pain radiates around towards back now)    Subjective        Harry Sierra presents to [facility]  History of Present Illness  72 y.o. male presenting to discuss recent CT, back pain , chest pain. Pt of Warren Mohamud MD. Takes 36 Levemir daily to treat diabetes. Blood sugar jumps to 200 if eats anything but up 240 if eats a few french fries. Blood sugar steady around 130. Has sharp pains in lower right back. Treated with extra strength Tylenol. Started having lower abdominal pain last night. States can't eat or sleep. Has had a 4 lbs weight loss in 4 weeks with decreased appetite. Sour stomach and takes TUMS with meals. States has a \"flash of headaches\" in the past 2 weeks. Mind has been \"foggy\" and it is difficult to find words. States \"hears people say something different in left ear in particular. Takes 2 stool softeners every other day. States stomach is tight with pain in right lower abdomen. Catheterizes self daily.       Objective   Vital Signs:  /70   Pulse 117   Temp 98.6 °F (37 °C)   Ht 175.3 cm (69\")   Wt 63.5 kg (140 lb)   SpO2 99%   BMI 20.67 kg/m²   Estimated body mass index is 20.67 kg/m² as calculated from the following:    Height as of this encounter: 175.3 cm (69\").    Weight as of this encounter: 63.5 kg (140 lb).    BMI is within normal parameters. No other follow-up for BMI required.      Physical Exam  Vitals reviewed.   Constitutional:       Appearance: Normal appearance.   Cardiovascular:      Rate and Rhythm: Normal rate.      Pulses: Normal pulses.      Heart sounds: Normal heart sounds.   Pulmonary:      Effort: Pulmonary effort is normal.      Breath sounds: Normal breath sounds.   Abdominal:      General: Bowel sounds are normal.      Palpations: Abdomen is soft.      Tenderness: There is abdominal tenderness.       Musculoskeletal:         General: Normal " range of motion.      Cervical back: Normal range of motion.   Skin:     General: Skin is warm and dry.      Capillary Refill: Capillary refill takes less than 2 seconds.   Neurological:      General: No focal deficit present.      Mental Status: He is alert and oriented to person, place, and time.   Psychiatric:         Mood and Affect: Mood normal.         Behavior: Behavior normal.         Thought Content: Thought content normal.         Judgment: Judgment normal.        Result Review :    Common labs    Common Labs 11/14/22 11/14/22 11/14/22 12/5/22 12/12/22 12/12/22    1422 1422 1422  1009 1009   Glucose  287 (A)    139 (A)   BUN  10    16   Creatinine  0.68 (A)    0.58 (A)   Sodium  139    140   Potassium  3.8    4.2   Chloride  99    102   Calcium  9.0    9.5   Total Protein  6.3    6.7   Albumin  4.00    4.10   Total Bilirubin  0.3    0.4   Alkaline Phosphatase  131 (A)    172 (A)   AST (SGOT)  21    21   ALT (SGPT)  35    24   WBC   7.27 5.25     Hemoglobin   10.4 (A) 10.3 (A)     Hematocrit   31.0 (A) 31.2 (A)     Platelets   219 240     Total Cholesterol     99    Triglycerides     75    HDL Cholesterol     38 (A)    LDL Cholesterol      45    Hemoglobin A1C 11.70 (A)        (A) Abnormal value       Comments are available for some flowsheets but are not being displayed.           Current Outpatient Medications on File Prior to Visit   Medication Sig Dispense Refill   • atorvastatin (LIPITOR) 40 MG tablet Take 1 tablet by mouth every night at bedtime. 90 tablet 4   • clopidogrel (PLAVIX) 75 MG tablet Take 1 tablet by mouth Daily. 90 tablet 4   • Continuous Blood Gluc  (FreeStyle Vaishali 14 Day [place]) device AS DIRECTED 1 each 2   • Diclofenac Sodium (Voltaren) 1 % gel gel Apply 4 g topically to the appropriate area as directed 4 (Four) Times a Day As Needed (Arthritis). 300 g 5   • insulin detemir (Levemir FlexTouch) 100 UNIT/ML injection Inject 40 Units under the skin into the appropriate area as  directed Daily. 18 mL 1   • Insulin Pen Needle (BD Pen Needle An U/F) 32G X 4 MM misc Use daily to check blood sugar DX: E11.9 100 each 5   • LORazepam (Ativan) 1 MG tablet Take 1 tablet about 45 minutes prior to the CT scan.  Take the other tablet if still anxious just prior to study.  MUST HAVE A RIDE HOME. 2 tablet 0   • losartan (COZAAR) 25 MG tablet Take 1 tablet by mouth Daily. 90 tablet 4   • metFORMIN (GLUCOPHAGE) 500 MG tablet Take 1 tablet by mouth 2 (Two) Times a Day With Meals. 180 tablet 3   • Multiple Vitamins-Minerals (CENTRUM SILVER 50+MEN) tablet Take 1 tablet by mouth Daily. HELD FOR OR     • pantoprazole (PROTONIX) 40 MG EC tablet Take 1 tablet by mouth Daily. 90 tablet 0   • silodosin (RAPAFLO) 8 MG capsule capsule Take 1 capsule by mouth Daily.       No current facility-administered medications on file prior to visit.                 Assessment and Plan   Diagnoses and all orders for this visit:    1. Abnormal weight loss (Primary)    2. Lower abdominal pain  -     sulfamethoxazole-trimethoprim (BACTRIM DS,SEPTRA DS) 800-160 MG per tablet; Take 1 tablet by mouth 2 (Two) Times a Day for 7 days.  Dispense: 14 tablet; Refill: 0    3. Type 2 diabetes mellitus with hyperglycemia, with long-term current use of insulin (HCC)  -     Continuous Blood Gluc Sensor (FreeStyle Vaishali 14 Day Sensor) misc; 1 Device Every 14 (Fourteen) Days.  Dispense: 1 each; Refill: 2    4. Anemia, unspecified type  -     ferrous sulfate (FerrouSul) 325 (65 FE) MG tablet; Take 1 tablet by mouth 3 (Three) Times a Week.  Dispense: 30 tablet; Refill: 1    5. Screen for colon cancer  -     Ambulatory Referral For Screening Colonoscopy      Patient Instructions   Start Bactrim 1 tablet twice a day of 7 days. Start iron supplement 3 times a week (Monday, Wednesday, Friday). Refill of FreeStyle sensors sent. Colonoscopy to be ordered and scheduling to call with appointment. Follow-up after carotids completed.              Follow Up    Return in about 1 month (around 2/5/2023) for Recheck.  Patient was given instructions and counseling regarding his condition or for health maintenance advice. Please see specific information pulled into the AVS if appropriate.

## 2023-01-06 DIAGNOSIS — I65.23 BILATERAL CAROTID ARTERY STENOSIS: Primary | ICD-10-CM

## 2023-01-31 ENCOUNTER — PRE-ADMISSION TESTING (OUTPATIENT)
Dept: PREADMISSION TESTING | Facility: HOSPITAL | Age: 73
End: 2023-01-31
Payer: MEDICARE

## 2023-01-31 VITALS
BODY MASS INDEX: 22.01 KG/M2 | HEIGHT: 69 IN | OXYGEN SATURATION: 100 % | HEART RATE: 92 BPM | SYSTOLIC BLOOD PRESSURE: 134 MMHG | WEIGHT: 148.6 LBS | RESPIRATION RATE: 16 BRPM | DIASTOLIC BLOOD PRESSURE: 82 MMHG | TEMPERATURE: 97.9 F

## 2023-01-31 LAB
ANION GAP SERPL CALCULATED.3IONS-SCNC: 7.1 MMOL/L (ref 5–15)
BUN SERPL-MCNC: 13 MG/DL (ref 8–23)
BUN/CREAT SERPL: 18.6 (ref 7–25)
CALCIUM SPEC-SCNC: 9.4 MG/DL (ref 8.6–10.5)
CHLORIDE SERPL-SCNC: 102 MMOL/L (ref 98–107)
CO2 SERPL-SCNC: 29.9 MMOL/L (ref 22–29)
CREAT SERPL-MCNC: 0.7 MG/DL (ref 0.76–1.27)
DEPRECATED RDW RBC AUTO: 46.3 FL (ref 37–54)
EGFRCR SERPLBLD CKD-EPI 2021: 96.7 ML/MIN/1.73
ERYTHROCYTE [DISTWIDTH] IN BLOOD BY AUTOMATED COUNT: 13.9 % (ref 12.3–15.4)
GLUCOSE SERPL-MCNC: 247 MG/DL (ref 65–99)
HCT VFR BLD AUTO: 30.5 % (ref 37.5–51)
HGB BLD-MCNC: 10.1 G/DL (ref 13–17.7)
MCH RBC QN AUTO: 30.3 PG (ref 26.6–33)
MCHC RBC AUTO-ENTMCNC: 33.1 G/DL (ref 31.5–35.7)
MCV RBC AUTO: 91.6 FL (ref 79–97)
PLATELET # BLD AUTO: 187 10*3/MM3 (ref 140–450)
PMV BLD AUTO: 9.3 FL (ref 6–12)
POTASSIUM SERPL-SCNC: 4.4 MMOL/L (ref 3.5–5.2)
QT INTERVAL: 372 MS
RBC # BLD AUTO: 3.33 10*6/MM3 (ref 4.14–5.8)
SODIUM SERPL-SCNC: 139 MMOL/L (ref 136–145)
WBC NRBC COR # BLD: 3.55 10*3/MM3 (ref 3.4–10.8)

## 2023-01-31 PROCEDURE — 93005 ELECTROCARDIOGRAM TRACING: CPT

## 2023-01-31 PROCEDURE — 36415 COLL VENOUS BLD VENIPUNCTURE: CPT

## 2023-01-31 PROCEDURE — 80048 BASIC METABOLIC PNL TOTAL CA: CPT

## 2023-01-31 PROCEDURE — 93010 ELECTROCARDIOGRAM REPORT: CPT | Performed by: INTERNAL MEDICINE

## 2023-01-31 PROCEDURE — 85027 COMPLETE CBC AUTOMATED: CPT

## 2023-01-31 RX ORDER — SULFAMETHOXAZOLE AND TRIMETHOPRIM 800; 160 MG/1; MG/1
TABLET ORAL
COMMUNITY
Start: 2023-01-30

## 2023-02-03 ENCOUNTER — PRE-PROCEDURE SCREENING (OUTPATIENT)
Dept: GASTROENTEROLOGY | Facility: CLINIC | Age: 73
End: 2023-02-03
Payer: MEDICARE

## 2023-02-03 NOTE — TELEPHONE ENCOUNTER
LAST SCOPE 10YRS ( NO RECORDS) --No personal history of polyps--No family history of polyps or colon cancer--PLAVIX --Medications:            atorvastatin (LIPITOR) 40 MG tablet  clopidogrel (PLAVIX) 75 MG tablet  ferrous sulfate (FerrouSul) 325 (65 FE) MG tablet    insulin detemir (Levemir FlexTouch) 100 UNIT/ML injection  LORazepam (Ativan) 1 MG tablet    losartan (COZAAR) 25 MG tablet    metFORMIN (GLUCOPHAGE) 500 MG tablet  Multiple Vitamins-Minerals (CENTRUM SILVER 50+MEN) tablet  pantoprazole (PROTONIX) 40 MG EC tablet  sulfamethoxazole-trimethoprim (BACTRIM DS,SEPTRA DS) 800-160 MG per tablet      QUESTIONNAIRE SCEEENING  HAS BEEN SENT TO DOCTOR FOR REVIEW

## 2023-02-03 NOTE — H&P
[facility] Physical    Patient Care Team:  Warren Mohamud MD as PCP - General (Family Medicine)  Brayden Franco III, MD as Consulting Physician (Cardiology)  Rod Becker MD as Consulting Physician (Dermatology)  Nick Alvarado MD as Consulting Physician (Urology)    Chief complaint cannot urinate    Subjective     Patient is a 72 y.o. male presents with history of prostate cancer followed on active surveillance who developed urinary retention in the [place] he was on intermittent catheterization which has been difficult to do and Rapaflo cystoscopy revealed more than 40 cc retained no median lobe 6 cm channel 2+ trabeculations prostate MRI confirmed approximately a 67 cc prostate with no localizing evidence of significant prostate malignancy patient's had no interest in undergoing a TUR of the prostate and will like to try suprapubic catheterization with attempt to spontaneously void again before considering TUR of the prostate the procedure the attendant risks noted to infection bleeding contiguous organ injury of been discussed change in the catheter he understands agrees to proceed      Review of Systems   No fever and chills    Past Medical History:   Diagnosis Date   • Claustrophobia    • Diabetes mellitus (HCC)    • GERD (gastroesophageal reflux disease)    • History of gastric ulcer    • History of pneumonia 2018    MULTIFOCAL, HOSPITALIZED X 6 DAYS   • History of shingles 2016   • History of TIA (transient ischemic attack)     X2   • Hyperlipidemia    • Hypertension    • Infection due to parainfluenza virus 2 11/13/2018   • Melanoma (HCC)     LEFT FOREARM    • Prostate cancer (HCC)     FOLLOWED BY DR ALVARADO   • Urinary catheter in place    • Uses self-applied continuous glucose monitoring device     FREESTYLE MACRINA   • Vertebral artery insufficiency    • Vertigo      Past Surgical History:   Procedure Laterality Date   • CATARACT EXTRACTION     • COLONOSCOPY       2011 Dr [facility]   • ENDOSCOPY N/A 12/15/2016    Procedure: ESOPHAGOGASTRODUODENOSCOPY WITH COLD BIOPSIES;  Surgeon: Moshe Lux MD;  Location: [facility] ENDOSCOPY;  Service:    • SKIN GRAFT SPLIT THICKNESS Left 10/27/2020    Procedure: EXCISION OF MELANOMA FROM THE LEFT FOREARM REQUIRING FULL THICKNESS SKIN GRAFT;  Surgeon: Nick Nuñez MD;  Location: [facility] MAIN OR;  Service: General;  Laterality: Left;     Family History   Problem Relation Age of Onset   • Diabetes Mother    • Heart disease Mother    • Uterine cancer Mother    • Diabetes Father    • Heart disease Father    • Kidney disease Father    • Skin cancer Father         melanoma   • Cancer Father         testicle   • Malig Hyperthermia Neg Hx      Social History     Tobacco Use   • Smoking status: Never   • Smokeless tobacco: Never   Vaping Use   • Vaping Use: Never used   Substance Use Topics   • Alcohol use: No   • Drug use: No       Meds:  No medications prior to admission.       Allergies:  Bee venom, Shellfish-derived products, Aleve [naproxen], Asa [aspirin], and Ibuprofen    Debilities:  None    Objective     Vital Signs     No intake or output data in the 24 hours ending 02/03/23 1259       Physical Exam:      General Appearance:    Alert, cooperative, in no acute distress   Head:    Normocephalic, without obvious abnormality, atraumatic   Eyes:            Lids and lashes normal, conjunctivae and sclerae normal, no   icterus, no pallor, corneas clear   Ears:    Ears appear intact with no abnormalities noted   Throat:   No oral lesions, no thrush, oral mucosa moist   Neck:   No adenopathy, supple, trachea midline, no thyromegaly,, no JVD   Back:     No kyphosis present, no scoliosis present, no skin lesions,      erythema or scars, no tenderness to percussion or                   palpation,   range of motion normal   Lungs:    ,respirations regular, even and                  unlabored    Heart:    Regular rhythm and normal  rate,   Chest Wall:    No abnormalities observed   Abdomen:   no masses, no organomegaly, soft        non-tender, non-distended, no guarding, no rebound                tenderness   Rectal:    Rectal good tone without masses prostates 40+ cc smooth nontender symmetric benign     penoscrotal testicles are normal Maldonado catheter in place                         Results Review:    I reviewed the patient's new clinical results.  Results for orders placed or performed in visit on 01/31/23   Basic Metabolic Panel    Specimen: Blood   Result Value Ref Range    Glucose 247 (H) 65 - 99 mg/dL    BUN 13 8 - 23 mg/dL    Creatinine 0.70 (L) 0.76 - 1.27 mg/dL    Sodium 139 136 - 145 mmol/L    Potassium 4.4 3.5 - 5.2 mmol/L    Chloride 102 98 - 107 mmol/L    CO2 29.9 (H) 22.0 - 29.0 mmol/L    Calcium 9.4 8.6 - 10.5 mg/dL    BUN/Creatinine Ratio 18.6 7.0 - 25.0    Anion Gap 7.1 5.0 - 15.0 mmol/L    eGFR 96.7 >60.0 mL/min/1.73   CBC (No Diff)    Specimen: Blood   Result Value Ref Range    WBC 3.55 3.40 - 10.80 10*3/mm3    RBC 3.33 (L) 4.14 - 5.80 10*6/mm3    Hemoglobin 10.1 (L) 13.0 - 17.7 g/dL    Hematocrit 30.5 (L) 37.5 - 51.0 %    MCV 91.6 79.0 - 97.0 fL    MCH 30.3 26.6 - 33.0 pg    MCHC 33.1 31.5 - 35.7 g/dL    RDW 13.9 12.3 - 15.4 %    RDW-SD 46.3 37.0 - 54.0 fl    MPV 9.3 6.0 - 12.0 fL    Platelets 187 140 - 450 10*3/mm3   ECG 12 Lead   Result Value Ref Range    QT Interval 372 ms        Assessment:  Urinary retention history of prostate cancer active surveillance BPH    Plan:    Cystoscopy suprapubic catheter placement    I discussed the patients findings and my recommendations with patient.     Dom Davis MD  02/03/23  12:59 EST

## 2023-02-06 ENCOUNTER — PREP FOR SURGERY (OUTPATIENT)
Dept: OTHER | Facility: HOSPITAL | Age: 73
End: 2023-02-06
Payer: MEDICARE

## 2023-02-06 DIAGNOSIS — K63.5 COLON POLYPS: Primary | ICD-10-CM

## 2023-02-08 ENCOUNTER — HOSPITAL ENCOUNTER (OUTPATIENT)
Facility: HOSPITAL | Age: 73
Setting detail: HOSPITAL OUTPATIENT SURGERY
Discharge: HOME | End: 2023-02-08
Attending: UROLOGY | Admitting: UROLOGY
Payer: MEDICARE

## 2023-02-08 ENCOUNTER — ANESTHESIA EVENT (OUTPATIENT)
Dept: PERIOP | Facility: HOSPITAL | Age: 73
End: 2023-02-08
Payer: MEDICARE

## 2023-02-08 ENCOUNTER — ANESTHESIA (OUTPATIENT)
Dept: PERIOP | Facility: HOSPITAL | Age: 73
End: 2023-02-08
Payer: MEDICARE

## 2023-02-08 VITALS
HEART RATE: 77 BPM | SYSTOLIC BLOOD PRESSURE: 157 MMHG | RESPIRATION RATE: 17 BRPM | TEMPERATURE: 98 F | WEIGHT: 143.7 LBS | DIASTOLIC BLOOD PRESSURE: 87 MMHG | OXYGEN SATURATION: 100 % | BODY MASS INDEX: 21.28 KG/M2 | HEIGHT: 69 IN

## 2023-02-08 DIAGNOSIS — C61 PROSTATE CANCER: ICD-10-CM

## 2023-02-08 DIAGNOSIS — N40.1 URINARY RETENTION DUE TO BENIGN PROSTATIC HYPERPLASIA: Primary | ICD-10-CM

## 2023-02-08 DIAGNOSIS — R33.8 URINARY RETENTION DUE TO BENIGN PROSTATIC HYPERPLASIA: Primary | ICD-10-CM

## 2023-02-08 LAB
GLUCOSE BLDC GLUCOMTR-MCNC: 181 MG/DL (ref 70–130)
GLUCOSE BLDC GLUCOMTR-MCNC: 226 MG/DL (ref 70–130)

## 2023-02-08 PROCEDURE — 25010000002 MIDAZOLAM PER 1 MG: Performed by: ANESTHESIOLOGY

## 2023-02-08 PROCEDURE — 25010000002 PROPOFOL 10 MG/ML EMULSION: Performed by: NURSE ANESTHETIST, CERTIFIED REGISTERED

## 2023-02-08 PROCEDURE — 25010000002 DEXAMETHASONE SODIUM PHOSPHATE 20 MG/5ML SOLUTION: Performed by: NURSE ANESTHETIST, CERTIFIED REGISTERED

## 2023-02-08 PROCEDURE — 25010000002 FENTANYL CITRATE (PF) 50 MCG/ML SOLUTION: Performed by: NURSE ANESTHETIST, CERTIFIED REGISTERED

## 2023-02-08 PROCEDURE — 25010000002 ONDANSETRON PER 1 MG: Performed by: NURSE ANESTHETIST, CERTIFIED REGISTERED

## 2023-02-08 PROCEDURE — 82962 GLUCOSE BLOOD TEST: CPT

## 2023-02-08 PROCEDURE — C1894 INTRO/SHEATH, NON-LASER: HCPCS | Performed by: UROLOGY

## 2023-02-08 PROCEDURE — 25010000002 CEFTRIAXONE PER 250 MG: Performed by: UROLOGY

## 2023-02-08 RX ORDER — EPHEDRINE SULFATE 50 MG/ML
5 INJECTION, SOLUTION INTRAVENOUS ONCE AS NEEDED
Status: DISCONTINUED | OUTPATIENT
Start: 2023-02-08 | End: 2023-02-08 | Stop reason: HOSPADM

## 2023-02-08 RX ORDER — SODIUM CHLORIDE 9 MG/ML
40 INJECTION, SOLUTION INTRAVENOUS AS NEEDED
Status: DISCONTINUED | OUTPATIENT
Start: 2023-02-08 | End: 2023-02-08 | Stop reason: HOSPADM

## 2023-02-08 RX ORDER — LABETALOL HYDROCHLORIDE 5 MG/ML
5 INJECTION, SOLUTION INTRAVENOUS
Status: DISCONTINUED | OUTPATIENT
Start: 2023-02-08 | End: 2023-02-08 | Stop reason: HOSPADM

## 2023-02-08 RX ORDER — OXYCODONE AND ACETAMINOPHEN 7.5; 325 MG/1; MG/1
1 TABLET ORAL EVERY 4 HOURS PRN
Status: DISCONTINUED | OUTPATIENT
Start: 2023-02-08 | End: 2023-02-08 | Stop reason: HOSPADM

## 2023-02-08 RX ORDER — PROPOFOL 10 MG/ML
VIAL (ML) INTRAVENOUS AS NEEDED
Status: DISCONTINUED | OUTPATIENT
Start: 2023-02-08 | End: 2023-02-08 | Stop reason: SURG

## 2023-02-08 RX ORDER — SODIUM CHLORIDE 0.9 % (FLUSH) 0.9 %
10 SYRINGE (ML) INJECTION EVERY 12 HOURS SCHEDULED
Status: DISCONTINUED | OUTPATIENT
Start: 2023-02-08 | End: 2023-02-08 | Stop reason: HOSPADM

## 2023-02-08 RX ORDER — SODIUM CHLORIDE 0.9 % (FLUSH) 0.9 %
10 SYRINGE (ML) INJECTION AS NEEDED
Status: DISCONTINUED | OUTPATIENT
Start: 2023-02-08 | End: 2023-02-08 | Stop reason: HOSPADM

## 2023-02-08 RX ORDER — BUPIVACAINE HYDROCHLORIDE 2.5 MG/ML
INJECTION, SOLUTION INFILTRATION; PERINEURAL AS NEEDED
Status: DISCONTINUED | OUTPATIENT
Start: 2023-02-08 | End: 2023-02-08 | Stop reason: HOSPADM

## 2023-02-08 RX ORDER — ONDANSETRON 2 MG/ML
4 INJECTION INTRAMUSCULAR; INTRAVENOUS ONCE AS NEEDED
Status: DISCONTINUED | OUTPATIENT
Start: 2023-02-08 | End: 2023-02-08 | Stop reason: HOSPADM

## 2023-02-08 RX ORDER — HYDROMORPHONE HYDROCHLORIDE 1 MG/ML
0.5 INJECTION, SOLUTION INTRAMUSCULAR; INTRAVENOUS; SUBCUTANEOUS
Status: DISCONTINUED | OUTPATIENT
Start: 2023-02-08 | End: 2023-02-08 | Stop reason: HOSPADM

## 2023-02-08 RX ORDER — ONDANSETRON 4 MG/1
4 TABLET, FILM COATED ORAL DAILY PRN
Qty: 10 TABLET | Refills: 1 | Status: SHIPPED | OUTPATIENT
Start: 2023-02-08

## 2023-02-08 RX ORDER — DIPHENHYDRAMINE HCL 25 MG
25 CAPSULE ORAL
Status: DISCONTINUED | OUTPATIENT
Start: 2023-02-08 | End: 2023-02-08 | Stop reason: HOSPADM

## 2023-02-08 RX ORDER — MAGNESIUM HYDROXIDE 1200 MG/15ML
LIQUID ORAL AS NEEDED
Status: DISCONTINUED | OUTPATIENT
Start: 2023-02-08 | End: 2023-02-08 | Stop reason: HOSPADM

## 2023-02-08 RX ORDER — HYDROCODONE BITARTRATE AND ACETAMINOPHEN 7.5; 325 MG/1; MG/1
1 TABLET ORAL ONCE AS NEEDED
Status: COMPLETED | OUTPATIENT
Start: 2023-02-08 | End: 2023-02-08

## 2023-02-08 RX ORDER — FENTANYL CITRATE 50 UG/ML
50 INJECTION, SOLUTION INTRAMUSCULAR; INTRAVENOUS
Status: DISCONTINUED | OUTPATIENT
Start: 2023-02-08 | End: 2023-02-08 | Stop reason: HOSPADM

## 2023-02-08 RX ORDER — PROMETHAZINE HYDROCHLORIDE 25 MG/1
25 TABLET ORAL ONCE AS NEEDED
Status: DISCONTINUED | OUTPATIENT
Start: 2023-02-08 | End: 2023-02-08 | Stop reason: HOSPADM

## 2023-02-08 RX ORDER — HYDRALAZINE HYDROCHLORIDE 20 MG/ML
5 INJECTION INTRAMUSCULAR; INTRAVENOUS
Status: DISCONTINUED | OUTPATIENT
Start: 2023-02-08 | End: 2023-02-08 | Stop reason: HOSPADM

## 2023-02-08 RX ORDER — LIDOCAINE HYDROCHLORIDE 20 MG/ML
INJECTION, SOLUTION INFILTRATION; PERINEURAL AS NEEDED
Status: DISCONTINUED | OUTPATIENT
Start: 2023-02-08 | End: 2023-02-08 | Stop reason: SURG

## 2023-02-08 RX ORDER — HYDROCODONE BITARTRATE AND ACETAMINOPHEN 5; 325 MG/1; MG/1
1-2 TABLET ORAL EVERY 4 HOURS PRN
Qty: 20 TABLET | Refills: 0 | Status: SHIPPED | OUTPATIENT
Start: 2023-02-08

## 2023-02-08 RX ORDER — PROMETHAZINE HYDROCHLORIDE 25 MG/1
25 SUPPOSITORY RECTAL ONCE AS NEEDED
Status: DISCONTINUED | OUTPATIENT
Start: 2023-02-08 | End: 2023-02-08 | Stop reason: HOSPADM

## 2023-02-08 RX ORDER — FAMOTIDINE 10 MG/ML
20 INJECTION, SOLUTION INTRAVENOUS ONCE
Status: COMPLETED | OUTPATIENT
Start: 2023-02-08 | End: 2023-02-08

## 2023-02-08 RX ORDER — DEXAMETHASONE SODIUM PHOSPHATE 4 MG/ML
INJECTION, SOLUTION INTRA-ARTICULAR; INTRALESIONAL; INTRAMUSCULAR; INTRAVENOUS; SOFT TISSUE AS NEEDED
Status: DISCONTINUED | OUTPATIENT
Start: 2023-02-08 | End: 2023-02-08 | Stop reason: SURG

## 2023-02-08 RX ORDER — FENTANYL CITRATE 50 UG/ML
INJECTION, SOLUTION INTRAMUSCULAR; INTRAVENOUS AS NEEDED
Status: DISCONTINUED | OUTPATIENT
Start: 2023-02-08 | End: 2023-02-08 | Stop reason: SURG

## 2023-02-08 RX ORDER — FLUMAZENIL 0.1 MG/ML
0.2 INJECTION INTRAVENOUS AS NEEDED
Status: DISCONTINUED | OUTPATIENT
Start: 2023-02-08 | End: 2023-02-08 | Stop reason: HOSPADM

## 2023-02-08 RX ORDER — NALOXONE HCL 0.4 MG/ML
0.2 VIAL (ML) INJECTION AS NEEDED
Status: DISCONTINUED | OUTPATIENT
Start: 2023-02-08 | End: 2023-02-08 | Stop reason: HOSPADM

## 2023-02-08 RX ORDER — MIDAZOLAM HYDROCHLORIDE 1 MG/ML
0.5 INJECTION INTRAMUSCULAR; INTRAVENOUS
Status: DISCONTINUED | OUTPATIENT
Start: 2023-02-08 | End: 2023-02-08 | Stop reason: HOSPADM

## 2023-02-08 RX ORDER — DIPHENHYDRAMINE HYDROCHLORIDE 50 MG/ML
12.5 INJECTION INTRAMUSCULAR; INTRAVENOUS
Status: DISCONTINUED | OUTPATIENT
Start: 2023-02-08 | End: 2023-02-08 | Stop reason: HOSPADM

## 2023-02-08 RX ORDER — SODIUM CHLORIDE, SODIUM LACTATE, POTASSIUM CHLORIDE, CALCIUM CHLORIDE 600; 310; 30; 20 MG/100ML; MG/100ML; MG/100ML; MG/100ML
9 INJECTION, SOLUTION INTRAVENOUS CONTINUOUS
Status: DISCONTINUED | OUTPATIENT
Start: 2023-02-08 | End: 2023-02-08 | Stop reason: HOSPADM

## 2023-02-08 RX ORDER — ONDANSETRON 2 MG/ML
INJECTION INTRAMUSCULAR; INTRAVENOUS AS NEEDED
Status: DISCONTINUED | OUTPATIENT
Start: 2023-02-08 | End: 2023-02-08 | Stop reason: SURG

## 2023-02-08 RX ADMIN — SODIUM CHLORIDE, POTASSIUM CHLORIDE, SODIUM LACTATE AND CALCIUM CHLORIDE 9 ML/HR: 600; 310; 30; 20 INJECTION, SOLUTION INTRAVENOUS at 08:48

## 2023-02-08 RX ADMIN — FENTANYL CITRATE 25 MCG: 50 INJECTION, SOLUTION INTRAMUSCULAR; INTRAVENOUS at 09:19

## 2023-02-08 RX ADMIN — ONDANSETRON 4 MG: 2 INJECTION INTRAMUSCULAR; INTRAVENOUS at 09:20

## 2023-02-08 RX ADMIN — PROPOFOL 20 MG: 10 INJECTION, EMULSION INTRAVENOUS at 09:08

## 2023-02-08 RX ADMIN — LIDOCAINE HYDROCHLORIDE 100 MG: 20 INJECTION, SOLUTION INFILTRATION; PERINEURAL at 09:03

## 2023-02-08 RX ADMIN — PROPOFOL 40 MG: 10 INJECTION, EMULSION INTRAVENOUS at 09:20

## 2023-02-08 RX ADMIN — DEXAMETHASONE SODIUM PHOSPHATE 8 MG: 4 INJECTION, SOLUTION INTRAMUSCULAR; INTRAVENOUS at 09:14

## 2023-02-08 RX ADMIN — FAMOTIDINE 20 MG: 10 INJECTION INTRAVENOUS at 08:46

## 2023-02-08 RX ADMIN — MIDAZOLAM 0.5 MG: 1 INJECTION INTRAMUSCULAR; INTRAVENOUS at 08:48

## 2023-02-08 RX ADMIN — HYDROCODONE BITARTRATE AND ACETAMINOPHEN 1 TABLET: 7.5; 325 TABLET ORAL at 10:00

## 2023-02-08 RX ADMIN — FENTANYL CITRATE 25 MCG: 50 INJECTION, SOLUTION INTRAMUSCULAR; INTRAVENOUS at 09:13

## 2023-02-08 RX ADMIN — PROPOFOL 140 MG: 10 INJECTION, EMULSION INTRAVENOUS at 09:03

## 2023-02-08 RX ADMIN — CEFTRIAXONE SODIUM 1 G: 1 INJECTION, POWDER, FOR SOLUTION INTRAMUSCULAR; INTRAVENOUS at 08:47

## 2023-02-08 NOTE — PERIOPERATIVE NURSING NOTE
Spoke with Ryan GUTIERREZ, pt is to try and urinate first and then drain remaining from cath. This is to be done every 6 hours. Pt and son instructed

## 2023-02-08 NOTE — OP NOTE
Prep diagnosis urine retention prostate cancer BPH    Postop diagnosis same    Operative procedure cystoscopy 16 Bermudian suprapubic catheter placement    Surgeon Ryan    Anesthesia General plus local Marcaine    Procedure note 72-year-old gentleman presented himself from [place] with a history of prostate cancer on active surveillance and BPH and retention has been trying to void spontaneously avoiding surgical intervention and has had difficulty catheterization with options discussed with including a TUR of the prostate and superior catheter he is elected on the latter procedure and attendant risk were discussed he understands wishes today    Site was marked antibiotics given timeout was taken COVID precautions allergies reviewed after adequate general esthesia placed very carefully in a modified dorsolithotomy position shaved over superior area prepped over this area and genitalia draped sterile fashion 2% intraurethral lidocaine jelly is made the scope was lanced bladder urethra was normal prostate was about 5-5 and half centimeters in length obstructing bilateral lobes bladder showed a marked trabeculations no cellules diverticula bladder tumors    3 fingerbreadths above the midline sepsis pubis infiltrated with quarter percent plain Marcaine I used a spinal needle with the patient in the lumbar position to gauge my depth and direction for placement of the SP tube in the bone infiltration of subcutaneous tissue as well with quarter percent plain Marcaine 15 blade knife was then used stabbed in the use of a trocar suprapubically under direct visualization into the dome of bladder 16 [place]ermudian silicone catheter was then placed under direct visualization and 10 cc inflation of balloon from back up to the dome of the bladder with no signs of any active bleeding and anchored to the skin as well with a 2-0 silk dressings were applied irrigated easily no signs of any active bleeding catheter plug was placed the  Saw today.   patient procedure well was sent to the recovery in satisfactory condition findings were called and left a voicemail to his son Warren at [phone]    Disposition holding on his Plavix there is no blood in the urine or his incision site for 24 hours continuation of his other meds instructed to intermittent draining with catheter plug or use of urinary leg or bedside drainage bag my office will call for follow-up in 1 week prescriptions for Norco and Zofran phone numbers given on instructions any concerns or questions to contact us accordingly

## 2023-02-08 NOTE — ANESTHESIA PREPROCEDURE EVALUATION
Anesthesia Evaluation     Patient summary reviewed and Nursing notes reviewed   no history of anesthetic complications:  NPO Solid Status: > 6 hours  NPO Liquid Status: > 6 hours           Airway   Mallampati: II  TM distance: >3 FB  Neck ROM: full  no difficulty expected and No difficulty expected  Dental - normal exam     Pulmonary - negative pulmonary ROS and normal exam    breath sounds clear to auscultation  (-) rhonchi, decreased breath sounds, wheezes, rales, stridor  Cardiovascular - normal exam    NYHA Classification: I  ECG reviewed  PT is on anticoagulation therapy  Rhythm: regular  Rate: normal    (+) hypertension, valvular problems/murmurs MR and TI, dysrhythmias Paroxysmal Atrial Fib, hyperlipidemia,  carotid artery disease carotid bilateral  (-) murmur, weak pulses, friction rub, systolic click, carotid bruits, JVD, peripheral edema      Neuro/Psych  (+) TIA,    GI/Hepatic/Renal/Endo    (+)  GERD,  diabetes mellitus type 2,     Musculoskeletal (-) negative ROS    Abdominal  - normal exam    Abdomen: soft.   Substance History - negative use     OB/GYN negative ob/gyn ROS         Other      history of cancer active                    Anesthesia Plan    ASA 3     general     intravenous induction     Anesthetic plan, risks, benefits, and alternatives have been provided, discussed and informed consent has been obtained with: patient.        CODE STATUS:

## 2023-02-08 NOTE — BRIEF OP NOTE
SUPRAPUBIC CATHETER INSERTION  Progress Note    Harry Sierra  2/8/2023    Pre-op Diagnosis:   Urinary retention history of prostate cancer with BPH inability to self cath       Post-Op Diagnosis Codes:  Same    Procedure/CPT® Codes:        Procedure(s):  CYSTOSCOPY SUPRAPUBIC CATHETER INSERTION        Surgeon(s):  Dom Davis MD    Anesthesia: General    Staff:   * No surgical staff found *         Estimated Blood Loss: minimal    Urine Voided: * No values recorded between 2/8/2023 12:00 AM and 2/8/2023  8:29 AM *    Specimens:                None          Drains: * No LDAs found *    Findings: Bilateral lobe prostate obstruction capacious bladder        Complications:           Dom Davis MD     Date: 2/8/2023  Time: 08:29 EST

## 2023-02-08 NOTE — INTERVAL H&P NOTE
H&P reviewed. The patient was examined and there are no changes to the H&P.      There were no vitals taken for this visit.   Walk in

## 2023-02-08 NOTE — ANESTHESIA POSTPROCEDURE EVALUATION
"Patient: Harry Sierra    Procedure Summary     Date: 02/08/23 Room / Location: [facility] OR 01 / [facility] MAIN OR    Anesthesia Start: 0853 Anesthesia Stop: 0941    Procedure: CYSTOSCOPY SUPRAPUBIC CATHETER INSERTION Diagnosis:     Surgeons: Dom Davis MD Provider: Ulises Trujillo MD    Anesthesia Type: general ASA Status: 3          Anesthesia Type: general    Vitals  Vitals Value Taken Time   /89 02/08/23 1001   Temp 36.7 °C (98 °F) 02/08/23 0936   Pulse 83 02/08/23 1013   Resp 16 02/08/23 0936   SpO2 99 % 02/08/23 1013   Vitals shown include unvalidated device data.        Post Anesthesia Care and Evaluation    Patient location during evaluation: PHASE II  Patient participation: complete - patient participated  Level of consciousness: awake and alert  Pain management: adequate    Airway patency: patent  Anesthetic complications: No anesthetic complications    Cardiovascular status: acceptable  Respiratory status: acceptable  Hydration status: acceptable    Comments: /87   Pulse 77   Temp 36.7 °C (98 °F) (Oral)   Resp 17   Ht 175.3 cm (69\")   Wt 65.2 kg (143 lb 11.2 oz)   SpO2 100%   BMI 21.22 kg/m²       "

## 2023-02-08 NOTE — ANESTHESIA PROCEDURE NOTES
Airway  Urgency: elective    Date/Time: 2/8/2023 9:06 AM  Airway not difficult    General Information and Staff    Patient location during procedure: OR  Anesthesiologist: Ulises Trujillo MD  CRNA/CAA: Eunice Marvin CRNA    Indications and Patient Condition    Preoxygenated: yes  Mask difficulty assessment: 0 - not attempted    Final Airway Details  Final airway type: supraglottic airway      Successful airway: classic  Size 4     Number of attempts at approach: 1  Assessment: lips, teeth, and gum same as pre-op    Additional Comments  Pt to OR and positioned self to OR table. Monitors applied. PreO2: SIVI and easy insertion LMA. ETCO2 +, Equal and bilateral chest rise

## 2023-02-14 ENCOUNTER — OFFICE VISIT (OUTPATIENT)
Dept: INTERNAL MEDICINE | Facility: CLINIC | Age: 73
End: 2023-02-14
Payer: MEDICARE

## 2023-02-14 VITALS
HEIGHT: 69 IN | WEIGHT: 143.8 LBS | SYSTOLIC BLOOD PRESSURE: 154 MMHG | HEART RATE: 87 BPM | TEMPERATURE: 97.8 F | OXYGEN SATURATION: 96 % | BODY MASS INDEX: 21.3 KG/M2 | DIASTOLIC BLOOD PRESSURE: 89 MMHG

## 2023-02-14 DIAGNOSIS — E11.65 TYPE 2 DIABETES MELLITUS WITH HYPERGLYCEMIA, WITH LONG-TERM CURRENT USE OF INSULIN: Primary | Chronic | ICD-10-CM

## 2023-02-14 DIAGNOSIS — I10 PRIMARY HYPERTENSION: Chronic | ICD-10-CM

## 2023-02-14 DIAGNOSIS — Z79.4 TYPE 2 DIABETES MELLITUS WITH HYPERGLYCEMIA, WITH LONG-TERM CURRENT USE OF INSULIN: Primary | Chronic | ICD-10-CM

## 2023-02-14 DIAGNOSIS — K21.00 GASTROESOPHAGEAL REFLUX DISEASE WITH ESOPHAGITIS WITHOUT HEMORRHAGE: Chronic | ICD-10-CM

## 2023-02-14 LAB — HBA1C MFR BLD: 8.4 % (ref 4.8–5.6)

## 2023-02-14 PROCEDURE — 99214 OFFICE O/P EST MOD 30 MIN: CPT | Performed by: FAMILY MEDICINE

## 2023-02-14 RX ORDER — FLASH GLUCOSE SENSOR
KIT MISCELLANEOUS
COMMUNITY
Start: 2023-02-03 | End: 2023-02-15

## 2023-02-14 RX ORDER — PANTOPRAZOLE SODIUM 40 MG/1
40 TABLET, DELAYED RELEASE ORAL DAILY
Qty: 90 TABLET | Refills: 4 | Status: SHIPPED | OUTPATIENT
Start: 2023-02-14

## 2023-02-14 NOTE — PROGRESS NOTES
"Chief Complaint  Hypertension (1yr f/u)    Subjective        Harry Sierra presents to [facility]  History of Present Illness     He is following up today regarding his diabetes.  His most recent A1c 3 months ago was very high at 11.70.  He is an insulin-dependent diabetic.  He is on 40 units of Levemir daily along with metformin 500 mg twice daily.  He was referred to endocrinology and had a pending March appointment but it looks like it has been canceled by the practice.  He is due for new A1c.  He is having some low sugars.    His blood pressure is high in the office today but he is running around the 120s/70s at home.  He is recovering from a procedure and he is stable at home.  He is taking the losartan as prescribed.    Looks like recently he had surgery to have a suprapubic catheter so that he could urinate more effectively.    Objective   Vital Signs:  /89 (BP Location: Left arm, Patient Position: Sitting, Cuff Size: Adult)   Pulse 87   Temp 97.8 °F (36.6 °C) (Infrared)   Ht 175.3 cm (69\")   Wt 65.2 kg (143 lb 12.8 oz)   SpO2 96%   BMI 21.24 kg/m²   Estimated body mass index is 21.24 kg/m² as calculated from the following:    Height as of this encounter: 175.3 cm (69\").    Weight as of this encounter: 65.2 kg (143 lb 12.8 oz).       BMI is within normal parameters. No other follow-up for BMI required.      Physical Exam  Vitals and nursing note reviewed.   Constitutional:       General: He is not in acute distress.     Appearance: Normal appearance.   Cardiovascular:      Rate and Rhythm: Normal rate and regular rhythm.      Heart sounds: Normal heart sounds. No murmur heard.  Pulmonary:      Effort: Pulmonary effort is normal.      Breath sounds: Normal breath sounds.   Neurological:      Mental Status: He is alert.        Result Review :  The following data was reviewed by: Warren Mohamud MD on 02/14/2023:  Common labs    Common Labs 12/5/22 12/12/22 12/12/22 " 1/31/23 1/31/23     1009 1009 1519 1519   Glucose   139 (A)  247 (A)   BUN   16  13   Creatinine   0.58 (A)  0.70 (A)   Sodium   140  139   Potassium   4.2  4.4   Chloride   102  102   Calcium   9.5  9.4   Total Protein   6.7     Albumin   4.10     Total Bilirubin   0.4     Alkaline Phosphatase   172 (A)     AST (SGOT)   21     ALT (SGPT)   24     WBC 5.25   3.55    Hemoglobin 10.3 (A)   10.1 (A)    Hematocrit 31.2 (A)   30.5 (A)    Platelets 240   187    Total Cholesterol  99      Triglycerides  75      HDL Cholesterol  38 (A)      LDL Cholesterol   45      (A) Abnormal value       Comments are available for some flowsheets but are not being displayed.                        Assessment and Plan   Diagnoses and all orders for this visit:    1. Type 2 diabetes mellitus with hyperglycemia, with long-term current use of insulin (HCC) (Primary)  -     Hemoglobin A1c    2. Primary hypertension    3. Gastroesophageal reflux disease with esophagitis without hemorrhage  -     pantoprazole (PROTONIX) 40 MG EC tablet; Take 1 tablet by mouth Daily.  Dispense: 90 tablet; Refill: 4        Unsure the stability of the diabetes.  Check A1c.  Continue all medications as above.  Labs reviewed/ordered as above. Continue monitoring pressures at home.  I will check on the status of his endocrine referral.           Follow Up   Return in about 4 months (around 6/14/2023) for Recheck - diabetes.  Patient was given instructions and counseling regarding his condition or for health maintenance advice. Please see specific information pulled into the AVS if appropriate.

## 2023-02-15 RX ORDER — FLASH GLUCOSE SENSOR
KIT MISCELLANEOUS
Qty: 1 EACH | Refills: 0 | Status: SHIPPED | OUTPATIENT
Start: 2023-02-15 | End: 2023-02-20 | Stop reason: SDUPTHER

## 2023-02-20 ENCOUNTER — TELEPHONE (OUTPATIENT)
Dept: INTERNAL MEDICINE | Facility: CLINIC | Age: 73
End: 2023-02-20
Payer: MEDICARE

## 2023-02-20 DIAGNOSIS — E11.65 TYPE 2 DIABETES MELLITUS WITH HYPERGLYCEMIA, WITH LONG-TERM CURRENT USE OF INSULIN: Chronic | ICD-10-CM

## 2023-02-20 DIAGNOSIS — Z79.4 TYPE 2 DIABETES MELLITUS WITH HYPERGLYCEMIA, WITH LONG-TERM CURRENT USE OF INSULIN: Chronic | ICD-10-CM

## 2023-02-20 RX ORDER — FLASH GLUCOSE SENSOR
1 KIT MISCELLANEOUS DAILY
Qty: 2 EACH | Refills: 11 | Status: SHIPPED | OUTPATIENT
Start: 2023-02-20

## 2023-02-20 RX ORDER — INSULIN DETEMIR 100 [IU]/ML
40 INJECTION, SOLUTION SUBCUTANEOUS DAILY
Qty: 18 ML | Refills: 1 | Status: SHIPPED | OUTPATIENT
Start: 2023-02-20

## 2023-02-20 NOTE — TELEPHONE ENCOUNTER
I think there was some confusion.  I wanted him to call those to his pharmacy and have them send me the request.  You can also take the request from him and send to me in a refill request.

## 2023-02-20 NOTE — TELEPHONE ENCOUNTER
Warren Mohamud MD Morris, Charlette, RegSched Rep  Okay to hold Pavix as requested 5days left message for pt to contact office

## 2023-04-10 ENCOUNTER — TELEPHONE (OUTPATIENT)
Dept: GASTROENTEROLOGY | Facility: CLINIC | Age: 73
End: 2023-04-10
Payer: MEDICARE

## 2023-04-12 ENCOUNTER — TELEPHONE (OUTPATIENT)
Dept: GASTROENTEROLOGY | Facility: CLINIC | Age: 73
End: 2023-04-12
Payer: MEDICARE

## 2023-04-12 ENCOUNTER — TELEPHONE (OUTPATIENT)
Dept: GASTROENTEROLOGY | Facility: CLINIC | Age: 73
End: 2023-04-12

## 2023-04-12 PROBLEM — K63.5 COLON POLYPS: Status: ACTIVE | Noted: 2023-04-12

## 2023-04-12 NOTE — TELEPHONE ENCOUNTER
OK FOR HUB TO READ  EVELIA patient via telephone for COLONOSCOPY. Scheduled 6/12/23 with arrival time of 1130. Prep paperwork mailed to verified address on file. Patient advised arrival time may change based on [facility] guidelines. EVELIA BOLAND

## 2023-04-12 NOTE — TELEPHONE ENCOUNTER
Provider: DR ROSENBAUM    Caller: PALMA KNIGHT    Relationship to Patient: SELF    Reason for Call: PATIENT CALLED TO CONFIRM THAT THE DATE OF 6/12/23 @ 11:30AM WITH DR ROSENBAUM FOR HIS COLONOSCOPY IS OK FOR HIM. HE SAID THAT THE INFORMATION WAS LEFT ON HIS VOICEMAIL. PLEASE CALL PATIENT BACK CONFIRMING ALL INFORMATION, INCLUDING HIS PREP INSTRUCTIONS. THANK YOU

## 2023-04-13 DIAGNOSIS — K21.00 GASTROESOPHAGEAL REFLUX DISEASE WITH ESOPHAGITIS WITHOUT HEMORRHAGE: Chronic | ICD-10-CM

## 2023-04-13 RX ORDER — PANTOPRAZOLE SODIUM 40 MG/1
40 TABLET, DELAYED RELEASE ORAL DAILY
Qty: 90 TABLET | Refills: 4 | Status: SHIPPED | OUTPATIENT
Start: 2023-04-13

## 2023-04-18 ENCOUNTER — OFFICE VISIT (OUTPATIENT)
Dept: ENDOCRINOLOGY | Age: 73
End: 2023-04-18
Payer: MEDICARE

## 2023-04-18 VITALS
HEART RATE: 79 BPM | OXYGEN SATURATION: 100 % | BODY MASS INDEX: 21.18 KG/M2 | WEIGHT: 143 LBS | DIASTOLIC BLOOD PRESSURE: 80 MMHG | HEIGHT: 69 IN | SYSTOLIC BLOOD PRESSURE: 120 MMHG | TEMPERATURE: 97.3 F

## 2023-04-18 DIAGNOSIS — E11.65 TYPE 2 DIABETES MELLITUS WITH HYPERGLYCEMIA, WITH LONG-TERM CURRENT USE OF INSULIN: Primary | Chronic | ICD-10-CM

## 2023-04-18 DIAGNOSIS — Z79.4 TYPE 2 DIABETES MELLITUS WITH HYPERGLYCEMIA, WITH LONG-TERM CURRENT USE OF INSULIN: Primary | Chronic | ICD-10-CM

## 2023-04-18 NOTE — PROGRESS NOTES
Chief complaint:  T2DM    HPI:   - 72 year old male here for management of diabetes mellitus type 2  - Has had diabetes for over 20 years  - No known complications to date  - Is currently taking Levemir 30 units daily and metformin 500 mg bid  - Occasional hypoglycemia with activity  - He does use a Vaishali CGM but did not bring it today  - He does have a suprapubic catheter  - Is also on atorvastatin 40 mg daily  - He states he was eating poorly about 6 months ago and his A1c eliza to 11% and he lost weight.  He had an evaluation for weight loss and started eating much better and his weight has stabilized and his BG levels are much improved.      The following portions of the patient's history were reviewed and updated as appropriate: allergies, current medications, past family history, past medical history, past social history, past surgical history and problem list.    Review of Systems   Genitourinary:        Recent suprapubic catheter placement.       Objective     Vitals:    04/18/23 0836   BP: 120/80   Pulse: 79   Temp: 97.3 °F (36.3 °C)   SpO2: 100%        Physical Exam  Constitutional:       Appearance: Normal appearance.   HENT:      Head: Normocephalic and atraumatic.   Eyes:      General: No scleral icterus.     Conjunctiva/sclera: Conjunctivae normal.   Pulmonary:      Effort: Pulmonary effort is normal. No respiratory distress.   Neurological:      Mental Status: He is alert.      Cranial Nerves: No cranial nerve deficit.   Psychiatric:         Mood and Affect: Mood normal.         Behavior: Behavior normal.         Thought Content: Thought content normal.         Judgment: Judgment normal.         Assessment & Plan   1. Type 2 DM, controlled  - His BG has improved since he has adjusted his lifestyle so I do not think we need to make any changes in his medication regiment today  - Cont. Levemir 30 units daily and metformin 500 mg bid    2. Hyperlipidemia  - On atorvastatin 40 mg daily    - Return to  clinic in 3 months with repeat A1c

## 2023-05-28 DIAGNOSIS — Z79.4 TYPE 2 DIABETES MELLITUS WITH HYPERGLYCEMIA, WITH LONG-TERM CURRENT USE OF INSULIN: Chronic | ICD-10-CM

## 2023-05-28 DIAGNOSIS — E11.65 TYPE 2 DIABETES MELLITUS WITH HYPERGLYCEMIA, WITH LONG-TERM CURRENT USE OF INSULIN: Chronic | ICD-10-CM

## 2023-05-30 RX ORDER — INSULIN DETEMIR 100 [IU]/ML
40 INJECTION, SOLUTION SUBCUTANEOUS DAILY
Qty: 15 ML | Refills: 0 | Status: SHIPPED | OUTPATIENT
Start: 2023-05-30

## 2023-06-12 ENCOUNTER — TELEPHONE (OUTPATIENT)
Dept: GASTROENTEROLOGY | Facility: CLINIC | Age: 73
End: 2023-06-12

## 2023-06-12 NOTE — TELEPHONE ENCOUNTER
Hub staff attempted to follow warm transfer process and was unsuccessful     Caller: Harry Sierra    Relationship to patient: Self    Best call back number: [phone]    Patient is needing: CANCEL SCOPE 06.12.23/PT EXPERIENCING FEVER AND CHILLS. PLEASE CALL PT BACK TO RESCHEDULE.

## 2023-06-13 ENCOUNTER — TELEPHONE (OUTPATIENT)
Dept: GASTROENTEROLOGY | Facility: CLINIC | Age: 73
End: 2023-06-13
Payer: MEDICARE

## 2023-06-13 NOTE — TELEPHONE ENCOUNTER
OK FOR HUB TO READ  PT IS RS FOR 6/29/23 FOR A COLONOSCOPY AT 6 AM PT HAS [employer] INSTRUCTIONS LIUS SÁNCHEZ/ ANNIA

## 2023-06-14 ENCOUNTER — OFFICE VISIT (OUTPATIENT)
Dept: INTERNAL MEDICINE | Facility: CLINIC | Age: 73
End: 2023-06-14
Payer: MEDICARE

## 2023-06-14 VITALS
HEART RATE: 86 BPM | TEMPERATURE: 97.8 F | WEIGHT: 146 LBS | HEIGHT: 69 IN | OXYGEN SATURATION: 97 % | DIASTOLIC BLOOD PRESSURE: 81 MMHG | SYSTOLIC BLOOD PRESSURE: 129 MMHG | BODY MASS INDEX: 21.62 KG/M2

## 2023-06-14 DIAGNOSIS — I10 PRIMARY HYPERTENSION: Chronic | ICD-10-CM

## 2023-06-14 DIAGNOSIS — R23.3 PETECHIAE: Primary | ICD-10-CM

## 2023-06-14 DIAGNOSIS — R23.3 ABNORMAL BRUISING: ICD-10-CM

## 2023-06-14 RX ORDER — CLOPIDOGREL BISULFATE 75 MG/1
75 TABLET ORAL DAILY
COMMUNITY

## 2023-06-14 NOTE — PROGRESS NOTES
Chief Complaint  Hand Pain (Purple spots on hands)     Subjective:      History of Present Illness {CC  Problem List  Visit  Diagnosis   Encounters  Notes  Medications  Labs  Result Review Imaging  Media :23}     Harry Sierra presents to [facility] for:      History of Present Illness     Pt noticed bruising on left  hand 1 weeks ago and  2 days go noticed bruising on right hand. Pt has cabin in red [employer] [facility] and thought he had hit his hands. Bruising has not gone away. Pt was anemic years ago and was given iron supplements. Pt feels fatigued. Pt takes clopidogrel. Pt stopped clopidogrel 5 days before last Monday for colonoscopy, restarted it two days ago. Pt missed colonoscopy d/t illness. Rescheduled for 6/29. PT does not remember hitting his hands. Pt was in [place] 2.5 months ago. Denies fever, pain or itching. Has not gone to dentist or had procedure done recently. Suprapubic catheter placed 3 months ago.         I have reviewed patient's medical history, any new submitted information provided by patient or medical assistant and updated medical record.      Objective:      Physical Exam  Vitals reviewed.   Constitutional:       Appearance: Normal appearance.   HENT:      Head: Normocephalic.   Cardiovascular:      Rate and Rhythm: Normal rate and regular rhythm.      Pulses: Normal pulses.      Heart sounds: Normal heart sounds. Murmur heard.   Pulmonary:      Effort: Pulmonary effort is normal.      Breath sounds: Normal breath sounds.   Skin:     General: Skin is warm and dry.      Capillary Refill: Capillary refill takes less than 2 seconds.      Coloration: Skin is not cyanotic, mottled or pale.      Findings: Bruising (petechiae on bilateral hands) and petechiae present. No signs of injury. Rash is not purpuric.             Comments: Petechiae located on bilateral hands  Nailbeds are intact and no petechiae or splinter hemorrhages noted.    Neurological:      " General: No focal deficit present.      Mental Status: He is alert.   Psychiatric:         Mood and Affect: Mood normal.         Behavior: Behavior normal.      Result Review  Data Reviewed:{ Labs  Result Review  Imaging  Med Tab  Media :23}                Vital Signs:   /81 (BP Location: Left arm, Patient Position: Sitting, Cuff Size: Adult)   Pulse 86   Temp 97.8 °F (36.6 °C) (Oral)   Ht 175.3 cm (69\")   Wt 66.2 kg (146 lb)   SpO2 97%   BMI 21.56 kg/m²         Requested Prescriptions      No prescriptions requested or ordered in this encounter       Routine medications provided by this office will also be refilled via pharmacy request.       Current Outpatient Medications:     atorvastatin (LIPITOR) 40 MG tablet, Take 1 tablet by mouth every night at bedtime., Disp: 90 tablet, Rfl: 4    Continuous Blood Gluc Sensor (PixSenseyle Vaishali 14 Day Sensor) misc, Place 1 each on the skin as directed by provider Daily., Disp: 2 each, Rfl: 11    Levemir FlexTouch 100 UNIT/ML injection, INJECT 40 UNITS UNDER THE SKIN INTO THE APPROPRIATE AREA AS DIRECTED DAILY, Disp: 15 mL, Rfl: 0    losartan (COZAAR) 25 MG tablet, Take 1 tablet by mouth Daily., Disp: 90 tablet, Rfl: 4    metFORMIN (GLUCOPHAGE) 500 MG tablet, Take 1 tablet by mouth 2 (Two) Times a Day With Meals., Disp: 180 tablet, Rfl: 3    Multiple Vitamins-Minerals (CENTRUM SILVER 50+MEN) tablet, Take 1 tablet by mouth Daily. HELD FOR OR, Disp: , Rfl:     pantoprazole (PROTONIX) 40 MG EC tablet, TAKE 1 TABLET BY MOUTH DAILY, Disp: 90 tablet, Rfl: 4    clopidogrel (PLAVIX) 75 MG tablet, Take 1 tablet by mouth Daily., Disp: , Rfl:      Assessment and Plan:      Assessment and Plan {CC Problem List  Visit Diagnosis  ROS  Review (Popup)  Health Maintenance  Quality  BestPractice  Medications  SmartSets  SnapShot Encounters  Media :23}     Problem List Items Addressed This Visit          Cardiac and Vasculature    Primary hypertension (Chronic)    " Relevant Orders    Comprehensive Metabolic Panel     Other Visit Diagnoses       Petechiae    -  Primary    Relevant Orders    CBC & Differential    Comprehensive Metabolic Panel    Protime-INR    APTT    Abnormal bruising        Relevant Orders    CBC & Differential    Comprehensive Metabolic Panel    Protime-INR    APTT          We will get labs today and review with patient once available.  I think this problem is partially due to clopidogrel being stopped and restarted.  We will assess platelets and PT/INR, PTT.  If petechiae continues we will consider getting COMFORT, ESR, CRP and potential echo.  Patient educated to be seen and ER if he feels chest pain, palpitations or develops a fever.  To return to the clinic if petechiae worsens or persists.  Patient verbalized understanding.      Follow Up {Instructions Charge Capture  Follow-up Communications :23}     Return if symptoms worsen or fail to improve.    I spent 44 minutes caring for Harry on this date of service. This time includes time spent by me in the following activities: preparing for the visit, reviewing tests, obtaining and/or reviewing a separately obtained history, performing a medically appropriate examination and/or evaluation, counseling and educating the patient/family/caregiver, ordering medications, tests, or procedures, documenting information in the medical record, and independently interpreting results and communicating that information with the patient/family/caregiver     Patient was given instructions and counseling regarding his condition or for health maintenance advice. Please see specific information pulled into the AVS if appropriate.    Darlene disclaimer:   Much of this encounter note is an electronic transcription/translation of spoken language to printed text. The electronic translation of spoken language may permit erroneous, or at times, nonsensical words or phrases to be inadvertently transcribed; Although I have reviewed the  note for such errors, some may still exist.     Additional Patient Counseling:       There are no Patient Instructions on file for this visit.

## 2023-06-15 LAB
ALBUMIN SERPL-MCNC: 4.5 G/DL (ref 3.5–5.2)
ALBUMIN/GLOB SERPL: 2.3 G/DL
ALP SERPL-CCNC: 100 U/L (ref 39–117)
ALT SERPL-CCNC: 10 U/L (ref 1–41)
APTT PPP: 31.1 SECONDS (ref 22.7–35.4)
AST SERPL-CCNC: 11 U/L (ref 1–40)
BASOPHILS # BLD AUTO: 0.01 10*3/MM3 (ref 0–0.2)
BASOPHILS NFR BLD AUTO: 0.3 % (ref 0–1.5)
BILIRUB SERPL-MCNC: 0.3 MG/DL (ref 0–1.2)
BUN SERPL-MCNC: 12 MG/DL (ref 8–23)
BUN/CREAT SERPL: 17.1 (ref 7–25)
CALCIUM SERPL-MCNC: 9.5 MG/DL (ref 8.6–10.5)
CHLORIDE SERPL-SCNC: 107 MMOL/L (ref 98–107)
CO2 SERPL-SCNC: 22.5 MMOL/L (ref 22–29)
CREAT SERPL-MCNC: 0.7 MG/DL (ref 0.76–1.27)
EGFRCR SERPLBLD CKD-EPI 2021: 96.7 ML/MIN/1.73
EOSINOPHIL # BLD AUTO: 0.01 10*3/MM3 (ref 0–0.4)
EOSINOPHIL NFR BLD AUTO: 0.3 % (ref 0.3–6.2)
ERYTHROCYTE [DISTWIDTH] IN BLOOD BY AUTOMATED COUNT: 13.6 % (ref 12.3–15.4)
GLOBULIN SER CALC-MCNC: 2 GM/DL
GLUCOSE SERPL-MCNC: 167 MG/DL (ref 65–99)
HCT VFR BLD AUTO: 30.8 % (ref 37.5–51)
HGB BLD-MCNC: 10.4 G/DL (ref 13–17.7)
IMM GRANULOCYTES # BLD AUTO: 0.02 10*3/MM3 (ref 0–0.05)
IMM GRANULOCYTES NFR BLD AUTO: 0.7 % (ref 0–0.5)
INR PPP: 1 (ref 0.9–1.1)
LYMPHOCYTES # BLD AUTO: 1.49 10*3/MM3 (ref 0.7–3.1)
LYMPHOCYTES NFR BLD AUTO: 50.2 % (ref 19.6–45.3)
MCH RBC QN AUTO: 30.5 PG (ref 26.6–33)
MCHC RBC AUTO-ENTMCNC: 33.8 G/DL (ref 31.5–35.7)
MCV RBC AUTO: 90.3 FL (ref 79–97)
MONOCYTES # BLD AUTO: 0.37 10*3/MM3 (ref 0.1–0.9)
MONOCYTES NFR BLD AUTO: 12.5 % (ref 5–12)
NEUTROPHILS # BLD AUTO: 1.07 10*3/MM3 (ref 1.7–7)
NEUTROPHILS NFR BLD AUTO: 36 % (ref 42.7–76)
NRBC BLD AUTO-RTO: 0.3 /100 WBC (ref 0–0.2)
PLATELET # BLD AUTO: 191 10*3/MM3 (ref 140–450)
POTASSIUM SERPL-SCNC: 4.2 MMOL/L (ref 3.5–5.2)
PROT SERPL-MCNC: 6.5 G/DL (ref 6–8.5)
PROTHROMBIN TIME: 13.2 SECONDS (ref 11.7–14.2)
RBC # BLD AUTO: 3.41 10*6/MM3 (ref 4.14–5.8)
SODIUM SERPL-SCNC: 143 MMOL/L (ref 136–145)
WBC # BLD AUTO: 2.97 10*3/MM3 (ref 3.4–10.8)

## 2023-06-22 PROBLEM — D64.9 ANEMIA: Chronic | Status: ACTIVE | Noted: 2022-12-12

## 2023-07-17 ENCOUNTER — TELEPHONE (OUTPATIENT)
Dept: INTERNAL MEDICINE | Facility: CLINIC | Age: 73
End: 2023-07-17

## 2023-07-17 NOTE — TELEPHONE ENCOUNTER
Caller: Harry Sierra    Relationship to patient: Self    Best call back number: [phone]    New or established patient?  [] New  [x] Established    Date of discharge: 07/14/2023    Facility discharged from: [facility]     Diagnosis/Symptoms: DIVERTICULITIS     Length of stay (If applicable): 8 DAYS    Specialty Only: Did you see a [facility] provider?    [] Yes  [x] No  If so, who? N/A

## 2023-07-24 ENCOUNTER — OFFICE VISIT (OUTPATIENT)
Dept: INTERNAL MEDICINE | Facility: CLINIC | Age: 73
End: 2023-07-24
Payer: MEDICARE

## 2023-07-24 VITALS
OXYGEN SATURATION: 100 % | WEIGHT: 140 LBS | DIASTOLIC BLOOD PRESSURE: 78 MMHG | BODY MASS INDEX: 20.73 KG/M2 | HEART RATE: 111 BPM | HEIGHT: 69 IN | RESPIRATION RATE: 18 BRPM | SYSTOLIC BLOOD PRESSURE: 122 MMHG

## 2023-07-24 DIAGNOSIS — I65.23 BILATERAL CAROTID ARTERY STENOSIS: Chronic | ICD-10-CM

## 2023-07-24 DIAGNOSIS — D64.9 NORMOCYTIC ANEMIA: Chronic | ICD-10-CM

## 2023-07-24 DIAGNOSIS — Z86.010 PERSONAL HISTORY OF COLONIC POLYPS: ICD-10-CM

## 2023-07-24 DIAGNOSIS — K57.90 DIVERTICULOSIS: ICD-10-CM

## 2023-07-24 DIAGNOSIS — Z86.79 HISTORY OF ATRIAL FIBRILLATION: ICD-10-CM

## 2023-07-24 DIAGNOSIS — Z09 HOSPITAL DISCHARGE FOLLOW-UP: Primary | ICD-10-CM

## 2023-07-24 DIAGNOSIS — D62 ACUTE BLOOD LOSS ANEMIA: ICD-10-CM

## 2023-07-24 PROCEDURE — 3052F HG A1C>EQUAL 8.0%<EQUAL 9.0%: CPT | Performed by: FAMILY MEDICINE

## 2023-07-24 PROCEDURE — 1160F RVW MEDS BY RX/DR IN RCRD: CPT | Performed by: FAMILY MEDICINE

## 2023-07-24 PROCEDURE — 3074F SYST BP LT 130 MM HG: CPT | Performed by: FAMILY MEDICINE

## 2023-07-24 PROCEDURE — 1159F MED LIST DOCD IN RCRD: CPT | Performed by: FAMILY MEDICINE

## 2023-07-24 PROCEDURE — 3078F DIAST BP <80 MM HG: CPT | Performed by: FAMILY MEDICINE

## 2023-07-24 PROCEDURE — 99214 OFFICE O/P EST MOD 30 MIN: CPT | Performed by: FAMILY MEDICINE

## 2023-07-24 RX ORDER — CLOPIDOGREL BISULFATE 75 MG/1
75 TABLET ORAL DAILY
Qty: 90 TABLET | Refills: 1 | Status: SHIPPED | OUTPATIENT
Start: 2023-07-24

## 2023-07-24 NOTE — PATIENT INSTRUCTIONS
"MyChart Tips:    MyChart is a useful part of our patient care program and is a great way for us to communicate lab results and for you to request refills.  Not all medical questions are appropriate for MyChart such as new medical issues that require taking a history, performing an exam, getting labs/studies or researching medical questions needed to be addressed in the office.    Examples of medical issues that are APPROPRIATE for MyChart:  -Follow-up on problems we have already addressed in a visit such as home testing results, blood pressure readings, glucose readings  -Questions that can be answered with a simple \"yes\" or \"no\"    Communication that is NOT APPROPRIATE for MyChart:  -MyChart is not for new problems, serious problems or urgent problems.  Urgent matters should be addressed by phone, in the office, urgent care or the ER.  -[employer] messages are not email.  Staff will check messages each weekday.  We strive for a 48-hour turnaround on messaging.    -[employer] is not for private issues.  Messages are received first by our office staff.    Please allow up to 7 business days for lab results to be sent through [employer] to you before contacting your provider.  Sometimes we are waiting for results to get back from the lab and also your provider needs time to analyze them thoroughly before making recommendations.  While the internet has great resources, it is not a substitute for interpreting lab results.    We also ask that you not send [employer] messages and telephone calls regarding the same issue simultaneously.  This slows down the process of returning your call/message and confuses staff.    Be mindful that "[employer]hart messages and telephone calls become part of your permanent medical record.    Lastly, your provider cannot access your "[employer]hart.  It is a service which communicates with the EHR (Electronic Health Record).  Sometimes there are errors in [employer] that staff and providers cannot see and these errors " are not part of your EHR.  Vaccines and screening reminders have been incorrect in MyChart.    We appreciate your respect for the limitations and boundaries of [employer].

## 2023-07-24 NOTE — PROGRESS NOTES
"Chief Complaint  Hospital Follow Up Visit and Rectal Bleeding    Subjective        Harry Sierra presents to [facility]  History of Present Illness    He is following up from being in the hospital.  He had a colonoscopy back on June 30th with some removal of polyps.  He developed a diverticular bleed ended up requiring 9 units of packed red blood cells.  Repeat colonoscopy did not show the source of the bleeding.  He followed up with Dr. Comer on July 17, 2023 and I reviewed the note.  During that visit, he had been without bleeding for 5 days.  He was holding his Plavix at the time.  He is due to have a colonoscopy in 1 year due to multiple polyps and tubular adenoma.    I reviewed his most recent CBC and his hemoglobin had improved to 11.2.  I did find the discharge summary from his hospital stay and he was discharged July 14, 2023.  Hemoglobin at the time of admission was 6.7.    He is taking his iron tablet daily.  He has not shown any further blood in his stool.    Objective   Vital Signs:  /78 (BP Location: Left arm, Patient Position: Sitting, Cuff Size: Small Adult)   Pulse 111   Resp 18   Ht 175.3 cm (69\")   Wt 63.5 kg (140 lb)   SpO2 100%   BMI 20.67 kg/m²   Estimated body mass index is 20.67 kg/m² as calculated from the following:    Height as of this encounter: 175.3 cm (69\").    Weight as of this encounter: 63.5 kg (140 lb).       BMI is within normal parameters. No other follow-up for BMI required.      Physical Exam  Vitals and nursing note reviewed.   Constitutional:       General: He is not in acute distress.     Appearance: Normal appearance.   Cardiovascular:      Rate and Rhythm: Normal rate and regular rhythm.      Heart sounds: Normal heart sounds. No murmur heard.  Pulmonary:      Effort: Pulmonary effort is normal.      Breath sounds: Normal breath sounds.   Neurological:      Mental Status: He is alert.      Result Review :  The following data was " reviewed by: Warren Mohamud MD on 07/24/2023:  Common labs          6/14/2023    12:00 6/22/2023    09:01 7/17/2023    13:08   Common Labs   Glucose 167      BUN 12      Creatinine 0.70      Sodium 143      Potassium 4.2      Chloride 107      Calcium 9.5      Total Protein 6.5      Albumin 4.5      Total Bilirubin 0.3      Alkaline Phosphatase 100      AST (SGOT) 11      ALT (SGPT) 10      WBC 2.97  3.62  3.44    Hemoglobin 10.4  10.9  11.2    Hematocrit 30.8  33.0  33.3    Platelets 191  213  270    Microalbumin, Urine  40.9                    Assessment and Plan   Diagnoses and all orders for this visit:    1. Hospital discharge follow-up (Primary)    2. Acute blood loss anemia  -     CBC (No Diff)  -     Iron Profile  -     Ferritin  -     Transferrin    3. Personal history of colonic polyps    4. History of atrial fibrillation  -     clopidogrel (PLAVIX) 75 MG tablet; Take 1 tablet by mouth Daily.  Dispense: 90 tablet; Refill: 1    5. Bilateral carotid artery stenosis  -     clopidogrel (PLAVIX) 75 MG tablet; Take 1 tablet by mouth Daily.  Dispense: 90 tablet; Refill: 1        Clinically stable chronic conditions as above.  Continue all medications as above.  Labs reviewed/ordered as above.  Recommend to restart clopidogrel and counseled to stop immediately if he sees bright red blood per rectum again.  I will get repeat CBC and iron panel today and  further regarding iron therapy.  For now continue once daily dosing.           Follow Up   Return in about 4 months (around 11/28/2023).  Patient was given instructions and counseling regarding his condition or for health maintenance advice. Please see specific information pulled into the AVS if appropriate.

## 2023-07-25 LAB
ERYTHROCYTE [DISTWIDTH] IN BLOOD BY AUTOMATED COUNT: 15.2 % (ref 12.3–15.4)
FERRITIN SERPL-MCNC: 363 NG/ML (ref 30–400)
HCT VFR BLD AUTO: 34.3 % (ref 37.5–51)
HGB BLD-MCNC: 11.4 G/DL (ref 13–17.7)
IRON SATN MFR SERPL: 18 % (ref 20–50)
IRON SERPL-MCNC: 64 MCG/DL (ref 59–158)
MCH RBC QN AUTO: 30.8 PG (ref 26.6–33)
MCHC RBC AUTO-ENTMCNC: 33.2 G/DL (ref 31.5–35.7)
MCV RBC AUTO: 92.7 FL (ref 79–97)
PLATELET # BLD AUTO: 246 10*3/MM3 (ref 140–450)
RBC # BLD AUTO: 3.7 10*6/MM3 (ref 4.14–5.8)
TIBC SERPL-MCNC: 355 MCG/DL
TRANSFERRIN SERPL-MCNC: 242 MG/DL (ref 177–329)
UIBC SERPL-MCNC: 291 MCG/DL (ref 112–346)
WBC # BLD AUTO: 3.77 10*3/MM3 (ref 3.4–10.8)

## 2023-08-03 DIAGNOSIS — Z79.4 TYPE 2 DIABETES MELLITUS WITH HYPERGLYCEMIA, WITH LONG-TERM CURRENT USE OF INSULIN: Chronic | ICD-10-CM

## 2023-08-03 DIAGNOSIS — E11.65 TYPE 2 DIABETES MELLITUS WITH HYPERGLYCEMIA, WITH LONG-TERM CURRENT USE OF INSULIN: Chronic | ICD-10-CM

## 2023-08-04 ENCOUNTER — TELEPHONE (OUTPATIENT)
Dept: INTERNAL MEDICINE | Facility: CLINIC | Age: 73
End: 2023-08-04
Payer: MEDICARE

## 2023-08-04 RX ORDER — INSULIN DETEMIR 100 [IU]/ML
30 INJECTION, SOLUTION SUBCUTANEOUS DAILY
Qty: 15 ML | Refills: 8 | Status: SHIPPED | OUTPATIENT
Start: 2023-08-04

## 2023-09-07 ENCOUNTER — APPOINTMENT (OUTPATIENT)
Dept: CT IMAGING | Facility: HOSPITAL | Age: 73
End: 2023-09-07
Payer: MEDICARE

## 2023-09-07 ENCOUNTER — APPOINTMENT (OUTPATIENT)
Dept: GENERAL RADIOLOGY | Facility: HOSPITAL | Age: 73
End: 2023-09-07
Payer: MEDICARE

## 2023-09-07 ENCOUNTER — HOSPITAL ENCOUNTER (OUTPATIENT)
Facility: HOSPITAL | Age: 73
Setting detail: OBSERVATION
Discharge: HOME | End: 2023-09-09
Attending: EMERGENCY MEDICINE | Admitting: INTERNAL MEDICINE
Payer: MEDICARE

## 2023-09-07 DIAGNOSIS — N39.0 ACUTE UTI: Primary | ICD-10-CM

## 2023-09-07 DIAGNOSIS — E87.20 LACTIC ACIDOSIS: ICD-10-CM

## 2023-09-07 DIAGNOSIS — R10.84 GENERALIZED ABDOMINAL PAIN: ICD-10-CM

## 2023-09-07 DIAGNOSIS — E11.69 TYPE 2 DIABETES MELLITUS WITH OTHER SPECIFIED COMPLICATION, WITHOUT LONG-TERM CURRENT USE OF INSULIN: ICD-10-CM

## 2023-09-07 LAB
ALBUMIN SERPL-MCNC: 4.6 G/DL (ref 3.5–5.2)
ALBUMIN/GLOB SERPL: 1.8 G/DL
ALP SERPL-CCNC: 125 U/L (ref 39–117)
ALT SERPL W P-5'-P-CCNC: 14 U/L (ref 1–41)
ANION GAP SERPL CALCULATED.3IONS-SCNC: 11 MMOL/L (ref 5–15)
AST SERPL-CCNC: 11 U/L (ref 1–40)
BACTERIA UR QL AUTO: ABNORMAL /HPF
BASOPHILS # BLD AUTO: 0.01 10*3/MM3 (ref 0–0.2)
BASOPHILS NFR BLD AUTO: 0.2 % (ref 0–1.5)
BILIRUB SERPL-MCNC: 0.6 MG/DL (ref 0–1.2)
BILIRUB UR QL STRIP: NEGATIVE
BUN SERPL-MCNC: 10 MG/DL (ref 8–23)
BUN/CREAT SERPL: 15.2 (ref 7–25)
CALCIUM SPEC-SCNC: 10.4 MG/DL (ref 8.6–10.5)
CHLORIDE SERPL-SCNC: 100 MMOL/L (ref 98–107)
CLARITY UR: ABNORMAL
CO2 SERPL-SCNC: 26 MMOL/L (ref 22–29)
COLOR UR: YELLOW
CREAT SERPL-MCNC: 0.66 MG/DL (ref 0.76–1.27)
D-LACTATE SERPL-SCNC: 2.2 MMOL/L (ref 0.5–2)
D-LACTATE SERPL-SCNC: 2.3 MMOL/L (ref 0.5–2)
DEPRECATED RDW RBC AUTO: 49.2 FL (ref 37–54)
EGFRCR SERPLBLD CKD-EPI 2021: 98.4 ML/MIN/1.73
EOSINOPHIL # BLD AUTO: 0 10*3/MM3 (ref 0–0.4)
EOSINOPHIL NFR BLD AUTO: 0 % (ref 0.3–6.2)
ERYTHROCYTE [DISTWIDTH] IN BLOOD BY AUTOMATED COUNT: 14.5 % (ref 12.3–15.4)
GLOBULIN UR ELPH-MCNC: 2.6 GM/DL
GLUCOSE BLDC GLUCOMTR-MCNC: 158 MG/DL (ref 70–130)
GLUCOSE BLDC GLUCOMTR-MCNC: 269 MG/DL (ref 70–130)
GLUCOSE SERPL-MCNC: 222 MG/DL (ref 65–99)
GLUCOSE UR STRIP-MCNC: ABNORMAL MG/DL
HCT VFR BLD AUTO: 34.3 % (ref 37.5–51)
HGB BLD-MCNC: 11.7 G/DL (ref 13–17.7)
HGB UR QL STRIP.AUTO: NEGATIVE
HOLD SPECIMEN: NORMAL
HOLD SPECIMEN: NORMAL
HYALINE CASTS UR QL AUTO: ABNORMAL /LPF
IMM GRANULOCYTES # BLD AUTO: 0.02 10*3/MM3 (ref 0–0.05)
IMM GRANULOCYTES NFR BLD AUTO: 0.5 % (ref 0–0.5)
KETONES UR QL STRIP: ABNORMAL
LEUKOCYTE ESTERASE UR QL STRIP.AUTO: ABNORMAL
LIPASE SERPL-CCNC: 10 U/L (ref 13–60)
LYMPHOCYTES # BLD AUTO: 1.17 10*3/MM3 (ref 0.7–3.1)
LYMPHOCYTES NFR BLD AUTO: 28.6 % (ref 19.6–45.3)
MCH RBC QN AUTO: 31.5 PG (ref 26.6–33)
MCHC RBC AUTO-ENTMCNC: 34.1 G/DL (ref 31.5–35.7)
MCV RBC AUTO: 92.2 FL (ref 79–97)
MONOCYTES # BLD AUTO: 0.68 10*3/MM3 (ref 0.1–0.9)
MONOCYTES NFR BLD AUTO: 16.6 % (ref 5–12)
NEUTROPHILS NFR BLD AUTO: 2.21 10*3/MM3 (ref 1.7–7)
NEUTROPHILS NFR BLD AUTO: 54.1 % (ref 42.7–76)
NITRITE UR QL STRIP: POSITIVE
NRBC BLD AUTO-RTO: 0 /100 WBC (ref 0–0.2)
NT-PROBNP SERPL-MCNC: 177 PG/ML (ref 0–900)
PH UR STRIP.AUTO: 7 [PH] (ref 5–8)
PLATELET # BLD AUTO: 176 10*3/MM3 (ref 140–450)
PMV BLD AUTO: 9.7 FL (ref 6–12)
POTASSIUM SERPL-SCNC: 4.1 MMOL/L (ref 3.5–5.2)
PROCALCITONIN SERPL-MCNC: 0.05 NG/ML (ref 0–0.25)
PROT SERPL-MCNC: 7.2 G/DL (ref 6–8.5)
PROT UR QL STRIP: NEGATIVE
QT INTERVAL: 310 MS
QTC INTERVAL: 433 MS
RBC # BLD AUTO: 3.72 10*6/MM3 (ref 4.14–5.8)
RBC # UR STRIP: ABNORMAL /HPF
REF LAB TEST METHOD: ABNORMAL
SODIUM SERPL-SCNC: 137 MMOL/L (ref 136–145)
SP GR UR STRIP: 1.02 (ref 1–1.03)
SQUAMOUS #/AREA URNS HPF: ABNORMAL /HPF
TROPONIN T SERPL HS-MCNC: 14 NG/L
UROBILINOGEN UR QL STRIP: ABNORMAL
WBC # UR STRIP: ABNORMAL /HPF
WBC NRBC COR # BLD: 4.09 10*3/MM3 (ref 3.4–10.8)
WHOLE BLOOD HOLD COAG: NORMAL
WHOLE BLOOD HOLD SPECIMEN: NORMAL

## 2023-09-07 PROCEDURE — G0378 HOSPITAL OBSERVATION PER HR: HCPCS

## 2023-09-07 PROCEDURE — 25010000002 ONDANSETRON PER 1 MG: Performed by: INTERNAL MEDICINE

## 2023-09-07 PROCEDURE — 71275 CT ANGIOGRAPHY CHEST: CPT

## 2023-09-07 PROCEDURE — 83690 ASSAY OF LIPASE: CPT | Performed by: EMERGENCY MEDICINE

## 2023-09-07 PROCEDURE — 81001 URINALYSIS AUTO W/SCOPE: CPT | Performed by: EMERGENCY MEDICINE

## 2023-09-07 PROCEDURE — 80053 COMPREHEN METABOLIC PANEL: CPT | Performed by: EMERGENCY MEDICINE

## 2023-09-07 PROCEDURE — 96365 THER/PROPH/DIAG IV INF INIT: CPT

## 2023-09-07 PROCEDURE — 25510000001 IOPAMIDOL PER 1 ML: Performed by: EMERGENCY MEDICINE

## 2023-09-07 PROCEDURE — 36415 COLL VENOUS BLD VENIPUNCTURE: CPT

## 2023-09-07 PROCEDURE — 71045 X-RAY EXAM CHEST 1 VIEW: CPT

## 2023-09-07 PROCEDURE — 84145 PROCALCITONIN (PCT): CPT | Performed by: PHYSICIAN ASSISTANT

## 2023-09-07 PROCEDURE — 93005 ELECTROCARDIOGRAM TRACING: CPT | Performed by: PHYSICIAN ASSISTANT

## 2023-09-07 PROCEDURE — 87186 SC STD MICRODIL/AGAR DIL: CPT | Performed by: PHYSICIAN ASSISTANT

## 2023-09-07 PROCEDURE — 84484 ASSAY OF TROPONIN QUANT: CPT | Performed by: PHYSICIAN ASSISTANT

## 2023-09-07 PROCEDURE — 87077 CULTURE AEROBIC IDENTIFY: CPT | Performed by: PHYSICIAN ASSISTANT

## 2023-09-07 PROCEDURE — 82948 REAGENT STRIP/BLOOD GLUCOSE: CPT

## 2023-09-07 PROCEDURE — 99285 EMERGENCY DEPT VISIT HI MDM: CPT

## 2023-09-07 PROCEDURE — 87040 BLOOD CULTURE FOR BACTERIA: CPT | Performed by: PHYSICIAN ASSISTANT

## 2023-09-07 PROCEDURE — 83880 ASSAY OF NATRIURETIC PEPTIDE: CPT | Performed by: PHYSICIAN ASSISTANT

## 2023-09-07 PROCEDURE — 85025 COMPLETE CBC W/AUTO DIFF WBC: CPT | Performed by: EMERGENCY MEDICINE

## 2023-09-07 PROCEDURE — 74177 CT ABD & PELVIS W/CONTRAST: CPT

## 2023-09-07 PROCEDURE — 83605 ASSAY OF LACTIC ACID: CPT | Performed by: EMERGENCY MEDICINE

## 2023-09-07 PROCEDURE — 96375 TX/PRO/DX INJ NEW DRUG ADDON: CPT

## 2023-09-07 PROCEDURE — 25010000002 CEFTRIAXONE PER 250 MG: Performed by: PHYSICIAN ASSISTANT

## 2023-09-07 PROCEDURE — 87086 URINE CULTURE/COLONY COUNT: CPT | Performed by: PHYSICIAN ASSISTANT

## 2023-09-07 RX ORDER — BISACODYL 10 MG
10 SUPPOSITORY, RECTAL RECTAL DAILY PRN
Status: DISCONTINUED | OUTPATIENT
Start: 2023-09-07 | End: 2023-09-09 | Stop reason: HOSPADM

## 2023-09-07 RX ORDER — ONDANSETRON 4 MG/1
4 TABLET, FILM COATED ORAL EVERY 6 HOURS PRN
Status: DISCONTINUED | OUTPATIENT
Start: 2023-09-07 | End: 2023-09-09 | Stop reason: HOSPADM

## 2023-09-07 RX ORDER — ONDANSETRON 2 MG/ML
4 INJECTION INTRAMUSCULAR; INTRAVENOUS EVERY 6 HOURS PRN
Status: DISCONTINUED | OUTPATIENT
Start: 2023-09-07 | End: 2023-09-09 | Stop reason: HOSPADM

## 2023-09-07 RX ORDER — BISACODYL 5 MG/1
5 TABLET, DELAYED RELEASE ORAL DAILY PRN
Status: DISCONTINUED | OUTPATIENT
Start: 2023-09-07 | End: 2023-09-09 | Stop reason: HOSPADM

## 2023-09-07 RX ORDER — SODIUM CHLORIDE 0.9 % (FLUSH) 0.9 %
10 SYRINGE (ML) INJECTION AS NEEDED
Status: DISCONTINUED | OUTPATIENT
Start: 2023-09-07 | End: 2023-09-09 | Stop reason: HOSPADM

## 2023-09-07 RX ORDER — UREA 10 %
3 LOTION (ML) TOPICAL NIGHTLY PRN
Status: DISCONTINUED | OUTPATIENT
Start: 2023-09-07 | End: 2023-09-09 | Stop reason: HOSPADM

## 2023-09-07 RX ORDER — AMOXICILLIN 250 MG
2 CAPSULE ORAL 2 TIMES DAILY
Status: DISCONTINUED | OUTPATIENT
Start: 2023-09-07 | End: 2023-09-09 | Stop reason: HOSPADM

## 2023-09-07 RX ORDER — ACETAMINOPHEN 325 MG/1
650 TABLET ORAL EVERY 4 HOURS PRN
Status: DISCONTINUED | OUTPATIENT
Start: 2023-09-07 | End: 2023-09-09 | Stop reason: HOSPADM

## 2023-09-07 RX ORDER — POLYETHYLENE GLYCOL 3350 17 G/17G
17 POWDER, FOR SOLUTION ORAL DAILY PRN
Status: DISCONTINUED | OUTPATIENT
Start: 2023-09-07 | End: 2023-09-09 | Stop reason: HOSPADM

## 2023-09-07 RX ADMIN — ONDANSETRON 4 MG: 2 INJECTION INTRAMUSCULAR; INTRAVENOUS at 21:43

## 2023-09-07 RX ADMIN — SODIUM CHLORIDE 1000 ML: 9 INJECTION, SOLUTION INTRAVENOUS at 17:37

## 2023-09-07 RX ADMIN — CEFTRIAXONE 2000 MG: 2 INJECTION, POWDER, FOR SOLUTION INTRAMUSCULAR; INTRAVENOUS at 17:38

## 2023-09-07 RX ADMIN — ACETAMINOPHEN 650 MG: 325 TABLET, FILM COATED ORAL at 18:42

## 2023-09-07 RX ADMIN — IOPAMIDOL 95 ML: 755 INJECTION, SOLUTION INTRAVENOUS at 15:38

## 2023-09-07 NOTE — ED PROVIDER NOTES
I supervised care provided by the midlevel provider.   We have discussed this patient's history, physical exam, and treatment plan.  I have reviewed the note and personally saw and examined the patient and agree with the plan of care.   I have seen and evaluated this gentleman.  Valdez Khalil has been present during my evaluation as well as the patient and spouse.  This gentleman comes to the emergency department because of multiple complaints.  His main complaint is that he has had some black watery stool for the past couple days.  His last bowel movement this morning was more soft and brown.  Also has had some lower abdominal pain both in the suprapubic region in the left and right lower quadrant.  He has subjectively had a low-grade fever.  This has been occurring for the past 1 day.  Denies any chest pain, shortness of breath or any cough or cold symptoms.  He was recently in the ICU [place] a different state about 6 weeks ago for diverticular bleed.  He is currently on Plavix.  He has had a history of diverticulosis.  He has had a history of multiple urinary tract infections in the past.  He has had sepsis and almost  from UTI in the past.  He also reports that he is lost about 30 to 40 pounds in the past year.  He is a diabetic.  Currently he has pain in his abdomen his almost 100% resolved.  GENERAL: Anxious elderly male.  Appears frail and chronically ill he is tachycardic with a sinus tachycardia it is rate somewhere between 110-125.  Blood pressure is normal.  He is afebrile here.  Normal oxygen saturation.  HENT: nares patent  Head/neck/ face are symmetric without gross deformity or swelling  EYES: no scleral icterus  CV: regular rhythm, regular rate with intact distal pulses  RESPIRATORY: normal effort and no respiratory distress  ABDOMEN: soft and mild suprapubic tenderness on exam.  He does have a suprapubic catheter.  There is no obvious purulent drainage.  No erythema.  He has positive bowel sounds.   There is no guarding or rebound  MUSCULOSKELETAL: no deformity.  Intact distal pulses to upper and lower extremities that are equal strong and symmetric.  NEURO: alert and appropriate, moves all extremities, follows commands  SKIN: warm, dry    Vital signs and nursing notes reviewed.    Plan this is a gentleman that has abdominal pain with subjective low-grade fever who is got a sinus tachycardia.  Initial lab work with CBC and chemistries are unremarkable.  His hemoglobin is 11.7.  His lactic acid is mildly elevated at 2.2.  I am concerned that he has an infectious etiology of his symptoms.  He is having diarrhea and some suprapubic pain.  He feels that his urine is also cloudy.  History of multiple UTIs in the past.  We will check a CT scan of his abdomen pelvis.  We will also check a urinalysis.  We will check a procalcitonin level on him as well as blood cultures.  This gentleman will need to be admitted to the hospital.  Discussed this with Valdez Khalil, patient, and spouse at bedside.  All questions answered    ED Course as of 09/07/23 2020   Thu Sep 07, 2023   1330 Presents with generalized weakness, tachycardia, diarrhea, abdominal pain x2 days.  Tachycardic with stable vitals.  Differential diagnoses include but not limited to anemia, underlying infection, cardiac arrhythmia. [EE]   1333 Hemoglobin(!): 11.7 [EE]   1333 Platelets: 176 [EE]   1335 WBC: 4.09 [EE]   1400 Lactate(!!): 2.2 [EE]   1410 Lactate(!!): 2.2 [MM]   1410 Hemoglobin(!): 11.7  1 month ago patient's hemoglobin was 11.4. [MM]   1411 My own independent interpretation of the EKG that was done at 2:07 PM reveals a rate of 117 it is a sinus tachycardia narrow complex normal axis I do not appreciate any acute injury pattern QT looks normal. [MM]   1448 Bacteria, UA(!): 3+ [EE]   1509 Potassium: 4.1 [EE]   1509 Creatinine(!): 0.66 [EE]   1510 Chest x-ray independently interpreted myself shows no acute infiltrate or pneumothorax. [EE]   1520 Plan to  admit patient for suspected sepsis from UTI.  Antibiotics, urine cultures ordered.  We will CT abdomen and chest to rule out PE however patient denies any chest pain or shortness of breath. [EE]   1530 I discussed patient Dr. Banegas VALENTIN.  She agrees to admit. [EE]      ED Course User Index  [EE] Valdez Khalil PA  [MM] Hector Leyva MD Molinari, Mark, MD  09/07/23 2020

## 2023-09-07 NOTE — PLAN OF CARE
Goal Outcome Evaluation:  Plan of Care Reviewed With: patient, spouse    Progress: no change  Outcome Evaluation: Mr. Sierra admitted today, 9/7, from home for acute UTI. Suprapubic catheter in place - due to be changed 9/19/23 per patient - changed every 6 weeks. Blood cultures drawn. CT (abd/pelvis), CTA (chest), and CXR completed. Lactic elevated at 2.3. Repeat due. Needing occult stool. Sinus tachycardia (low 100's - 110's). 1L NS bolus given, IV Rocephin given. Wife and daughter at bedside. Patient stable and needs met at this time.

## 2023-09-07 NOTE — ED NOTES
Pt to ER via personal vehicle for abd pain. PT states he feels full and bloated. Said he was in the ICU in [place] for diverticulosis and other issues.    This RN in appropriate PPE while in pt room. Pt wearing mask

## 2023-09-07 NOTE — ED PROVIDER NOTES
EMERGENCY DEPARTMENT ENCOUNTER    Room Number:  06/06  Date of encounter:  9/7/2023  PCP: Warren Mohamud MD  Historian: Patient, wife  Chronic or social conditions impacting care (social determinants of health): Nothing    HPI:  Chief Complaint: Generalized weakness, tachycardia, abdominal pain  A complete HPI/ROS/PMH/PSH/SH/FH are unobtainable due to: Nothing    Context: Harry Sierra is a 72 y.o. male who presents to the ED c/o multiple complaints.  Patient states 2 days ago he began to feel generally weak, worse with exertion.  He began to feel short of breath.  He complains of diarrhea, abdominal pain.  He had a hospitalization in July 2023 after a diverticular bleed which required blood infusion and ICU placement.  He is recovering well and now back on his Plavix.  Patient reports having intermittent diffuse abdominal pain.  He reports having diarrhea 2 days ago which seemed dark.  He has had normal bowel movements today.  He denies any chest pain, fever.  He reports a 40 pound weight loss in the past year.    Review of prior external notes (non-ED):   I reviewed internal medicine office visit from 7/24/2023.  Patient seen in follow-up of blood loss anemia, A-fib.    Review of prior external test results outside of this encounter:  I reviewed a CMP from 6/14/2023.  Creatinine 0.70, potassium 4.2    PAST MEDICAL HISTORY  Active Ambulatory Problems     Diagnosis Date Noted    Primary hypertension 08/30/2016    Routine health maintenance 08/30/2016    Abnormal PSA 09/01/2016    Gastroesophageal reflux disease with esophagitis without hemorrhage 12/01/2016    Melena 12/01/2016    Bilateral carotid artery stenosis 12/01/2016    Transient cerebral ischemia 09/14/2017    Hyperlipidemia LDL goal <70 12/12/2017    Prostate cancer 03/13/2018    Sleep disturbance 09/18/2018    Multilobar lung infiltrate, secondary bacterial infection 11/14/2018    Sepsis - present on admission 11/14/2018    Constipation 11/14/2018     Valvular heart disease 11/15/2018    Multifocal pneumonia 01/24/2019    Type 2 diabetes mellitus with hyperglycemia, with long-term current use of insulin 01/24/2019    Near syncope 01/24/2019    Hyperlipidemia associated with type 2 diabetes mellitus 12/08/2019    Hyperinsulinism 12/08/2019    Melanoma in situ of left upper extremity including shoulder 10/06/2020    Chest pain, atypical 01/18/2021    History of atrial fibrillation 01/18/2021    Abnormal weight loss 12/12/2022    Chronic cough 12/12/2022    Normocytic anemia 12/12/2022    Urinary retention due to benign prostatic hyperplasia 02/08/2023    Prostate cancer 02/08/2023    Colon polyps 04/12/2023    Diverticulosis 07/24/2023     Resolved Ambulatory Problems     Diagnosis Date Noted    Type 2 diabetes mellitus with hyperglycemia, without long-term current use of insulin (HCC) 08/30/2016    Prostatitis 09/26/2016    UTI (urinary tract infection) due to urinary indwelling catheter 09/27/2016    Infection due to parainfluenza virus 2 11/13/2018    Dehydration 11/13/2018    Atrial fibrillation (new) 11/13/2018    HTN (hypertension) 01/24/2019    Long term current use of anticoagulant therapy 01/24/2019    Atrial fibrillation 01/24/2019     Past Medical History:   Diagnosis Date    Claustrophobia     Diabetes mellitus     GERD (gastroesophageal reflux disease)     History of gastric ulcer     History of pneumonia 2018    History of shingles 2016    History of TIA (transient ischemic attack)     Hyperlipidemia     Hypertension     Melanoma     Urinary catheter in place     Uses self-applied continuous glucose monitoring device     Vertebral artery insufficiency     Vertigo          PAST SURGICAL HISTORY  Past Surgical History:   Procedure Laterality Date    CATARACT EXTRACTION      COLONOSCOPY      2011 Dr Reynoso [facility]    COLONOSCOPY N/A 6/30/2023    Procedure: COLONOSCOPY TO CECUM WITH SALINE LIFT &  HOT SNARE POLYPECTOMIES, COLD SNARE POLYPECTOMIES;   Surgeon: Ian Comer MD;  Location: [facility] ENDOSCOPY;  Service: Gastroenterology;  Laterality: N/A;  PRE- SCREENING  POST- COLON POLYPS, DIVERTICULOSIS, HEMORRHOIDS    ENDOSCOPY N/A 12/15/2016    Procedure: ESOPHAGOGASTRODUODENOSCOPY WITH COLD BIOPSIES;  Surgeon: Moshe Lux MD;  Location: [facility] ENDOSCOPY;  Service:     SKIN GRAFT SPLIT THICKNESS Left 10/27/2020    Procedure: EXCISION OF MELANOMA FROM THE LEFT FOREARM REQUIRING FULL THICKNESS SKIN GRAFT;  Surgeon: Nick Nuñez MD;  Location: [facility] MAIN OR;  Service: General;  Laterality: Left;    SUPRAPUBIC TUBE PLACEMENT N/A 2/8/2023    Procedure: CYSTOSCOPY SUPRAPUBIC CATHETER INSERTION;  Surgeon: Dom Davis MD;  Location: [facility] MAIN OR;  Service: Urology;  Laterality: N/A;         FAMILY HISTORY  Family History   Problem Relation Age of Onset    Diabetes Mother     Heart disease Mother     Uterine cancer Mother     Diabetes Father     Heart disease Father     Kidney disease Father     Skin cancer Father         melanoma    Cancer Father         testicle    Malig Hyperthermia Neg Hx          SOCIAL HISTORY  Social History     Socioeconomic History    Marital status:     Number of children: 4   Tobacco Use    Smoking status: Never     Passive exposure: Never    Smokeless tobacco: Never   Vaping Use    Vaping Use: Never used   Substance and Sexual Activity    Alcohol use: No    Drug use: No    Sexual activity: Defer         ALLERGIES  Bee venom, Shellfish-derived products, Aleve [naproxen], Asa [aspirin], and Ibuprofen        REVIEW OF SYSTEMS  All systems reviewed and negative except for those discussed in HPI.       PHYSICAL EXAM    I have reviewed the triage vital signs and nursing notes.    ED Triage Vitals   Temp Heart Rate Resp BP SpO2   09/07/23 1255 09/07/23 1255 09/07/23 1255 09/07/23 1309 09/07/23 1255   99.5 °F (37.5 °C) 112 16 152/85 99 %      Temp src Heart Rate Source Patient Position BP Location FiO2 (%)   -- -- --  -- --              Physical Exam  GENERAL: Alert, oriented, chronically ill-appearing, nontoxic, not distressed  HENT: head atraumatic, no nuchal rigidity  EYES: no scleral icterus, EOMI  CV: regular rhythm, tachycardic rate, no murmur  RESPIRATORY: normal effort, CTA  ABDOMEN: soft, mild suprapubic tenderness.  Suprapubic catheter in good position.  MUSCULOSKELETAL: no deformity, FROM, no calf swelling or tenderness  NEURO: alert, moves all extremities, follows commands  SKIN: warm, dry        LAB RESULTS  Recent Results (from the past 24 hour(s))   Lipase    Collection Time: 09/07/23  1:17 PM    Specimen: Blood   Result Value Ref Range    Lipase 10 (L) 13 - 60 U/L   Lactic Acid, Plasma    Collection Time: 09/07/23  1:17 PM    Specimen: Blood   Result Value Ref Range    Lactate 2.2 (C) 0.5 - 2.0 mmol/L   Green Top (Gel)    Collection Time: 09/07/23  1:17 PM   Result Value Ref Range    Extra Tube Hold for add-ons.    Lavender Top    Collection Time: 09/07/23  1:17 PM   Result Value Ref Range    Extra Tube hold for add-on    Gold Top - SST    Collection Time: 09/07/23  1:17 PM   Result Value Ref Range    Extra Tube Hold for add-ons.    Light Blue Top    Collection Time: 09/07/23  1:17 PM   Result Value Ref Range    Extra Tube Hold for add-ons.    CBC Auto Differential    Collection Time: 09/07/23  1:17 PM    Specimen: Blood   Result Value Ref Range    WBC 4.09 3.40 - 10.80 10*3/mm3    RBC 3.72 (L) 4.14 - 5.80 10*6/mm3    Hemoglobin 11.7 (L) 13.0 - 17.7 g/dL    Hematocrit 34.3 (L) 37.5 - 51.0 %    MCV 92.2 79.0 - 97.0 fL    MCH 31.5 26.6 - 33.0 pg    MCHC 34.1 31.5 - 35.7 g/dL    RDW 14.5 12.3 - 15.4 %    RDW-SD 49.2 37.0 - 54.0 fl    MPV 9.7 6.0 - 12.0 fL    Platelets 176 140 - 450 10*3/mm3    Neutrophil % 54.1 42.7 - 76.0 %    Lymphocyte % 28.6 19.6 - 45.3 %    Monocyte % 16.6 (H) 5.0 - 12.0 %    Eosinophil % 0.0 (L) 0.3 - 6.2 %    Basophil % 0.2 0.0 - 1.5 %    Immature Grans % 0.5 0.0 - 0.5 %    Neutrophils,  Absolute 2.21 1.70 - 7.00 10*3/mm3    Lymphocytes, Absolute 1.17 0.70 - 3.10 10*3/mm3    Monocytes, Absolute 0.68 0.10 - 0.90 10*3/mm3    Eosinophils, Absolute 0.00 0.00 - 0.40 10*3/mm3    Basophils, Absolute 0.01 0.00 - 0.20 10*3/mm3    Immature Grans, Absolute 0.02 0.00 - 0.05 10*3/mm3    nRBC 0.0 0.0 - 0.2 /100 WBC   BNP    Collection Time: 09/07/23  1:17 PM    Specimen: Blood   Result Value Ref Range    proBNP 177.0 0.0 - 900.0 pg/mL   Procalcitonin    Collection Time: 09/07/23  1:17 PM    Specimen: Blood   Result Value Ref Range    Procalcitonin 0.05 0.00 - 0.25 ng/mL   Urinalysis With Microscopic If Indicated (No Culture) - Urine, Clean Catch    Collection Time: 09/07/23  1:47 PM    Specimen: Urine, Clean Catch   Result Value Ref Range    Color, UA Yellow Yellow, Straw    Appearance, UA Cloudy (A) Clear    pH, UA 7.0 5.0 - 8.0    Specific Gravity, UA 1.018 1.005 - 1.030    Glucose, UA >=1000 mg/dL (3+) (A) Negative    Ketones, UA Trace (A) Negative    Bilirubin, UA Negative Negative    Blood, UA Negative Negative    Protein, UA Negative Negative    Leuk Esterase, UA Moderate (2+) (A) Negative    Nitrite, UA Positive (A) Negative    Urobilinogen, UA 0.2 E.U./dL 0.2 - 1.0 E.U./dL   Urinalysis, Microscopic Only - Urine, Clean Catch    Collection Time: 09/07/23  1:47 PM    Specimen: Urine, Clean Catch   Result Value Ref Range    RBC, UA 0-2 None Seen, 0-2 /HPF    WBC, UA Too Numerous to Count (A) None Seen, 0-2 /HPF    Bacteria, UA 3+ (A) None Seen /HPF    Squamous Epithelial Cells, UA 0-2 None Seen, 0-2 /HPF    Hyaline Casts, UA 0-2 None Seen /LPF    Methodology Manual Light Microscopy    ECG 12 Lead Tachycardia    Collection Time: 09/07/23  2:07 PM   Result Value Ref Range    QT Interval 310 ms    QTC Interval 433 ms   Comprehensive Metabolic Panel    Collection Time: 09/07/23  2:29 PM    Specimen: Blood   Result Value Ref Range    Glucose 222 (H) 65 - 99 mg/dL    BUN 10 8 - 23 mg/dL    Creatinine 0.66 (L) 0.76  - 1.27 mg/dL    Sodium 137 136 - 145 mmol/L    Potassium 4.1 3.5 - 5.2 mmol/L    Chloride 100 98 - 107 mmol/L    CO2 26.0 22.0 - 29.0 mmol/L    Calcium 10.4 8.6 - 10.5 mg/dL    Total Protein 7.2 6.0 - 8.5 g/dL    Albumin 4.6 3.5 - 5.2 g/dL    ALT (SGPT) 14 1 - 41 U/L    AST (SGOT) 11 1 - 40 U/L    Alkaline Phosphatase 125 (H) 39 - 117 U/L    Total Bilirubin 0.6 0.0 - 1.2 mg/dL    Globulin 2.6 gm/dL    A/G Ratio 1.8 g/dL    BUN/Creatinine Ratio 15.2 7.0 - 25.0    Anion Gap 11.0 5.0 - 15.0 mmol/L    eGFR 98.4 >60.0 mL/min/1.73   STAT Lactic Acid, Reflex    Collection Time: 09/07/23  2:29 PM    Specimen: Blood   Result Value Ref Range    Lactate 2.3 (C) 0.5 - 2.0 mmol/L   High Sensitivity Troponin T    Collection Time: 09/07/23  2:29 PM    Specimen: Blood   Result Value Ref Range    HS Troponin T 14 <15 ng/L       Ordered the above labs and independently reviewed the results.        RADIOLOGY  CT Abdomen Pelvis With Contrast, CT Angiogram Chest    Result Date: 9/7/2023  CT ANGIOGRAM CHEST-, CT ABDOMEN PELVIS W CONTRAST-  Radiation dose reduction techniques were utilized, including automated exposure control and exposure modulation based on body size.  Clinical: Abdominal pain with rectal bleeding, shortness of breath  CT scan of the chest performed with intravenous contrast, pulmonary embolus protocol to include coronal, sagittal and three-dimensional reconstruction.  COMPARISON CT of the chest abdomen and pelvis dated 12/30/2022  FINDINGS: No pulmonary embolus. Cardiac size within normal limits. No aortic aneurysm nor dissection. The esophagus is satisfactory in course and caliber. No mediastinal or hilar mass/lymphadenopathy. There are several sub-5 mm calcified and noncalcified pulmonary nodules seen bilaterally. The vast majority of these are calcified and benign granulomas. All remain stable. No pleural effusion, acute airspace disease or suspicious pulmonary lesion is demonstrated. Again demonstrated calcified  dura mid thoracic spine.  There are 2 tiny, 2 mm gallstone seen. The gallbladder is otherwise normal. No biliary duct dilatation. See images 49 and 57.  Liver spleen and pancreas are unremarkable. Both adrenal glands have a satisfactory appearance. Both kidneys demonstrate a symmetric satisfactory pattern of enhancement without mass, calculus nor obstructive uropathy. Both ureters are normal in course and caliber to the urinary bladder. Diffuse bladder wall thickening/trabeculation as before. Suprapubic catheter in place. Prostate is heterogenous and appears enlarged quite similar to the previous examination. Seminal vesicles unremarkable.  The stomach is collapsed, contour within normal limits, no duodenal abnormality seen.  There is diverticulosis of the sigmoid colon, no indication of acute diverticulitis. Caliber of the large and small bowel is within normal limits. The appendix is normal.  CONCLUSION: 1. No pulmonary embolus, aortic aneurysm or dissection.  2. Tiny calcified benign granulomas demonstrated throughout both lungs, few tiny noncalcified pulmonary nodules also seen. These can be followed with 1 year repeat CT chest.  3. Tiny gallstones.  4. Diverticulosis of the colon.  5. Suprapubic catheter within the urinary bladder, the diffuse bladder wall thickening/trabeculation. Prostate is unchanged in appearance.      This report was finalized on 9/7/2023 4:25 PM by Dr. Prashant Rangel M.D.      XR Chest 1 View    Result Date: 9/7/2023  Portable chest radiograph  HISTORY: Shortness of breath  TECHNIQUE: Single AP portable radiograph of the chest  COMPARISON: Chest radiograph 4/1/2022      FINDINGS AND IMPRESSION: No pulmonary consolidation, pleural effusion or pneumothorax is visualized cardiac silhouette is within normal limits for size..  This report was finalized on 9/7/2023 2:41 PM by Dr. Terrance Hauser M.D.       I ordered the above noted radiological studies. Reviewed by me and discussed with  radiologist.  See dictation for official radiology interpretation.      MEDICATIONS GIVEN IN ER    Medications   sodium chloride 0.9 % flush 10 mL (has no administration in time range)   sodium chloride 0.9 % bolus 1,000 mL (has no administration in time range)   cefTRIAXone (ROCEPHIN) 2,000 mg in sodium chloride 0.9 % 100 mL IVPB-VTB (has no administration in time range)   iopamidol (ISOVUE-370) 76 % injection 100 mL (95 mL Intravenous Given 9/7/23 1538)         ADDITIONAL ORDERS CONSIDERED BUT NOT ORDERED:  Nothing      PROGRESS, DATA ANALYSIS, CONSULTS, AND MEDICAL DECISION MAKING    All labs have been independently interpreted by myself.  All radiology studies have been independently interpreted by myself and discussed with radiologist dictating the report.   EKG's independently interpreted by myself.  Discussion below represents my analysis of pertinent findings related to patient's condition, differential diagnosis, treatment plan and final disposition.    I have discussed case with Dr. Leyva, emergency room physician.  He has performed his own bedside examination and agrees with treatment plan.    ED Course as of 09/07/23 1630   Thu Sep 07, 2023   1330 Presents with generalized weakness, tachycardia, diarrhea, abdominal pain x2 days.  Tachycardic with stable vitals.  Differential diagnoses include but not limited to anemia, underlying infection, cardiac arrhythmia. [EE]   1333 Hemoglobin(!): 11.7 [EE]   1333 Platelets: 176 [EE]   1335 WBC: 4.09 [EE]   1400 Lactate(!!): 2.2 [EE]   1410 Lactate(!!): 2.2 [MM]   1410 Hemoglobin(!): 11.7  1 month ago patient's hemoglobin was 11.4. [MM]   1411 My own independent interpretation of the EKG that was done at 2:07 PM reveals a rate of 117 it is a sinus tachycardia narrow complex normal axis I do not appreciate any acute injury pattern QT looks normal. [MM]   1448 Bacteria, UA(!): 3+ [EE]   1509 Potassium: 4.1 [EE]   1509 Creatinine(!): 0.66 [EE]   1510 Chest x-ray  independently interpreted myself shows no acute infiltrate or pneumothorax. [EE]   1520 Plan to admit patient for suspected sepsis from UTI.  Antibiotics, urine cultures ordered.  We will CT abdomen and chest to rule out PE however patient denies any chest pain or shortness of breath. [EE]   1530 I discussed patient REGAN Mcdonnell.  She agrees to admit. [EE]      ED Course User Index  [EE] Valdez Khalil PA  [MM] Hector Leyva MD       AS OF 16:30 EDT VITALS:    BP - 158/93  HR - 114  TEMP - 99.5 °F (37.5 °C)  O2 SATS - 99%        DIAGNOSIS  Final diagnoses:   Acute UTI   Lactic acidosis   Generalized abdominal pain   Type 2 diabetes mellitus with other specified complication, without long-term current use of insulin         DISPOSITION  Admitted      Dictated utilizing Dragon dictation     Valdez Khalil PA  09/07/23 [id number]

## 2023-09-07 NOTE — PROGRESS NOTES
Clinical Pharmacy Services: Medication History    Harry Sierra is a 72 y.o. male presenting to [facility] for   Chief Complaint   Patient presents with    Abdominal Pain       He  has a past medical history of Claustrophobia, Diabetes mellitus, GERD (gastroesophageal reflux disease), History of gastric ulcer, History of pneumonia (2018), History of shingles (2016), History of TIA (transient ischemic attack), Hyperlipidemia, Hypertension, Infection due to parainfluenza virus 2 (11/13/2018), Melanoma, Prostate cancer, Urinary catheter in place, Uses self-applied continuous glucose monitoring device, Vertebral artery insufficiency, and Vertigo.    Allergies as of 09/07/2023 - Reviewed 09/07/2023   Allergen Reaction Noted    Bee venom Shortness Of Breath 09/26/2016    Shellfish-derived products Anaphylaxis and Rash 08/30/2016    Aleve [naproxen] Other (See Comments) 11/13/2018    Asa [aspirin] Other (See Comments) 11/13/2018    Ibuprofen GI Bleeding 10/06/2020       Medication information was obtained from: Pharmacy  Pharmacy and Phone Number:   [facility] [id number] - [place], [place] - [address] AT [facility] & [address] - [phone] PH - [phone] FX  [address]  [address]  [place] [address]  Phone: [phone] Fax: [phone]    [facility] [place]  [address] [place] [address]  Phone: [phone] Fax: [phone]    [id number] - [place], [place] - [address] AT [facility] [address] - [phone] PH - [phone] FX  [address] [facility] [address]  Phone: [phone] Fax: [phone]        Prior to Admission Medications       Prescriptions Last Dose Informant Patient Reported? Taking?    atorvastatin (LIPITOR) 40 MG tablet  Pharmacy No Yes    Take 1 tablet by mouth every night at bedtime.    clopidogrel (PLAVIX) 75 MG tablet  Pharmacy No Yes    Take 1 tablet by mouth Daily.    insulin detemir  (Levemir FlexPen) 100 UNIT/ML injection  Pharmacy No Yes    Inject 30 Units under the skin into the appropriate area as directed Daily.    losartan (COZAAR) 25 MG tablet  Pharmacy No Yes    Take 1 tablet by mouth Daily.    metFORMIN (GLUCOPHAGE) 500 MG tablet  Pharmacy No Yes    Take 1 tablet by mouth 2 (Two) Times a Day With Meals.    Multiple Vitamins-Minerals (CENTRUM SILVER 50+MEN) tablet  Self Yes Yes    Take 1 tablet by mouth Daily.    pantoprazole (PROTONIX) 40 MG EC tablet  Pharmacy No Yes    TAKE 1 TABLET BY MOUTH DAILY    cefdinir (OMNICEF) 300 MG capsule   Yes No    Continuous Blood Gluc Sensor (FreeStyle Vaishali 14 Day Sensor) misc   No No    Place 1 each on the skin as directed by provider Daily.    ferrous sulfate (FerrouSul) 325 (65 FE) MG tablet  Self No No    Take 1 tablet by mouth Daily With Breakfast.    Patient taking differently:  Take 1 tablet by mouth Daily With Breakfast. Holding For Endo              Medication notes:     This medication list is complete to the best of my knowledge as of 9/7/2023    Please call if questions.    Aries Stover  Medication History Technician  [phone]    9/7/2023 16:07 EDT

## 2023-09-08 PROBLEM — E44.0 MODERATE MALNUTRITION: Status: ACTIVE | Noted: 2023-09-08

## 2023-09-08 PROBLEM — Z93.59 CHRONIC SUPRAPUBIC CATHETER: Status: ACTIVE | Noted: 2023-09-08

## 2023-09-08 LAB
ANION GAP SERPL CALCULATED.3IONS-SCNC: 12.2 MMOL/L (ref 5–15)
BUN SERPL-MCNC: 7 MG/DL (ref 8–23)
BUN/CREAT SERPL: 10.8 (ref 7–25)
CALCIUM SPEC-SCNC: 9.3 MG/DL (ref 8.6–10.5)
CHLORIDE SERPL-SCNC: 104 MMOL/L (ref 98–107)
CO2 SERPL-SCNC: 21.8 MMOL/L (ref 22–29)
CREAT SERPL-MCNC: 0.65 MG/DL (ref 0.76–1.27)
D-LACTATE SERPL-SCNC: 0.7 MMOL/L (ref 0.5–2)
DEPRECATED RDW RBC AUTO: 49.9 FL (ref 37–54)
EGFRCR SERPLBLD CKD-EPI 2021: 98.9 ML/MIN/1.73
ERYTHROCYTE [DISTWIDTH] IN BLOOD BY AUTOMATED COUNT: 14.8 % (ref 12.3–15.4)
GLUCOSE BLDC GLUCOMTR-MCNC: 111 MG/DL (ref 70–130)
GLUCOSE BLDC GLUCOMTR-MCNC: 168 MG/DL (ref 70–130)
GLUCOSE BLDC GLUCOMTR-MCNC: 201 MG/DL (ref 70–130)
GLUCOSE BLDC GLUCOMTR-MCNC: 210 MG/DL (ref 70–130)
GLUCOSE SERPL-MCNC: 103 MG/DL (ref 65–99)
HCT VFR BLD AUTO: 31.6 % (ref 37.5–51)
HGB BLD-MCNC: 10.8 G/DL (ref 13–17.7)
MCH RBC QN AUTO: 31.4 PG (ref 26.6–33)
MCHC RBC AUTO-ENTMCNC: 34.2 G/DL (ref 31.5–35.7)
MCV RBC AUTO: 91.9 FL (ref 79–97)
PLATELET # BLD AUTO: 157 10*3/MM3 (ref 140–450)
PMV BLD AUTO: 9.7 FL (ref 6–12)
POTASSIUM SERPL-SCNC: 3.7 MMOL/L (ref 3.5–5.2)
RBC # BLD AUTO: 3.44 10*6/MM3 (ref 4.14–5.8)
SODIUM SERPL-SCNC: 138 MMOL/L (ref 136–145)
TROPONIN T SERPL HS-MCNC: 13 NG/L
WBC NRBC COR # BLD: 5.96 10*3/MM3 (ref 3.4–10.8)

## 2023-09-08 PROCEDURE — 63710000001 INSULIN LISPRO (HUMAN) PER 5 UNITS: Performed by: INTERNAL MEDICINE

## 2023-09-08 PROCEDURE — 82948 REAGENT STRIP/BLOOD GLUCOSE: CPT

## 2023-09-08 PROCEDURE — 63710000001 INSULIN GLARGINE PER 5 UNITS: Performed by: INTERNAL MEDICINE

## 2023-09-08 PROCEDURE — 80048 BASIC METABOLIC PNL TOTAL CA: CPT | Performed by: INTERNAL MEDICINE

## 2023-09-08 PROCEDURE — 25010000002 CEFTRIAXONE PER 250 MG: Performed by: INTERNAL MEDICINE

## 2023-09-08 PROCEDURE — G0378 HOSPITAL OBSERVATION PER HR: HCPCS

## 2023-09-08 PROCEDURE — 96366 THER/PROPH/DIAG IV INF ADDON: CPT

## 2023-09-08 PROCEDURE — 83605 ASSAY OF LACTIC ACID: CPT | Performed by: HOSPITALIST

## 2023-09-08 PROCEDURE — 85027 COMPLETE CBC AUTOMATED: CPT | Performed by: INTERNAL MEDICINE

## 2023-09-08 PROCEDURE — 84484 ASSAY OF TROPONIN QUANT: CPT | Performed by: HOSPITALIST

## 2023-09-08 RX ORDER — MULTIPLE VITAMINS W/ MINERALS TAB 9MG-400MCG
1 TAB ORAL DAILY
Status: DISCONTINUED | OUTPATIENT
Start: 2023-09-08 | End: 2023-09-09 | Stop reason: HOSPADM

## 2023-09-08 RX ORDER — IBUPROFEN 600 MG/1
1 TABLET ORAL
Status: DISCONTINUED | OUTPATIENT
Start: 2023-09-08 | End: 2023-09-09 | Stop reason: HOSPADM

## 2023-09-08 RX ORDER — DEXTROSE MONOHYDRATE 25 G/50ML
25 INJECTION, SOLUTION INTRAVENOUS
Status: DISCONTINUED | OUTPATIENT
Start: 2023-09-08 | End: 2023-09-09 | Stop reason: HOSPADM

## 2023-09-08 RX ORDER — CLOPIDOGREL BISULFATE 75 MG/1
75 TABLET ORAL DAILY
Status: DISCONTINUED | OUTPATIENT
Start: 2023-09-08 | End: 2023-09-09 | Stop reason: HOSPADM

## 2023-09-08 RX ORDER — ATORVASTATIN CALCIUM 20 MG/1
40 TABLET, FILM COATED ORAL DAILY
Status: DISCONTINUED | OUTPATIENT
Start: 2023-09-08 | End: 2023-09-09 | Stop reason: HOSPADM

## 2023-09-08 RX ORDER — PANTOPRAZOLE SODIUM 40 MG/1
40 TABLET, DELAYED RELEASE ORAL DAILY
Status: DISCONTINUED | OUTPATIENT
Start: 2023-09-08 | End: 2023-09-09 | Stop reason: HOSPADM

## 2023-09-08 RX ORDER — NICOTINE POLACRILEX 4 MG
15 LOZENGE BUCCAL
Status: DISCONTINUED | OUTPATIENT
Start: 2023-09-08 | End: 2023-09-09 | Stop reason: HOSPADM

## 2023-09-08 RX ORDER — LOSARTAN POTASSIUM 25 MG/1
25 TABLET ORAL DAILY
Status: DISCONTINUED | OUTPATIENT
Start: 2023-09-08 | End: 2023-09-09 | Stop reason: HOSPADM

## 2023-09-08 RX ORDER — INSULIN LISPRO 100 [IU]/ML
2-9 INJECTION, SOLUTION INTRAVENOUS; SUBCUTANEOUS
Status: DISCONTINUED | OUTPATIENT
Start: 2023-09-08 | End: 2023-09-09 | Stop reason: HOSPADM

## 2023-09-08 RX ADMIN — INSULIN LISPRO 2 UNITS: 100 INJECTION, SOLUTION INTRAVENOUS; SUBCUTANEOUS at 18:16

## 2023-09-08 RX ADMIN — SENNOSIDES AND DOCUSATE SODIUM 2 TABLET: 50; 8.6 TABLET ORAL at 20:24

## 2023-09-08 RX ADMIN — LOSARTAN POTASSIUM 25 MG: 25 TABLET, FILM COATED ORAL at 09:15

## 2023-09-08 RX ADMIN — INSULIN GLARGINE 30 UNITS: 100 INJECTION, SOLUTION SUBCUTANEOUS at 11:34

## 2023-09-08 RX ADMIN — ACETAMINOPHEN 650 MG: 325 TABLET, FILM COATED ORAL at 19:23

## 2023-09-08 RX ADMIN — ATORVASTATIN CALCIUM 40 MG: 20 TABLET, FILM COATED ORAL at 09:15

## 2023-09-08 RX ADMIN — ACETAMINOPHEN 650 MG: 325 TABLET, FILM COATED ORAL at 06:29

## 2023-09-08 RX ADMIN — MULTIPLE VITAMINS W/ MINERALS TAB 1 TABLET: TAB at 09:15

## 2023-09-08 RX ADMIN — CLOPIDOGREL BISULFATE 75 MG: 75 TABLET, FILM COATED ORAL at 09:15

## 2023-09-08 RX ADMIN — CEFTRIAXONE 2000 MG: 2 INJECTION, POWDER, FOR SOLUTION INTRAMUSCULAR; INTRAVENOUS at 18:14

## 2023-09-08 RX ADMIN — PANTOPRAZOLE SODIUM 40 MG: 40 TABLET, DELAYED RELEASE ORAL at 09:15

## 2023-09-08 NOTE — PLAN OF CARE
Goal Outcome Evaluation:  Plan of Care Reviewed With: patient        Progress: improving  Outcome Evaluation: Awaiting for stool sample, no BM last night. One episode of flushing and feel hot per pt around 2130 last night, normally he feels cold. Vital checked, no temp, BG - 269, possible the cause of discomfort, [facility] notified, no new order received. Zofran given x1, ice pack provided for flushing. Pt slept for the most of the night. Will continue to monitor.

## 2023-09-08 NOTE — PROGRESS NOTES
Name: [facility] [place]: 2023   : 1950  PCP: Warren Mohamud MD    MRN: [id number] LOS: 0 days   AGE/SEX: 72 y.o. male  ROOM: E4/     Subjective   Subjective   Feeling much better today. Tolerating diet. No N/V/D/abd pain. Making urine. No SOA or CP today. Eager to go home.       Objective   Objective   Vital Signs  Temp:  [97.3 °F (36.3 °C)-99.1 °F (37.3 °C)] 97.9 °F (36.6 °C)  Heart Rate:  [] 106  Resp:  [16-18] 18  BP: (122-165)/(66-93) 137/77  SpO2:  [97 %-100 %] 99 %  on   ;   Device (Oxygen Therapy): room air  Body mass index is 20.07 kg/m².  Physical Exam  Vitals and nursing note reviewed. Exam conducted with a chaperone present (Family x 3).   Constitutional:       General: He is not in acute distress.     Appearance: He is ill-appearing (chronically). He is not toxic-appearing or diaphoretic.   HENT:      Head: Normocephalic.      Nose: Nose normal.      Mouth/Throat:      Mouth: Mucous membranes are moist.      Pharynx: Oropharynx is clear.   Eyes:      General: No scleral icterus.        Right eye: No discharge.         Left eye: No discharge.      Extraocular Movements: Extraocular movements intact.      Conjunctiva/sclera: Conjunctivae normal.   Cardiovascular:      Rate and Rhythm: Normal rate and regular rhythm.      Pulses: Normal pulses.   Pulmonary:      Effort: Pulmonary effort is normal. No respiratory distress.      Breath sounds: Normal breath sounds. No wheezing or rales.   Chest:      Chest wall: No tenderness.   Abdominal:      General: Bowel sounds are normal. There is no distension.      Palpations: Abdomen is soft.      Tenderness: There is no abdominal tenderness.   Genitourinary:     Comments: Suprapubic catheter in place, clear yellow urine in bag  Musculoskeletal:         General: No swelling or deformity. Normal range of motion.      Cervical back: Neck supple. No rigidity.      Comments: SCDs in place   Skin:     General: Skin is warm and dry.       Capillary Refill: Capillary refill takes less than 2 seconds.      Coloration: Skin is not jaundiced.   Neurological:      General: No focal deficit present.      Mental Status: He is alert and oriented to person, place, and time. Mental status is at baseline.      Cranial Nerves: No cranial nerve deficit.      Coordination: Coordination normal.   Psychiatric:         Mood and Affect: Mood normal.         Behavior: Behavior normal.         Thought Content: Thought content normal.      Comments: Very pleasant     Results Review     I reviewed the patient's new clinical results.  Results from last 7 days   Lab Units 09/08/23  0500 09/07/23  1317   WBC 10*3/mm3 5.96 4.09   HEMOGLOBIN g/dL 10.8* 11.7*   PLATELETS 10*3/mm3 157 176     Results from last 7 days   Lab Units 09/08/23  0500 09/07/23  1429   SODIUM mmol/L 138 137   POTASSIUM mmol/L 3.7 4.1   CHLORIDE mmol/L 104 100   CO2 mmol/L 21.8* 26.0   BUN mg/dL 7* 10   CREATININE mg/dL 0.65* 0.66*   GLUCOSE mg/dL 103* 222*   EGFR mL/min/1.73 98.9 98.4     Results from last 7 days   Lab Units 09/07/23  1429   ALBUMIN g/dL 4.6   BILIRUBIN mg/dL 0.6   ALK PHOS U/L 125*   AST (SGOT) U/L 11   ALT (SGPT) U/L 14     Results from last 7 days   Lab Units 09/08/23  0500 09/07/23  1429   CALCIUM mg/dL 9.3 10.4   ALBUMIN g/dL  --  4.6     Results from last 7 days   Lab Units 09/08/23  0813 09/07/23  1429 09/07/23  1317   PROCALCITONIN ng/mL  --   --  0.05   LACTATE mmol/L 0.7 2.3* 2.2*     Glucose   Date/Time Value Ref Range Status   09/08/2023 1208 210 (H) 70 - 130 mg/dL Final   09/08/2023 0819 111 70 - 130 mg/dL Final   09/07/2023 2115 269 (H) 70 - 130 mg/dL Final   09/07/2023 1703 158 (H) 70 - 130 mg/dL Final       XR Chest 1 View    Result Date: 9/7/2023  FINDINGS AND IMPRESSION: No pulmonary consolidation, pleural effusion or pneumothorax is visualized cardiac silhouette is within normal limits for size..  This report was finalized on 9/7/2023 2:41 PM by Dr. Terrance Hauser M.D.        I have personally reviewed all medications:  Scheduled Medications  atorvastatin, 40 mg, Oral, Daily  cefTRIAXone, 2,000 mg, Intravenous, Q24H  clopidogrel, 75 mg, Oral, Daily  insulin glargine, 30 Units, Subcutaneous, Daily  insulin lispro, 2-9 Units, Subcutaneous, 4x Daily AC & at Bedtime  losartan, 25 mg, Oral, Daily  multivitamin with minerals, 1 tablet, Oral, Daily  pantoprazole, 40 mg, Oral, Daily  senna-docusate sodium, 2 tablet, Oral, BID    Infusions   Diet  Diet: Diabetic Diets; Consistent Carbohydrate; Texture: Regular Texture (IDDSI 7); Fluid Consistency: Thin (IDDSI 0)    I have personally reviewed:  [x]  Laboratory   [x]  Microbiology   [x]  Radiology   [x]  EKG/Telemetry  []  Cardiology/Vascular   []  Pathology    [x]  Records       Assessment/Plan     Active Hospital Problems    Diagnosis  POA    **Acute UTI [N39.0]  Yes    Chronic suprapubic catheter [Z93.59]  Not Applicable    Urinary retention due to benign prostatic hyperplasia [N40.1, R33.8]  Yes    Normocytic anemia [D64.9]  Yes    History of atrial fibrillation [Z86.79]  Not Applicable    Hyperlipidemia associated with type 2 diabetes mellitus [E11.69, E78.5]  Yes    Type 2 diabetes mellitus with hyperglycemia, with long-term current use of insulin [E11.65, Z79.4]  Not Applicable    Prostate cancer [C61]  Yes    Bilateral carotid artery stenosis [I65.23]  Yes    Melena [K92.1]  Yes    Primary hypertension [I10]  Yes      Resolved Hospital Problems   No resolved problems to display.     71yo gentleman with prostate CA, chronic suprapubic catheter, DM2, carotid disease, GERD, h/o gastric ulcer, prior TIA, HTN, HLD, and recent hospitalization in July for diverticular bleed, who presented to ER with 2 days of weakness, SOA, dark loose stool, abd pain. He was admitted to our service with diagnosis of UTI.    Acute UTI  Chronic suprapubic catheter  Prostate CA: urine culture growing GNR, continue Rocephin, seems to have shown great improvement  with only tx of UTI    Dark diarrhea NOS  Anemia, normocytic  Recent diverticular bleed: no bleeding evident at present, Hgb stable, no symptoms of anemia, no abd pain or N/V/D today, check anemia labs, trend Hgb, hemoccult ordered    DM2: metformin on hold, continue basal/bolus insulin, check A1c    HTN: BPs acceptable on Losartan    GERD: continue PPI      SCDs for DVT prophylaxis.  Full code.  Discussed with patient, nursing staff, CCP, and care team on multidisciplinary rounds. D/w children x 3 at bedside.  Anticipate discharge home with family, timing yet to be determined. May be ready as soon as tomorrow.    >55min total time, family had many questions    Grant Cho MD  [facility]  09/08/23  15:16 EDT

## 2023-09-08 NOTE — PLAN OF CARE
Goal Outcome Evaluation:  Plan of Care Reviewed With: patient, spouse    Progress: improving  Outcome Evaluation: Mr. Sierra admitted on 9/7 for acute UTI. IV Rocephin ordered. Blood cultures negative. Awaiting hemoccult. No c/o pain. Family at bedside. Patient stable and needs met at this time.

## 2023-09-08 NOTE — CASE MANAGEMENT/SOCIAL WORK
Discharge Planning Assessment  [facility]     Patient Name: Harry Sierra  MRN: [id number]  Today's Date: 9/8/2023    Admit Date: 9/7/2023    Plan: Home with family- PT eval pending   Discharge Needs Assessment       Row Name 09/08/23 1251       Living Environment    People in Home spouse    Name(s) of People in Home Hanny Blankenship    Current Living Arrangements home    Potentially Unsafe Housing Conditions none    Primary Care Provided by self    Provides Primary Care For no one    Family Caregiver if Needed child(tiffanie), adult;spouse    Family Caregiver Names Hanny Blankenship [phone]    Quality of Family Relationships helpful;involved;supportive    Able to Return to Prior Arrangements yes       Resource/Environmental Concerns    Resource/Environmental Concerns none       Transition Planning    Patient/Family Anticipates Transition to home with family    Patient/Family Anticipated Services at Transition none    Transportation Anticipated family or friend will provide       Discharge Needs Assessment    Equipment Currently Used at Home none    Concerns to be Addressed discharge planning                   Discharge Plan       Row Name 09/08/23 1252       Plan    Plan Home with family- PT eval pending    Patient/Family in Agreement with Plan yes    Plan Comments CCP spoke with patient, patient's spouse, and patient's daughter at bedside; CCP role explained, face sheet verified, and discharge plan discussed. Patient resides with spouse in one-level home with three steps to enter. Patient denies use of any DME and reports being independent with ADLs. Confirms Meds to Beds. Patient denies any HH and SNF history. Patient denies any known discharge needs at this time and is hopeful to return home at discharge. PT eval pending. Family to transport. CCP to follow progress with PT to determine level of care needs. Geovany SULLIVAN LCSW                  Continued Care and Services - Admitted Since 9/7/2023    Coordination  has not been started for this encounter.          Demographic Summary       Row Name 09/08/23 1250       General Information    Admission Type observation    Arrived From home    Reason for Consult discharge planning    Preferred Language English                   Functional Status       Row Name 09/08/23 1250       Functional Status    Usual Activity Tolerance moderate    Current Activity Tolerance moderate       Functional Status, IADL    Medications independent    Meal Preparation independent    Housekeeping independent    Laundry independent    Shopping independent       Mental Status    General Appearance WDL WDL                   Psychosocial    No documentation.                  Abuse/Neglect    No documentation.                  Legal    No documentation.                  Substance Abuse    No documentation.                  Patient Forms    No documentation.                     ARACELIS ShirleyW

## 2023-09-08 NOTE — H&P
HISTORY AND PHYSICAL   [facility]        Date of Admission: 2023  Patient Identification:  Name: Harry Sierra  Age: 72 y.o.  Sex: male  :  1950  MRN: [id number]                     Primary Care Physician: Warren Mohamud MD    Chief Complaint:  72 year old gentleman who presented to the emergency room with weakness, malaise and decreased energy; he has had some abdominal pain; he has a suprapubic catheter; he has had some diarrhea; no nausea or vomiting; he has had abdominal pain    History of Present Illness:   As above    Past Medical History:  Past Medical History:   Diagnosis Date    Claustrophobia     Diabetes mellitus     GERD (gastroesophageal reflux disease)     History of gastric ulcer     History of pneumonia 2018    MULTIFOCAL, HOSPITALIZED X 6 DAYS    History of shingles 2016    History of TIA (transient ischemic attack)     X2    Hyperlipidemia     Hypertension     Infection due to parainfluenza virus 2 2018    Melanoma     LEFT FOREARM     Prostate cancer     FOLLOWED BY DR ALVARADO    Urinary catheter in place     SUPRA PUBIC    Uses self-applied continuous glucose monitoring device     FREESTYLE MACRINA    Vertebral artery insufficiency     Vertigo      Past Surgical History:  Past Surgical History:   Procedure Laterality Date    CATARACT EXTRACTION      COLONOSCOPY       Dr Edgardo Ferraro [facility]    COLONOSCOPY N/A 2023    Procedure: COLONOSCOPY TO CECUM WITH SALINE LIFT &  HOT SNARE POLYPECTOMIES, COLD SNARE POLYPECTOMIES;  Surgeon: Ian Comer MD;  Location: [facility] ENDOSCOPY;  Service: Gastroenterology;  Laterality: N/A;  PRE- SCREENING  POST- COLON POLYPS, DIVERTICULOSIS, HEMORRHOIDS    ENDOSCOPY N/A 12/15/2016    Procedure: ESOPHAGOGASTRODUODENOSCOPY WITH COLD BIOPSIES;  Surgeon: Moshe Lux MD;  Location: [facility] ENDOSCOPY;  Service:     SKIN GRAFT SPLIT THICKNESS Left 10/27/2020    Procedure: EXCISION OF MELANOMA FROM THE LEFT FOREARM REQUIRING  FULL THICKNESS SKIN GRAFT;  Surgeon: Nick Nuñez MD;  Location: [facility] MAIN OR;  Service: General;  Laterality: Left;    SUPRAPUBIC TUBE PLACEMENT N/A 2/8/2023    Procedure: CYSTOSCOPY SUPRAPUBIC CATHETER INSERTION;  Surgeon: Dom Davis MD;  Location: [facility] MAIN OR;  Service: Urology;  Laterality: N/A;      Home Meds:  Medications Prior to Admission   Medication Sig Dispense Refill Last Dose    atorvastatin (LIPITOR) 40 MG tablet Take 1 tablet by mouth every night at bedtime. 90 tablet 4     clopidogrel (PLAVIX) 75 MG tablet Take 1 tablet by mouth Daily. 90 tablet 1     insulin detemir (Levemir FlexPen) 100 UNIT/ML injection Inject 30 Units under the skin into the appropriate area as directed Daily. 15 mL 8     losartan (COZAAR) 25 MG tablet Take 1 tablet by mouth Daily. 90 tablet 4     metFORMIN (GLUCOPHAGE) 500 MG tablet Take 1 tablet by mouth 2 (Two) Times a Day With Meals. 180 tablet 3     Multiple Vitamins-Minerals (CENTRUM SILVER 50+MEN) tablet Take 1 tablet by mouth Daily.       pantoprazole (PROTONIX) 40 MG EC tablet TAKE 1 TABLET BY MOUTH DAILY 90 tablet 4     cefdinir (OMNICEF) 300 MG capsule        Continuous Blood Gluc Sensor (FreeStyle Vaishali 14 Day Sensor) misc Place 1 each on the skin as directed by provider Daily. 2 each 11     ferrous sulfate (FerrouSul) 325 (65 FE) MG tablet Take 1 tablet by mouth Daily With Breakfast. (Patient taking differently: Take 1 tablet by mouth Daily With Breakfast. Holding For Endo) 90 tablet 1        Allergies:  Allergies   Allergen Reactions    Bee Venom Shortness Of Breath    Shellfish-Derived Products Anaphylaxis and Rash    Aleve [Naproxen] Other (See Comments)     Bleeding      Asa [Aspirin] Other (See Comments)     bleeding    Ibuprofen GI Bleeding     Immunizations:  Immunization History   Administered Date(s) Administered    COVID-19 (PFIZER) BIVALENT 12+YRS 10/19/2022    COVID-19 (PFIZER) Purple Cap Monovalent 01/13/2021, 02/05/2021, 08/21/2021     Covid-19 (Pfizer) Gray Cap Monovalent 2022    Fluad Quad 65+ 10/12/2020    Fluzone High Dose =>65 Years (Vaxcare ONLY) 11/10/2015, 2016, 10/03/2017, 2018, 10/14/2021    Fluzone High-Dose 65+yrs 10/19/2022    Pneumococcal Conjugate 13-Valent (PCV13) 2016, 2016    Pneumococcal Polysaccharide (PPSV23) 2019    Shingrix 2019, 06/15/2019    Zostavax 2017     Social History:   Social History     Social History Narrative    Not on file     Social History     Socioeconomic History    Marital status:     Number of children: 4   Tobacco Use    Smoking status: Never     Passive exposure: Never    Smokeless tobacco: Never   Vaping Use    Vaping Use: Never used   Substance and Sexual Activity    Alcohol use: No    Drug use: No    Sexual activity: Defer       Family History:  Family History   Problem Relation Age of Onset    Diabetes Mother     Heart disease Mother     Uterine cancer Mother     Diabetes Father     Heart disease Father     Kidney disease Father     Skin cancer Father         melanoma    Cancer Father         testicle    Malig Hyperthermia Neg Hx         Review of Systems  See history of present illness and past medical history.  Patient denies headache, dizziness, syncope, falls, trauma, change in vision, change in hearing, change in taste, changes in weight, changes in appetite, focal weakness, numbness, or paresthesia.  Patient denies chest pain, palpitations,    orthopnea, PND, cough, sinus pressure, rhinorrhea, epistaxis, hemoptysis, nausea, vomiting,hematemesis, constipation or hematochezia.  Denies cold or heat intolerance, polydipsia, polyuria, polyphagia. Denies hematuria, pyuria, dysuria, hesitancy, frequency or urgency. Denies consumption of raw and under cooked meats foods or change in water source.  Denies fever, chills, sweats, night sweats.       Objective:  T Max 24 hrs: Temp (24hrs), Av.3 °F (36.8 °C), Min:97.3 °F (36.3 °C), Max:99.5 °F  "(37.5 °C)    Vitals Ranges:   Temp:  [97.3 °F (36.3 °C)-99.5 °F (37.5 °C)] 98.1 °F (36.7 °C)  Heart Rate:  [] 98  Resp:  [16-18] 18  BP: (131-165)/(66-93) 144/81      Exam:  /81 (BP Location: Right arm, Patient Position: Lying)   Pulse 98   Temp 98.1 °F (36.7 °C) (Oral)   Resp 18   Ht 175.3 cm (69.02\")   Wt 63.5 kg (139 lb 15.9 oz)   SpO2 99%   BMI 20.66 kg/m²     General Appearance:    Alert, cooperative, no distress, appears stated age   Head:    Normocephalic, without obvious abnormality, atraumatic   Eyes:    PERRL, conjunctivae/corneas clear, EOM's intact, both eyes   Ears:    Normal external ear canals, both ears   Nose:   Nares normal, septum midline, mucosa normal, no drainage    or sinus tenderness   Throat:   Lips, mucosa, and tongue normal   Neck:   Supple, symmetrical, trachea midline, no adenopathy;     thyroid:  no enlargement/tenderness/nodules; no carotid    bruit or JVD   Back:     Symmetric, no curvature, ROM normal, no CVA tenderness   Lungs:     Clear to auscultation bilaterally, respirations unlabored   Chest Wall:    No tenderness or deformity    Heart:    Regular rate and rhythm, S1 and S2 normal, no murmur, rub   or gallop   Abdomen:     Soft, nontender, bowel sounds active all four quadrants,     no masses, no hepatomegaly, no splenomegaly   Extremities:   Extremities normal, atraumatic, no cyanosis or edema                        .    Data Review:  Labs in chart were reviewed.  WBC   Date Value Ref Range Status   09/07/2023 4.09 3.40 - 10.80 10*3/mm3 Final     Hemoglobin   Date Value Ref Range Status   09/07/2023 11.7 (L) 13.0 - 17.7 g/dL Final     Hematocrit   Date Value Ref Range Status   09/07/2023 34.3 (L) 37.5 - 51.0 % Final     Platelets   Date Value Ref Range Status   09/07/2023 176 140 - 450 10*3/mm3 Final     Sodium   Date Value Ref Range Status   09/07/2023 137 136 - 145 mmol/L Final     Potassium   Date Value Ref Range Status   09/07/2023 4.1 3.5 - 5.2 mmol/L " Final     Comment:     Slight hemolysis detected by analyzer. Results may be affected.     Chloride   Date Value Ref Range Status   09/07/2023 100 98 - 107 mmol/L Final     CO2   Date Value Ref Range Status   09/07/2023 26.0 22.0 - 29.0 mmol/L Final     BUN   Date Value Ref Range Status   09/07/2023 10 8 - 23 mg/dL Final     Creatinine   Date Value Ref Range Status   09/07/2023 0.66 (L) 0.76 - 1.27 mg/dL Final     Glucose   Date Value Ref Range Status   09/07/2023 222 (H) 65 - 99 mg/dL Final     Calcium   Date Value Ref Range Status   09/07/2023 10.4 8.6 - 10.5 mg/dL Final                Imaging Results (All)       Procedure Component Value Units Date/Time    CT Abdomen Pelvis With Contrast [[id number]] Collected: 09/07/23 1608     Updated: 09/07/23 1629    Narrative:      CT ANGIOGRAM CHEST-, CT ABDOMEN PELVIS W CONTRAST-     Radiation dose reduction techniques were utilized, including automated  exposure control and exposure modulation based on body size.     Clinical: Abdominal pain with rectal bleeding, shortness of breath     CT scan of the chest performed with intravenous contrast, pulmonary  embolus protocol to include coronal, sagittal and three-dimensional  reconstruction.     COMPARISON CT of the chest abdomen and pelvis dated 12/30/2022     FINDINGS: No pulmonary embolus. Cardiac size within normal limits. No  aortic aneurysm nor dissection. The esophagus is satisfactory in course  and caliber. No mediastinal or hilar mass/lymphadenopathy. There are  several sub-5 mm calcified and noncalcified pulmonary nodules seen  bilaterally. The vast majority of these are calcified and benign  granulomas. All remain stable. No pleural effusion, acute airspace  disease or suspicious pulmonary lesion is demonstrated. Again  demonstrated calcified dura mid thoracic spine.     There are 2 tiny, 2 mm gallstone seen. The gallbladder is otherwise  normal. No biliary duct dilatation. See images 49 and 57.     Liver spleen and  pancreas are unremarkable. Both adrenal glands have a  satisfactory appearance. Both kidneys demonstrate a symmetric  satisfactory pattern of enhancement without mass, calculus nor  obstructive uropathy. Both ureters are normal in course and caliber to  the urinary bladder. Diffuse bladder wall thickening/trabeculation as  before. Suprapubic catheter in place. Prostate is heterogenous and  appears enlarged quite similar to the previous examination. Seminal  vesicles unremarkable.     The stomach is collapsed, contour within normal limits, no duodenal  abnormality seen.     There is diverticulosis of the sigmoid colon, no indication of acute  diverticulitis. Caliber of the large and small bowel is within normal  limits. The appendix is normal.     CONCLUSION:  1. No pulmonary embolus, aortic aneurysm or dissection.     2. Tiny calcified benign granulomas demonstrated throughout both lungs,  few tiny noncalcified pulmonary nodules also seen. These can be followed  with 1 year repeat CT chest.     3. Tiny gallstones.     4. Diverticulosis of the colon.     5. Suprapubic catheter within the urinary bladder, the diffuse bladder  wall thickening/trabeculation. Prostate is unchanged in appearance.                 This report was finalized on 9/7/2023 4:25 PM by Dr. Prashant Rangel M.D.       CT Angiogram Chest [[id number]] Collected: 09/07/23 1608     Updated: 09/07/23 1629    Narrative:      CT ANGIOGRAM CHEST-, CT ABDOMEN PELVIS W CONTRAST-     Radiation dose reduction techniques were utilized, including automated  exposure control and exposure modulation based on body size.     Clinical: Abdominal pain with rectal bleeding, shortness of breath     CT scan of the chest performed with intravenous contrast, pulmonary  embolus protocol to include coronal, sagittal and three-dimensional  reconstruction.     COMPARISON CT of the chest abdomen and pelvis dated 12/30/2022     FINDINGS: No pulmonary embolus. Cardiac size within  normal limits. No  aortic aneurysm nor dissection. The esophagus is satisfactory in course  and caliber. No mediastinal or hilar mass/lymphadenopathy. There are  several sub-5 mm calcified and noncalcified pulmonary nodules seen  bilaterally. The vast majority of these are calcified and benign  granulomas. All remain stable. No pleural effusion, acute airspace  disease or suspicious pulmonary lesion is demonstrated. Again  demonstrated calcified dura mid thoracic spine.     There are 2 tiny, 2 mm gallstone seen. The gallbladder is otherwise  normal. No biliary duct dilatation. See images 49 and 57.     Liver spleen and pancreas are unremarkable. Both adrenal glands have a  satisfactory appearance. Both kidneys demonstrate a symmetric  satisfactory pattern of enhancement without mass, calculus nor  obstructive uropathy. Both ureters are normal in course and caliber to  the urinary bladder. Diffuse bladder wall thickening/trabeculation as  before. Suprapubic catheter in place. Prostate is heterogenous and  appears enlarged quite similar to the previous examination. Seminal  vesicles unremarkable.     The stomach is collapsed, contour within normal limits, no duodenal  abnormality seen.     There is diverticulosis of the sigmoid colon, no indication of acute  diverticulitis. Caliber of the large and small bowel is within normal  limits. The appendix is normal.     CONCLUSION:  1. No pulmonary embolus, aortic aneurysm or dissection.     2. Tiny calcified benign granulomas demonstrated throughout both lungs,  few tiny noncalcified pulmonary nodules also seen. These can be followed  with 1 year repeat CT chest.     3. Tiny gallstones.     4. Diverticulosis of the colon.     5. Suprapubic catheter within the urinary bladder, the diffuse bladder  wall thickening/trabeculation. Prostate is unchanged in appearance.                 This report was finalized on 9/7/2023 4:25 PM by Dr. Prashant Rangel M.D.       XR Chest 1 View  [[id number]] Collected: 09/07/23 1431     Updated: 09/07/23 1444    Narrative:      Portable chest radiograph     HISTORY: Shortness of breath     TECHNIQUE: Single AP portable radiograph of the chest     COMPARISON: Chest radiograph 4/1/2022       Impression:      FINDINGS AND IMPRESSION:  No pulmonary consolidation, pleural effusion or pneumothorax is  visualized cardiac silhouette is within normal limits for size..     This report was finalized on 9/7/2023 2:41 PM by Dr. Terrance Hauser M.D.                 Assessment:  Active Hospital Problems    Diagnosis  POA    **Acute UTI [N39.0]  Yes      Resolved Hospital Problems   No resolved problems to display.   Diabetes  Hypertension  Prostate cancer  Anemia  Abdominal pain  Diarrhea  underweight    Plan:  Will continue antibiotics, fluids  Await cultures  Trend hgb  Monitor on telemetry  Accu checks, insulin sliding scale  Dw patient and ed provider    Julieth Banegas MD  9/8/2023  00:28 EDT

## 2023-09-08 NOTE — CONSULTS
Nutrition Services    Patient Name:  Harry Sierra  YOB: 1950  MRN: [id number]  Admit Date:  9/7/2023  Assessment Date:  09/08/23    Comment: Consult per nurse admission screen and MST score 4    72yoM with PMH of DM, HTN and HLD presents to ED with c/o weakness, malaise, decreased energy and abdominal pain with some diarrhea.     Received consult for nutrition assessment per nurse admission screen and MST score 4. Pt cleared for healthy heart diet yesterday per MD. Pt with PMH of DM, discussed adding consistent carb diet restriction to control BG levels. Pt requested RD hold off on adding restriction to allow for more menu options. Discussed adding consistent carb diet restriction if BG too high. Pt agreed and voiced understanding. Weight history reviewed with 12 lb (8%) weight loss past 8 months. He reports poor PO intake ~1 month PTA 2/2 decreased appetite and weakness. Agreeable to ONS BID with meals. NFPE completed with moderate subcutaneous fat and muscle wasting observed. Last documented BM on 9/7 per RN documentation.     Patient meets ASPEN/AND criteria for nutrition diagnosis of moderate malnutrition of chronic illness based on: intake <75% est needs for at least past month, moderate subcutaneous fat and muscle wasting and measurably reduced functional capacity.     Recommendations:   Continue current diet order and encourage adequate PO intake PRN.   Add consistent carb diet restriction of BG levels elevated.   Add Boost Glucose Control (dangelo) BID with meals for additional nutrition support.     RD will continue to follow course for PO intake and tolerance.    CLINICAL NUTRITION ASSESSMENT      Reason for Assessment MST score 2+, Nurse Admission Screen     Diagnosis/Problem   Acute UTI   Medical/Surgical History Past Medical History:   Diagnosis Date    Claustrophobia     Diabetes mellitus     GERD (gastroesophageal reflux disease)     History of gastric ulcer     History of pneumonia 2018  "   MULTIFOCAL, HOSPITALIZED X 6 DAYS    History of shingles 2016    History of TIA (transient ischemic attack)     X2    Hyperlipidemia     Hypertension     Infection due to parainfluenza virus 2 11/13/2018    Melanoma     LEFT FOREARM     Prostate cancer     FOLLOWED BY DR ALVARADO    Urinary catheter in place     SUPRA PUBIC    Uses self-applied continuous glucose monitoring device     FREESTYLE MACRINA    Vertebral artery insufficiency     Vertigo        Past Surgical History:   Procedure Laterality Date    CATARACT EXTRACTION      COLONOSCOPY      2011 Dr [facility]    COLONOSCOPY N/A 6/30/2023    Procedure: COLONOSCOPY TO CECUM WITH SALINE LIFT &  HOT SNARE POLYPECTOMIES, COLD SNARE POLYPECTOMIES;  Surgeon: Ian Comer MD;  Location: [facility] ENDOSCOPY;  Service: Gastroenterology;  Laterality: N/A;  PRE- SCREENING  POST- COLON POLYPS, DIVERTICULOSIS, HEMORRHOIDS    ENDOSCOPY N/A 12/15/2016    Procedure: ESOPHAGOGASTRODUODENOSCOPY WITH COLD BIOPSIES;  Surgeon: Moshe Lux MD;  Location: [facility] ENDOSCOPY;  Service:     SKIN GRAFT SPLIT THICKNESS Left 10/27/2020    Procedure: EXCISION OF MELANOMA FROM THE LEFT FOREARM REQUIRING FULL THICKNESS SKIN GRAFT;  Surgeon: Nick Nuñez MD;  Location: [facility] OR;  Service: General;  Laterality: Left;    SUPRAPUBIC TUBE PLACEMENT N/A 2/8/2023    Procedure: CYSTOSCOPY SUPRAPUBIC CATHETER INSERTION;  Surgeon: Dom Davis MD;  Location: [facility] OR;  Service: Urology;  Laterality: N/A;        Encounter Information        Nutrition History:     Food Preferences:    Supplements:    Factors Affecting Intake: decreased appetite, weakness     Anthropometrics        Current Height  Current Weight  BMI kg/m2 Height: 175.3 cm (69.02\")  Weight: 61.7 kg (136 lb) (09/08/23 0520)  Body mass index is 20.07 kg/m².   Adjusted BMI (if applicable)    BMI Category Normal/Healthy (18.4 - 24.9)       Admission Weight 136 lb (61.7 kg)       Ideal Body Weight " (IBW) 160 lb (73 kg)   Adjusted IBW (if applicable)        Usual Body Weight (UBW) 148 lb (1/2023)   Weight Trend Loss, Amount/Timeframe: 12 lb (8.1%) weight loss past 8 months        Weight History Wt Readings from Last 30 Encounters:   09/08/23 0520 61.7 kg (136 lb)   09/07/23 1255 63.5 kg (139 lb 15.9 oz)   07/24/23 1352 63.5 kg (140 lb)   07/17/23 1242 64.2 kg (141 lb 8 oz)   06/30/23 0810 63.4 kg (139 lb 11.2 oz)   06/22/23 0829 65.3 kg (144 lb)   06/14/23 1115 66.2 kg (146 lb)   04/18/23 0836 64.9 kg (143 lb)   02/14/23 0931 65.2 kg (143 lb 12.8 oz)   02/08/23 0750 65.2 kg (143 lb 11.2 oz)   01/31/23 1512 67.4 kg (148 lb 9.6 oz)   01/05/23 0815 63.5 kg (140 lb)   12/12/22 0850 65.3 kg (144 lb)   11/14/22 1318 65.6 kg (144 lb 9.6 oz)   06/14/22 1513 76.1 kg (167 lb 12.8 oz)   04/19/22 0906 75.6 kg (166 lb 9.6 oz)   04/14/22 1219 76.4 kg (168 lb 6.4 oz)   04/01/22 0825 78 kg (172 lb)   11/16/21 1005 77.6 kg (171 lb)   08/02/21 0922 78.2 kg (172 lb 6.4 oz)   03/15/21 1109 81.6 kg (180 lb)   01/14/21 1408 76.7 kg (169 lb)   12/28/20 1453 80.3 kg (177 lb)   10/27/20 1143 77.8 kg (171 lb 8 oz)   10/21/20 1251 78 kg (171 lb 14.4 oz)   10/06/20 1034 77.6 kg (171 lb)   02/05/20 0917 72.6 kg (160 lb)   11/05/19 1119 72 kg (158 lb 12.8 oz)   10/15/19 0850 71.8 kg (158 lb 6.4 oz)   09/03/19 1034 71.9 kg (158 lb 9.6 oz)   08/19/19 1451 72.5 kg (159 lb 12.8 oz)        Estimated/Assessed Needs        Current Weight  Weight: 61.7 kg (136 lb) (09/08/23 0520)       Energy Requirements    Weight for Calculation 61.7 kg   Method for Estimation  25 kcal/kg, 30 kcal/kg   EST Needs (kcal/day) 6732-5001 kcal       Protein Requirements    Weight for Calculation 61.7 kg   EST Protein Needs (g/kg) 1.2 - 1.5 gm/kg   EST Daily Needs (g/day) 74-92 gm       Fluid Requirements     Method for Estimation 1 mL/kcal    EST Needs (mL/day)      Tests/Procedures        Tests/Procedures CT scan     Labs       Pertinent Labs    Results from last 7  days   Lab Units 09/08/23  0500 09/07/23  1429   SODIUM mmol/L 138 137   POTASSIUM mmol/L 3.7 4.1   CHLORIDE mmol/L 104 100   CO2 mmol/L 21.8* 26.0   BUN mg/dL 7* 10   CREATININE mg/dL 0.65* 0.66*   CALCIUM mg/dL 9.3 10.4   BILIRUBIN mg/dL  --  0.6   ALK PHOS U/L  --  125*   ALT (SGPT) U/L  --  14   AST (SGOT) U/L  --  11   GLUCOSE mg/dL 103* 222*     Results from last 7 days   Lab Units 09/08/23  0500 09/07/23  1429   HEMOGLOBIN g/dL 10.8*  --    HEMATOCRIT % 31.6*  --    WBC 10*3/mm3 5.96  --    ALBUMIN g/dL  --  4.6     Results from last 7 days   Lab Units 09/08/23  0500 09/07/23  1317   PLATELETS 10*3/mm3 157 176     SARS-CoV-2 PCR   Date Value Ref Range Status   10/24/2020 Not Detected Not Detected Final     Comment:     Nucleic acid specific to SARS-CoV-2 (COVID-19) virus was not detected inthis sample by the TaqPath (TM) COVID-19 Combo Kit.SARS-CoV-2 (COVID-19) nucleic acid testing performed using [employer] Aptima (R) SARS-CoV-2 Assay or [employer] TaqPath   (TM)COVID-19 Combo Kit as indicated above under Emergency Use Authorization (EUA) from the FDA. Aptima (R) and TaqPath (TM) test performancecharacteristics verified by [facility] in accordance with the FDAs Guidance Document (Policy for Diagnostic   Tests for Coronavirus Disease-2019during the Public Health Emergency) issued on March 16, 2020. The laboratory is regulated under CLIA as qualified to perform high-complexity testingUnless otherwise noted, all testing was performed at [facility],   CLIA No. [id number], [place] State Licensee No. [id number]     Lab Results   Component Value Date    HGBA1C 8.40 (H) 02/14/2023          Medications           Scheduled Medications atorvastatin, 40 mg, Oral, Daily  [START ON 9/9/2023] cefTRIAXone, 2,000 mg, Intravenous, Once  [START ON 9/9/2023] cefTRIAXone, 2,000 mg, Intravenous, Q24H  clopidogrel, 75 mg, Oral, Daily  insulin glargine, 30 Units, Subcutaneous, Daily  insulin lispro, 2-9 Units,  Subcutaneous, 4x Daily AC & at Bedtime  losartan, 25 mg, Oral, Daily  multivitamin with minerals, 1 tablet, Oral, Daily  pantoprazole, 40 mg, Oral, Daily  senna-docusate sodium, 2 tablet, Oral, BID       Infusions     PRN Medications   acetaminophen    senna-docusate sodium **AND** polyethylene glycol **AND** bisacodyl **AND** bisacodyl    dextrose    dextrose    glucagon (human recombinant)    melatonin    ondansetron **OR** ondansetron    sodium chloride     Physical Findings          Physical Appearance alert, loss of muscle mass, loss of subcutaneous fat, oriented, room air   Oral/Mouth Cavity tooth or teeth missing   Edema  no edema   Gastrointestinal non-distended , last bowel movement: 9/7   Skin  bruising   Tubes/Drains/Lines none   NFPE Date Completed: 9/8     Malnutrition Severity Assessment      Patient meets criteria for : Moderate (non-severe) Malnutrition (intake <75% est needs for at least past month, moderate subcutaneous fat and muscle wasting and measurably reduced functional capacity)  Malnutrition Type (last 8 hours)       Malnutrition Severity Assessment       Row Name 09/08/23 1316       Malnutrition Severity Assessment    Malnutrition Type Chronic Disease - Related Malnutrition      Row Name 09/08/23 1316       Insufficient Energy Intake     Insufficient Energy Intake Findings Moderate    Insufficient Energy Intake  <75% of est. energy requirement for > or equal to 1 month      Row Name 09/08/23 1316       Unintentional Weight Loss     Unintentional Weight Loss Findings Mild  12 lb (8.1%) weight loss past 8 months      Row Name 09/08/23 1316       Muscle Loss    Loss of Muscle Mass Findings Moderate    Synagogue Region Moderate - slight depression    Clavicle Bone Region Moderate - some protrusion in females, visible in males    Acromion Bone Region Moderate - acromion may slightly protrude    Scapular Bone Region Moderate - mild depression, bones may show slightly      Row Name 09/08/23 1316        Fat Loss    Subcutaneous Fat Loss Findings Moderate    Orbital Region  Moderate -  somewhat hollowness, slightly dark circles    Upper Arm Region Moderate - some fat tissue, not ample      Row Name 09/08/23 1316       Declining Functional Status    Declining Functional Status Findings Measurably Reduced      Row Name 09/08/23 1316       Criteria Met (Must meet criteria for severity in at least 2 of these categories: M Wasting, Fat Loss, Fluid, Secondary Signs, Wt. Status, Intake)    Patient meets criteria for  Moderate (non-severe) Malnutrition  intake <75% est needs for at least past month, moderate subcutaneous fat and muscle wasting and measurably reduced functional capacity                       Current Nutrition Orders & Evaluation of Intake       Oral Nutrition     Food Allergies Shellfish   Current PO Diet Diet: Cardiac Diets; Healthy Heart (2-3 Na+); Texture: Regular Texture (IDDSI 7); Fluid Consistency: Thin (IDDSI 0)   Supplement n/a   PO Evaluation     % PO Intake/# of Days 100% x 1 meal   --  PES STATEMENT / NUTRITION DIAGNOSIS      Nutrition Dx Problem  Problem: Malnutrition (moderate)  Etiology: Factors Affecting Nutrition - decreased appetite    Signs/Symptoms: Report of Minimal PO Intake, NFPE Results, Unintended Weight Change, and Report/Observation    Comment:    --  NUTRITION INTERVENTION / PLAN OF CARE      Intervention Goal(s) Maintain nutrition status, Improved nutrition related labs, Meet estimated needs, Disease management/therapy, Initiate feeding/diet, Tolerate PO , Maintain weight, and No significant weight loss         RD Intervention/Action Encourage intake, Continue to monitor, and Care plan reviewed         Prescription/Orders:       PO Diet       Supplements Boost Glucose Control (dangelo) BID with meals      Snacks       Enteral Nutrition       Parenteral Nutrition    New Prescription Ordered? Yes   --      Monitor/Evaluation Per protocol, I&O, PO intake, Supplement intake, Pertinent  labs, Weight, Skin status, GI status, Symptoms   Discharge Plan/Needs Pending clinical course   Education Will instruct as appropriate   --    RD to follow per protocol.      Electronically signed by:  Sandee Richmond RD  09/08/23 09:48 EDT

## 2023-09-09 ENCOUNTER — READMISSION MANAGEMENT (OUTPATIENT)
Dept: CALL CENTER | Facility: HOSPITAL | Age: 73
End: 2023-09-09
Payer: MEDICARE

## 2023-09-09 VITALS
RESPIRATION RATE: 18 BRPM | HEIGHT: 69 IN | OXYGEN SATURATION: 96 % | DIASTOLIC BLOOD PRESSURE: 74 MMHG | WEIGHT: 137.6 LBS | HEART RATE: 97 BPM | TEMPERATURE: 98.4 F | SYSTOLIC BLOOD PRESSURE: 137 MMHG | BODY MASS INDEX: 20.38 KG/M2

## 2023-09-09 PROBLEM — D50.9 IRON DEFICIENCY ANEMIA: Status: ACTIVE | Noted: 2023-09-09

## 2023-09-09 PROBLEM — E87.6 HYPOKALEMIA: Status: ACTIVE | Noted: 2023-09-09

## 2023-09-09 LAB
ALBUMIN SERPL-MCNC: 3.7 G/DL (ref 3.5–5.2)
ALBUMIN/GLOB SERPL: 1.2 G/DL
ALP SERPL-CCNC: 113 U/L (ref 39–117)
ALT SERPL W P-5'-P-CCNC: 11 U/L (ref 1–41)
ANION GAP SERPL CALCULATED.3IONS-SCNC: 13.9 MMOL/L (ref 5–15)
AST SERPL-CCNC: 13 U/L (ref 1–40)
BACTERIA SPEC AEROBE CULT: ABNORMAL
BILIRUB SERPL-MCNC: 0.4 MG/DL (ref 0–1.2)
BUN SERPL-MCNC: 9 MG/DL (ref 8–23)
BUN/CREAT SERPL: 17 (ref 7–25)
CALCIUM SPEC-SCNC: 9.2 MG/DL (ref 8.6–10.5)
CHLORIDE SERPL-SCNC: 101 MMOL/L (ref 98–107)
CO2 SERPL-SCNC: 22.1 MMOL/L (ref 22–29)
CREAT SERPL-MCNC: 0.53 MG/DL (ref 0.76–1.27)
DEPRECATED RDW RBC AUTO: 49.6 FL (ref 37–54)
EGFRCR SERPLBLD CKD-EPI 2021: 105.2 ML/MIN/1.73
ERYTHROCYTE [DISTWIDTH] IN BLOOD BY AUTOMATED COUNT: 14.9 % (ref 12.3–15.4)
FERRITIN SERPL-MCNC: 396 NG/ML (ref 30–400)
FOLATE SERPL-MCNC: >20 NG/ML (ref 4.78–24.2)
GLOBULIN UR ELPH-MCNC: 3.2 GM/DL
GLUCOSE BLDC GLUCOMTR-MCNC: 144 MG/DL (ref 70–130)
GLUCOSE BLDC GLUCOMTR-MCNC: 211 MG/DL (ref 70–130)
GLUCOSE SERPL-MCNC: 113 MG/DL (ref 65–99)
HBA1C MFR BLD: 7.7 % (ref 4.8–5.6)
HCT VFR BLD AUTO: 30.7 % (ref 37.5–51)
HGB BLD-MCNC: 10.7 G/DL (ref 13–17.7)
IRON 24H UR-MRATE: 16 MCG/DL (ref 59–158)
IRON SATN MFR SERPL: 6 % (ref 20–50)
MAGNESIUM SERPL-MCNC: 1.8 MG/DL (ref 1.6–2.4)
MCH RBC QN AUTO: 31.8 PG (ref 26.6–33)
MCHC RBC AUTO-ENTMCNC: 34.9 G/DL (ref 31.5–35.7)
MCV RBC AUTO: 91.4 FL (ref 79–97)
PLATELET # BLD AUTO: 144 10*3/MM3 (ref 140–450)
PMV BLD AUTO: 10.1 FL (ref 6–12)
POTASSIUM SERPL-SCNC: 3.4 MMOL/L (ref 3.5–5.2)
PROT SERPL-MCNC: 6.9 G/DL (ref 6–8.5)
RBC # BLD AUTO: 3.36 10*6/MM3 (ref 4.14–5.8)
SODIUM SERPL-SCNC: 137 MMOL/L (ref 136–145)
TIBC SERPL-MCNC: 289 MCG/DL (ref 298–536)
TRANSFERRIN SERPL-MCNC: 194 MG/DL (ref 200–360)
VIT B12 BLD-MCNC: 341 PG/ML (ref 211–946)
WBC NRBC COR # BLD: 3.93 10*3/MM3 (ref 3.4–10.8)

## 2023-09-09 PROCEDURE — 82746 ASSAY OF FOLIC ACID SERUM: CPT | Performed by: HOSPITALIST

## 2023-09-09 PROCEDURE — 82728 ASSAY OF FERRITIN: CPT | Performed by: HOSPITALIST

## 2023-09-09 PROCEDURE — 85027 COMPLETE CBC AUTOMATED: CPT | Performed by: HOSPITALIST

## 2023-09-09 PROCEDURE — 83735 ASSAY OF MAGNESIUM: CPT | Performed by: HOSPITALIST

## 2023-09-09 PROCEDURE — G0378 HOSPITAL OBSERVATION PER HR: HCPCS

## 2023-09-09 PROCEDURE — 82607 VITAMIN B-12: CPT | Performed by: HOSPITALIST

## 2023-09-09 PROCEDURE — 80053 COMPREHEN METABOLIC PANEL: CPT | Performed by: HOSPITALIST

## 2023-09-09 PROCEDURE — 25010000002 CYANOCOBALAMIN PER 1000 MCG: Performed by: HOSPITALIST

## 2023-09-09 PROCEDURE — 96372 THER/PROPH/DIAG INJ SC/IM: CPT

## 2023-09-09 PROCEDURE — 83540 ASSAY OF IRON: CPT | Performed by: HOSPITALIST

## 2023-09-09 PROCEDURE — 83036 HEMOGLOBIN GLYCOSYLATED A1C: CPT | Performed by: HOSPITALIST

## 2023-09-09 PROCEDURE — 84466 ASSAY OF TRANSFERRIN: CPT | Performed by: HOSPITALIST

## 2023-09-09 PROCEDURE — 82948 REAGENT STRIP/BLOOD GLUCOSE: CPT

## 2023-09-09 PROCEDURE — 63710000001 INSULIN GLARGINE PER 5 UNITS: Performed by: INTERNAL MEDICINE

## 2023-09-09 RX ORDER — CYANOCOBALAMIN 1000 UG/ML
1000 INJECTION, SOLUTION INTRAMUSCULAR; SUBCUTANEOUS DAILY
Status: DISCONTINUED | OUTPATIENT
Start: 2023-09-09 | End: 2023-09-09 | Stop reason: HOSPADM

## 2023-09-09 RX ORDER — POTASSIUM CHLORIDE 750 MG/1
40 TABLET, FILM COATED, EXTENDED RELEASE ORAL EVERY 4 HOURS
Status: COMPLETED | OUTPATIENT
Start: 2023-09-09 | End: 2023-09-09

## 2023-09-09 RX ORDER — CHOLECALCIFEROL (VITAMIN D3) 125 MCG
1000 CAPSULE ORAL DAILY
Status: DISCONTINUED | OUTPATIENT
Start: 2023-09-10 | End: 2023-09-09 | Stop reason: HOSPADM

## 2023-09-09 RX ORDER — CEFDINIR 300 MG/1
300 CAPSULE ORAL 2 TIMES DAILY
Qty: 10 CAPSULE | Refills: 0 | Status: SHIPPED | OUTPATIENT
Start: 2023-09-09 | End: 2023-09-14

## 2023-09-09 RX ADMIN — LOSARTAN POTASSIUM 25 MG: 25 TABLET, FILM COATED ORAL at 08:30

## 2023-09-09 RX ADMIN — ACETAMINOPHEN 325 MG: 325 TABLET, FILM COATED ORAL at 06:21

## 2023-09-09 RX ADMIN — MULTIPLE VITAMINS W/ MINERALS TAB 1 TABLET: TAB at 08:30

## 2023-09-09 RX ADMIN — POTASSIUM CHLORIDE 40 MEQ: 750 TABLET, EXTENDED RELEASE ORAL at 13:56

## 2023-09-09 RX ADMIN — CYANOCOBALAMIN 1000 MCG: 1000 INJECTION, SOLUTION INTRAMUSCULAR; SUBCUTANEOUS at 13:56

## 2023-09-09 RX ADMIN — ATORVASTATIN CALCIUM 40 MG: 20 TABLET, FILM COATED ORAL at 08:30

## 2023-09-09 RX ADMIN — CLOPIDOGREL BISULFATE 75 MG: 75 TABLET, FILM COATED ORAL at 08:30

## 2023-09-09 RX ADMIN — INSULIN GLARGINE 30 UNITS: 100 INJECTION, SOLUTION SUBCUTANEOUS at 12:27

## 2023-09-09 RX ADMIN — POTASSIUM CHLORIDE 40 MEQ: 750 TABLET, EXTENDED RELEASE ORAL at 11:00

## 2023-09-09 RX ADMIN — PANTOPRAZOLE SODIUM 40 MG: 40 TABLET, DELAYED RELEASE ORAL at 08:30

## 2023-09-09 NOTE — PLAN OF CARE
Goal Outcome Evaluation:  Plan of Care Reviewed With: patient        Progress: improving  Outcome Evaluation: Mr. Sierra, admitted 9/07, with acute UTI. Vitals stable on RA. No BM noted this shift. Suprapubic remains in place and patient is independent in emptying. Patient refused PM insulin. No complaints and needs met at this time. Planning to D/C home today.

## 2023-09-09 NOTE — DISCHARGE SUMMARY
Patient Name: Harry Sierra  : 1950  MRN: [id number]    Date of Admission: 2023  Date of Discharge:  2023  Primary Care Physician: Warren Mohamud MD      Chief Complaint:   Abdominal Pain      Discharge Diagnoses     Active Hospital Problems    Diagnosis  POA    **Acute UTI [N39.0]  Yes    Iron deficiency anemia [D50.9]  Yes    Hypokalemia [E87.6]  No    Chronic suprapubic catheter [Z93.59]  Not Applicable    Moderate malnutrition [E44.0]  Yes    Urinary retention due to benign prostatic hyperplasia [N40.1, R33.8]  Yes    History of atrial fibrillation [Z86.79]  Not Applicable    Hyperlipidemia associated with type 2 diabetes mellitus [E11.69, E78.5]  Yes    Type 2 diabetes mellitus with hyperglycemia, with long-term current use of insulin [E11.65, Z79.4]  Not Applicable    Prostate cancer [C61]  Yes    Bilateral carotid artery stenosis [I65.23]  Yes    Melena [K92.1]  Yes    Primary hypertension [I10]  Yes      Resolved Hospital Problems   No resolved problems to display.        Hospital Course     Very pleasant 71yo gentleman with prostate CA, chronic suprapubic catheter, DM2, carotid disease, GERD, h/o gastric ulcer, prior TIA, HTN, HLD, and recent hospitalization in July for diverticular bleed, who presented to ER with 2 days of weakness, SOA, dark loose stool, abd pain. He was admitted to our service with diagnosis of UTI. Please see below for details of admission:     Acute UTI  Chronic suprapubic catheter  Prostate CA: urine culture growing Klebsiella broadly sensitive, treated here with Rocephin, change to oral Cefdinir to complete 7d total course  F/u with  ([facility]) regarding his recurrent UTIs     Dark diarrhea NOS  Anemia, normocytic, iron deficiency, low-normal B12  Recent diverticular bleed: no bleeding evident at present, Hgb stable, no symptoms of anemia, no abd pain or N/V/D today, can f/u with PCP for monitoring  Given a single IM injection of B12 today  Tomorrow start  oral B12, monitor through PCP's office     DM2: metformin on hold here can restart at dc, covered with Lantus and SSI here, A1c 7.7     HTN: BPs acceptable on Losartan     GERD: continued PPI    Hypokalemia: 3.4 this AM, Mg++ level fine, replaced orally, f/u with PCP in a week for recheck        SCDs for DVT prophylaxis while here.  Full code confirmed.  Discussed with patient, family x 1, and RN.  Discharge home with family today.  F/u with Dr. Mohamud (PCP) in 1 week regarding: potassium, anemia, B12 monitoring, DM, etc    Day of Discharge     Subjective:  Feeling much better again today. Tolerating diet. No N/V/D/abd pain. Making urine. No SOA or CP today. Eager to go home.     Physical Exam:  Temp:  [97.3 °F (36.3 °C)-98.4 °F (36.9 °C)] 98.4 °F (36.9 °C)  Heart Rate:  [] 97  Resp:  [18] 18  BP: (114-137)/(71-77) 137/74  Body mass index is 20.31 kg/m².  Physical Exam  Vitals and nursing note reviewed. Exam conducted with a chaperone present (Family x 1).   Constitutional:       General: He is not in acute distress.     Appearance: He is ill-appearing (chronically). He is not toxic-appearing or diaphoretic.   Cardiovascular:      Rate and Rhythm: Normal rate and regular rhythm.      Pulses: Normal pulses.   Pulmonary:      Effort: Pulmonary effort is normal. No respiratory distress.      Breath sounds: Normal breath sounds. No wheezing or rales.   Chest:      Chest wall: No tenderness.   Abdominal:      General: Bowel sounds are normal. There is no distension.      Palpations: Abdomen is soft.      Tenderness: There is no abdominal tenderness.   Genitourinary:     Comments: Suprapubic catheter in place, clear yellow urine in bag  Musculoskeletal:         General: No swelling or deformity. Normal range of motion.      Cervical back: Neck supple. No rigidity.      Comments: SCDs in place   Skin:     General: Skin is warm and dry.      Capillary Refill: Capillary refill takes less than 2 seconds.      Coloration:  Skin is not jaundiced.   Neurological:      General: No focal deficit present.      Mental Status: He is alert and oriented to person, place, and time. Mental status is at baseline.      Cranial Nerves: No cranial nerve deficit.      Coordination: Coordination normal.   Psychiatric:         Mood and Affect: Mood normal.         Behavior: Behavior normal.         Thought Content: Thought content normal.      Comments: Very pleasant      Consultants     Consult Orders (all) (From admission, onward)       Start     Ordered    09/07/23 1716  Inpatient Case Management  Consult  Once        Provider:  (Not yet assigned)    09/07/23 1723    09/07/23 1716  Inpatient Nutrition Consult  Once        Provider:  (Not yet assigned)    09/07/23 1723    09/07/23 1510  [facility] (on-call MD unless specified) Details  Once        Specialty:  Hospitalist  Provider:  (Not yet assigned)    09/07/23 1509                  Procedures     * Surgery not found *    Imaging Results (All)       Procedure Component Value Units Date/Time    CT Abdomen Pelvis With Contrast [[id number]] Collected: 09/07/23 1608     Updated: 09/07/23 1629    Narrative:      CT ANGIOGRAM CHEST-, CT ABDOMEN PELVIS W CONTRAST-     Radiation dose reduction techniques were utilized, including automated  exposure control and exposure modulation based on body size.     Clinical: Abdominal pain with rectal bleeding, shortness of breath     CT scan of the chest performed with intravenous contrast, pulmonary  embolus protocol to include coronal, sagittal and three-dimensional  reconstruction.     COMPARISON CT of the chest abdomen and pelvis dated 12/30/2022     FINDINGS: No pulmonary embolus. Cardiac size within normal limits. No  aortic aneurysm nor dissection. The esophagus is satisfactory in course  and caliber. No mediastinal or hilar mass/lymphadenopathy. There are  several sub-5 mm calcified and noncalcified pulmonary nodules seen  bilaterally. The vast  majority of these are calcified and benign  granulomas. All remain stable. No pleural effusion, acute airspace  disease or suspicious pulmonary lesion is demonstrated. Again  demonstrated calcified dura mid thoracic spine.     There are 2 tiny, 2 mm gallstone seen. The gallbladder is otherwise  normal. No biliary duct dilatation. See images 49 and 57.     Liver spleen and pancreas are unremarkable. Both adrenal glands have a  satisfactory appearance. Both kidneys demonstrate a symmetric  satisfactory pattern of enhancement without mass, calculus nor  obstructive uropathy. Both ureters are normal in course and caliber to  the urinary bladder. Diffuse bladder wall thickening/trabeculation as  before. Suprapubic catheter in place. Prostate is heterogenous and  appears enlarged quite similar to the previous examination. Seminal  vesicles unremarkable.     The stomach is collapsed, contour within normal limits, no duodenal  abnormality seen.     There is diverticulosis of the sigmoid colon, no indication of acute  diverticulitis. Caliber of the large and small bowel is within normal  limits. The appendix is normal.     CONCLUSION:  1. No pulmonary embolus, aortic aneurysm or dissection.     2. Tiny calcified benign granulomas demonstrated throughout both lungs,  few tiny noncalcified pulmonary nodules also seen. These can be followed  with 1 year repeat CT chest.     3. Tiny gallstones.     4. Diverticulosis of the colon.     5. Suprapubic catheter within the urinary bladder, the diffuse bladder  wall thickening/trabeculation. Prostate is unchanged in appearance.                 This report was finalized on 9/7/2023 4:25 PM by Dr. Prashant Rangel M.D.       CT Angiogram Chest [[id number]] Collected: 09/07/23 1608     Updated: 09/07/23 1629    Narrative:      CT ANGIOGRAM CHEST-, CT ABDOMEN PELVIS W CONTRAST-     Radiation dose reduction techniques were utilized, including automated  exposure control and exposure modulation  based on body size.     Clinical: Abdominal pain with rectal bleeding, shortness of breath     CT scan of the chest performed with intravenous contrast, pulmonary  embolus protocol to include coronal, sagittal and three-dimensional  reconstruction.     COMPARISON CT of the chest abdomen and pelvis dated 12/30/2022     FINDINGS: No pulmonary embolus. Cardiac size within normal limits. No  aortic aneurysm nor dissection. The esophagus is satisfactory in course  and caliber. No mediastinal or hilar mass/lymphadenopathy. There are  several sub-5 mm calcified and noncalcified pulmonary nodules seen  bilaterally. The vast majority of these are calcified and benign  granulomas. All remain stable. No pleural effusion, acute airspace  disease or suspicious pulmonary lesion is demonstrated. Again  demonstrated calcified dura mid thoracic spine.     There are 2 tiny, 2 mm gallstone seen. The gallbladder is otherwise  normal. No biliary duct dilatation. See images 49 and 57.     Liver spleen and pancreas are unremarkable. Both adrenal glands have a  satisfactory appearance. Both kidneys demonstrate a symmetric  satisfactory pattern of enhancement without mass, calculus nor  obstructive uropathy. Both ureters are normal in course and caliber to  the urinary bladder. Diffuse bladder wall thickening/trabeculation as  before. Suprapubic catheter in place. Prostate is heterogenous and  appears enlarged quite similar to the previous examination. Seminal  vesicles unremarkable.     The stomach is collapsed, contour within normal limits, no duodenal  abnormality seen.     There is diverticulosis of the sigmoid colon, no indication of acute  diverticulitis. Caliber of the large and small bowel is within normal  limits. The appendix is normal.     CONCLUSION:  1. No pulmonary embolus, aortic aneurysm or dissection.     2. Tiny calcified benign granulomas demonstrated throughout both lungs,  few tiny noncalcified pulmonary nodules also  seen. These can be followed  with 1 year repeat CT chest.     3. Tiny gallstones.     4. Diverticulosis of the colon.     5. Suprapubic catheter within the urinary bladder, the diffuse bladder  wall thickening/trabeculation. Prostate is unchanged in appearance.                 This report was finalized on 9/7/2023 4:25 PM by Dr. Prashant Rangel M.D.       XR Chest 1 View [[id number]] Collected: 09/07/23 1431     Updated: 09/07/23 1444    Narrative:      Portable chest radiograph     HISTORY: Shortness of breath     TECHNIQUE: Single AP portable radiograph of the chest     COMPARISON: Chest radiograph 4/1/2022       Impression:      FINDINGS AND IMPRESSION:  No pulmonary consolidation, pleural effusion or pneumothorax is  visualized cardiac silhouette is within normal limits for size..     This report was finalized on 9/7/2023 2:41 PM by Dr. Terrance Hauser M.D.             Results for orders placed during the hospital encounter of 01/05/23    Duplex Carotid Ultrasound CAR    Interpretation Summary    Proximal right internal carotid artery mild stenosis.    Proximal left internal carotid artery mild stenosis.    Results for orders placed during the hospital encounter of 01/24/19    Adult Transthoracic Echo Complete W/ Cont if Necessary Per Protocol    Interpretation Summary  · Left ventricular systolic function is normal. Calculated EF = 52.0%. Estimated EF was in agreement with the calculated EF. Normal left ventricular cavity size and wall thickness noted. All left ventricular wall segments contract normally.  · Left ventricular diastolic dysfunction is noted (grade II w/high LAP) consistent with pseudonormalization.  · No evidence of a patent foramen ovale. Saline test results are negative.  · Mild mitral valve regurgitation is present with a very anteriorly directed eccentric jet noted.    Pertinent Labs     Results from last 7 days   Lab Units 09/09/23  0629 09/08/23  0500 09/07/23  1317   WBC 10*3/mm3 3.93 5.96  4.09   HEMOGLOBIN g/dL 10.7* 10.8* 11.7*   PLATELETS 10*3/mm3 144 157 176     Results from last 7 days   Lab Units 09/09/23  0629 09/08/23  0500 09/07/23  1429   SODIUM mmol/L 137 138 137   POTASSIUM mmol/L 3.4* 3.7 4.1   CHLORIDE mmol/L 101 104 100   CO2 mmol/L 22.1 21.8* 26.0   BUN mg/dL 9 7* 10   CREATININE mg/dL 0.53* 0.65* 0.66*   GLUCOSE mg/dL 113* 103* 222*   EGFR mL/min/1.73 105.2 98.9 98.4     Results from last 7 days   Lab Units 09/09/23  0629 09/07/23  1429   ALBUMIN g/dL 3.7 4.6   BILIRUBIN mg/dL 0.4 0.6   ALK PHOS U/L 113 125*   AST (SGOT) U/L 13 11   ALT (SGPT) U/L 11 14     Results from last 7 days   Lab Units 09/09/23  0629 09/08/23  0500 09/07/23  1429   CALCIUM mg/dL 9.2 9.3 10.4   ALBUMIN g/dL 3.7  --  4.6   MAGNESIUM mg/dL 1.8  --   --      Results from last 7 days   Lab Units 09/07/23  1317   LIPASE U/L 10*     Results from last 7 days   Lab Units 09/08/23  0500 09/07/23  1429 09/07/23  1317   HSTROP T ng/L 13 14  --    PROBNP pg/mL  --   --  177.0           Invalid input(s): LDLCALC  Results from last 7 days   Lab Units 09/07/23  1532 09/07/23  1429 09/07/23  1347   BLOODCX  No growth at 24 hours No growth at 24 hours  --    URINECX   --   --  >100,000 CFU/mL Klebsiella pneumoniae ssp pneumoniae*         Test Results Pending at Discharge     Pending Labs       Order Current Status    Blood Culture - Blood, Arm, Right Preliminary result    Blood Culture - Blood, Arm, Right Preliminary result            Discharge Details        Discharge Medications        New Medications        Instructions Start Date   cyanocobalamin 1000 MCG tablet  Commonly known as: VITAMIN B-12   1,000 mcg, Oral, Daily   Start Date: September 10, 2023            Changes to Medications        Instructions Start Date   cefdinir 300 MG capsule  Commonly known as: OMNICEF  What changed: See the new instructions.   300 mg, Oral, 2 Times Daily      ferrous sulfate 325 (65 FE) MG tablet  Commonly known as: FerrouSul  What changed:  additional instructions   325 mg, Oral, Daily With Breakfast             Continue These Medications        Instructions Start Date   atorvastatin 40 MG tablet  Commonly known as: LIPITOR   40 mg, Oral, Every Night at Bedtime      Centrum Silver 50+Men tablet tablet  Generic drug: multivitamin with minerals   1 tablet, Oral, Daily      clopidogrel 75 MG tablet  Commonly known as: PLAVIX   75 mg, Oral, Daily      FreeStyle Vaishali 14 Day Sensor misc   1 each, Transdermal, Daily      Levemir FlexPen 100 UNIT/ML injection  Generic drug: insulin detemir   30 Units, Subcutaneous, Daily      losartan 25 MG tablet  Commonly known as: COZAAR   25 mg, Oral, Daily      metFORMIN 500 MG tablet  Commonly known as: GLUCOPHAGE   500 mg, Oral, 2 Times Daily With Meals      pantoprazole 40 MG EC tablet  Commonly known as: PROTONIX   40 mg, Oral, Daily               Allergies   Allergen Reactions    Bee Venom Shortness Of Breath    Shellfish-Derived Products Anaphylaxis and Rash    Aleve [Naproxen] Other (See Comments)     Bleeding      Asa [Aspirin] Other (See Comments)     bleeding    Ibuprofen GI Bleeding       Discharge Disposition:  Home or Self Care      Discharge Diet:  Diet Order   Procedures    Diet: Diabetic Diets; Consistent Carbohydrate; Texture: Regular Texture (IDDSI 7); Fluid Consistency: Thin (IDDSI 0)       Discharge Activity:   As tolerated    CODE STATUS:    Code Status and Medical Interventions:   Ordered at: 09/07/23 1746     Code Status (Patient has no pulse and is not breathing):    CPR (Attempt to Resuscitate)     Medical Interventions (Patient has pulse or is breathing):    Full       Future Appointments   Date Time Provider Department Center   9/29/2023  9:00 AM Warren Mohamud MD [facility]   11/28/2023  1:15 PM Warren Mohamud MD [facility]     Additional Instructions for the Follow-ups that You Need to Schedule       Discharge Follow-up with PCP   As directed       Currently Documented PCP:     Warren Mohamud MD    PCP Phone Number:    [phone]     Follow Up Details: Dr. Mohamud (PCP) in 1 week               Follow-up Information       Warren Mohamud MD .    Specialties: Family Medicine, Emergency Medicine  Why: Dr. Mohamud (PCP) in 1 week  Contact information:  [address] [place] [address]  [phone]                             Additional Instructions for the Follow-ups that You Need to Schedule       Discharge Follow-up with PCP   As directed       Currently Documented PCP:    Warren Mohamud MD    PCP Phone Number:    [phone]     Follow Up Details: Dr. Mohamud (PCP) in 1 week            Time Spent on Discharge:  Greater than 30 minutes      Grant Cho MD  [facility]  09/09/23  13:24 EDT

## 2023-09-11 ENCOUNTER — TRANSITIONAL CARE MANAGEMENT TELEPHONE ENCOUNTER (OUTPATIENT)
Dept: CALL CENTER | Facility: HOSPITAL | Age: 73
End: 2023-09-11
Payer: MEDICARE

## 2023-09-11 NOTE — OUTREACH NOTE
Call Center TCM Note      Flowsheet Row Responses   [facility] [place] patient discharged from? [place]   Does the patient have one of the following disease processes/diagnoses(primary or secondary)? Other   TCM attempt successful? No   Unsuccessful attempts Attempt 1            Ary Nash MA    9/11/2023, 15:45 EDT

## 2023-09-11 NOTE — CASE MANAGEMENT/SOCIAL WORK
Case Management Discharge Note      Final Note: Home. [place]inda GODOY         Selected Continued Care - Discharged on 9/9/2023 Admission date: 9/7/2023 - Discharge disposition: Home or Self Care      Destination    No services have been selected for the patient.                Durable Medical Equipment    No services have been selected for the patient.                Dialysis/Infusion    No services have been selected for the patient.                Home Medical Care    No services have been selected for the patient.                Therapy    No services have been selected for the patient.                Community Resources    No services have been selected for the patient.                Community & DME    No services have been selected for the patient.                         Final Discharge Disposition Code: 01 - home or self-care

## 2023-09-11 NOTE — OUTREACH NOTE
Call Center TCM Note      Flowsheet Row Responses   [facility] patient discharged from? [place]   Does the patient have one of the following disease processes/diagnoses(primary or secondary)? Other   TCM attempt successful? Yes   Call start time 1733   Call end time 1739   Discharge diagnosis acute UTI   Person spoke with today (if not patient) and relationship pt   Meds reviewed with patient/caregiver? Yes   Is the patient having any side effects they believe may be caused by any medication additions or changes? No   Does the patient have all medications ordered at discharge? Yes   Is the patient taking all medications as directed (includes completed medication regime)? Yes   Does the patient have an appointment with their PCP within 7-14 days of discharge? Yes  [9/15/2023 at 1:00 PM]   Psychosocial issues? No   Did the patient receive a copy of their discharge instructions? Yes   Nursing interventions Reviewed instructions with patient   What is the patient's perception of their health status since discharge? Improving   Is the patient/caregiver able to teach back signs and symptoms related to disease process for when to call PCP? Yes   Is the patient/caregiver able to teach back signs and symptoms related to disease process for when to call 911? Yes   Is the patient/caregiver able to teach back the hierarchy of who to call/visit for symptoms/problems? PCP, Specialist, Home health nurse, Urgent Care, ED, 911 Yes   If the patient is a current smoker, are they able to teach back resources for cessation? Not a smoker   TCM call completed? Yes   Wrap up additional comments Pt states he is doing better, and denies any complications. Reviewed AVS/meds with pt. Pt verified PCP [facility] appt.   Call end time 1739   Would this patient benefit from a Referral to [facility] Social Work? No   Is the patient interested in additional calls from an ambulatory ? No            Lalitha Weaver  RN    9/11/2023, 17:40 EDT

## 2023-09-12 LAB
BACTERIA SPEC AEROBE CULT: NORMAL
BACTERIA SPEC AEROBE CULT: NORMAL

## 2023-09-21 ENCOUNTER — OFFICE VISIT (OUTPATIENT)
Dept: INTERNAL MEDICINE | Facility: CLINIC | Age: 73
End: 2023-09-21
Payer: MEDICARE

## 2023-09-21 VITALS
WEIGHT: 141.4 LBS | OXYGEN SATURATION: 96 % | TEMPERATURE: 97.8 F | HEIGHT: 69 IN | HEART RATE: 108 BPM | DIASTOLIC BLOOD PRESSURE: 87 MMHG | SYSTOLIC BLOOD PRESSURE: 146 MMHG | BODY MASS INDEX: 20.94 KG/M2

## 2023-09-21 DIAGNOSIS — N39.0 URINARY TRACT INFECTION WITHOUT HEMATURIA, SITE UNSPECIFIED: Primary | ICD-10-CM

## 2023-09-21 DIAGNOSIS — Z09 HOSPITAL DISCHARGE FOLLOW-UP: ICD-10-CM

## 2023-09-21 DIAGNOSIS — E87.6 HYPOKALEMIA: ICD-10-CM

## 2023-09-21 DIAGNOSIS — D64.9 NORMOCYTIC ANEMIA: ICD-10-CM

## 2023-09-21 DIAGNOSIS — J40 BRONCHITIS: ICD-10-CM

## 2023-09-21 LAB
BACTERIA UR QL AUTO: ABNORMAL /HPF
BILIRUB BLD-MCNC: NEGATIVE MG/DL
CLARITY, POC: ABNORMAL
COLOR UR: YELLOW
EXPIRATION DATE: ABNORMAL
GLUCOSE UR STRIP-MCNC: ABNORMAL MG/DL
KETONES UR QL: NEGATIVE
LEUKOCYTE EST, POC: ABNORMAL
Lab: ABNORMAL
MUCUS, POC: ABNORMAL
NITRITE UR-MCNC: POSITIVE MG/ML
PH UR: 5.5 [PH] (ref 5–8)
PROT UR STRIP-MCNC: ABNORMAL MG/DL
RBC # UR STRIP: ABNORMAL /HPF
RBC # UR STRIP: ABNORMAL /UL
SP GR UR: 1.02 (ref 1–1.03)
SQUAMOUS EPITHELIAL, POC: ABNORMAL
UROBILINOGEN UR QL: ABNORMAL
WBC # UR STRIP: ABNORMAL /HPF

## 2023-09-21 PROCEDURE — 81001 URINALYSIS AUTO W/SCOPE: CPT

## 2023-09-21 RX ORDER — CEFDINIR 300 MG/1
300 CAPSULE ORAL 2 TIMES DAILY
Qty: 20 CAPSULE | Refills: 0 | Status: SHIPPED | OUTPATIENT
Start: 2023-09-21 | End: 2023-10-01

## 2023-09-21 NOTE — PATIENT INSTRUCTIONS
Acute Bronchitis, Adult    Acute bronchitis is when air tubes in the lungs (bronchi) suddenly get swollen. The condition can make it hard for you to breathe. In adults, acute bronchitis usually goes away within 2 weeks. A cough caused by bronchitis may last up to 3 weeks. Smoking, allergies, and asthma can make the condition worse.  What are the causes?  Germs that cause cold and flu (viruses). The most common cause of this condition is the virus that causes the common cold.  Bacteria.  Substances that bother (irritate) the lungs, including:  Smoke from cigarettes and other types of tobacco.  Dust and pollen.  Fumes from chemicals, gases, or burned fuel.  Indoor or outdoor air pollution.  What increases the risk?  A weak body's defense system. This is also called the immune system.  Any condition that affects your lungs and breathing, such as asthma.  What are the signs or symptoms?  A cough.  Coughing up clear, yellow, or green mucus.  Making high-pitched whistling sounds when you breathe, most often when you breathe out (wheezing).  Runny or stuffy nose.  Having too much mucus in your lungs (chest congestion).  Shortness of breath.  Body aches.  A sore throat.  How is this treated?  Acute bronchitis may go away over time without treatment. Your doctor may tell you to:  Drink more fluids. This will help thin your mucus so it is easier to cough up.  Use a device that gets medicine into your lungs (inhaler).  Use a vaporizer or a humidifier. These are machines that add water to the air. This helps with coughing and poor breathing.  Take a medicine that thins mucus and helps clear it from your lungs.  Take a medicine that prevents or stops coughing.  It is not common to take an antibiotic medicine for this condition.  Follow these instructions at home:    Take over-the-counter and prescription medicines only as told by your doctor.  Use an inhaler, vaporizer, or humidifier as told by your doctor.  Take two teaspoons  (10 mL) of honey at bedtime. This helps lessen your coughing at night.  Drink enough fluid to keep your pee (urine) pale yellow.  Do not smoke or use any products that contain nicotine or tobacco. If you need help quitting, ask your doctor.  Get a lot of rest.  Return to your normal activities when your doctor says that it is safe.  Keep all follow-up visits.  How is this prevented?    Wash your hands often with soap and water for at least 20 seconds. If you cannot use soap and water, use hand .  Avoid contact with people who have cold symptoms.  Try not to touch your mouth, nose, or eyes with your hands.  Avoid breathing in smoke or chemical fumes.  Make sure to get the flu shot every year.  Contact a doctor if:  Your symptoms do not get better in 2 weeks.  You have trouble coughing up the mucus.  Your cough keeps you awake at night.  You have a fever.  Get help right away if:  You cough up blood.  You have chest pain.  You have very bad shortness of breath.  You faint or keep feeling like you are going to faint.  You have a very bad headache.  Your fever or chills get worse.  These symptoms may be an emergency. Get help right away. Call your local emergency services (911 in the [place]  Do not wait to see if the symptoms will go away.  Do not drive yourself to the hospital.  Summary  Acute bronchitis is when air tubes in the lungs (bronchi) suddenly get swollen. In adults, acute bronchitis usually goes away within 2 weeks.  Drink more fluids. This will help thin your mucus so it is easier to cough up.  Take over-the-counter and prescription medicines only as told by your doctor.  Contact a doctor if your symptoms do not improve after 2 weeks of treatment.  This information is not intended to replace advice given to you by your health care provider. Make sure you discuss any questions you have with your health care provider.  Document Revised: 04/20/2022 Document Reviewed: 04/20/2022  Jessie Patient  Education © 2023 [employer]

## 2023-09-21 NOTE — PROGRESS NOTES
"Transitional Care Follow Up Visit  Subjective     Harry Sierra is a 72 y.o. male who presents for a transitional care management visit.    Within 48 business hours after discharge our office contacted him via telephone to coordinate his care and needs.      I reviewed and discussed the details of that call along with the discharge summary, hospital problems, inpatient lab results, inpatient diagnostic studies, and consultation reports with Harry.     Current outpatient and discharge medications have been reconciled for the patient.  Reviewed by: JO Juárez          9/9/2023     4:27 PM   Date of TCM Phone Call   [facility]   Date of Admission 9/7/2023   Date of Discharge 9/9/2023   Discharge Disposition Home or Self Care     Risk for Readmission (LACE) Score: 8 (9/9/2023  6:00 AM)      History of Present Illness   Course During Hospital Stay:    \"73yo gentleman with prostate CA, chronic suprapubic catheter, DM2, carotid disease, GERD, h/o gastric ulcer, prior TIA, HTN, HLD, and recent hospitalization in July for diverticular bleed, who presented to ER with 2 days of weakness, SOA, dark loose stool, abd pain. He was admitted to our service with diagnosis of UTI.\"    \"Acute UTI  Chronic suprapubic catheter  Prostate CA: urine culture growing Klebsiella broadly sensitive, treated here with Rocephin, change to oral Cefdinir to complete 7d total course  F/u with  (Mayo Davis) regarding his recurrent UTIs     Dark diarrhea NOS  Anemia, normocytic, iron deficiency, low-normal B12  Recent diverticular bleed: no bleeding evident at present, Hgb stable, no symptoms of anemia, no abd pain or N/V/D today, can f/u with PCP for monitoring  Given a single IM injection of B12 today  Tomorrow start oral B12, monitor through PCP's office     DM2: metformin on hold here can restart at dc, covered with Lantus and SSI here, A1c 7.7     HTN: BPs acceptable on Losartan     GERD: continued PPI   " "  Hypokalemia: 3.4 this AM, Mg++ level fine, replaced orally, f/u with PCP in a week for recheck\"    -CT showed granulomas in bilat lungs and noncalcified pulm nodules. Gallstones. Diverticulosis.      -Pt was d/c home on cefdinir, iron and B12    Pt had COVID after his hospitalization- tested positive 10 days. PT states he still has a cough and has chest congestion. PT states he is susceptible to bronchitis.     PT denies any symptoms of UTI. Pt has suprapubic catheter. Pt sees Dr. Mayo Davis MD at [facility] urology.      Bring back in 1 month to recheck b12,iron and a1c  The following portions of the patient's history were reviewed and updated as appropriate: allergies, current medications, past family history, past medical history, past social history, past surgical history, and problem list.      Objective   Physical Exam  Vitals reviewed.   Constitutional:       Appearance: Normal appearance.   HENT:      Head: Normocephalic.      Nose: Nose normal. No congestion or rhinorrhea.      Right Sinus: No maxillary sinus tenderness or frontal sinus tenderness.      Left Sinus: No maxillary sinus tenderness or frontal sinus tenderness.      Mouth/Throat:      Mouth: Mucous membranes are moist.      Pharynx: Oropharynx is clear. Pharyngeal swelling, posterior oropharyngeal erythema and uvula swelling present. No oropharyngeal exudate.   Eyes:      General:         Right eye: No discharge.         Left eye: No discharge.      Extraocular Movements: Extraocular movements intact.      Conjunctiva/sclera: Conjunctivae normal.      Pupils: Pupils are equal, round, and reactive to light.   Cardiovascular:      Rate and Rhythm: Normal rate and regular rhythm.      Pulses: Normal pulses.      Heart sounds: Normal heart sounds.   Pulmonary:      Effort: Pulmonary effort is normal. No respiratory distress.      Breath sounds: Normal breath sounds. No stridor. No wheezing, rhonchi or rales.   Chest:      Chest wall: No " tenderness.   Abdominal:      General: Abdomen is flat. Bowel sounds are normal.      Palpations: Abdomen is soft.      Tenderness: There is no right CVA tenderness or left CVA tenderness.   Skin:     General: Skin is warm and dry.      Capillary Refill: Capillary refill takes less than 2 seconds.   Neurological:      General: No focal deficit present.      Mental Status: He is alert.   Psychiatric:         Mood and Affect: Mood normal.         Behavior: Behavior normal.       Brief Urine Lab Results  (Last result in the past 365 days)        Color   Clarity   Blood   Leuk Est   Nitrite   Protein   CREAT   Urine HCG        09/21/23 1315 Yellow   Cloudy   1+   Small (1+)   Positive   2+                  Assessment & Plan   Diagnoses and all orders for this visit:    1. Urinary tract infection without hematuria, site unspecified (Primary)  -     POCT urinalysis dipstick, automated  -     cefdinir (OMNICEF) 300 MG capsule; Take 1 capsule by mouth 2 (Two) Times a Day for 10 days.  Dispense: 20 capsule; Refill: 0    2. Hypokalemia  -     Comprehensive Metabolic Panel    3. Normocytic anemia  -     CBC w AUTO Differential    4. Bronchitis    5. Hospital discharge follow-up      Educated patient on urine results.  We will get labs today and review with patient once available.  Educated patient on dosing and side effects of cefdinir.  We will send culture on patient and review results and change antibiotic if need to.  If bronchitis does not improved we will consider getting chest x-ray, adding steroid pack and possibly another antibiotic.  Patient to see primary care in 1 week so we will recheck iron and B12 potentially at that time.  Patient verbalized understanding is comfortable with the plan of care.

## 2023-09-22 LAB
ALBUMIN SERPL-MCNC: 4.1 G/DL (ref 3.5–5.2)
ALBUMIN/GLOB SERPL: 1.8 G/DL
ALP SERPL-CCNC: 197 U/L (ref 39–117)
ALT SERPL-CCNC: 48 U/L (ref 1–41)
AST SERPL-CCNC: 40 U/L (ref 1–40)
BASOPHILS # BLD MANUAL: 0.05 10*3/MM3 (ref 0–0.2)
BASOPHILS NFR BLD MANUAL: 1 % (ref 0–1.5)
BILIRUB SERPL-MCNC: <0.2 MG/DL (ref 0–1.2)
BUN SERPL-MCNC: 11 MG/DL (ref 8–23)
BUN/CREAT SERPL: 16.9 (ref 7–25)
CALCIUM SERPL-MCNC: 8.9 MG/DL (ref 8.6–10.5)
CHLORIDE SERPL-SCNC: 104 MMOL/L (ref 98–107)
CO2 SERPL-SCNC: 24.5 MMOL/L (ref 22–29)
CREAT SERPL-MCNC: 0.65 MG/DL (ref 0.76–1.27)
DIFFERENTIAL COMMENT: NORMAL
EGFRCR SERPLBLD CKD-EPI 2021: 98.9 ML/MIN/1.73
EOSINOPHIL # BLD MANUAL: 0.05 10*3/MM3 (ref 0–0.4)
EOSINOPHIL NFR BLD MANUAL: 1 % (ref 0.3–6.2)
ERYTHROCYTE [DISTWIDTH] IN BLOOD BY AUTOMATED COUNT: 14.5 % (ref 12.3–15.4)
GLOBULIN SER CALC-MCNC: 2.3 GM/DL
GLUCOSE SERPL-MCNC: 279 MG/DL (ref 65–99)
HCT VFR BLD AUTO: 30.4 % (ref 37.5–51)
HGB BLD-MCNC: 10.2 G/DL (ref 13–17.7)
LYMPHOCYTES # BLD MANUAL: 1.31 10*3/MM3 (ref 0.7–3.1)
LYMPHOCYTES NFR BLD MANUAL: 27.3 % (ref 19.6–45.3)
MCH RBC QN AUTO: 31.5 PG (ref 26.6–33)
MCHC RBC AUTO-ENTMCNC: 33.6 G/DL (ref 31.5–35.7)
MCV RBC AUTO: 93.8 FL (ref 79–97)
MONOCYTES # BLD MANUAL: 0.39 10*3/MM3 (ref 0.1–0.9)
MONOCYTES NFR BLD MANUAL: 8.1 % (ref 5–12)
NEUTROPHILS # BLD MANUAL: 2.48 10*3/MM3 (ref 1.7–7)
NEUTROPHILS NFR BLD MANUAL: 51.5 % (ref 42.7–76)
NRBC BLD AUTO-RTO: 0.2 /100 WBC (ref 0–0.2)
PLATELET # BLD AUTO: 293 10*3/MM3 (ref 140–450)
PLATELET BLD QL SMEAR: NORMAL
POTASSIUM SERPL-SCNC: 3.9 MMOL/L (ref 3.5–5.2)
PROT SERPL-MCNC: 6.4 G/DL (ref 6–8.5)
RBC # BLD AUTO: 3.24 10*6/MM3 (ref 4.14–5.8)
RBC MORPH BLD: NORMAL
SODIUM SERPL-SCNC: 140 MMOL/L (ref 136–145)
WBC # BLD AUTO: 4.81 10*3/MM3 (ref 3.4–10.8)

## 2023-09-28 LAB
BACTERIA UR CULT: ABNORMAL
BACTERIA UR CULT: ABNORMAL
OTHER ANTIBIOTIC SUSC ISLT: ABNORMAL

## 2023-09-29 ENCOUNTER — OFFICE VISIT (OUTPATIENT)
Dept: INTERNAL MEDICINE | Facility: CLINIC | Age: 73
End: 2023-09-29
Payer: MEDICARE

## 2023-09-29 VITALS
OXYGEN SATURATION: 98 % | WEIGHT: 140 LBS | RESPIRATION RATE: 18 BRPM | SYSTOLIC BLOOD PRESSURE: 122 MMHG | HEART RATE: 101 BPM | DIASTOLIC BLOOD PRESSURE: 78 MMHG | HEIGHT: 69 IN | BODY MASS INDEX: 20.73 KG/M2

## 2023-09-29 DIAGNOSIS — Z00.00 MEDICARE ANNUAL WELLNESS VISIT, SUBSEQUENT: Primary | ICD-10-CM

## 2023-09-29 PROCEDURE — G0439 PPPS, SUBSEQ VISIT: HCPCS | Performed by: FAMILY MEDICINE

## 2023-09-29 PROCEDURE — 3074F SYST BP LT 130 MM HG: CPT | Performed by: FAMILY MEDICINE

## 2023-09-29 PROCEDURE — 1159F MED LIST DOCD IN RCRD: CPT | Performed by: FAMILY MEDICINE

## 2023-09-29 PROCEDURE — 1170F FXNL STATUS ASSESSED: CPT | Performed by: FAMILY MEDICINE

## 2023-09-29 PROCEDURE — 3051F HG A1C>EQUAL 7.0%<8.0%: CPT | Performed by: FAMILY MEDICINE

## 2023-09-29 PROCEDURE — 3078F DIAST BP <80 MM HG: CPT | Performed by: FAMILY MEDICINE

## 2023-09-29 PROCEDURE — 1160F RVW MEDS BY RX/DR IN RCRD: CPT | Performed by: FAMILY MEDICINE

## 2023-09-29 NOTE — PROGRESS NOTES
The ABCs of the Annual Wellness Visit  Subsequent Medicare Wellness Visit    Subjective      Harry Sierra is a 72 y.o. male who presents for a Subsequent Medicare Wellness Visit.    The following portions of the patient's history were reviewed and   updated as appropriate: allergies, current medications, past family history, past medical history, past social history, past surgical history, and problem list.    Compared to one year ago, the patient feels his physical   health is the same.  He has been in the hospital multiple times this year but currently improving from latest illness.    Compared to one year ago, the patient feels his mental   health is the same.    Recent Hospitalizations:  This patient has had a [facility] admission record on file within the last 365 days.  He has been in the hospital multiple times this year.    Current Medical Providers:  Patient Care Team:  Warren Mohamud MD as PCP - General (Family Medicine)  Brayden Franco III, MD as Consulting Physician (Cardiology)  Rod Becker MD as Consulting Physician (Dermatology)  Nick Alarcon MD as Consulting Physician (Urology)    Outpatient Medications Prior to Visit   Medication Sig Dispense Refill    atorvastatin (LIPITOR) 40 MG tablet Take 1 tablet by mouth every night at bedtime. 90 tablet 4    cefdinir (OMNICEF) 300 MG capsule Take 1 capsule by mouth 2 (Two) Times a Day for 10 days. 20 capsule 0    clopidogrel (PLAVIX) 75 MG tablet Take 1 tablet by mouth Daily. 90 tablet 1    Continuous Blood Gluc Sensor (FreeStyle Vaishali 14 Day Sensor) misc Place 1 each on the skin as directed by provider Daily. 2 each 11    cyanocobalamin (VITAMIN B-12) 1000 MCG tablet Take 1 tablet by mouth Daily. 30 tablet 0    ferrous sulfate (FerrouSul) 325 (65 FE) MG tablet Take 1 tablet by mouth Daily With Breakfast. 90 tablet 1    insulin detemir (Levemir FlexPen) 100 UNIT/ML injection Inject 30 Units under the skin into the appropriate area as  directed Daily. 15 mL 8    losartan (COZAAR) 25 MG tablet Take 1 tablet by mouth Daily. 90 tablet 4    metFORMIN (GLUCOPHAGE) 500 MG tablet Take 1 tablet by mouth 2 (Two) Times a Day With Meals. 180 tablet 3    Multiple Vitamins-Minerals (CENTRUM SILVER 50+MEN) tablet Take 1 tablet by mouth Daily.      pantoprazole (PROTONIX) 40 MG EC tablet TAKE 1 TABLET BY MOUTH DAILY 90 tablet 4     No facility-administered medications prior to visit.       No opioid medication identified on active medication list. I have reviewed chart for other potential  high risk medication/s and harmful drug interactions in the elderly.        Aspirin is not on active medication list.  Aspirin use is not indicated based on review of current medical condition/s. Risk of harm outweighs potential benefits.  .    Patient Active Problem List   Diagnosis    Primary hypertension    Routine health maintenance    Abnormal PSA    Gastroesophageal reflux disease with esophagitis without hemorrhage    Melena    Bilateral carotid artery stenosis    Transient cerebral ischemia    Hyperlipidemia LDL goal <70    Prostate cancer    Sleep disturbance    Multilobar lung infiltrate, secondary bacterial infection    Sepsis - present on admission    Constipation    Valvular heart disease    Multifocal pneumonia    Type 2 diabetes mellitus with hyperglycemia, with long-term current use of insulin    Near syncope    Hyperlipidemia associated with type 2 diabetes mellitus    Hyperinsulinism    Melanoma in situ of left upper extremity including shoulder    Chest pain, atypical    History of atrial fibrillation    Abnormal weight loss    Chronic cough    Urinary retention due to benign prostatic hyperplasia    Prostate cancer    Colon polyps    Diverticulosis    Acute UTI    Chronic suprapubic catheter    Moderate malnutrition    Iron deficiency anemia    Hypokalemia     Advance Care Planning   Advance Care Planning     Advance Directive is not on file.  ACP  "discussion was held with the patient during this visit. Patient has an advance directive (not in EMR), copy requested.     Objective    Vitals:    23 0907   BP: 122/78   BP Location: Left arm   Patient Position: Sitting   Cuff Size: Small Adult   Pulse: 101   Resp: 18   SpO2: 98%   Weight: 63.5 kg (140 lb)   Height: 175.3 cm (69\")     Estimated body mass index is 20.67 kg/m² as calculated from the following:    Height as of this encounter: 175.3 cm (69\").    Weight as of this encounter: 63.5 kg (140 lb).    BMI is within normal parameters. No other follow-up for BMI required.    Physical Exam  Vitals and nursing note reviewed.   Constitutional:       General: He is not in acute distress.     Appearance: Normal appearance.   Cardiovascular:      Rate and Rhythm: Normal rate and regular rhythm.      Heart sounds: Normal heart sounds. No murmur heard.  Pulmonary:      Effort: Pulmonary effort is normal.      Breath sounds: Normal breath sounds.   Neurological:      Mental Status: He is alert.         Does the patient have evidence of cognitive impairment?   No    Lab Results   Component Value Date    HGBA1C 7.70 (H) 2023          HEALTH RISK ASSESSMENT    Smoking Status:  Social History     Tobacco Use   Smoking Status Never    Passive exposure: Never   Smokeless Tobacco Never     Alcohol Consumption:  Social History     Substance and Sexual Activity   Alcohol Use No     Fall Risk Screen:    STEADI Fall Risk Assessment was completed, and patient is at LOW risk for falls.Assessment completed on:2023    Depression Screenin/29/2023     9:08 AM   PHQ-2/PHQ-9 Depression Screening   Little Interest or Pleasure in Doing Things 0-->not at all   Feeling Down, Depressed or Hopeless 0-->not at all   PHQ-9: Brief Depression Severity Measure Score 0       Health Habits and Functional and Cognitive Screenin/29/2023     9:07 AM   Functional & Cognitive Status   Do you have difficulty preparing food " and eating? No   Do you have difficulty bathing yourself, getting dressed or grooming yourself? No   Do you have difficulty using the toilet? No   Do you have difficulty moving around from place to place? No   Do you have trouble with steps or getting out of a bed or a chair? No   Current Diet Well Balanced Diet   Dental Exam Not up to date   Eye Exam Not up to date   Exercise (times per week) 3 times per week   Current Exercises Include Walking   Do you need help using the phone?  No   Are you deaf or do you have serious difficulty hearing?  No   Do you need help to go to places out of walking distance? No   Do you need help shopping? No   Do you need help preparing meals?  No   Do you need help with housework?  No   Do you need help with laundry? No   Do you need help taking your medications? No   Do you need help managing money? No   Do you ever drive or ride in a car without wearing a seat belt? No   Have you felt unusual stress, anger or loneliness in the last month? No   Who do you live with? Spouse   If you need help, do you have trouble finding someone available to you? No   Have you been bothered in the last four weeks by sexual problems? No   Do you have difficulty concentrating, remembering or making decisions? No       Age-appropriate Screening Schedule:  Refer to the list below for future screening recommendations based on patient's age, sex and/or medical conditions. Orders for these recommended tests are listed in the plan section. The patient has been provided with a written plan.    Health Maintenance   Topic Date Due    DIABETIC EYE EXAM  03/08/2022    DIABETIC FOOT EXAM  04/19/2023    INFLUENZA VACCINE  08/01/2023    COVID-19 Vaccine (6 - 2023-24 season) 09/01/2023    LIPID PANEL  12/12/2023    HEMOGLOBIN A1C  03/09/2024    URINE MICROALBUMIN  06/22/2024    COLORECTAL CANCER SCREENING  07/08/2024    ANNUAL WELLNESS VISIT  09/29/2024    TDAP/TD VACCINES (2 - Td or Tdap) 08/28/2025    Pneumococcal  Vaccine 65+  Completed    ZOSTER VACCINE  Completed    HEPATITIS C SCREENING  Addressed                Common labs          9/8/2023    05:00 9/9/2023    06:29 9/21/2023    00:00   Common Labs   Glucose 103  113  279    BUN 7  9  11    Creatinine 0.65  0.53  0.65    Sodium 138  137  140    Potassium 3.7  3.4  3.9    Chloride 104  101  104    Calcium 9.3  9.2  8.9    Total Protein   6.4    Albumin  3.7  4.1    Total Bilirubin  0.4  <0.2    Alkaline Phosphatase  113  197    AST (SGOT)  13  40    ALT (SGPT)  11  48    WBC 5.96  3.93  4.81    Hemoglobin 10.8  10.7  10.2    Hematocrit 31.6  30.7  30.4    Platelets 157  144  293    Hemoglobin A1C  7.70           CMS Preventative Services Quick Reference  Risk Factors Identified During Encounter:    Immunizations Discussed/Encouraged: COVID19    The above risks/problems have been discussed with the patient.  Pertinent information has been shared with the patient in the After Visit Summary.    Diagnoses and all orders for this visit:    1. Medicare annual wellness visit, subsequent (Primary)        Follow Up:   Next Medicare Wellness visit to be scheduled in 1 year.      An After Visit Summary and PPPS were made available to the patient.

## 2023-10-30 ENCOUNTER — TELEPHONE (OUTPATIENT)
Dept: ENDOCRINOLOGY | Age: 73
End: 2023-10-30
Payer: MEDICARE

## 2023-10-30 DIAGNOSIS — E11.65 TYPE 2 DIABETES MELLITUS WITH HYPERGLYCEMIA, WITH LONG-TERM CURRENT USE OF INSULIN: Primary | ICD-10-CM

## 2023-10-30 DIAGNOSIS — Z79.4 TYPE 2 DIABETES MELLITUS WITH HYPERGLYCEMIA, WITH LONG-TERM CURRENT USE OF INSULIN: Primary | ICD-10-CM

## 2023-10-30 RX ORDER — FLASH GLUCOSE SCANNING READER
1 EACH MISCELLANEOUS ONCE
Qty: 1 EACH | Refills: 0 | Status: SHIPPED | OUTPATIENT
Start: 2023-10-30 | End: 2023-10-30

## 2023-10-30 NOTE — TELEPHONE ENCOUNTER
Caller: Harry Sierra    Relationship: Self    Best call back number: [phone]     Requested Prescriptions: FREESTYLE MACRINA 14 DAY READER      Pharmacy where request should be sent: [facility] [id number] - [place] [address] AT [facility]  - [phone]  - [phone] FX     Last office visit with prescribing clinician: 4/18/2023   Last telemedicine visit with prescribing clinician: Visit date not found   Next office visit with prescribing clinician: Visit date not found     Additional details provided by patient: PATIENT STATES THAT HIS READER HAS STOPPED WORKING AND IS REQUESTING A NEW ONE.     Does the patient have less than a 3 day supply:  [x] Yes  [] No    Would you like a call back once the refill request has been completed: [] Yes [x] No      Anna Marie Caicedo Rep   10/30/23 10:07 EDT

## 2023-11-08 ENCOUNTER — TELEPHONE (OUTPATIENT)
Dept: INTERNAL MEDICINE | Facility: CLINIC | Age: 73
End: 2023-11-08
Payer: MEDICARE

## 2023-11-08 NOTE — TELEPHONE ENCOUNTER
Patient called with complaints of shortness of breath for the last 3 days. Asked patient if he can check his oxygen and heart rate but patient was at the gas station when he called     Advised patient to go to the ER but patient refused because of how long it takes to be seen. Advised patient if symptom has not improved in the morning he needs to go, which he agreed to

## 2023-11-09 NOTE — TELEPHONE ENCOUNTER
Okay.  Looks like he has an upcoming appt with me.  If things become severe he should go to ER.  If he needs to be seen sooner, check for availability with me or APRN.

## 2023-11-09 NOTE — TELEPHONE ENCOUNTER
Patient advised    Patient states he is feeling better and that he will wait to follow-up with Dr. Mohamud on 11/28

## 2023-11-18 ENCOUNTER — APPOINTMENT (OUTPATIENT)
Dept: GENERAL RADIOLOGY | Facility: HOSPITAL | Age: 73
End: 2023-11-18
Payer: MEDICARE

## 2023-11-18 ENCOUNTER — APPOINTMENT (OUTPATIENT)
Dept: CT IMAGING | Facility: HOSPITAL | Age: 73
End: 2023-11-18
Payer: MEDICARE

## 2023-11-18 ENCOUNTER — HOSPITAL ENCOUNTER (INPATIENT)
Facility: HOSPITAL | Age: 73
LOS: 3 days | Discharge: HOME | End: 2023-11-21
Attending: STUDENT IN AN ORGANIZED HEALTH CARE EDUCATION/TRAINING PROGRAM | Admitting: INTERNAL MEDICINE
Payer: MEDICARE

## 2023-11-18 DIAGNOSIS — A41.9 SEVERE SEPSIS: ICD-10-CM

## 2023-11-18 DIAGNOSIS — N12 PYELONEPHRITIS: Primary | ICD-10-CM

## 2023-11-18 DIAGNOSIS — R65.20 SEVERE SEPSIS: ICD-10-CM

## 2023-11-18 DIAGNOSIS — A41.9 SEPSIS WITHOUT ACUTE ORGAN DYSFUNCTION, DUE TO UNSPECIFIED ORGANISM: ICD-10-CM

## 2023-11-18 LAB
ALBUMIN SERPL-MCNC: 3.9 G/DL (ref 3.5–5.2)
ALBUMIN/GLOB SERPL: 1.4 G/DL
ALP SERPL-CCNC: 155 U/L (ref 39–117)
ALT SERPL W P-5'-P-CCNC: 18 U/L (ref 1–41)
ANION GAP SERPL CALCULATED.3IONS-SCNC: 10 MMOL/L (ref 5–15)
APTT PPP: 30.4 SECONDS (ref 22.7–35.4)
AST SERPL-CCNC: 12 U/L (ref 1–40)
B PARAPERT DNA SPEC QL NAA+PROBE: NOT DETECTED
B PERT DNA SPEC QL NAA+PROBE: NOT DETECTED
BACTERIA UR QL AUTO: ABNORMAL /HPF
BASOPHILS # BLD AUTO: 0 10*3/MM3 (ref 0–0.2)
BASOPHILS NFR BLD AUTO: 0 % (ref 0–1.5)
BILIRUB SERPL-MCNC: 0.5 MG/DL (ref 0–1.2)
BILIRUB UR QL STRIP: NEGATIVE
BUN SERPL-MCNC: 13 MG/DL (ref 8–23)
BUN/CREAT SERPL: 18.6 (ref 7–25)
C PNEUM DNA NPH QL NAA+NON-PROBE: NOT DETECTED
CALCIUM SPEC-SCNC: 9.2 MG/DL (ref 8.6–10.5)
CHLORIDE SERPL-SCNC: 97 MMOL/L (ref 98–107)
CLARITY UR: ABNORMAL
CO2 SERPL-SCNC: 26 MMOL/L (ref 22–29)
COLOR UR: YELLOW
CREAT SERPL-MCNC: 0.7 MG/DL (ref 0.76–1.27)
D-LACTATE SERPL-SCNC: 1.2 MMOL/L (ref 0.5–2)
DEPRECATED RDW RBC AUTO: 43.4 FL (ref 37–54)
EGFRCR SERPLBLD CKD-EPI 2021: 96.7 ML/MIN/1.73
EOSINOPHIL # BLD AUTO: 0 10*3/MM3 (ref 0–0.4)
EOSINOPHIL NFR BLD AUTO: 0 % (ref 0.3–6.2)
ERYTHROCYTE [DISTWIDTH] IN BLOOD BY AUTOMATED COUNT: 13 % (ref 12.3–15.4)
FLUAV SUBTYP SPEC NAA+PROBE: NOT DETECTED
FLUBV RNA ISLT QL NAA+PROBE: NOT DETECTED
GLOBULIN UR ELPH-MCNC: 2.7 GM/DL
GLUCOSE BLDC GLUCOMTR-MCNC: 211 MG/DL (ref 70–130)
GLUCOSE SERPL-MCNC: 247 MG/DL (ref 65–99)
GLUCOSE UR STRIP-MCNC: ABNORMAL MG/DL
HADV DNA SPEC NAA+PROBE: NOT DETECTED
HCOV 229E RNA SPEC QL NAA+PROBE: NOT DETECTED
HCOV HKU1 RNA SPEC QL NAA+PROBE: NOT DETECTED
HCOV NL63 RNA SPEC QL NAA+PROBE: NOT DETECTED
HCOV OC43 RNA SPEC QL NAA+PROBE: NOT DETECTED
HCT VFR BLD AUTO: 27.4 % (ref 37.5–51)
HGB BLD-MCNC: 9.4 G/DL (ref 13–17.7)
HGB UR QL STRIP.AUTO: ABNORMAL
HMPV RNA NPH QL NAA+NON-PROBE: NOT DETECTED
HPIV1 RNA ISLT QL NAA+PROBE: NOT DETECTED
HPIV2 RNA SPEC QL NAA+PROBE: NOT DETECTED
HPIV3 RNA NPH QL NAA+PROBE: NOT DETECTED
HPIV4 P GENE NPH QL NAA+PROBE: NOT DETECTED
HYALINE CASTS UR QL AUTO: ABNORMAL /LPF
IMM GRANULOCYTES # BLD AUTO: 0.15 10*3/MM3 (ref 0–0.05)
IMM GRANULOCYTES NFR BLD AUTO: 2.4 % (ref 0–0.5)
INR PPP: 1.15 (ref 0.9–1.1)
KETONES UR QL STRIP: ABNORMAL
LEUKOCYTE ESTERASE UR QL STRIP.AUTO: ABNORMAL
LYMPHOCYTES # BLD AUTO: 0.72 10*3/MM3 (ref 0.7–3.1)
LYMPHOCYTES NFR BLD AUTO: 11.4 % (ref 19.6–45.3)
M PNEUMO IGG SER IA-ACNC: NOT DETECTED
MCH RBC QN AUTO: 31.9 PG (ref 26.6–33)
MCHC RBC AUTO-ENTMCNC: 34.3 G/DL (ref 31.5–35.7)
MCV RBC AUTO: 92.9 FL (ref 79–97)
MONOCYTES # BLD AUTO: 0.84 10*3/MM3 (ref 0.1–0.9)
MONOCYTES NFR BLD AUTO: 13.2 % (ref 5–12)
NEUTROPHILS NFR BLD AUTO: 4.63 10*3/MM3 (ref 1.7–7)
NEUTROPHILS NFR BLD AUTO: 73 % (ref 42.7–76)
NITRITE UR QL STRIP: POSITIVE
NRBC BLD AUTO-RTO: 0 /100 WBC (ref 0–0.2)
NT-PROBNP SERPL-MCNC: 390 PG/ML (ref 0–900)
PH UR STRIP.AUTO: 5.5 [PH] (ref 5–8)
PLATELET # BLD AUTO: 253 10*3/MM3 (ref 140–450)
PMV BLD AUTO: 9.4 FL (ref 6–12)
POTASSIUM SERPL-SCNC: 3.8 MMOL/L (ref 3.5–5.2)
PROCALCITONIN SERPL-MCNC: 0.1 NG/ML (ref 0–0.25)
PROT SERPL-MCNC: 6.6 G/DL (ref 6–8.5)
PROT UR QL STRIP: ABNORMAL
PROTHROMBIN TIME: 14.8 SECONDS (ref 11.7–14.2)
QT INTERVAL: 301 MS
QTC INTERVAL: 440 MS
RBC # BLD AUTO: 2.95 10*6/MM3 (ref 4.14–5.8)
RBC # UR STRIP: ABNORMAL /HPF
REF LAB TEST METHOD: ABNORMAL
RHINOVIRUS RNA SPEC NAA+PROBE: NOT DETECTED
RSV RNA NPH QL NAA+NON-PROBE: NOT DETECTED
SARS-COV-2 RNA NPH QL NAA+NON-PROBE: NOT DETECTED
SODIUM SERPL-SCNC: 133 MMOL/L (ref 136–145)
SP GR UR STRIP: 1.02 (ref 1–1.03)
SQUAMOUS #/AREA URNS HPF: ABNORMAL /HPF
TROPONIN T SERPL HS-MCNC: 25 NG/L
UROBILINOGEN UR QL STRIP: ABNORMAL
WBC # UR STRIP: ABNORMAL /HPF
WBC NRBC COR # BLD AUTO: 6.34 10*3/MM3 (ref 3.4–10.8)

## 2023-11-18 PROCEDURE — 80053 COMPREHEN METABOLIC PANEL: CPT | Performed by: STUDENT IN AN ORGANIZED HEALTH CARE EDUCATION/TRAINING PROGRAM

## 2023-11-18 PROCEDURE — P9612 CATHETERIZE FOR URINE SPEC: HCPCS

## 2023-11-18 PROCEDURE — 84484 ASSAY OF TROPONIN QUANT: CPT | Performed by: STUDENT IN AN ORGANIZED HEALTH CARE EDUCATION/TRAINING PROGRAM

## 2023-11-18 PROCEDURE — 71045 X-RAY EXAM CHEST 1 VIEW: CPT

## 2023-11-18 PROCEDURE — 87186 SC STD MICRODIL/AGAR DIL: CPT | Performed by: STUDENT IN AN ORGANIZED HEALTH CARE EDUCATION/TRAINING PROGRAM

## 2023-11-18 PROCEDURE — 25010000002 SODIUM CHLORIDE 0.9 % WITH KCL 20 MEQ 20-0.9 MEQ/L-% SOLUTION: Performed by: INTERNAL MEDICINE

## 2023-11-18 PROCEDURE — 36415 COLL VENOUS BLD VENIPUNCTURE: CPT | Performed by: STUDENT IN AN ORGANIZED HEALTH CARE EDUCATION/TRAINING PROGRAM

## 2023-11-18 PROCEDURE — 25010000002 MORPHINE PER 10 MG: Performed by: STUDENT IN AN ORGANIZED HEALTH CARE EDUCATION/TRAINING PROGRAM

## 2023-11-18 PROCEDURE — 74177 CT ABD & PELVIS W/CONTRAST: CPT

## 2023-11-18 PROCEDURE — 63710000001 INSULIN LISPRO (HUMAN) PER 5 UNITS: Performed by: INTERNAL MEDICINE

## 2023-11-18 PROCEDURE — 25510000001 IOPAMIDOL PER 1 ML: Performed by: STUDENT IN AN ORGANIZED HEALTH CARE EDUCATION/TRAINING PROGRAM

## 2023-11-18 PROCEDURE — 87150 DNA/RNA AMPLIFIED PROBE: CPT | Performed by: STUDENT IN AN ORGANIZED HEALTH CARE EDUCATION/TRAINING PROGRAM

## 2023-11-18 PROCEDURE — 25810000003 SODIUM CHLORIDE 0.9 % SOLUTION 250 ML FLEX CONT: Performed by: STUDENT IN AN ORGANIZED HEALTH CARE EDUCATION/TRAINING PROGRAM

## 2023-11-18 PROCEDURE — 83605 ASSAY OF LACTIC ACID: CPT | Performed by: STUDENT IN AN ORGANIZED HEALTH CARE EDUCATION/TRAINING PROGRAM

## 2023-11-18 PROCEDURE — 63710000001 INSULIN GLARGINE PER 5 UNITS: Performed by: INTERNAL MEDICINE

## 2023-11-18 PROCEDURE — 25010000002 AZITHROMYCIN PER 500 MG: Performed by: STUDENT IN AN ORGANIZED HEALTH CARE EDUCATION/TRAINING PROGRAM

## 2023-11-18 PROCEDURE — 85025 COMPLETE CBC W/AUTO DIFF WBC: CPT | Performed by: STUDENT IN AN ORGANIZED HEALTH CARE EDUCATION/TRAINING PROGRAM

## 2023-11-18 PROCEDURE — 87077 CULTURE AEROBIC IDENTIFY: CPT | Performed by: STUDENT IN AN ORGANIZED HEALTH CARE EDUCATION/TRAINING PROGRAM

## 2023-11-18 PROCEDURE — 85610 PROTHROMBIN TIME: CPT | Performed by: STUDENT IN AN ORGANIZED HEALTH CARE EDUCATION/TRAINING PROGRAM

## 2023-11-18 PROCEDURE — 84145 PROCALCITONIN (PCT): CPT | Performed by: STUDENT IN AN ORGANIZED HEALTH CARE EDUCATION/TRAINING PROGRAM

## 2023-11-18 PROCEDURE — 25010000002 AMPICILLIN-SULBACTAM PER 1.5 G: Performed by: STUDENT IN AN ORGANIZED HEALTH CARE EDUCATION/TRAINING PROGRAM

## 2023-11-18 PROCEDURE — 25010000002 CEFTRIAXONE PER 250 MG: Performed by: INTERNAL MEDICINE

## 2023-11-18 PROCEDURE — 87040 BLOOD CULTURE FOR BACTERIA: CPT | Performed by: STUDENT IN AN ORGANIZED HEALTH CARE EDUCATION/TRAINING PROGRAM

## 2023-11-18 PROCEDURE — 87086 URINE CULTURE/COLONY COUNT: CPT | Performed by: STUDENT IN AN ORGANIZED HEALTH CARE EDUCATION/TRAINING PROGRAM

## 2023-11-18 PROCEDURE — 93005 ELECTROCARDIOGRAM TRACING: CPT | Performed by: STUDENT IN AN ORGANIZED HEALTH CARE EDUCATION/TRAINING PROGRAM

## 2023-11-18 PROCEDURE — 36415 COLL VENOUS BLD VENIPUNCTURE: CPT

## 2023-11-18 PROCEDURE — 0202U NFCT DS 22 TRGT SARS-COV-2: CPT | Performed by: STUDENT IN AN ORGANIZED HEALTH CARE EDUCATION/TRAINING PROGRAM

## 2023-11-18 PROCEDURE — 82948 REAGENT STRIP/BLOOD GLUCOSE: CPT

## 2023-11-18 PROCEDURE — 93010 ELECTROCARDIOGRAM REPORT: CPT | Performed by: INTERNAL MEDICINE

## 2023-11-18 PROCEDURE — 81001 URINALYSIS AUTO W/SCOPE: CPT | Performed by: STUDENT IN AN ORGANIZED HEALTH CARE EDUCATION/TRAINING PROGRAM

## 2023-11-18 PROCEDURE — 85730 THROMBOPLASTIN TIME PARTIAL: CPT | Performed by: STUDENT IN AN ORGANIZED HEALTH CARE EDUCATION/TRAINING PROGRAM

## 2023-11-18 PROCEDURE — 25810000003 SODIUM CHLORIDE 0.9 % SOLUTION: Performed by: PHYSICIAN ASSISTANT

## 2023-11-18 PROCEDURE — 99285 EMERGENCY DEPT VISIT HI MDM: CPT

## 2023-11-18 PROCEDURE — 71275 CT ANGIOGRAPHY CHEST: CPT

## 2023-11-18 PROCEDURE — 83880 ASSAY OF NATRIURETIC PEPTIDE: CPT | Performed by: STUDENT IN AN ORGANIZED HEALTH CARE EDUCATION/TRAINING PROGRAM

## 2023-11-18 RX ORDER — DEXTROSE MONOHYDRATE 25 G/50ML
25 INJECTION, SOLUTION INTRAVENOUS
Status: DISCONTINUED | OUTPATIENT
Start: 2023-11-18 | End: 2023-11-21 | Stop reason: HOSPADM

## 2023-11-18 RX ORDER — ACETAMINOPHEN 325 MG/1
650 TABLET ORAL EVERY 4 HOURS PRN
Status: DISCONTINUED | OUTPATIENT
Start: 2023-11-18 | End: 2023-11-21 | Stop reason: HOSPADM

## 2023-11-18 RX ORDER — MORPHINE SULFATE 2 MG/ML
4 INJECTION, SOLUTION INTRAMUSCULAR; INTRAVENOUS ONCE
Status: COMPLETED | OUTPATIENT
Start: 2023-11-18 | End: 2023-11-18

## 2023-11-18 RX ORDER — BISACODYL 5 MG/1
5 TABLET, DELAYED RELEASE ORAL DAILY PRN
Status: DISCONTINUED | OUTPATIENT
Start: 2023-11-18 | End: 2023-11-21 | Stop reason: HOSPADM

## 2023-11-18 RX ORDER — SODIUM CHLORIDE AND POTASSIUM CHLORIDE 150; 900 MG/100ML; MG/100ML
125 INJECTION, SOLUTION INTRAVENOUS CONTINUOUS
Status: DISCONTINUED | OUTPATIENT
Start: 2023-11-18 | End: 2023-11-19

## 2023-11-18 RX ORDER — AMOXICILLIN 250 MG
2 CAPSULE ORAL 2 TIMES DAILY
Status: DISCONTINUED | OUTPATIENT
Start: 2023-11-18 | End: 2023-11-21 | Stop reason: HOSPADM

## 2023-11-18 RX ORDER — INSULIN LISPRO 100 [IU]/ML
2-9 INJECTION, SOLUTION INTRAVENOUS; SUBCUTANEOUS
Status: DISCONTINUED | OUTPATIENT
Start: 2023-11-18 | End: 2023-11-21 | Stop reason: HOSPADM

## 2023-11-18 RX ORDER — LOSARTAN POTASSIUM 25 MG/1
25 TABLET ORAL DAILY
Status: DISCONTINUED | OUTPATIENT
Start: 2023-11-18 | End: 2023-11-21 | Stop reason: HOSPADM

## 2023-11-18 RX ORDER — FERROUS SULFATE 325(65) MG
325 TABLET ORAL
Status: DISCONTINUED | OUTPATIENT
Start: 2023-11-19 | End: 2023-11-21 | Stop reason: HOSPADM

## 2023-11-18 RX ORDER — NICOTINE POLACRILEX 4 MG
15 LOZENGE BUCCAL
Status: DISCONTINUED | OUTPATIENT
Start: 2023-11-18 | End: 2023-11-21 | Stop reason: HOSPADM

## 2023-11-18 RX ORDER — ATORVASTATIN CALCIUM 20 MG/1
40 TABLET, FILM COATED ORAL DAILY
Status: DISCONTINUED | OUTPATIENT
Start: 2023-11-18 | End: 2023-11-21 | Stop reason: HOSPADM

## 2023-11-18 RX ORDER — UREA 10 %
3 LOTION (ML) TOPICAL NIGHTLY PRN
Status: DISCONTINUED | OUTPATIENT
Start: 2023-11-18 | End: 2023-11-21 | Stop reason: HOSPADM

## 2023-11-18 RX ORDER — PANTOPRAZOLE SODIUM 40 MG/1
40 TABLET, DELAYED RELEASE ORAL DAILY
Status: DISCONTINUED | OUTPATIENT
Start: 2023-11-18 | End: 2023-11-21 | Stop reason: HOSPADM

## 2023-11-18 RX ORDER — IBUPROFEN 600 MG/1
1 TABLET ORAL
Status: DISCONTINUED | OUTPATIENT
Start: 2023-11-18 | End: 2023-11-21 | Stop reason: HOSPADM

## 2023-11-18 RX ORDER — CHOLECALCIFEROL (VITAMIN D3) 125 MCG
1000 CAPSULE ORAL DAILY
Status: DISCONTINUED | OUTPATIENT
Start: 2023-11-18 | End: 2023-11-21 | Stop reason: HOSPADM

## 2023-11-18 RX ORDER — ACETAMINOPHEN 500 MG
1000 TABLET ORAL ONCE
Status: COMPLETED | OUTPATIENT
Start: 2023-11-18 | End: 2023-11-18

## 2023-11-18 RX ORDER — POLYETHYLENE GLYCOL 3350 17 G/17G
17 POWDER, FOR SOLUTION ORAL DAILY PRN
Status: DISCONTINUED | OUTPATIENT
Start: 2023-11-18 | End: 2023-11-21 | Stop reason: HOSPADM

## 2023-11-18 RX ORDER — BISACODYL 10 MG
10 SUPPOSITORY, RECTAL RECTAL DAILY PRN
Status: DISCONTINUED | OUTPATIENT
Start: 2023-11-18 | End: 2023-11-21 | Stop reason: HOSPADM

## 2023-11-18 RX ORDER — ONDANSETRON 4 MG/1
4 TABLET, FILM COATED ORAL EVERY 6 HOURS PRN
Status: DISCONTINUED | OUTPATIENT
Start: 2023-11-18 | End: 2023-11-21 | Stop reason: HOSPADM

## 2023-11-18 RX ORDER — MULTIPLE VITAMINS W/ MINERALS TAB 9MG-400MCG
1 TAB ORAL DAILY
Status: DISCONTINUED | OUTPATIENT
Start: 2023-11-18 | End: 2023-11-21 | Stop reason: HOSPADM

## 2023-11-18 RX ORDER — CLOPIDOGREL BISULFATE 75 MG/1
75 TABLET ORAL DAILY
Status: DISCONTINUED | OUTPATIENT
Start: 2023-11-18 | End: 2023-11-21 | Stop reason: HOSPADM

## 2023-11-18 RX ORDER — ONDANSETRON 2 MG/ML
4 INJECTION INTRAMUSCULAR; INTRAVENOUS EVERY 6 HOURS PRN
Status: DISCONTINUED | OUTPATIENT
Start: 2023-11-18 | End: 2023-11-21 | Stop reason: HOSPADM

## 2023-11-18 RX ADMIN — SODIUM CHLORIDE 1000 ML: 9 INJECTION, SOLUTION INTRAVENOUS at 15:19

## 2023-11-18 RX ADMIN — INSULIN LISPRO 4 UNITS: 100 INJECTION, SOLUTION INTRAVENOUS; SUBCUTANEOUS at 21:23

## 2023-11-18 RX ADMIN — MORPHINE SULFATE 4 MG: 2 INJECTION, SOLUTION INTRAMUSCULAR; INTRAVENOUS at 14:48

## 2023-11-18 RX ADMIN — ACETAMINOPHEN 650 MG: 325 TABLET, FILM COATED ORAL at 20:26

## 2023-11-18 RX ADMIN — AMPICILLIN SODIUM AND SULBACTAM SODIUM 3 G: 2; 1 INJECTION, POWDER, FOR SOLUTION INTRAMUSCULAR; INTRAVENOUS at 14:49

## 2023-11-18 RX ADMIN — AZITHROMYCIN MONOHYDRATE 500 MG: 500 INJECTION, POWDER, LYOPHILIZED, FOR SOLUTION INTRAVENOUS at 16:01

## 2023-11-18 RX ADMIN — IOPAMIDOL 100 ML: 755 INJECTION, SOLUTION INTRAVENOUS at 15:29

## 2023-11-18 RX ADMIN — CEFTRIAXONE 2000 MG: 2 INJECTION, POWDER, FOR SOLUTION INTRAMUSCULAR; INTRAVENOUS at 20:25

## 2023-11-18 RX ADMIN — INSULIN GLARGINE 30 UNITS: 100 INJECTION, SOLUTION SUBCUTANEOUS at 21:22

## 2023-11-18 RX ADMIN — POTASSIUM CHLORIDE AND SODIUM CHLORIDE 125 ML/HR: 900; 150 INJECTION, SOLUTION INTRAVENOUS at 20:25

## 2023-11-18 RX ADMIN — ACETAMINOPHEN 1000 MG: 500 TABLET ORAL at 17:35

## 2023-11-18 RX ADMIN — Medication 3 MG: at 20:26

## 2023-11-18 RX ADMIN — ATORVASTATIN CALCIUM 40 MG: 20 TABLET, FILM COATED ORAL at 20:26

## 2023-11-18 NOTE — ED PROVIDER NOTES
EMERGENCY DEPARTMENT ENCOUNTER    Room Number:  E558/1  PCP: Warren Mohamud MD  History obtained from: Patient, family      HPI:  Chief Complaint: General fatigue  A complete HPI/ROS/PMH/PSH/SH/FH are unobtainable due to: Not applicable  Context: Harry Sierra is a 72 y.o. male who presents to the ED c/o fatigue, right flank/chest pain.  Has pain in his right shoulder and his right flank.  No urinary symptoms.  Has had a cough for some time, feels short of breath.  No fever.  No nausea or vomiting.            PAST MEDICAL HISTORY  Active Ambulatory Problems     Diagnosis Date Noted    Primary hypertension 08/30/2016    Routine health maintenance 08/30/2016    Abnormal PSA 09/01/2016    Gastroesophageal reflux disease with esophagitis without hemorrhage 12/01/2016    Melena 12/01/2016    Bilateral carotid artery stenosis 12/01/2016    Transient cerebral ischemia 09/14/2017    Hyperlipidemia LDL goal <70 12/12/2017    Prostate cancer 03/13/2018    Sleep disturbance 09/18/2018    Multilobar lung infiltrate, secondary bacterial infection 11/14/2018    Sepsis - present on admission 11/14/2018    Constipation 11/14/2018    Valvular heart disease 11/15/2018    Multifocal pneumonia 01/24/2019    Type 2 diabetes mellitus with hyperglycemia, with long-term current use of insulin 01/24/2019    Near syncope 01/24/2019    Hyperlipidemia associated with type 2 diabetes mellitus 12/08/2019    Hyperinsulinism 12/08/2019    Melanoma in situ of left upper extremity including shoulder 10/06/2020    Chest pain, atypical 01/18/2021    History of atrial fibrillation 01/18/2021    Abnormal weight loss 12/12/2022    Chronic cough 12/12/2022    Urinary retention due to benign prostatic hyperplasia 02/08/2023    Prostate cancer 02/08/2023    Colon polyps 04/12/2023    Diverticulosis 07/24/2023    Acute UTI 09/07/2023    Chronic suprapubic catheter 09/08/2023    Moderate malnutrition 09/08/2023    Iron deficiency anemia 09/09/2023     Hypokalemia 09/09/2023     Resolved Ambulatory Problems     Diagnosis Date Noted    Type 2 diabetes mellitus with hyperglycemia, without long-term current use of insulin 08/30/2016    Prostatitis 09/26/2016    UTI (urinary tract infection) due to urinary indwelling catheter 09/27/2016    Infection due to parainfluenza virus 2 11/13/2018    Dehydration 11/13/2018    Atrial fibrillation (new) 11/13/2018    HTN (hypertension) 01/24/2019    Long term current use of anticoagulant therapy 01/24/2019    Atrial fibrillation 01/24/2019     Past Medical History:   Diagnosis Date    Claustrophobia     Diabetes mellitus     GERD (gastroesophageal reflux disease)     History of gastric ulcer     History of pneumonia 2018    History of shingles 2016    History of TIA (transient ischemic attack)     Hyperlipidemia     Hypertension     Melanoma     Urinary catheter in place     Uses self-applied continuous glucose monitoring device     Vertebral artery insufficiency     Vertigo          PAST SURGICAL HISTORY  Past Surgical History:   Procedure Laterality Date    CATARACT EXTRACTION      COLONOSCOPY      2011 Dr [facility]    COLONOSCOPY N/A 6/30/2023    Procedure: COLONOSCOPY TO CECUM WITH SALINE LIFT &  HOT SNARE POLYPECTOMIES, COLD SNARE POLYPECTOMIES;  Surgeon: Ian Comer MD;  Location: [facility] ENDOSCOPY;  Service: Gastroenterology;  Laterality: N/A;  PRE- SCREENING  POST- COLON POLYPS, DIVERTICULOSIS, HEMORRHOIDS    ENDOSCOPY N/A 12/15/2016    Procedure: ESOPHAGOGASTRODUODENOSCOPY WITH COLD BIOPSIES;  Surgeon: Moshe Lux MD;  Location: [facility] ENDOSCOPY;  Service:     SKIN GRAFT SPLIT THICKNESS Left 10/27/2020    Procedure: EXCISION OF MELANOMA FROM THE LEFT FOREARM REQUIRING FULL THICKNESS SKIN GRAFT;  Surgeon: Nick Nuñez MD;  Location: [facility] OR;  Service: General;  Laterality: Left;    SUPRAPUBIC TUBE PLACEMENT N/A 2/8/2023    Procedure: CYSTOSCOPY SUPRAPUBIC CATHETER INSERTION;  Surgeon:  Dom Davis MD;  Location: [facility] OR;  Service: Urology;  Laterality: N/A;         FAMILY HISTORY  Family History   Problem Relation Age of Onset    Diabetes Mother     Heart disease Mother     Uterine cancer Mother     Diabetes Father     Heart disease Father     Kidney disease Father     Skin cancer Father         melanoma    Cancer Father         testicle    Malig Hyperthermia Neg Hx          SOCIAL HISTORY  Social History     Socioeconomic History    Marital status:     Number of children: 4   Tobacco Use    Smoking status: Never     Passive exposure: Never    Smokeless tobacco: Never   Vaping Use    Vaping Use: Never used   Substance and Sexual Activity    Alcohol use: No    Drug use: No    Sexual activity: Defer         ALLERGIES  Bee venom, Shellfish-derived products, Aleve [naproxen], Asa [aspirin], and Ibuprofen        REVIEW OF SYSTEMS    As per [facility]      PHYSICAL EXAM  ED Triage Vitals   Temp Heart Rate Resp BP SpO2   11/18/23 1201 11/18/23 1201 11/18/23 1201 11/18/23 1208 11/18/23 1201   100.2 °F (37.9 °C) (!) 134 16 (!) 175/105 99 %      Temp src Heart Rate Source Patient Position BP Location FiO2 (%)   11/18/23 1201 11/18/23 1201 11/18/23 1208 11/18/23 1208 --   Tympanic Monitor Lying Right arm        Physical Exam  Constitutional:       General: He is not in acute distress.     Appearance: He is ill-appearing.   HENT:      Head: Normocephalic and atraumatic.   Cardiovascular:      Rate and Rhythm: Regular rhythm. Tachycardia present.   Pulmonary:      Effort: No respiratory distress.   Abdominal:      General: There is no distension.      Tenderness: There is no abdominal tenderness.   Musculoskeletal:         General: No swelling or deformity.   Skin:     General: Skin is warm and dry.   Neurological:      General: No focal deficit present.      Mental Status: He is alert. Mental status is at baseline.           Vital signs and nursing notes reviewed.          LAB RESULTS  Recent  Results (from the past 24 hour(s))   POC Glucose Once    Collection Time: 11/18/23  8:58 PM    Specimen: Blood   Result Value Ref Range    Glucose 211 (H) 70 - 130 mg/dL   Basic Metabolic Panel    Collection Time: 11/19/23  4:57 AM    Specimen: Blood   Result Value Ref Range    Glucose 77 65 - 99 mg/dL    BUN 11 8 - 23 mg/dL    Creatinine 0.63 (L) 0.76 - 1.27 mg/dL    Sodium 139 136 - 145 mmol/L    Potassium 3.4 (L) 3.5 - 5.2 mmol/L    Chloride 102 98 - 107 mmol/L    CO2 24.1 22.0 - 29.0 mmol/L    Calcium 8.5 (L) 8.6 - 10.5 mg/dL    BUN/Creatinine Ratio 17.5 7.0 - 25.0    Anion Gap 12.9 5.0 - 15.0 mmol/L    eGFR 99.8 >60.0 mL/min/1.73   CBC (No Diff)    Collection Time: 11/19/23  4:57 AM    Specimen: Blood   Result Value Ref Range    WBC 11.87 (H) 3.40 - 10.80 10*3/mm3    RBC 2.99 (L) 4.14 - 5.80 10*6/mm3    Hemoglobin 9.4 (L) 13.0 - 17.7 g/dL    Hematocrit 27.8 (L) 37.5 - 51.0 %    MCV 93.0 79.0 - 97.0 fL    MCH 31.4 26.6 - 33.0 pg    MCHC 33.8 31.5 - 35.7 g/dL    RDW 13.1 12.3 - 15.4 %    RDW-SD 44.1 37.0 - 54.0 fl    MPV 9.6 6.0 - 12.0 fL    Platelets 241 140 - 450 10*3/mm3   POC Glucose Once    Collection Time: 11/19/23  6:06 AM    Specimen: Blood   Result Value Ref Range    Glucose 89 70 - 130 mg/dL   POC Glucose Once    Collection Time: 11/19/23 11:43 AM    Specimen: Blood   Result Value Ref Range    Glucose 72 70 - 130 mg/dL   POC Glucose Once    Collection Time: 11/19/23  3:05 PM    Specimen: Blood   Result Value Ref Range    Glucose 150 (H) 70 - 130 mg/dL   Magnesium    Collection Time: 11/19/23  4:47 PM    Specimen: Arm, Left; Blood   Result Value Ref Range    Magnesium 1.5 (L) 1.6 - 2.4 mg/dL   Vitamin B12    Collection Time: 11/19/23  4:47 PM    Specimen: Arm, Left; Blood   Result Value Ref Range    Vitamin B-12 548 211 - 946 pg/mL   TSH    Collection Time: 11/19/23  4:47 PM    Specimen: Arm, Left; Blood   Result Value Ref Range    TSH 1.220 0.270 - 4.200 uIU/mL       Ordered the above labs and reviewed  the results.        RADIOLOGY  No Radiology Exams Resulted Within Past 24 Hours    Ordered the above noted radiological studies. Reviewed by me in PACS.                MEDICATIONS GIVEN IN ER  Medications   acetaminophen (TYLENOL) tablet 650 mg ( Oral Canceled Entry 11/19/23 1649)   sennosides-docusate (PERICOLACE) 8.6-50 MG per tablet 2 tablet (2 tablets Oral Given 11/19/23 0809)     And   polyethylene glycol (MIRALAX) packet 17 g (has no administration in time range)     And   bisacodyl (DULCOLAX) EC tablet 5 mg (has no administration in time range)     And   bisacodyl (DULCOLAX) suppository 10 mg (has no administration in time range)   ondansetron (ZOFRAN) tablet 4 mg (has no administration in time range)     Or   ondansetron (ZOFRAN) injection 4 mg (has no administration in time range)   melatonin tablet 3 mg (3 mg Oral Given 11/18/23 2026)   cefTRIAXone (ROCEPHIN) 2,000 mg in sodium chloride 0.9 % 100 mL IVPB-VTB (2,000 mg Intravenous New Bag 11/18/23 2025)   atorvastatin (LIPITOR) tablet 40 mg (40 mg Oral Given 11/19/23 0808)   clopidogrel (PLAVIX) tablet 75 mg (75 mg Oral Given 11/19/23 0808)   vitamin B-12 (CYANOCOBALAMIN) tablet 1,000 mcg (1,000 mcg Oral Given 11/19/23 0808)   ferrous sulfate tablet 325 mg (325 mg Oral Given 11/19/23 0809)   losartan (COZAAR) tablet 25 mg (25 mg Oral Given 11/19/23 1311)   multivitamin with minerals 1 tablet (1 tablet Oral Given 11/19/23 0808)   pantoprazole (PROTONIX) EC tablet 40 mg (40 mg Oral Given 11/19/23 0809)   dextrose (GLUTOSE) oral gel 15 g (has no administration in time range)   dextrose (D50W) (25 g/50 mL) IV injection 25 g (has no administration in time range)   glucagon (GLUCAGEN) injection 1 mg (has no administration in time range)   insulin lispro (HUMALOG/ADMELOG) injection 2-9 Units (2 Units Subcutaneous Given 11/19/23 1648)   Potassium Replacement - Follow Nurse / BPA Driven Protocol (has no administration in time range)   Magnesium Low Dose Replacement  - Follow Nurse / BPA Driven Protocol (has no administration in time range)   Phosphorus Replacement - Follow Nurse / BPA Driven Protocol (has no administration in time range)   Calcium Replacement - Follow Nurse / BPA Driven Protocol (has no administration in time range)   insulin glargine (LANTUS, SEMGLEE) injection 20 Units (has no administration in time range)   lactated ringers infusion (100 mL/hr Intravenous New Bag 11/19/23 1348)   magnesium sulfate in D5W 1g/100mL (PREMIX) (has no administration in time range)   morphine injection 4 mg (4 mg Intravenous Given 11/18/23 1448)   ampicillin-sulbactam (UNASYN) 3 g in sodium chloride 0.9 % 100 mL IVPB-VTB (0 g Intravenous Stopped 11/18/23 1601)   azithromycin (ZITHROMAX) 500 mg in sodium chloride 0.9 % 250 mL IVPB-VTB (0 mg Intravenous Stopped 11/18/23 1711)   sodium chloride 0.9 % bolus 1,000 mL (0 mL Intravenous Stopped 11/18/23 1645)   iopamidol (ISOVUE-370) 76 % injection 100 mL (100 mL Intravenous Given by Other 11/18/23 1529)   acetaminophen (TYLENOL) tablet 1,000 mg (1,000 mg Oral Given 11/18/23 1735)   potassium chloride (K-DUR,KLOR-CON) ER tablet 20 mEq (20 mEq Oral Given 11/19/23 1518)               MEDICAL DECISION MAKING, PROGRESS, and CONSULTS    MDM: Patient presented the emergency department with cough, tachycardia, shortness of breath.  Also with generalized fatigue.  Blood pressure stable, oxygen saturation normal on room air.  Borderline temperature.  Labs significant for normal white count, normal lactic acid.  Additional imaging pending for further evaluation of the chest and abdomen.  Plan for admission, given empiric antibiotics for community-acquired pneumonia.  Viral testing is pending.    All labs have been independently reviewed by me.  All radiology studies have been reviewed by me and I have also reviewed the radiology report.   EKG's independently viewed and interpreted by me.  Discussion below represents my analysis of pertinent  findings related to patient's condition, differential diagnosis, treatment plan and final disposition.      Additional sources:  - Discussed/ obtained information from independent historians: Obtained additional history from daughter at bedside, she reports that he has been admitted for GI bleeding and recurrent sepsis.    - External (non-ED) record review: Per chart review patient was admitted for anemia in September of this year.    - Chronic or social conditions impacting care: Anemia, recurrent urinary tract infections    - Shared decision making: Discussed plan for admission, patient and family agree.      Orders placed during this visit:  Orders Placed This Encounter   Procedures    Blood Culture - Blood,    Blood Culture - Blood,    Respiratory Panel PCR w/COVID-19(SARS-CoV-2) ISAIAH/AMALIA/KEESHA/PAD/COR/KATHY In-House, NP Swab in UTM/VTM, 2 HR TAT - Swab, Nasopharynx    Urine Culture - Urine,    Blood Culture ID, PCR - Blood,    Blood Culture - Blood,    Blood Culture - Blood,    XR Chest 1 View    CT Angiogram Chest Pulmonary Embolism    CT Abdomen Pelvis With Contrast    Comprehensive Metabolic Panel    Protime-INR    aPTT    BNP    Single High Sensitivity Troponin T    Lactic Acid, Plasma    Procalcitonin    CBC Auto Differential    Urinalysis With Culture If Indicated -    Urinalysis, Microscopic Only - Urine, Clean Catch    Basic Metabolic Panel    CBC (No Diff)    Magnesium    Vitamin B12    TSH    Magnesium    Diet: Cardiac Diets; Healthy Heart (2-3 Na+); Texture: Regular Texture (IDDSI 7); Fluid Consistency: Thin (IDDSI 0)    Vital Signs    Up with assistance    Daily Weights    Strict Intake & Output    Oral Care    Place Sequential Compression Device    Maintain Sequential Compression Device    Code Status and Medical Interventions:    LHVALENTIN (on-call MD unless specified) Details    Inpatient Infectious Diseases Consult    Inpatient Urology Consult    Discontinue Patient Isolation    POC Glucose 4x Daily Before  Meals & at Bedtime    POC Glucose Once    POC Glucose Once    POC Glucose Once    POC Glucose Once    ECG 12 Lead Dyspnea    SCANNED - TELEMETRY      SCANNED - TELEMETRY      SCANNED - TELEMETRY      Inpatient Admission    CBC & Differential         Additional orders considered but not ordered:  Considered sound however no indication at this time, will follow-up CT.        Differential diagnosis includes but is not limited to:    Sepsis, viral syndrome, pulmonary embolism, urinary tract infection, pneumonia      Independent interpretation of labs, radiology studies, and discussions with consultants:  ED Course as of 11/19/23 1809   Sat Nov 18, 2023   1225 EKG interpreted myself:  1220, sinus tachycardia rate of 128, no acute ST segment changes or T wave inversions. [FS]   1254 Chest x-ray interpreted myself:  Possible developing right middle lobe infiltrate [FS]   1443 WBC: 6.34 [FS]   1449 Lactate: 1.2 [FS]   1500 Patient's care transferred to me from Dr. Leos pending CT imaging, respiratory pathogen panel, and disposition. [AR]   1727 I discussed the patient's overall care with Dr. Banegas with [facility].  She is agreeable to admission. [AR]   1728 I have Updated the patient and family of need for admission.  They are both agreeable.  The patient states that he sees Dr. Mayo Davis with [facility]. He states that his catheter was most recently changed out approximately 3 weeks ago. [AR]   1731 Repeat temperature is 103.2F.  Will give Tylenol. [AR]      ED Course User Index  [AR] Caro Lam PA  [FS] Kurt Leos MD           DIAGNOSIS  Final diagnoses:   Pyelonephritis   Sepsis without acute organ dysfunction, due to unspecified organism   Severe sepsis         DISPOSITION  Admitted to telemetry        Latest Documented Vital Signs:  As of 18:09 EST  BP- 159/69 HR- 116 Temp- 97.8 °F (36.6 °C) (Oral) O2 sat- 97%              --    Please note that portions of this were completed with a voice  recognition program.       Note Disclaimer: At [facility], we believe that sharing information builds trust and better relationships. You are receiving this note because you are receiving care at [facility] or recently visited. It is possible you will see health information before a provider has talked with you about it. This kind of information can be easy to misunderstand. To help you fully understand what it means for your health, we urge you to discuss this note with your provider.             Kurt Leos MD  11/19/23 3877

## 2023-11-18 NOTE — H&P
HISTORY AND PHYSICAL   [facility]        Date of Admission: 2023  Patient Identification:  Name: Harry Sierra  Age: 72 y.o.  Sex: male  :  1950  MRN: [id number]                     Primary Care Physician: Warren Mohamud MD    Chief Complaint:  72 year old gentleman who presented to the emergency room with shortness of breath, right flank  and shoulder pain;he has had a cough for a few months; he has a history of suprapubic catheter and uti and sepsis; he was febrile to 103 in the ED    History of Present Illness:   As above    Past Medical History:  Past Medical History:   Diagnosis Date    Claustrophobia     Diabetes mellitus     GERD (gastroesophageal reflux disease)     History of gastric ulcer     History of pneumonia 2018    MULTIFOCAL, HOSPITALIZED X 6 DAYS    History of shingles 2016    History of TIA (transient ischemic attack)     X2    Hyperlipidemia     Hypertension     Infection due to parainfluenza virus 2 2018    Melanoma     LEFT FOREARM     Prostate cancer     FOLLOWED BY DR ALVARADO    Urinary catheter in place     SUPRA PUBIC    Uses self-applied continuous glucose monitoring device     FREESTYLE MACRINA    Vertebral artery insufficiency     Vertigo      Past Surgical History:  Past Surgical History:   Procedure Laterality Date    CATARACT EXTRACTION      COLONOSCOPY       Dr Edgardo Ferraro [facility]    COLONOSCOPY N/A 2023    Procedure: COLONOSCOPY TO CECUM WITH SALINE LIFT &  HOT SNARE POLYPECTOMIES, COLD SNARE POLYPECTOMIES;  Surgeon: Ian Comer MD;  Location: [facility] ENDOSCOPY;  Service: Gastroenterology;  Laterality: N/A;  PRE- SCREENING  POST- COLON POLYPS, DIVERTICULOSIS, HEMORRHOIDS    ENDOSCOPY N/A 12/15/2016    Procedure: ESOPHAGOGASTRODUODENOSCOPY WITH COLD BIOPSIES;  Surgeon: Moshe Lux MD;  Location: [facility] ENDOSCOPY;  Service:     SKIN GRAFT SPLIT THICKNESS Left 10/27/2020    Procedure: EXCISION OF MELANOMA FROM THE LEFT FOREARM  REQUIRING FULL THICKNESS SKIN GRAFT;  Surgeon: Nick Nuñez MD;  Location: [facility] MAIN OR;  Service: General;  Laterality: Left;    SUPRAPUBIC TUBE PLACEMENT N/A 2/8/2023    Procedure: CYSTOSCOPY SUPRAPUBIC CATHETER INSERTION;  Surgeon: Dom Davis MD;  Location: [facility] MAIN OR;  Service: Urology;  Laterality: N/A;      Home Meds:  Medications Prior to Admission   Medication Sig Dispense Refill Last Dose    atorvastatin (LIPITOR) 40 MG tablet Take 1 tablet by mouth every night at bedtime. 90 tablet 4     clopidogrel (PLAVIX) 75 MG tablet Take 1 tablet by mouth Daily. 90 tablet 1     Continuous Blood Gluc Sensor (FreeStyle Vaishali 14 Day Sensor) misc Place 1 each on the skin as directed by provider Daily. 2 each 11     cyanocobalamin (VITAMIN B-12) 1000 MCG tablet Take 1 tablet by mouth Daily. 30 tablet 0     ferrous sulfate (FerrouSul) 325 (65 FE) MG tablet Take 1 tablet by mouth Daily With Breakfast. 90 tablet 1     insulin detemir (Levemir FlexPen) 100 UNIT/ML injection Inject 30 Units under the skin into the appropriate area as directed Daily. 15 mL 8     losartan (COZAAR) 25 MG tablet Take 1 tablet by mouth Daily. 90 tablet 4     metFORMIN (GLUCOPHAGE) 500 MG tablet Take 1 tablet by mouth 2 (Two) Times a Day With Meals. 180 tablet 3     Multiple Vitamins-Minerals (CENTRUM SILVER 50+MEN) tablet Take 1 tablet by mouth Daily.       pantoprazole (PROTONIX) 40 MG EC tablet TAKE 1 TABLET BY MOUTH DAILY 90 tablet 4        Allergies:  Allergies   Allergen Reactions    Bee Venom Shortness Of Breath    Shellfish-Derived Products Anaphylaxis and Rash    Aleve [Naproxen] Other (See Comments)     Bleeding      Asa [Aspirin] Other (See Comments)     bleeding    Ibuprofen GI Bleeding     Immunizations:  Immunization History   Administered Date(s) Administered    COVID-19 (PFIZER) BIVALENT 12+YRS 10/19/2022    COVID-19 (PFIZER) Purple Cap Monovalent 01/13/2021, 02/05/2021, 08/21/2021    Covid-19 (Pfizer) Gray Cap  Monovalent 2022    Fluad Quad 65+ 10/12/2020    Fluzone High Dose =>65 Years (Vaxcare ONLY) 11/10/2015, 2016, 10/03/2017, 2018, 10/14/2021    Fluzone High-Dose 65+yrs 10/19/2022    Pneumococcal Conjugate 13-Valent (PCV13) 2016, 2016    Pneumococcal Polysaccharide (PPSV23) 2019    Shingrix 2019, 06/15/2019    Zostavax 2017     Social History:   Social History     Social History Narrative    Not on file     Social History     Socioeconomic History    Marital status:     Number of children: 4   Tobacco Use    Smoking status: Never     Passive exposure: Never    Smokeless tobacco: Never   Vaping Use    Vaping Use: Never used   Substance and Sexual Activity    Alcohol use: No    Drug use: No    Sexual activity: Defer       Family History:  Family History   Problem Relation Age of Onset    Diabetes Mother     Heart disease Mother     Uterine cancer Mother     Diabetes Father     Heart disease Father     Kidney disease Father     Skin cancer Father         melanoma    Cancer Father         testicle    Malig Hyperthermia Neg Hx         Review of Systems  See history of present illness and past medical history.  Patient denies headache, dizziness, syncope, falls, trauma, change in vision, change in hearing, change in taste, changes in weight, changes in appetite, focal weakness, numbness, or paresthesia.  Patient denies palpitations, orthopnea, PND, cough, sinus pressure, rhinorrhea, epistaxis, hemoptysis, nausea, vomiting,hematemesis, diarrhea, constipation or hematochezia.  Denies cold or heat intolerance, polydipsia, polyuria, polyphagia. Denies hematuria, pyuria, dysuria, hesitancy, frequency or urgency. Denies consumption of raw and under cooked meats foods or change in water source.       Objective:  T Max 24 hrs: Temp (24hrs), Av.1 °F (38.9 °C), Min:100.2 °F (37.9 °C), Max:103.2 °F (39.6 °C)    Vitals Ranges:   Temp:  [100.2 °F (37.9 °C)-103.2 °F (39.6 °C)]  "103 °F (39.4 °C)  Heart Rate:  [128-137] 133  Resp:  [16-32] 18  BP: (146-175)/() 167/84      Exam:  /84   Pulse (!) 133   Temp (!) 103 °F (39.4 °C) (Tympanic)   Resp 18   Ht 175.3 cm (69\")   Wt 64.4 kg (142 lb)   SpO2 97%   BMI 20.97 kg/m²     General Appearance:    Alert, cooperative, no distress, appears stated age; chronically ill appearing   Head:    Normocephalic, without obvious abnormality, atraumatic   Eyes:    PERRL, conjunctivae/corneas clear, EOM's intact, both eyes   Ears:    Normal external ear canals, both ears   Nose:   Nares normal, septum midline, mucosa normal, no drainage    or sinus tenderness   Throat:   Lips, mucosa, and tongue normal   Neck:   Supple, symmetrical, trachea midline, no adenopathy;     thyroid:  no enlargement/tenderness/nodules; no carotid    bruit or JVD   Back:     Symmetric, no curvature, ROM normal, no CVA tenderness   Lungs:     Clear to auscultation bilaterally, respirations unlabored   Chest Wall:    No tenderness or deformity    Heart:    Regular rate and rhythm, S1 and S2 normal, no murmur, rub   or gallop   Abdomen:     Soft, nontender, bowel sounds active all four quadrants,     no masses, no hepatomegaly, no splenomegaly; catheter in place   Extremities:   Extremities normal, atraumatic, no cyanosis or edema                       .    Data Review:  Labs in chart were reviewed.  WBC   Date Value Ref Range Status   11/18/2023 6.34 3.40 - 10.80 10*3/mm3 Final     Hemoglobin   Date Value Ref Range Status   11/18/2023 9.4 (L) 13.0 - 17.7 g/dL Final     Hematocrit   Date Value Ref Range Status   11/18/2023 27.4 (L) 37.5 - 51.0 % Final     Platelets   Date Value Ref Range Status   11/18/2023 253 140 - 450 10*3/mm3 Final     Sodium   Date Value Ref Range Status   11/18/2023 133 (L) 136 - 145 mmol/L Final     Potassium   Date Value Ref Range Status   11/18/2023 3.8 3.5 - 5.2 mmol/L Final     Chloride   Date Value Ref Range Status   11/18/2023 97 (L) 98 - " 107 mmol/L Final     CO2   Date Value Ref Range Status   11/18/2023 26.0 22.0 - 29.0 mmol/L Final     BUN   Date Value Ref Range Status   11/18/2023 13 8 - 23 mg/dL Final     Creatinine   Date Value Ref Range Status   11/18/2023 0.70 (L) 0.76 - 1.27 mg/dL Final     Glucose   Date Value Ref Range Status   11/18/2023 247 (H) 65 - 99 mg/dL Final     Calcium   Date Value Ref Range Status   11/18/2023 9.2 8.6 - 10.5 mg/dL Final                Imaging Results (All)       Procedure Component Value Units Date/Time    CT Angiogram Chest Pulmonary Embolism [[id number]] Collected: 11/18/23 1539     Updated: 11/18/23 1605    Narrative:      CT ANGIOGRAM CHEST PULMONARY EMBOLISM-, CT ABDOMEN PELVIS W CONTRAST-     Radiation dose reduction techniques were utilized, including automated  exposure control and exposure modulation based on body size.     CT scan of the chest performed with intravenous contrast, pulmonary  embolus protocol to include coronal, sagittal and three-dimensional  reconstruction.     Clinical: Pulmonary embolus suspected, shortness of breath and chest  pain     COMPARISON chest CT 9/7/2023 and CT of the abdomen and pelvis 9/7/2023     FINDINGS:  1. No pulmonary embolus, aortic aneurysm or dissection. There is mild  cardiac enlargement with heavy coronary artery calcification.     2. The esophagus is satisfactory in appearance. No mediastinal or hilar  mass/adenopathy. Numerous tiny calcified benign granulomas demonstrated  throughout both lungs. These measure up to 2-3 mm. Few similar size  noncalcified nodules also seen. All of these may be related to  granulomatous disease. All stable. No vascular congestion or pleural  effusion. No acute airspace disease or consolidation seen.     3. There is diffuse bladder wall thickening similar to the previous  examination, there is suprapubic catheter in position. Catheter position  is optimal. There is an area of altered enhancement along the lower pole  of the right  kidney with perinephric fat stranding demonstrated at this  location. In addition there is uroepithelial thickening of the right  pelvis and proximal ureter. The findings are consistent with  pyelonephritis. No obstructive uropathy. The left kidney is normal. No  left-sided calculus or obstructive uropathy. Prostate is heterogenous  and similar to the previous examination. The adrenal glands are normal.     4. The spleen is mildly enlarged measuring 13.5 cm in the AP direction.  The liver and gallbladder as well as the spleen are unremarkable. No  biliary duct dilatation.     5. Atherosclerotic calcification of a normal diameter aorta. The stomach  is collapsed, contour within normal limits. No free air or free fluid.  The appendix, colon and small bowel have a satisfactory appearance.           This report was finalized on 11/18/2023 4:02 PM by Dr. Prashant Rangel M.D on Workstation: [id number]       CT Abdomen Pelvis With Contrast [[id number]] Collected: 11/18/23 1539     Updated: 11/18/23 1605    Narrative:      CT ANGIOGRAM CHEST PULMONARY EMBOLISM-, CT ABDOMEN PELVIS W CONTRAST-     Radiation dose reduction techniques were utilized, including automated  exposure control and exposure modulation based on body size.     CT scan of the chest performed with intravenous contrast, pulmonary  embolus protocol to include coronal, sagittal and three-dimensional  reconstruction.     Clinical: Pulmonary embolus suspected, shortness of breath and chest  pain     COMPARISON chest CT 9/7/2023 and CT of the abdomen and pelvis 9/7/2023     FINDINGS:  1. No pulmonary embolus, aortic aneurysm or dissection. There is mild  cardiac enlargement with heavy coronary artery calcification.     2. The esophagus is satisfactory in appearance. No mediastinal or hilar  mass/adenopathy. Numerous tiny calcified benign granulomas demonstrated  throughout both lungs. These measure up to 2-3 mm. Few similar size  noncalcified nodules also seen.  All of these may be related to  granulomatous disease. All stable. No vascular congestion or pleural  effusion. No acute airspace disease or consolidation seen.     3. There is diffuse bladder wall thickening similar to the previous  examination, there is suprapubic catheter in position. Catheter position  is optimal. There is an area of altered enhancement along the lower pole  of the right kidney with perinephric fat stranding demonstrated at this  location. In addition there is uroepithelial thickening of the right  pelvis and proximal ureter. The findings are consistent with  pyelonephritis. No obstructive uropathy. The left kidney is normal. No  left-sided calculus or obstructive uropathy. Prostate is heterogenous  and similar to the previous examination. The adrenal glands are normal.     4. The spleen is mildly enlarged measuring 13.5 cm in the AP direction.  The liver and gallbladder as well as the spleen are unremarkable. No  biliary duct dilatation.     5. Atherosclerotic calcification of a normal diameter aorta. The stomach  is collapsed, contour within normal limits. No free air or free fluid.  The appendix, colon and small bowel have a satisfactory appearance.           This report was finalized on 11/18/2023 4:02 PM by Dr. Prashant Rangel M.D on Workstation: [id number]       XR Chest 1 View [[id number]] Collected: 11/18/23 1330     Updated: 11/18/23 1334    Narrative:      XR CHEST 1 VW-     Clinical: Shortness of breath     COMPARISON examination 9/7/2023     FINDINGS: Cardiac size within normal limits. No mediastinal or hilar  abnormality. No pleural effusion, vascular congestion or acute airspace  disease is demonstrated.     CONCLUSION: No active disease of the chest     This report was finalized on 11/18/2023 1:31 PM by Dr. Prashant Rangel M.D on Workstation: [id number]                 Assessment:  Active Hospital Problems    Diagnosis  POA    **Pyelonephritis [N12]  Yes      Resolved Hospital  Problems   No resolved problems to display.   Sepsis  Diabetes  Hypertension  Underweight  anemia    Plan:  Change to rocephin  Last admission in September his culture was positive for klebsiella  Iv fluids  Monitor on telemetry  Trend labs  Accu checks, insulin sliding scale  Dw patient and ed provider    Julieth Baengas MD  11/18/2023  18:40 EST

## 2023-11-18 NOTE — ED NOTES
Nursing report ED to floor  Harry Sierra  72 y.o.  male    HPI :   Chief Complaint   Patient presents with    Shortness of Breath       Admitting doctor:   No admitting provider for patient encounter.    Admitting diagnosis:   The primary encounter diagnosis was Pyelonephritis. Diagnoses of Sepsis without acute organ dysfunction, due to unspecified organism and Severe sepsis were also pertinent to this visit.    Code status:   Current Code Status       Date Active Code Status Order ID Comments User Context       Prior            Allergies:   Bee venom, Shellfish-derived products, Aleve [naproxen], Asa [aspirin], and Ibuprofen    Isolation:   No active isolations    Intake and Output    Intake/Output Summary (Last 24 hours) at 11/18/2023 1721  Last data filed at 11/18/2023 1645  Gross per 24 hour   Intake 1000 ml   Output 400 ml   Net 600 ml       Weight:       11/18/23  1210   Weight: 64.4 kg (142 lb)       Most recent vitals:   Vitals:    11/18/23 1501 11/18/23 1601 11/18/23 1634 11/18/23 1701   BP:  159/92 146/75 161/81   BP Location:   Right arm    Patient Position:   Lying    Pulse: (!) 133 (!) 137 (!) 137 (!) 133   Resp: (!) 32 20 18 20   Temp:       TempSrc:       SpO2: 98% 94% 97% 95%   Weight:       Height:           Active LDAs/IV Access:   Lines, Drains & Airways       Active LDAs       Name Placement date Placement time Site Days    Peripheral IV 11/18/23 1428 Posterior;Right Forearm 11/18/23  1428  Forearm  less than 1    Suprapubic Catheter 02/08/23  0920  -- 283                    Labs (abnormal labs have a star):   Labs Reviewed   COMPREHENSIVE METABOLIC PANEL - Abnormal; Notable for the following components:       Result Value    Glucose 247 (*)     Creatinine 0.70 (*)     Sodium 133 (*)     Chloride 97 (*)     Alkaline Phosphatase 155 (*)     All other components within normal limits    Narrative:     GFR Normal >60  Chronic Kidney Disease <60  Kidney Failure <15    The GFR formula is only valid for  adults with stable renal function between ages 18 and 70.   PROTIME-INR - Abnormal; Notable for the following components:    Protime 14.8 (*)     INR 1.15 (*)     All other components within normal limits   SINGLE HSTROPONIN T - Abnormal; Notable for the following components:    HS Troponin T 25 (*)     All other components within normal limits    Narrative:     High Sensitive Troponin T Reference Range:  <14.0 ng/L- Negative Female for AMI  <22.0 ng/L- Negative Male for AMI  >=14 - Abnormal Female indicating possible myocardial injury.  >=22 - Abnormal Male indicating possible myocardial injury.   Clinicians would have to utilize clinical acumen, EKG, Troponin, and serial changes to determine if it is an Acute Myocardial Infarction or myocardial injury due to an underlying chronic condition.        CBC WITH AUTO DIFFERENTIAL - Abnormal; Notable for the following components:    RBC 2.95 (*)     Hemoglobin 9.4 (*)     Hematocrit 27.4 (*)     Lymphocyte % 11.4 (*)     Monocyte % 13.2 (*)     Eosinophil % 0.0 (*)     Immature Grans % 2.4 (*)     Immature Grans, Absolute 0.15 (*)     All other components within normal limits   URINALYSIS W/ CULTURE IF INDICATED - Abnormal; Notable for the following components:    Appearance, UA Cloudy (*)     Glucose,  mg/dL (1+) (*)     Ketones, UA 40 mg/dL (2+) (*)     Blood, UA Small (1+) (*)     Protein, UA 30 mg/dL (1+) (*)     Leuk Esterase, UA Large (3+) (*)     Nitrite, UA Positive (*)     All other components within normal limits    Narrative:     In absence of clinical symptoms, the presence of pyuria, bacteria, and/or nitrites on the urinalysis result does not correlate with infection.   URINALYSIS, MICROSCOPIC ONLY - Abnormal; Notable for the following components:    WBC, UA 6-10 (*)     Bacteria, UA 4+ (*)     All other components within normal limits   RESPIRATORY PANEL PCR W/ COVID-19 (SARS-COV-2), NP SWAB IN UTM/VTP, 2 HR TAT - Normal    Narrative:     In the  setting of a positive respiratory panel with a viral infection PLUS a negative procalcitonin without other underlying concern for bacterial infection, consider observing off antibiotics or discontinuation of antibiotics and continue supportive care. If the respiratory panel is positive for atypical bacterial infection (Bordetella pertussis, Chlamydophila pneumoniae, or Mycoplasma pneumoniae), consider antibiotic de-escalation to target atypical bacterial infection.   APTT - Normal   BNP (IN-HOUSE) - Normal    Narrative:     This assay is used as an aid in the diagnosis of individuals suspected of having heart failure. It can be used as an aid in the diagnosis of acute decompensated heart failure (ADHF) in patients presenting with signs and symptoms of ADHF to the emergency department (ED). In addition, NT-proBNP of <300 pg/mL indicates ADHF is not likely.    Age Range Result Interpretation  NT-proBNP Concentration (pg/mL:      <50             Positive            >450                   Gray                 300-450                    Negative             <300    50-75           Positive            >900                  Gray                300-900                  Negative            <300      >75             Positive            >1800                  Gray                300-1800                  Negative            <300   LACTIC ACID, PLASMA - Normal   PROCALCITONIN - Normal    Narrative:     As a Marker for Sepsis (Non-Neonates):    1. <0.5 ng/mL represents a low risk of severe sepsis and/or septic shock.  2. >2 ng/mL represents a high risk of severe sepsis and/or septic shock.    As a Marker for Lower Respiratory Tract Infections that require antibiotic therapy:    PCT on Admission    Antibiotic Therapy       6-12 Hrs later    >0.5                Strongly Recommended  >0.25 - <0.5        Recommended   0.1 - 0.25          Discouraged              Remeasure/reassess PCT  <0.1                Strongly Discouraged      "Remeasure/reassess PCT    As 28 day mortality risk marker: \"Change in Procalcitonin Result\" (>80% or <=80%) if Day 0 (or Day 1) and Day 4 values are available. Refer to [email or web address]    Change in PCT <=80%  A decrease of PCT levels below or equal to 80% defines a positive change in PCT test result representing a higher risk for 28-day all-cause mortality of patients diagnosed with severe sepsis for septic shock.    Change in PCT >80%  A decrease of PCT levels of more than 80% defines a negative change in PCT result representing a lower risk for 28-day all-cause mortality of patients diagnosed with severe sepsis or septic shock.      BLOOD CULTURE   BLOOD CULTURE   URINE CULTURE   CBC AND DIFFERENTIAL    Narrative:     The following orders were created for panel order CBC & Differential.  Procedure                               Abnormality         Status                     ---------                               -----------         ------                     CBC Auto Differential[[id number]]        Abnormal            Final result                 Please view results for these tests on the individual orders.       EKG:   ECG 12 Lead Dyspnea   Preliminary Result   HEART RATE= 128  bpm   RR Interval= 469  ms   HI Interval= 149  ms   P Horizontal Axis= -11  deg   P Front Axis= 62  deg   QRSD Interval= 84  ms   QT Interval= 301  ms   QTcB= 440  ms   QRS Axis= 29  deg   T Wave Axis= 30  deg   - BORDERLINE ECG -   Sinus tachycardia   Probable left atrial enlargement   Electronically Signed By:    Date and Time of Study: 2023-11-18 12:20:05          Meds given in ED:   Medications   morphine injection 4 mg (4 mg Intravenous Given 11/18/23 1448)   ampicillin-sulbactam (UNASYN) 3 g in sodium chloride 0.9 % 100 mL IVPB-VTB (0 g Intravenous Stopped 11/18/23 1601)   azithromycin (ZITHROMAX) 500 mg in sodium chloride 0.9 % 250 mL IVPB-VTB (0 mg Intravenous Stopped 11/18/23 1711)   sodium chloride 0.9 % bolus " 1,000 mL (0 mL Intravenous Stopped 11/18/23 6295)   iopamidol (ISOVUE-370) 76 % injection 100 mL (100 mL Intravenous Given by Other 11/18/23 1529)       Imaging results:  No radiology results for the last day    Ambulatory status:   - Assist x 1    Social issues:   Social History     Socioeconomic History    Marital status:     Number of children: 4   Tobacco Use    Smoking status: Never     Passive exposure: Never    Smokeless tobacco: Never   Vaping Use    Vaping Use: Never used   Substance and Sexual Activity    Alcohol use: No    Drug use: No    Sexual activity: Defer       NIH Stroke Scale:       Martha Melvin RN  11/18/23 17:21 EST

## 2023-11-18 NOTE — ED TRIAGE NOTES
Pt is soa started yest - he has had a cough x several months.  He has a suprapubic cath.  He started having right flank pain yest.

## 2023-11-18 NOTE — ED PROVIDER NOTES
EMERGENCY DEPARTMENT ENCOUNTER TURNOVER NOTE    Transfer of Care Note    HPI:  The patient is a 72-year-old male who presented to the emergency department complaining of shortness of breath, right-sided chest pain, and cough.    LAB RESULTS  Recent Results (from the past 24 hour(s))   ECG 12 Lead Dyspnea    Collection Time: 11/18/23 12:20 PM   Result Value Ref Range    QT Interval 301 ms    QTC Interval 440 ms   Respiratory Panel PCR w/COVID-19(SARS-CoV-2) ISAIAH/AMALIA/KEESHA/PAD/COR/KATHY In-House, NP Swab in UTM/VTM, 2 HR TAT - Swab, Nasopharynx    Collection Time: 11/18/23  1:31 PM    Specimen: Nasopharynx; Swab   Result Value Ref Range    ADENOVIRUS, PCR Not Detected Not Detected    Coronavirus 229E Not Detected Not Detected    Coronavirus HKU1 Not Detected Not Detected    Coronavirus NL63 Not Detected Not Detected    Coronavirus OC43 Not Detected Not Detected    COVID19 Not Detected Not Detected - Ref. Range    Human Metapneumovirus Not Detected Not Detected    Human Rhinovirus/Enterovirus Not Detected Not Detected    Influenza A PCR Not Detected Not Detected    Influenza B PCR Not Detected Not Detected    Parainfluenza Virus 1 Not Detected Not Detected    Parainfluenza Virus 2 Not Detected Not Detected    Parainfluenza Virus 3 Not Detected Not Detected    Parainfluenza Virus 4 Not Detected Not Detected    RSV, PCR Not Detected Not Detected    Bordetella pertussis pcr Not Detected Not Detected    Bordetella parapertussis PCR Not Detected Not Detected    Chlamydophila pneumoniae PCR Not Detected Not Detected    Mycoplasma pneumo by PCR Not Detected Not Detected   Comprehensive Metabolic Panel    Collection Time: 11/18/23  2:16 PM    Specimen: Blood   Result Value Ref Range    Glucose 247 (H) 65 - 99 mg/dL    BUN 13 8 - 23 mg/dL    Creatinine 0.70 (L) 0.76 - 1.27 mg/dL    Sodium 133 (L) 136 - 145 mmol/L    Potassium 3.8 3.5 - 5.2 mmol/L    Chloride 97 (L) 98 - 107 mmol/L    CO2 26.0 22.0 - 29.0 mmol/L    Calcium 9.2 8.6 -  10.5 mg/dL    Total Protein 6.6 6.0 - 8.5 g/dL    Albumin 3.9 3.5 - 5.2 g/dL    ALT (SGPT) 18 1 - 41 U/L    AST (SGOT) 12 1 - 40 U/L    Alkaline Phosphatase 155 (H) 39 - 117 U/L    Total Bilirubin 0.5 0.0 - 1.2 mg/dL    Globulin 2.7 gm/dL    A/G Ratio 1.4 g/dL    BUN/Creatinine Ratio 18.6 7.0 - 25.0    Anion Gap 10.0 5.0 - 15.0 mmol/L    eGFR 96.7 >60.0 mL/min/1.73   Protime-INR    Collection Time: 11/18/23  2:16 PM    Specimen: Blood   Result Value Ref Range    Protime 14.8 (H) 11.7 - 14.2 Seconds    INR 1.15 (H) 0.90 - 1.10   aPTT    Collection Time: 11/18/23  2:16 PM    Specimen: Blood   Result Value Ref Range    PTT 30.4 22.7 - 35.4 seconds   BNP    Collection Time: 11/18/23  2:16 PM    Specimen: Blood   Result Value Ref Range    proBNP 390.0 0.0 - 900.0 pg/mL   Single High Sensitivity Troponin T    Collection Time: 11/18/23  2:16 PM    Specimen: Blood   Result Value Ref Range    HS Troponin T 25 (H) <22 ng/L   Lactic Acid, Plasma    Collection Time: 11/18/23  2:16 PM    Specimen: Blood   Result Value Ref Range    Lactate 1.2 0.5 - 2.0 mmol/L   Procalcitonin    Collection Time: 11/18/23  2:16 PM    Specimen: Blood   Result Value Ref Range    Procalcitonin 0.10 0.00 - 0.25 ng/mL   CBC Auto Differential    Collection Time: 11/18/23  2:16 PM    Specimen: Blood   Result Value Ref Range    WBC 6.34 3.40 - 10.80 10*3/mm3    RBC 2.95 (L) 4.14 - 5.80 10*6/mm3    Hemoglobin 9.4 (L) 13.0 - 17.7 g/dL    Hematocrit 27.4 (L) 37.5 - 51.0 %    MCV 92.9 79.0 - 97.0 fL    MCH 31.9 26.6 - 33.0 pg    MCHC 34.3 31.5 - 35.7 g/dL    RDW 13.0 12.3 - 15.4 %    RDW-SD 43.4 37.0 - 54.0 fl    MPV 9.4 6.0 - 12.0 fL    Platelets 253 140 - 450 10*3/mm3    Neutrophil % 73.0 42.7 - 76.0 %    Lymphocyte % 11.4 (L) 19.6 - 45.3 %    Monocyte % 13.2 (H) 5.0 - 12.0 %    Eosinophil % 0.0 (L) 0.3 - 6.2 %    Basophil % 0.0 0.0 - 1.5 %    Immature Grans % 2.4 (H) 0.0 - 0.5 %    Neutrophils, Absolute 4.63 1.70 - 7.00 10*3/mm3    Lymphocytes, Absolute  0.72 0.70 - 3.10 10*3/mm3    Monocytes, Absolute 0.84 0.10 - 0.90 10*3/mm3    Eosinophils, Absolute 0.00 0.00 - 0.40 10*3/mm3    Basophils, Absolute 0.00 0.00 - 0.20 10*3/mm3    Immature Grans, Absolute 0.15 (H) 0.00 - 0.05 10*3/mm3    nRBC 0.0 0.0 - 0.2 /100 WBC   Urinalysis With Culture If Indicated - Suprapubic Catheter    Collection Time: 11/18/23  4:35 PM    Specimen: Suprapubic Catheter; Urine   Result Value Ref Range    Color, UA Yellow Yellow, Straw    Appearance, UA Cloudy (A) Clear    pH, UA 5.5 5.0 - 8.0    Specific Gravity, UA 1.023 1.005 - 1.030    Glucose,  mg/dL (1+) (A) Negative    Ketones, UA 40 mg/dL (2+) (A) Negative    Bilirubin, UA Negative Negative    Blood, UA Small (1+) (A) Negative    Protein, UA 30 mg/dL (1+) (A) Negative    Leuk Esterase, UA Large (3+) (A) Negative    Nitrite, UA Positive (A) Negative    Urobilinogen, UA 0.2 E.U./dL 0.2 - 1.0 E.U./dL   Urinalysis, Microscopic Only - Suprapubic Catheter    Collection Time: 11/18/23  4:35 PM    Specimen: Suprapubic Catheter; Urine   Result Value Ref Range    RBC, UA 0-2 None Seen, 0-2 /HPF    WBC, UA 6-10 (A) None Seen, 0-2 /HPF    Bacteria, UA 4+ (A) None Seen /HPF    Squamous Epithelial Cells, UA 0-2 None Seen, 0-2 /HPF    Hyaline Casts, UA 0-2 None Seen /LPF    Methodology Manual Light Microscopy        I ordered the above labs and reviewed the results.    RADIOLOGY  CT Angiogram Chest Pulmonary Embolism, CT Abdomen Pelvis With Contrast    Result Date: 11/18/2023  CT ANGIOGRAM CHEST PULMONARY EMBOLISM-, CT ABDOMEN PELVIS W CONTRAST-  Radiation dose reduction techniques were utilized, including automated exposure control and exposure modulation based on body size.  CT scan of the chest performed with intravenous contrast, pulmonary embolus protocol to include coronal, sagittal and three-dimensional reconstruction.  Clinical: Pulmonary embolus suspected, shortness of breath and chest pain  COMPARISON chest CT 9/7/2023 and CT of the  abdomen and pelvis 9/7/2023  FINDINGS: 1. No pulmonary embolus, aortic aneurysm or dissection. There is mild cardiac enlargement with heavy coronary artery calcification.  2. The esophagus is satisfactory in appearance. No mediastinal or hilar mass/adenopathy. Numerous tiny calcified benign granulomas demonstrated throughout both lungs. These measure up to 2-3 mm. Few similar size noncalcified nodules also seen. All of these may be related to granulomatous disease. All stable. No vascular congestion or pleural effusion. No acute airspace disease or consolidation seen.  3. There is diffuse bladder wall thickening similar to the previous examination, there is suprapubic catheter in position. Catheter position is optimal. There is an area of altered enhancement along the lower pole of the right kidney with perinephric fat stranding demonstrated at this location. In addition there is uroepithelial thickening of the right pelvis and proximal ureter. The findings are consistent with pyelonephritis. No obstructive uropathy. The left kidney is normal. No left-sided calculus or obstructive uropathy. Prostate is heterogenous and similar to the previous examination. The adrenal glands are normal.  4. The spleen is mildly enlarged measuring 13.5 cm in the AP direction. The liver and gallbladder as well as the spleen are unremarkable. No biliary duct dilatation.  5. Atherosclerotic calcification of a normal diameter aorta. The stomach is collapsed, contour within normal limits. No free air or free fluid. The appendix, colon and small bowel have a satisfactory appearance.    This report was finalized on 11/18/2023 4:02 PM by Dr. Prashant Rangel M.D on Workstation: [id number]      XR Chest 1 View    Result Date: 11/18/2023  XR CHEST 1 VW-  Clinical: Shortness of breath  COMPARISON examination 9/7/2023  FINDINGS: Cardiac size within normal limits. No mediastinal or hilar abnormality. No pleural effusion, vascular congestion or acute  airspace disease is demonstrated.  CONCLUSION: No active disease of the chest  This report was finalized on 11/18/2023 1:31 PM by Dr. Prashant Rangel M.D on Workstation: [id number]       I ordered the above noted radiological studies. I reviewed the images and results. I agree with the radiologist interpretation.    PROCEDURES  Procedures    MEDICATIONS GIVEN IN ER  Medications   acetaminophen (TYLENOL) tablet 1,000 mg (has no administration in time range)   morphine injection 4 mg (4 mg Intravenous Given 11/18/23 1448)   ampicillin-sulbactam (UNASYN) 3 g in sodium chloride 0.9 % 100 mL IVPB-VTB (0 g Intravenous Stopped 11/18/23 1601)   azithromycin (ZITHROMAX) 500 mg in sodium chloride 0.9 % 250 mL IVPB-VTB (0 mg Intravenous Stopped 11/18/23 1711)   sodium chloride 0.9 % bolus 1,000 mL (0 mL Intravenous Stopped 11/18/23 1645)   iopamidol (ISOVUE-370) 76 % injection 100 mL (100 mL Intravenous Given by Other 11/18/23 1529)       PROGRESS, DATA ANALYSIS, CONSULTS, AND MEDICAL DECISION MAKING  A complete history and physical exam have been performed.  All available laboratory and imaging results have been reviewed by myself prior to disposition.  Face mask and gloves were worn throughout the patient encounter, unless additional PPE was worn and specified below. Hand hygiene was performed before entering and after leaving the patient room.  [facility]      ED Course as of 11/18/23 1733   Sat Nov 18, 2023   1225 EKG interpreted myself:  1220, sinus tachycardia rate of 128, no acute ST segment changes or T wave inversions. [FS]   1254 Chest x-ray interpreted myself:  Possible developing right middle lobe infiltrate [FS]   1443 WBC: 6.34 [FS]   1449 Lactate: 1.2 [FS]   1500 Patient's care transferred to me from Dr. Leos pending CT imaging, respiratory pathogen panel, and disposition. [AR]   1727 I discussed the patient's overall care with Dr. Banegas with A.  She is agreeable to admission. [AR]   1728 I have Updated the patient  and family of need for admission.  They are both agreeable.  The patient states that he sees Dr. Mayo Davis with [facility]. He states that his catheter was most recently changed out approximately 3 weeks ago. [AR]   1731 Repeat temperature is 103.2F.  Will give Tylenol. [AR]      ED Course User Index  [AR] Caro Lam PA  [FS] Kurt Leos MD       AS OF 17:33 EST VITALS:    BP - 161/81  HR - (!) 133  TEMP - (!) 103.2 °F (39.6 °C) (Tympanic)  O2 SATS - 95%    DIAGNOSIS  Final diagnoses:   Pyelonephritis   Sepsis without acute organ dysfunction, due to unspecified organism   Severe sepsis     DISPOSITION  Discussed treatment plan and reason for admission with pt/family and admitting physician.  Pt/family voiced understanding of the plan for admission for further testing/treatment as needed.        Medication List      No changes were made to your prescriptions during this visit.            Caro Lam PA  11/18/23 1731       Caro Lam PA  11/18/23 1739

## 2023-11-19 PROBLEM — E87.6 HYPOPOTASSEMIA: Status: ACTIVE | Noted: 2023-11-19

## 2023-11-19 PROBLEM — R78.81 GRAM-NEGATIVE BACTEREMIA: Status: ACTIVE | Noted: 2023-11-19

## 2023-11-19 LAB
ANION GAP SERPL CALCULATED.3IONS-SCNC: 12.9 MMOL/L (ref 5–15)
BACTERIA BLD CULT: ABNORMAL
BOTTLE TYPE: ABNORMAL
BUN SERPL-MCNC: 11 MG/DL (ref 8–23)
BUN/CREAT SERPL: 17.5 (ref 7–25)
CALCIUM SPEC-SCNC: 8.5 MG/DL (ref 8.6–10.5)
CHLORIDE SERPL-SCNC: 102 MMOL/L (ref 98–107)
CO2 SERPL-SCNC: 24.1 MMOL/L (ref 22–29)
CREAT SERPL-MCNC: 0.63 MG/DL (ref 0.76–1.27)
DEPRECATED RDW RBC AUTO: 44.1 FL (ref 37–54)
EGFRCR SERPLBLD CKD-EPI 2021: 99.8 ML/MIN/1.73
ERYTHROCYTE [DISTWIDTH] IN BLOOD BY AUTOMATED COUNT: 13.1 % (ref 12.3–15.4)
GLUCOSE BLDC GLUCOMTR-MCNC: 123 MG/DL (ref 70–130)
GLUCOSE BLDC GLUCOMTR-MCNC: 150 MG/DL (ref 70–130)
GLUCOSE BLDC GLUCOMTR-MCNC: 72 MG/DL (ref 70–130)
GLUCOSE BLDC GLUCOMTR-MCNC: 89 MG/DL (ref 70–130)
GLUCOSE SERPL-MCNC: 77 MG/DL (ref 65–99)
HCT VFR BLD AUTO: 27.8 % (ref 37.5–51)
HGB BLD-MCNC: 9.4 G/DL (ref 13–17.7)
MAGNESIUM SERPL-MCNC: 1.5 MG/DL (ref 1.6–2.4)
MCH RBC QN AUTO: 31.4 PG (ref 26.6–33)
MCHC RBC AUTO-ENTMCNC: 33.8 G/DL (ref 31.5–35.7)
MCV RBC AUTO: 93 FL (ref 79–97)
PLATELET # BLD AUTO: 241 10*3/MM3 (ref 140–450)
PMV BLD AUTO: 9.6 FL (ref 6–12)
POTASSIUM SERPL-SCNC: 3.4 MMOL/L (ref 3.5–5.2)
RBC # BLD AUTO: 2.99 10*6/MM3 (ref 4.14–5.8)
SODIUM SERPL-SCNC: 139 MMOL/L (ref 136–145)
TSH SERPL DL<=0.05 MIU/L-ACNC: 1.22 UIU/ML (ref 0.27–4.2)
VIT B12 BLD-MCNC: 548 PG/ML (ref 211–946)
WBC NRBC COR # BLD AUTO: 11.87 10*3/MM3 (ref 3.4–10.8)

## 2023-11-19 PROCEDURE — 25810000003 LACTATED RINGERS PER 1000 ML: Performed by: HOSPITALIST

## 2023-11-19 PROCEDURE — 83735 ASSAY OF MAGNESIUM: CPT | Performed by: HOSPITALIST

## 2023-11-19 PROCEDURE — 63710000001 INSULIN GLARGINE PER 5 UNITS: Performed by: INTERNAL MEDICINE

## 2023-11-19 PROCEDURE — 82607 VITAMIN B-12: CPT | Performed by: HOSPITALIST

## 2023-11-19 PROCEDURE — 25010000002 CEFTRIAXONE PER 250 MG: Performed by: INTERNAL MEDICINE

## 2023-11-19 PROCEDURE — 80048 BASIC METABOLIC PNL TOTAL CA: CPT | Performed by: INTERNAL MEDICINE

## 2023-11-19 PROCEDURE — 82948 REAGENT STRIP/BLOOD GLUCOSE: CPT

## 2023-11-19 PROCEDURE — 85027 COMPLETE CBC AUTOMATED: CPT | Performed by: INTERNAL MEDICINE

## 2023-11-19 PROCEDURE — 25010000002 SODIUM CHLORIDE 0.9 % WITH KCL 20 MEQ 20-0.9 MEQ/L-% SOLUTION: Performed by: INTERNAL MEDICINE

## 2023-11-19 PROCEDURE — 63710000001 INSULIN LISPRO (HUMAN) PER 5 UNITS: Performed by: INTERNAL MEDICINE

## 2023-11-19 PROCEDURE — 84443 ASSAY THYROID STIM HORMONE: CPT | Performed by: HOSPITALIST

## 2023-11-19 PROCEDURE — 25010000002 MAGNESIUM SULFATE IN D5W 1G/100ML (PREMIX) 1-5 GM/100ML-% SOLUTION: Performed by: HOSPITALIST

## 2023-11-19 PROCEDURE — 99223 1ST HOSP IP/OBS HIGH 75: CPT | Performed by: INTERNAL MEDICINE

## 2023-11-19 RX ORDER — MAGNESIUM SULFATE 1 G/100ML
1 INJECTION INTRAVENOUS
Status: COMPLETED | OUTPATIENT
Start: 2023-11-19 | End: 2023-11-19

## 2023-11-19 RX ORDER — SODIUM CHLORIDE, SODIUM LACTATE, POTASSIUM CHLORIDE, CALCIUM CHLORIDE 600; 310; 30; 20 MG/100ML; MG/100ML; MG/100ML; MG/100ML
100 INJECTION, SOLUTION INTRAVENOUS CONTINUOUS
Status: DISCONTINUED | OUTPATIENT
Start: 2023-11-19 | End: 2023-11-20

## 2023-11-19 RX ORDER — POTASSIUM CHLORIDE 750 MG/1
20 TABLET, FILM COATED, EXTENDED RELEASE ORAL ONCE
Status: COMPLETED | OUTPATIENT
Start: 2023-11-19 | End: 2023-11-19

## 2023-11-19 RX ADMIN — POTASSIUM CHLORIDE AND SODIUM CHLORIDE 125 ML/HR: 900; 150 INJECTION, SOLUTION INTRAVENOUS at 13:11

## 2023-11-19 RX ADMIN — INSULIN GLARGINE 30 UNITS: 100 INJECTION, SOLUTION SUBCUTANEOUS at 08:09

## 2023-11-19 RX ADMIN — DOCUSATE SODIUM 50MG AND SENNOSIDES 8.6MG 2 TABLET: 8.6; 5 TABLET, FILM COATED ORAL at 08:09

## 2023-11-19 RX ADMIN — CEFTRIAXONE 2000 MG: 2 INJECTION, POWDER, FOR SOLUTION INTRAMUSCULAR; INTRAVENOUS at 22:50

## 2023-11-19 RX ADMIN — SODIUM CHLORIDE, POTASSIUM CHLORIDE, SODIUM LACTATE AND CALCIUM CHLORIDE 100 ML/HR: 600; 310; 30; 20 INJECTION, SOLUTION INTRAVENOUS at 13:48

## 2023-11-19 RX ADMIN — DOCUSATE SODIUM 50MG AND SENNOSIDES 8.6MG 2 TABLET: 8.6; 5 TABLET, FILM COATED ORAL at 20:00

## 2023-11-19 RX ADMIN — POTASSIUM CHLORIDE 20 MEQ: 750 TABLET, EXTENDED RELEASE ORAL at 15:18

## 2023-11-19 RX ADMIN — MAGNESIUM SULFATE 1 G: 1 INJECTION INTRAVENOUS at 19:45

## 2023-11-19 RX ADMIN — ACETAMINOPHEN 650 MG: 325 TABLET, FILM COATED ORAL at 11:36

## 2023-11-19 RX ADMIN — MAGNESIUM SULFATE 1 G: 1 INJECTION INTRAVENOUS at 20:51

## 2023-11-19 RX ADMIN — PANTOPRAZOLE SODIUM 40 MG: 40 TABLET, DELAYED RELEASE ORAL at 08:09

## 2023-11-19 RX ADMIN — ACETAMINOPHEN 650 MG: 325 TABLET, FILM COATED ORAL at 07:34

## 2023-11-19 RX ADMIN — LOSARTAN POTASSIUM 25 MG: 25 TABLET, FILM COATED ORAL at 13:11

## 2023-11-19 RX ADMIN — INSULIN LISPRO 2 UNITS: 100 INJECTION, SOLUTION INTRAVENOUS; SUBCUTANEOUS at 16:48

## 2023-11-19 RX ADMIN — Medication 1000 MCG: at 08:08

## 2023-11-19 RX ADMIN — ACETAMINOPHEN 650 MG: 325 TABLET, FILM COATED ORAL at 02:03

## 2023-11-19 RX ADMIN — ACETAMINOPHEN 650 MG: 325 TABLET, FILM COATED ORAL at 19:59

## 2023-11-19 RX ADMIN — CLOPIDOGREL BISULFATE 75 MG: 75 TABLET, FILM COATED ORAL at 08:08

## 2023-11-19 RX ADMIN — MAGNESIUM SULFATE 1 G: 1 INJECTION INTRAVENOUS at 21:44

## 2023-11-19 RX ADMIN — POTASSIUM CHLORIDE AND SODIUM CHLORIDE 125 ML/HR: 900; 150 INJECTION, SOLUTION INTRAVENOUS at 05:11

## 2023-11-19 RX ADMIN — ATORVASTATIN CALCIUM 40 MG: 20 TABLET, FILM COATED ORAL at 08:08

## 2023-11-19 RX ADMIN — FERROUS SULFATE TAB 325 MG (65 MG ELEMENTAL FE) 325 MG: 325 (65 FE) TAB at 08:09

## 2023-11-19 RX ADMIN — MULTIPLE VITAMINS W/ MINERALS TAB 1 TABLET: TAB at 08:08

## 2023-11-19 NOTE — PLAN OF CARE
Problem: Adult Inpatient Plan of Care  Goal: Absence of Hospital-Acquired Illness or Injury  Intervention: Prevent Skin Injury  Recent Flowsheet Documentation  Taken 11/19/2023 1400 by Kimberly Rubalcava RN  Body Position: position changed independently  Taken 11/19/2023 1000 by Kimberly Rubalcava RN  Body Position: position changed independently  Taken 11/19/2023 0800 by Kimberly Rubalcava RN  Body Position: position changed independently  Goal: Optimal Comfort and Wellbeing  Intervention: Provide Person-Centered Care  Recent Flowsheet Documentation  Taken 11/19/2023 0800 by Kimberly Rubalcava RN  Trust Relationship/Rapport:   care explained   choices provided     Problem: Adjustment to Illness (Sepsis/Septic Shock)  Goal: Optimal Coping  Intervention: Optimize Psychosocial Adjustment to Illness  Recent Flowsheet Documentation  Taken 11/19/2023 0800 by Kimberly Rubalcava RN  Family/Support System Care:   support provided   self-care encouraged   presence promoted   involvement promoted   Goal Outcome Evaluation:  Plan of Care Reviewed With: patient        Progress: improving

## 2023-11-19 NOTE — CONSULTS
Referring Provider: Julieth Banegas MD      Subjective   History of present illness: This very nice 72-year-old gentleman who has a history of prostate cancer and suprapubic catheter placement in February 2023.  He has intermittent UTIs.  He said he was doing well up until a few days ago when he developed some right back/flank pain.  He initially attributed this to musculoskeletal pain as he had spent a lot of time breaking up leaves.  Fortunately a progressive symptoms with myalgias and also developed fever as high as 103.  He denies any obstruction from the suprapubic or abnormal drainage.  Given progressive symptoms he presented to the emergency department where CT chest abdomen pelvis was obtained and showed right pyelonephritis.    I reviewed his prior microbiology and he had Klebsiella in his urine twice in September 2023 both of which were broadly sensitive.    Patient has been on ceftriaxone and says he feels mildly better although he still having some pain.    Past Medical History:   Diagnosis Date    Claustrophobia     Diabetes mellitus     GERD (gastroesophageal reflux disease)     History of gastric ulcer     History of pneumonia 2018    MULTIFOCAL, HOSPITALIZED X 6 DAYS    History of shingles 2016    History of TIA (transient ischemic attack)     X2    Hyperlipidemia     Hypertension     Infection due to parainfluenza virus 2 11/13/2018    Melanoma     LEFT FOREARM     Prostate cancer     FOLLOWED BY DR ALVARADO    Urinary catheter in place     SUPRA PUBIC    Uses self-applied continuous glucose monitoring device     FREESTYLE MACRINA    Vertebral artery insufficiency     Vertigo        Past Surgical History:   Procedure Laterality Date    CATARACT EXTRACTION      COLONOSCOPY      2011 Dr [facility]    COLONOSCOPY N/A 6/30/2023    Procedure: COLONOSCOPY TO CECUM WITH SALINE LIFT &  HOT SNARE POLYPECTOMIES, COLD SNARE POLYPECTOMIES;  Surgeon: Ian Comer MD;  Location: [facility] ENDOSCOPY;   Service: Gastroenterology;  Laterality: N/A;  PRE- SCREENING  POST- COLON POLYPS, DIVERTICULOSIS, HEMORRHOIDS    ENDOSCOPY N/A 12/15/2016    Procedure: ESOPHAGOGASTRODUODENOSCOPY WITH COLD BIOPSIES;  Surgeon: Moshe Lux MD;  Location: [facility] ENDOSCOPY;  Service:     SKIN GRAFT SPLIT THICKNESS Left 10/27/2020    Procedure: EXCISION OF MELANOMA FROM THE LEFT FOREARM REQUIRING FULL THICKNESS SKIN GRAFT;  Surgeon: Nick Nuñez MD;  Location: [facility] MAIN OR;  Service: General;  Laterality: Left;    SUPRAPUBIC TUBE PLACEMENT N/A 2/8/2023    Procedure: CYSTOSCOPY SUPRAPUBIC CATHETER INSERTION;  Surgeon: Dom Davis MD;  Location: [facility] MAIN OR;  Service: Urology;  Laterality: N/A;           Allergies   Allergen Reactions    Bee Venom Shortness Of Breath    Shellfish-Derived Products Anaphylaxis and Rash    Aleve [Naproxen] Other (See Comments)     Bleeding      Asa [Aspirin] Other (See Comments)     bleeding    Ibuprofen GI Bleeding       Physical Exam:   Vital Signs   Temp:  [97.8 °F (36.6 °C)-103.2 °F (39.6 °C)] 97.8 °F (36.6 °C)  Heart Rate:  [] 126  Resp:  [16-32] 16  BP: (121-175)/() 140/74    GENERAL: Awake and alert, sickly appearing  HEENT: Oropharynx is clear. Hearing is grossly normal.   EYES: . No conjunctival injection. No lid lag.   LUNGS:normal respiratory effort.   SKIN: no cutaneous eruptions in exposed areas  PSYCHIATRIC: Appropriate mood, affect, insight, and judgment.   Right CVA tenderness  Results Review:  White count 11.87  Creatinine 0.63  Glucose 77 through 211  Procalcitonin 0.1  Urinalysis 6-12 white blood cells, 4+ bacteria  11/18 blood cultures pending  11/18 urine culture pending  Respiratory pathogen panel negative  CT chest abdomen pelvis shows no PE or pneumonia.  Diffuse bladder wall thickening with right perinephric stranding and no obstruction.  Mild splenomegaly  Chest x-ray independently interpreted: No acute pneumonia      A/p  1.  Sepsis secondary  #2  2.  Right pyelonephritis    Based on his prior urine cultures, I would recommend continuing ceftriaxone 2 g IV every 24 hours.  Once all his culture results are available hopefully will be able to transition over to oral antibiotics in the next 1 to 2 days pending his clinical response and culture results.  He is very motivated to try to get out of the hospital by Thanksgiving holiday so he can visit his family in [place], [place]    Thank you for this consult.  We will continue to follow along and tailor antibiotics as the patient's clinical course evolves.

## 2023-11-19 NOTE — PROGRESS NOTES
Name: Harry ALCALA [facility] [place] ADMIT: 2023   : 1950  PCP: Warren Mohamud MD    MRN: [id number] LOS: 1 days   AGE/SEX: 72 y.o. male  ROOM: [facility]     Subjective   Subjective   He is feeling much better today. Family member at bedside. No chest pain or shortness of breath.     Objective   Objective   Vital Signs  Temp:  [97.8 °F (36.6 °C)-103.2 °F (39.6 °C)] 98.2 °F (36.8 °C)  Heart Rate:  [] 108  Resp:  [16-32] 16  BP: (121-175)/(69-92) 129/79  SpO2:  [94 %-100 %] 97 %  on   ;   Device (Oxygen Therapy): room air  Body mass index is 21 kg/m².    Physical Exam  Constitutional:       General: He is not in acute distress.     Appearance: Normal appearance. He is not toxic-appearing.   HENT:      Head: Normocephalic and atraumatic.   Eyes:      Extraocular Movements: Extraocular movements intact.   Cardiovascular:      Rate and Rhythm: Normal rate and regular rhythm.   Pulmonary:      Effort: Pulmonary effort is normal. No respiratory distress.      Breath sounds: Normal breath sounds. No wheezing or rhonchi.   Abdominal:      General: Bowel sounds are normal.      Palpations: Abdomen is soft.      Tenderness: There is no abdominal tenderness. There is no guarding or rebound.   Genitourinary:     Comments: Suprapubic catheter  Musculoskeletal:         General: No swelling.      Cervical back: Normal range of motion.   Skin:     General: Skin is warm and dry.   Neurological:      General: No focal deficit present.      Mental Status: He is alert and oriented to person, place, and time.   Psychiatric:         Mood and Affect: Mood normal.         Behavior: Behavior normal.         Thought Content: Thought content normal.     Results Review  I reviewed the patient's new clinical results.  Results from last 7 days   Lab Units 23  141   WBC 10*3/mm3 11.87* 6.34   HEMOGLOBIN g/dL 9.4* 9.4*   PLATELETS 10*3/mm3 241 253     Results from last 7 days   Lab Units 23  141    SODIUM mmol/L 139 133*   POTASSIUM mmol/L 3.4* 3.8   CHLORIDE mmol/L 102 97*   CO2 mmol/L 24.1 26.0   BUN mg/dL 11 13   CREATININE mg/dL 0.63* 0.70*   GLUCOSE mg/dL 77 247*     Lab Results   Component Value Date    ANIONGAP 12.9 11/19/2023     Estimated Creatinine Clearance: 93.8 mL/min (A) (by C-G formula based on SCr of 0.63 mg/dL (L)).   Lab Results   Component Value Date    EGFR 99.8 11/19/2023     Results from last 7 days   Lab Units 11/18/23  1416   ALBUMIN g/dL 3.9   BILIRUBIN mg/dL 0.5   ALK PHOS U/L 155*   AST (SGOT) U/L 12   ALT (SGPT) U/L 18     Results from last 7 days   Lab Units 11/19/23  0457 11/18/23  1416   CALCIUM mg/dL 8.5* 9.2   ALBUMIN g/dL  --  3.9     Results from last 7 days   Lab Units 11/18/23  1416   PROCALCITONIN ng/mL 0.10   LACTATE mmol/L 1.2     Glucose   Date/Time Value Ref Range Status   11/19/2023 1143 72 70 - 130 mg/dL Final   11/19/2023 0606 89 70 - 130 mg/dL Final   11/18/2023 2058 211 (H) 70 - 130 mg/dL Final       No radiology results for the last day    Scheduled Meds  atorvastatin, 40 mg, Oral, Daily  cefTRIAXone, 2,000 mg, Intravenous, Q24H  clopidogrel, 75 mg, Oral, Daily  ferrous sulfate, 325 mg, Oral, Daily With Breakfast  insulin glargine, 30 Units, Subcutaneous, Daily  insulin lispro, 2-9 Units, Subcutaneous, 4x Daily AC & at Bedtime  losartan, 25 mg, Oral, Daily  multivitamin with minerals, 1 tablet, Oral, Daily  pantoprazole, 40 mg, Oral, Daily  senna-docusate sodium, 2 tablet, Oral, BID  cyanocobalamin, 1,000 mcg, Oral, Daily    Continuous Infusions  sodium chloride 0.9 % with KCl 20 mEq, 125 mL/hr, Last Rate: 125 mL/hr (11/19/23 0511)    PRN Meds    acetaminophen    senna-docusate sodium **AND** polyethylene glycol **AND** bisacodyl **AND** bisacodyl    dextrose    dextrose    glucagon (human recombinant)    melatonin    ondansetron **OR** ondansetron    sodium chloride 0.9 % with KCl 20 mEq, 125 mL/hr, Last Rate: 125 mL/hr (11/19/23 0511)    Diet  Diet: Cardiac  Diets; Healthy Heart (2-3 Na+); Texture: Regular Texture (IDDSI 7); Fluid Consistency: Thin (IDDSI 0)    I have personally reviewed:  [x]  Medications  [x]  Laboratory   [x]  Microbiology   [x]  Radiology   [x]  EKG/Telemetry   []  Cardiology/Vascular   []  Pathology   []  Records      Assessment/Plan     Active Hospital Problems    Diagnosis  POA    **Pyelonephritis [N12]  Yes    Gram-negative bacteremia [R78.81]  Unknown    Chronic suprapubic catheter [Z93.59]  Not Applicable    Prostate cancer [C61]  Yes    Urinary retention due to benign prostatic hyperplasia [N40.1, R33.8]  Yes    History of atrial fibrillation [Z86.79]  Not Applicable    Type 2 diabetes mellitus with hyperglycemia, with long-term current use of insulin [E11.65, Z79.4]  Not Applicable    Primary hypertension [I10]  Yes      Resolved Hospital Problems   No resolved problems to display.     72 y.o. male     Gram negative bacteremia  Pyelonephritis, right  History of suprapubic catheter, prostate cancer, BPH/urinary retention  Blood culture gram-negative, Klebsiella BCID  Urine culture with gram-negative  Antibiotics per ID: Ceftriaxone  Continue IVF  Consult urology    DM2  A1c 7.70% 2 months ago  Low normal glucoses will back off on long-acting insulin. Continue correctional  At home on Levemir 30 and metformin    Hypertension  History of atrial fibrillation  Appears to be sinus tachycardia on the monitor (due to infection/sepsis)  Previously on apixaban but currently on Plavix    Splenomegaly on CT  monitor, pain probably related to pyelonephritis rather than spleen    Hypokalemia  Replace per protocol  Check magnesium     SCDs for DVT prophylaxis    Discussed with patient, family, and nursing staff. Family worried about some recent cognitive decline. Will check TSH, B12    Expected discharge date/ time has not been documented.    Rajat Mixon MD  [facility]  11/19/23

## 2023-11-19 NOTE — PROGRESS NOTES
[facility] Progress Note  11/19/2023  14:01 EST      Urology Consult Pending     CT 11/18/2023 - reviewed: SP tube in good position, Left pyelonephritis, no obstruction     Plan  - No acute Urologic surgical intervention   - Maintain SP tube - DO NOT CHANGE  - Antibiotics, await cultures  - Urology will see in AM      Adrian Duran MD  [facility]  General & Reconstructive Urology  Office: [phone]  Fax: [phone]

## 2023-11-19 NOTE — PLAN OF CARE
Goal Outcome Evaluation:      VSS ex/ sinus tach for most of shift. RA. Ax1. Suprapubic cath CDI, emptied into urinal. NS 20K+ @ 125 mL/hr. Rocephin iv antibx. PRN tylenol x2. Temp of 101.8, already given tylenol. Recheck temp around 0530.

## 2023-11-19 NOTE — PAYOR COMM NOTE
"Harry Knight (72 y.o. Male)     ATTN: NURSE REVIEWER  RE: INITIAL INPT AUTH CLINICALS  AUTH: [id number]              PLEASE REPLY TO PASTOR ALCALAAlfred [phone] OR FAX# [phone]        Date of Birth   1950    Social Security Number       Address   [address] [address]    Home Phone   [phone]    MRN   [id number]       Yazidism   Pentecostal    Marital Status                               Admission Date   11/18/23    Admission Type   Emergency    Admitting Provider   Julieth Banegas MD    Attending Provider   Rajat Mixon MD    Department, Room/Bed   [address], E[address]/1       Discharge Date       Discharge Disposition       Discharge Destination                                 Attending Provider: Rajat Mixon MD    Allergies: Bee Venom, Shellfish-derived Products, Aleve [Naproxen], Asa [Aspirin], Ibuprofen    Isolation: None   Infection: COVID (rule out) (11/18/23)   Code Status: CPR    Ht: 175.3 cm (69\")   Wt: 64.5 kg (142 lb 3.2 oz)    Admission Cmt: None   Principal Problem: Pyelonephritis [N12]                   Active Insurance as of 11/18/2023       Primary Coverage       Payor Plan Insurance Group Employer/Plan Group    ANTHEM MEDICARE REPLACEMENT ANTHEM MEDICARE ADVANTAGE [id number]       Payor Plan Address Payor Plan Phone Number Payor Plan Fax Number Effective Dates    [address] [phone]  1/1/2022 - None Entered    [facility] [address]         Subscriber Name Subscriber Birth Date Member ID       HARRY KNIGHT 1950 [id number]                     Emergency Contacts        (Rel.) Home Phone Work Phone Mobile Phone    Hanny Knight (Spouse) [phone] -- --    marielagerson (Son) -- -- [phone]    MarielaTamra (Daughter) [phone] -- --    MarielaSandee (Daughter) [phone] -- --                 History & Physical        Julieth Banegas MD at 11/18/23 1840          HISTORY AND PHYSICAL   [facility] " [facility]        Date of Admission: 2023  Patient Identification:  Name: Harry Sierra  Age: 72 y.o.  Sex: male  :  1950  MRN: [id number]                     Primary Care Physician: Warren Mohamud MD    Chief Complaint:  72 year old gentleman who presented to the emergency room with shortness of breath, right flank  and shoulder pain;he has had a cough for a few months; he has a history of suprapubic catheter and uti and sepsis; he was febrile to 103 in the ED    History of Present Illness:   As above    Past Medical History:  Past Medical History:   Diagnosis Date    Claustrophobia     Diabetes mellitus     GERD (gastroesophageal reflux disease)     History of gastric ulcer     History of pneumonia 2018    MULTIFOCAL, HOSPITALIZED X 6 DAYS    History of shingles 2016    History of TIA (transient ischemic attack)     X2    Hyperlipidemia     Hypertension     Infection due to parainfluenza virus 2 2018    Melanoma     LEFT FOREARM     Prostate cancer     FOLLOWED BY DR ALVARADO    Urinary catheter in place     SUPRA PUBIC    Uses self-applied continuous glucose monitoring device     FREESTYLE MACRINA    Vertebral artery insufficiency     Vertigo      Past Surgical History:  Past Surgical History:   Procedure Laterality Date    CATARACT EXTRACTION      COLONOSCOPY       Dr Reynoso [facility]rology    COLONOSCOPY N/A 2023    Procedure: COLONOSCOPY TO CECUM WITH SALINE LIFT &  HOT SNARE POLYPECTOMIES, COLD SNARE POLYPECTOMIES;  Surgeon: Ian Comer MD;  Location: [facility] ENDOSCOPY;  Service: Gastroenterology;  Laterality: N/A;  PRE- SCREENING  POST- COLON POLYPS, DIVERTICULOSIS, HEMORRHOIDS    ENDOSCOPY N/A 12/15/2016    Procedure: ESOPHAGOGASTRODUODENOSCOPY WITH COLD BIOPSIES;  Surgeon: Moshe Lux MD;  Location: [facility] ENDOSCOPY;  Service:     SKIN GRAFT SPLIT THICKNESS Left 10/27/2020    Procedure: EXCISION OF MELANOMA FROM THE LEFT FOREARM REQUIRING FULL THICKNESS SKIN GRAFT;   Surgeon: Nick Nuñez MD;  Location: [facility] MAIN OR;  Service: General;  Laterality: Left;    SUPRAPUBIC TUBE PLACEMENT N/A 2/8/2023    Procedure: CYSTOSCOPY SUPRAPUBIC CATHETER INSERTION;  Surgeon: Dom Davis MD;  Location: [facility] MAIN OR;  Service: Urology;  Laterality: N/A;      Home Meds:  Medications Prior to Admission   Medication Sig Dispense Refill Last Dose    atorvastatin (LIPITOR) 40 MG tablet Take 1 tablet by mouth every night at bedtime. 90 tablet 4     clopidogrel (PLAVIX) 75 MG tablet Take 1 tablet by mouth Daily. 90 tablet 1     Continuous Blood Gluc Sensor (FreeStyle Vaishali 14 Day Sensor) misc Place 1 each on the skin as directed by provider Daily. 2 each 11     cyanocobalamin (VITAMIN B-12) 1000 MCG tablet Take 1 tablet by mouth Daily. 30 tablet 0     ferrous sulfate (FerrouSul) 325 (65 FE) MG tablet Take 1 tablet by mouth Daily With Breakfast. 90 tablet 1     insulin detemir (Levemir FlexPen) 100 UNIT/ML injection Inject 30 Units under the skin into the appropriate area as directed Daily. 15 mL 8     losartan (COZAAR) 25 MG tablet Take 1 tablet by mouth Daily. 90 tablet 4     metFORMIN (GLUCOPHAGE) 500 MG tablet Take 1 tablet by mouth 2 (Two) Times a Day With Meals. 180 tablet 3     Multiple Vitamins-Minerals (CENTRUM SILVER 50+MEN) tablet Take 1 tablet by mouth Daily.       pantoprazole (PROTONIX) 40 MG EC tablet TAKE 1 TABLET BY MOUTH DAILY 90 tablet 4        Allergies:  Allergies   Allergen Reactions    Bee Venom Shortness Of Breath    Shellfish-Derived Products Anaphylaxis and Rash    Aleve [Naproxen] Other (See Comments)     Bleeding      Asa [Aspirin] Other (See Comments)     bleeding    Ibuprofen GI Bleeding     Immunizations:  Immunization History   Administered Date(s) Administered    COVID-19 (PFIZER) BIVALENT 12+YRS 10/19/2022    COVID-19 (PFIZER) Purple Cap Monovalent 01/13/2021, 02/05/2021, 08/21/2021    Covid-19 (Pfizer) Gray Cap Monovalent 04/11/2022    Fluad Quad 65+  10/12/2020    Fluzone High Dose =>65 Years (Vaxcare ONLY) 11/10/2015, 2016, 10/03/2017, 2018, 10/14/2021    Fluzone High-Dose 65+yrs 10/19/2022    Pneumococcal Conjugate 13-Valent (PCV13) 2016, 2016    Pneumococcal Polysaccharide (PPSV23) 2019    Shingrix 2019, 06/15/2019    Zostavax 2017     Social History:   Social History     Social History Narrative    Not on file     Social History     Socioeconomic History    Marital status:     Number of children: 4   Tobacco Use    Smoking status: Never     Passive exposure: Never    Smokeless tobacco: Never   Vaping Use    Vaping Use: Never used   Substance and Sexual Activity    Alcohol use: No    Drug use: No    Sexual activity: Defer       Family History:  Family History   Problem Relation Age of Onset    Diabetes Mother     Heart disease Mother     Uterine cancer Mother     Diabetes Father     Heart disease Father     Kidney disease Father     Skin cancer Father         melanoma    Cancer Father         testicle    Malig Hyperthermia Neg Hx         Review of Systems  See history of present illness and past medical history.  Patient denies headache, dizziness, syncope, falls, trauma, change in vision, change in hearing, change in taste, changes in weight, changes in appetite, focal weakness, numbness, or paresthesia.  Patient denies palpitations, orthopnea, PND, cough, sinus pressure, rhinorrhea, epistaxis, hemoptysis, nausea, vomiting,hematemesis, diarrhea, constipation or hematochezia.  Denies cold or heat intolerance, polydipsia, polyuria, polyphagia. Denies hematuria, pyuria, dysuria, hesitancy, frequency or urgency. Denies consumption of raw and under cooked meats foods or change in water source.       Objective:  T Max 24 hrs: Temp (24hrs), Av.1 °F (38.9 °C), Min:100.2 °F (37.9 °C), Max:103.2 °F (39.6 °C)    Vitals Ranges:   Temp:  [100.2 °F (37.9 °C)-103.2 °F (39.6 °C)] 103 °F (39.4 °C)  Heart Rate:  [128-137]  "133  Resp:  [16-32] 18  BP: (146-175)/() 167/84      Exam:  /84   Pulse (!) 133   Temp (!) 103 °F (39.4 °C) (Tympanic)   Resp 18   Ht 175.3 cm (69\")   Wt 64.4 kg (142 lb)   SpO2 97%   BMI 20.97 kg/m²     General Appearance:    Alert, cooperative, no distress, appears stated age; chronically ill appearing   Head:    Normocephalic, without obvious abnormality, atraumatic   Eyes:    PERRL, conjunctivae/corneas clear, EOM's intact, both eyes   Ears:    Normal external ear canals, both ears   Nose:   Nares normal, septum midline, mucosa normal, no drainage    or sinus tenderness   Throat:   Lips, mucosa, and tongue normal   Neck:   Supple, symmetrical, trachea midline, no adenopathy;     thyroid:  no enlargement/tenderness/nodules; no carotid    bruit or JVD   Back:     Symmetric, no curvature, ROM normal, no CVA tenderness   Lungs:     Clear to auscultation bilaterally, respirations unlabored   Chest Wall:    No tenderness or deformity    Heart:    Regular rate and rhythm, S1 and S2 normal, no murmur, rub   or gallop   Abdomen:     Soft, nontender, bowel sounds active all four quadrants,     no masses, no hepatomegaly, no splenomegaly; catheter in place   Extremities:   Extremities normal, atraumatic, no cyanosis or edema                       .    Data Review:  Labs in chart were reviewed.  WBC   Date Value Ref Range Status   11/18/2023 6.34 3.40 - 10.80 10*3/mm3 Final     Hemoglobin   Date Value Ref Range Status   11/18/2023 9.4 (L) 13.0 - 17.7 g/dL Final     Hematocrit   Date Value Ref Range Status   11/18/2023 27.4 (L) 37.5 - 51.0 % Final     Platelets   Date Value Ref Range Status   11/18/2023 253 140 - 450 10*3/mm3 Final     Sodium   Date Value Ref Range Status   11/18/2023 133 (L) 136 - 145 mmol/L Final     Potassium   Date Value Ref Range Status   11/18/2023 3.8 3.5 - 5.2 mmol/L Final     Chloride   Date Value Ref Range Status   11/18/2023 97 (L) 98 - 107 mmol/L Final     CO2   Date Value Ref " Range Status   11/18/2023 26.0 22.0 - 29.0 mmol/L Final     BUN   Date Value Ref Range Status   11/18/2023 13 8 - 23 mg/dL Final     Creatinine   Date Value Ref Range Status   11/18/2023 0.70 (L) 0.76 - 1.27 mg/dL Final     Glucose   Date Value Ref Range Status   11/18/2023 247 (H) 65 - 99 mg/dL Final     Calcium   Date Value Ref Range Status   11/18/2023 9.2 8.6 - 10.5 mg/dL Final                Imaging Results (All)       Procedure Component Value Units Date/Time    CT Angiogram Chest Pulmonary Embolism [[id number]] Collected: 11/18/23 1539     Updated: 11/18/23 1605    Narrative:      CT ANGIOGRAM CHEST PULMONARY EMBOLISM-, CT ABDOMEN PELVIS W CONTRAST-     Radiation dose reduction techniques were utilized, including automated  exposure control and exposure modulation based on body size.     CT scan of the chest performed with intravenous contrast, pulmonary  embolus protocol to include coronal, sagittal and three-dimensional  reconstruction.     Clinical: Pulmonary embolus suspected, shortness of breath and chest  pain     COMPARISON chest CT 9/7/2023 and CT of the abdomen and pelvis 9/7/2023     FINDINGS:  1. No pulmonary embolus, aortic aneurysm or dissection. There is mild  cardiac enlargement with heavy coronary artery calcification.     2. The esophagus is satisfactory in appearance. No mediastinal or hilar  mass/adenopathy. Numerous tiny calcified benign granulomas demonstrated  throughout both lungs. These measure up to 2-3 mm. Few similar size  noncalcified nodules also seen. All of these may be related to  granulomatous disease. All stable. No vascular congestion or pleural  effusion. No acute airspace disease or consolidation seen.     3. There is diffuse bladder wall thickening similar to the previous  examination, there is suprapubic catheter in position. Catheter position  is optimal. There is an area of altered enhancement along the lower pole  of the right kidney with perinephric fat stranding  demonstrated at this  location. In addition there is uroepithelial thickening of the right  pelvis and proximal ureter. The findings are consistent with  pyelonephritis. No obstructive uropathy. The left kidney is normal. No  left-sided calculus or obstructive uropathy. Prostate is heterogenous  and similar to the previous examination. The adrenal glands are normal.     4. The spleen is mildly enlarged measuring 13.5 cm in the AP direction.  The liver and gallbladder as well as the spleen are unremarkable. No  biliary duct dilatation.     5. Atherosclerotic calcification of a normal diameter aorta. The stomach  is collapsed, contour within normal limits. No free air or free fluid.  The appendix, colon and small bowel have a satisfactory appearance.           This report was finalized on 11/18/2023 4:02 PM by Dr. Prashant Rangel M.D on Workstation: [id number]       CT Abdomen Pelvis With Contrast [[id number]] Collected: 11/18/23 1539     Updated: 11/18/23 1605    Narrative:      CT ANGIOGRAM CHEST PULMONARY EMBOLISM-, CT ABDOMEN PELVIS W CONTRAST-     Radiation dose reduction techniques were utilized, including automated  exposure control and exposure modulation based on body size.     CT scan of the chest performed with intravenous contrast, pulmonary  embolus protocol to include coronal, sagittal and three-dimensional  reconstruction.     Clinical: Pulmonary embolus suspected, shortness of breath and chest  pain     COMPARISON chest CT 9/7/2023 and CT of the abdomen and pelvis 9/7/2023     FINDINGS:  1. No pulmonary embolus, aortic aneurysm or dissection. There is mild  cardiac enlargement with heavy coronary artery calcification.     2. The esophagus is satisfactory in appearance. No mediastinal or hilar  mass/adenopathy. Numerous tiny calcified benign granulomas demonstrated  throughout both lungs. These measure up to 2-3 mm. Few similar size  noncalcified nodules also seen. All of these may be related  to  granulomatous disease. All stable. No vascular congestion or pleural  effusion. No acute airspace disease or consolidation seen.     3. There is diffuse bladder wall thickening similar to the previous  examination, there is suprapubic catheter in position. Catheter position  is optimal. There is an area of altered enhancement along the lower pole  of the right kidney with perinephric fat stranding demonstrated at this  location. In addition there is uroepithelial thickening of the right  pelvis and proximal ureter. The findings are consistent with  pyelonephritis. No obstructive uropathy. The left kidney is normal. No  left-sided calculus or obstructive uropathy. Prostate is heterogenous  and similar to the previous examination. The adrenal glands are normal.     4. The spleen is mildly enlarged measuring 13.5 cm in the AP direction.  The liver and gallbladder as well as the spleen are unremarkable. No  biliary duct dilatation.     5. Atherosclerotic calcification of a normal diameter aorta. The stomach  is collapsed, contour within normal limits. No free air or free fluid.  The appendix, colon and small bowel have a satisfactory appearance.           This report was finalized on 11/18/2023 4:02 PM by Dr. Prashant Rangel M.D on Workstation: [id number]       XR Chest 1 View [[id number]] Collected: 11/18/23 1330     Updated: 11/18/23 1334    Narrative:      XR CHEST 1 VW-     Clinical: Shortness of breath     COMPARISON examination 9/7/2023     FINDINGS: Cardiac size within normal limits. No mediastinal or hilar  abnormality. No pleural effusion, vascular congestion or acute airspace  disease is demonstrated.     CONCLUSION: No active disease of the chest     This report was finalized on 11/18/2023 1:31 PM by Dr. Prashant Rangel M.D on Workstation: [id number]                 Assessment:  Active Hospital Problems    Diagnosis  POA    **Pyelonephritis [N12]  Yes      Resolved Hospital Problems   No resolved problems to  display.   Sepsis  Diabetes  Hypertension  Underweight  anemia    Plan:  Change to rocephin  Last admission in September his culture was positive for klebsiella  Iv fluids  Monitor on telemetry  Trend labs  Accu checks, insulin sliding scale  Dw patient and ed provider    Julieth Bnaegas MD  11/18/2023  18:40 EST      Electronically signed by Julieth Banegas MD at 11/18/23 1849       Facility-Administered Medications as of 11/19/2023   Medication Dose Route Frequency Provider Last Rate Last Admin    [COMPLETED] acetaminophen (TYLENOL) tablet 1,000 mg  1,000 mg Oral Once Caro Lam PA   1,000 mg at 11/18/23 1735    acetaminophen (TYLENOL) tablet 650 mg  650 mg Oral Q4H PRN Julieth Banegas MD   650 mg at 11/19/23 0734    [COMPLETED] ampicillin-sulbactam (UNASYN) 3 g in sodium chloride 0.9 % 100 mL IVPB-VTB  3 g Intravenous Once Kurt Leos MD   Stopped at 11/18/23 1601    atorvastatin (LIPITOR) tablet 40 mg  40 mg Oral Daily Julieth Banegas MD   40 mg at 11/19/23 0808    [COMPLETED] azithromycin (ZITHROMAX) 500 mg in sodium chloride 0.9 % 250 mL IVPB-VTB  500 mg Intravenous Once Kurt Leos MD   Stopped at 11/18/23 1711    sennosides-docusate (PERICOLACE) 8.6-50 MG per tablet 2 tablet  2 tablet Oral BID Julieth Banegas MD   2 tablet at 11/19/23 0809    And    polyethylene glycol (MIRALAX) packet 17 g  17 g Oral Daily PRN Julieth Banegas MD        And    bisacodyl (DULCOLAX) EC tablet 5 mg  5 mg Oral Daily PRN Julieth Banegas MD        And    bisacodyl (DULCOLAX) suppository 10 mg  10 mg Rectal Daily PRN Julieth Banegas MD        cefTRIAXone (ROCEPHIN) 2,000 mg in sodium chloride 0.9 % 100 mL IVPB-VTB  2,000 mg Intravenous Q24H Julieth Banegas  mL/hr at 11/18/23 2025 2,000 mg at 11/18/23 2025    clopidogrel (PLAVIX) tablet 75 mg  75 mg Oral Daily Julieth Banegas MD   75 mg at 11/19/23 0808    dextrose (D50W) (25 g/50 mL) IV  injection 25 g  25 g Intravenous Q15 Min PRN Julieth Banegas MD        dextrose (GLUTOSE) oral gel 15 g  15 g Oral Q15 Min PRN Julieth Banegas MD        ferrous sulfate tablet 325 mg  325 mg Oral Daily With Breakfast Julieth Banegas MD   325 mg at 11/19/23 0809    glucagon (GLUCAGEN) injection 1 mg  1 mg Intramuscular Q15 Min PRN Julieth Banegas MD        insulin glargine (LANTUS, SEMGLEE) injection 30 Units  30 Units Subcutaneous Daily Julieth Banegas MD   30 Units at 11/19/23 0809    insulin lispro (HUMALOG/ADMELOG) injection 2-9 Units  2-9 Units Subcutaneous 4x Daily AC & at Bedtime Julieth Banegas MD   4 Units at 11/18/23 2123    [COMPLETED] iopamidol (ISOVUE-370) 76 % injection 100 mL  100 mL Intravenous Once in imaging Kurt Leos MD   100 mL at 11/18/23 1529    losartan (COZAAR) tablet 25 mg  25 mg Oral Daily Julieth Banegas MD        melatonin tablet 3 mg  3 mg Oral Nightly PRN Julieth Banegas MD   3 mg at 11/18/23 2026    [COMPLETED] morphine injection 4 mg  4 mg Intravenous Once Kurt Leos MD   4 mg at 11/18/23 1448    multivitamin with minerals 1 tablet  1 tablet Oral Daily Julieth Banegas MD   1 tablet at 11/19/23 0808    ondansetron (ZOFRAN) tablet 4 mg  4 mg Oral Q6H PRN Julieth Banegas MD        Or    ondansetron (ZOFRAN) injection 4 mg  4 mg Intravenous Q6H PRN Julieth Banegas MD        pantoprazole (PROTONIX) EC tablet 40 mg  40 mg Oral Daily Julieth Banegas MD   40 mg at 11/19/23 0809    [COMPLETED] sodium chloride 0.9 % bolus 1,000 mL  1,000 mL Intravenous Once Caro Lam PA   Stopped at 11/18/23 1645    sodium chloride 0.9 % with KCl 20 mEq/L infusion  125 mL/hr Intravenous Continuous Julieth Banegas  mL/hr at 11/19/23 0511 125 mL/hr at 11/19/23 0511    vitamin B-12 (CYANOCOBALAMIN) tablet 1,000 mcg  1,000 mcg Oral Daily Julieth Banegas MD   1,000 mcg at 11/19/23 0808      Lab Results (last 24 hours)       Procedure Component Value Units Date/Time    Basic Metabolic Panel [[id number]]  (Abnormal) Collected: 11/19/23 0457    Specimen: Blood Updated: 11/19/23 0628     Glucose 77 mg/dL      BUN 11 mg/dL      Creatinine 0.63 mg/dL      Sodium 139 mmol/L      Potassium 3.4 mmol/L      Chloride 102 mmol/L      CO2 24.1 mmol/L      Calcium 8.5 mg/dL      BUN/Creatinine Ratio 17.5     Anion Gap 12.9 mmol/L      eGFR 99.8 mL/min/1.73     Narrative:      GFR Normal >60  Chronic Kidney Disease <60  Kidney Failure <15    The GFR formula is only valid for adults with stable renal function between ages 18 and 70.    POC Glucose Once [[id number]]  (Normal) Collected: 11/19/23 0606    Specimen: Blood Updated: 11/19/23 0607     Glucose 89 mg/dL     CBC (No Diff) [[id number]]  (Abnormal) Collected: 11/19/23 0457    Specimen: Blood Updated: 11/19/23 0600     WBC 11.87 10*3/mm3      RBC 2.99 10*6/mm3      Hemoglobin 9.4 g/dL      Hematocrit 27.8 %      MCV 93.0 fL      MCH 31.4 pg      MCHC 33.8 g/dL      RDW 13.1 %      RDW-SD 44.1 fl      MPV 9.6 fL      Platelets 241 10*3/mm3     POC Glucose Once [[id number]]  (Abnormal) Collected: 11/18/23 2058    Specimen: Blood Updated: 11/18/23 2109     Glucose 211 mg/dL     Urinalysis, Microscopic Only - Suprapubic Catheter [[id number]]  (Abnormal) Collected: 11/18/23 1635    Specimen: Urine from Suprapubic Catheter Updated: 11/18/23 1707     RBC, UA 0-2 /HPF      WBC, UA 6-10 /HPF      Bacteria, UA 4+ /HPF      Squamous Epithelial Cells, UA 0-2 /HPF      Hyaline Casts, UA 0-2 /LPF      Methodology Manual Light Microscopy    Urine Culture - Urine, Suprapubic Catheter [[id number]] Collected: 11/18/23 1635    Specimen: Urine from Suprapubic Catheter Updated: 11/18/23 1707    Urinalysis With Culture If Indicated - Suprapubic Catheter [[id number]]  (Abnormal) Collected: 11/18/23 1635    Specimen: Urine from Suprapubic Catheter Updated: 11/18/23 1654     Color, UA Yellow      Appearance, UA Cloudy     pH, UA 5.5     Specific Gravity, UA 1.023     Glucose,  mg/dL (1+)     Ketones, UA 40 mg/dL (2+)     Bilirubin, UA Negative     Blood, UA Small (1+)     Protein, UA 30 mg/dL (1+)     Leuk Esterase, UA Large (3+)     Nitrite, UA Positive     Urobilinogen, UA 0.2 E.U./dL    Narrative:      In absence of clinical symptoms, the presence of pyuria, bacteria, and/or nitrites on the urinalysis result does not correlate with infection.    Respiratory Panel PCR w/COVID-19(SARS-CoV-2) ISAIAH/AMALIA/KEESHA/PAD/COR/KATHY In-House, NP Swab in UTM/VTM, 2 HR TAT - Swab, Nasopharynx [[id number]]  (Normal) Collected: 11/18/23 1331    Specimen: Swab from Nasopharynx Updated: 11/18/23 1501     ADENOVIRUS, PCR Not Detected     Coronavirus 229E Not Detected     Coronavirus HKU1 Not Detected     Coronavirus NL63 Not Detected     Coronavirus OC43 Not Detected     COVID19 Not Detected     Human Metapneumovirus Not Detected     Human Rhinovirus/Enterovirus Not Detected     Influenza A PCR Not Detected     Influenza B PCR Not Detected     Parainfluenza Virus 1 Not Detected     Parainfluenza Virus 2 Not Detected     Parainfluenza Virus 3 Not Detected     Parainfluenza Virus 4 Not Detected     RSV, PCR Not Detected     Bordetella pertussis pcr Not Detected     Bordetella parapertussis PCR Not Detected     Chlamydophila pneumoniae PCR Not Detected     Mycoplasma pneumo by PCR Not Detected    Narrative:      In the setting of a positive respiratory panel with a viral infection PLUS a negative procalcitonin without other underlying concern for bacterial infection, consider observing off antibiotics or discontinuation of antibiotics and continue supportive care. If the respiratory panel is positive for atypical bacterial infection (Bordetella pertussis, Chlamydophila pneumoniae, or Mycoplasma pneumoniae), consider antibiotic de-escalation to target atypical bacterial infection.    BNP [[id number]]  (Normal) Collected:  11/18/23 1416    Specimen: Blood Updated: 11/18/23 1456     proBNP 390.0 pg/mL     Narrative:      This assay is used as an aid in the diagnosis of individuals suspected of having heart failure. It can be used as an aid in the diagnosis of acute decompensated heart failure (ADHF) in patients presenting with signs and symptoms of ADHF to the emergency department (ED). In addition, NT-proBNP of <300 pg/mL indicates ADHF is not likely.    Age Range Result Interpretation  NT-proBNP Concentration (pg/mL:      <50             Positive            >450                   Gray                 300-450                    Negative             <300    50-75           Positive            >900                  Gray                300-900                  Negative            <300      >75             Positive            >1800                  Gray                300-1800                  Negative            <300    Single High Sensitivity Troponin T [[id number]]  (Abnormal) Collected: 11/18/23 1416    Specimen: Blood Updated: 11/18/23 1456     HS Troponin T 25 ng/L     Narrative:      High Sensitive Troponin T Reference Range:  <14.0 ng/L- Negative Female for AMI  <22.0 ng/L- Negative Male for AMI  >=14 - Abnormal Female indicating possible myocardial injury.  >=22 - Abnormal Male indicating possible myocardial injury.   Clinicians would have to utilize clinical acumen, EKG, Troponin, and serial changes to determine if it is an Acute Myocardial Infarction or myocardial injury due to an underlying chronic condition.         Procalcitonin [[id number]]  (Normal) Collected: 11/18/23 1416    Specimen: Blood Updated: 11/18/23 1456     Procalcitonin 0.10 ng/mL     Narrative:      As a Marker for Sepsis (Non-Neonates):    1. <0.5 ng/mL represents a low risk of severe sepsis and/or septic shock.  2. >2 ng/mL represents a high risk of severe sepsis and/or septic shock.    As a Marker for Lower Respiratory Tract Infections that require  "antibiotic therapy:    PCT on Admission    Antibiotic Therapy       6-12 Hrs later    >0.5                Strongly Recommended  >0.25 - <0.5        Recommended   0.1 - 0.25          Discouraged              Remeasure/reassess PCT  <0.1                Strongly Discouraged     Remeasure/reassess PCT    As 28 day mortality risk marker: \"Change in Procalcitonin Result\" (>80% or <=80%) if Day 0 (or Day 1) and Day 4 values are available. Refer to http://[email or web address]-pct-calculator.com    Change in PCT <=80%  A decrease of PCT levels below or equal to 80% defines a positive change in PCT test result representing a higher risk for 28-day all-cause mortality of patients diagnosed with severe sepsis for septic shock.    Change in PCT >80%  A decrease of PCT levels of more than 80% defines a negative change in PCT result representing a lower risk for 28-day all-cause mortality of patients diagnosed with severe sepsis or septic shock.       Blood Culture - Blood, Arm, Right [[id number]] Collected: 11/18/23 1427    Specimen: Blood from Arm, Right Updated: 11/18/23 1455    Comprehensive Metabolic Panel [[id number]]  (Abnormal) Collected: 11/18/23 1416    Specimen: Blood Updated: 11/18/23 1450     Glucose 247 mg/dL      BUN 13 mg/dL      Creatinine 0.70 mg/dL      Sodium 133 mmol/L      Potassium 3.8 mmol/L      Chloride 97 mmol/L      CO2 26.0 mmol/L      Calcium 9.2 mg/dL      Total Protein 6.6 g/dL      Albumin 3.9 g/dL      ALT (SGPT) 18 U/L      AST (SGOT) 12 U/L      Alkaline Phosphatase 155 U/L      Total Bilirubin 0.5 mg/dL      Globulin 2.7 gm/dL      A/G Ratio 1.4 g/dL      BUN/Creatinine Ratio 18.6     Anion Gap 10.0 mmol/L      eGFR 96.7 mL/min/1.73     Narrative:      GFR Normal >60  Chronic Kidney Disease <60  Kidney Failure <15    The GFR formula is only valid for adults with stable renal function between ages 18 and 70.    Protime-INR [[id number]]  (Abnormal) Collected: 11/18/23 1416    Specimen: Blood Updated: 11/18/23 " 1448     Protime 14.8 Seconds      INR 1.15    aPTT [[id number]]  (Normal) Collected: 11/18/23 1416    Specimen: Blood Updated: 11/18/23 1448     PTT 30.4 seconds     Lactic Acid, Plasma [[id number]]  (Normal) Collected: 11/18/23 1416    Specimen: Blood Updated: 11/18/23 1448     Lactate 1.2 mmol/L     CBC & Differential [[id number]]  (Abnormal) Collected: 11/18/23 1416    Specimen: Blood Updated: 11/18/23 1427    Narrative:      The following orders were created for panel order CBC & Differential.  Procedure                               Abnormality         Status                     ---------                               -----------         ------                     CBC Auto Differential[[id number]]        Abnormal            Final result                 Please view results for these tests on the individual orders.    CBC Auto Differential [[id number]]  (Abnormal) Collected: 11/18/23 1416    Specimen: Blood Updated: 11/18/23 1427     WBC 6.34 10*3/mm3      RBC 2.95 10*6/mm3      Hemoglobin 9.4 g/dL      Hematocrit 27.4 %      MCV 92.9 fL      MCH 31.9 pg      MCHC 34.3 g/dL      RDW 13.0 %      RDW-SD 43.4 fl      MPV 9.4 fL      Platelets 253 10*3/mm3      Neutrophil % 73.0 %      Lymphocyte % 11.4 %      Monocyte % 13.2 %      Eosinophil % 0.0 %      Basophil % 0.0 %      Immature Grans % 2.4 %      Neutrophils, Absolute 4.63 10*3/mm3      Lymphocytes, Absolute 0.72 10*3/mm3      Monocytes, Absolute 0.84 10*3/mm3      Eosinophils, Absolute 0.00 10*3/mm3      Basophils, Absolute 0.00 10*3/mm3      Immature Grans, Absolute 0.15 10*3/mm3      nRBC 0.0 /100 WBC     Blood Culture - Blood, Arm, Right [[id number]] Collected: 11/18/23 1229    Specimen: Blood from Arm, Right Updated: 11/18/23 1236          Imaging Results (Last 24 Hours)       Procedure Component Value Units Date/Time    CT Angiogram Chest Pulmonary Embolism [[id number]] Collected: 11/18/23 1539     Updated: 11/18/23 1605    Narrative:      CT ANGIOGRAM CHEST  PULMONARY EMBOLISM-, CT ABDOMEN PELVIS W CONTRAST-     Radiation dose reduction techniques were utilized, including automated  exposure control and exposure modulation based on body size.     CT scan of the chest performed with intravenous contrast, pulmonary  embolus protocol to include coronal, sagittal and three-dimensional  reconstruction.     Clinical: Pulmonary embolus suspected, shortness of breath and chest  pain     COMPARISON chest CT 9/7/2023 and CT of the abdomen and pelvis 9/7/2023     FINDINGS:  1. No pulmonary embolus, aortic aneurysm or dissection. There is mild  cardiac enlargement with heavy coronary artery calcification.     2. The esophagus is satisfactory in appearance. No mediastinal or hilar  mass/adenopathy. Numerous tiny calcified benign granulomas demonstrated  throughout both lungs. These measure up to 2-3 mm. Few similar size  noncalcified nodules also seen. All of these may be related to  granulomatous disease. All stable. No vascular congestion or pleural  effusion. No acute airspace disease or consolidation seen.     3. There is diffuse bladder wall thickening similar to the previous  examination, there is suprapubic catheter in position. Catheter position  is optimal. There is an area of altered enhancement along the lower pole  of the right kidney with perinephric fat stranding demonstrated at this  location. In addition there is uroepithelial thickening of the right  pelvis and proximal ureter. The findings are consistent with  pyelonephritis. No obstructive uropathy. The left kidney is normal. No  left-sided calculus or obstructive uropathy. Prostate is heterogenous  and similar to the previous examination. The adrenal glands are normal.     4. The spleen is mildly enlarged measuring 13.5 cm in the AP direction.  The liver and gallbladder as well as the spleen are unremarkable. No  biliary duct dilatation.     5. Atherosclerotic calcification of a normal diameter aorta. The  stomach  is collapsed, contour within normal limits. No free air or free fluid.  The appendix, colon and small bowel have a satisfactory appearance.           This report was finalized on 11/18/2023 4:02 PM by Dr. Prashant Rangel M.D on Workstation: [id number]       CT Abdomen Pelvis With Contrast [[id number]] Collected: 11/18/23 1539     Updated: 11/18/23 1605    Narrative:      CT ANGIOGRAM CHEST PULMONARY EMBOLISM-, CT ABDOMEN PELVIS W CONTRAST-     Radiation dose reduction techniques were utilized, including automated  exposure control and exposure modulation based on body size.     CT scan of the chest performed with intravenous contrast, pulmonary  embolus protocol to include coronal, sagittal and three-dimensional  reconstruction.     Clinical: Pulmonary embolus suspected, shortness of breath and chest  pain     COMPARISON chest CT 9/7/2023 and CT of the abdomen and pelvis 9/7/2023     FINDINGS:  1. No pulmonary embolus, aortic aneurysm or dissection. There is mild  cardiac enlargement with heavy coronary artery calcification.     2. The esophagus is satisfactory in appearance. No mediastinal or hilar  mass/adenopathy. Numerous tiny calcified benign granulomas demonstrated  throughout both lungs. These measure up to 2-3 mm. Few similar size  noncalcified nodules also seen. All of these may be related to  granulomatous disease. All stable. No vascular congestion or pleural  effusion. No acute airspace disease or consolidation seen.     3. There is diffuse bladder wall thickening similar to the previous  examination, there is suprapubic catheter in position. Catheter position  is optimal. There is an area of altered enhancement along the lower pole  of the right kidney with perinephric fat stranding demonstrated at this  location. In addition there is uroepithelial thickening of the right  pelvis and proximal ureter. The findings are consistent with  pyelonephritis. No obstructive uropathy. The left kidney is  normal. No  left-sided calculus or obstructive uropathy. Prostate is heterogenous  and similar to the previous examination. The adrenal glands are normal.     4. The spleen is mildly enlarged measuring 13.5 cm in the AP direction.  The liver and gallbladder as well as the spleen are unremarkable. No  biliary duct dilatation.     5. Atherosclerotic calcification of a normal diameter aorta. The stomach  is collapsed, contour within normal limits. No free air or free fluid.  The appendix, colon and small bowel have a satisfactory appearance.           This report was finalized on 11/18/2023 4:02 PM by Dr. Prashant Rangel M.D on Workstation: [id number]       XR Chest 1 View [[id number]] Collected: 11/18/23 1330     Updated: 11/18/23 1334    Narrative:      XR CHEST 1 VW-     Clinical: Shortness of breath     COMPARISON examination 9/7/2023     FINDINGS: Cardiac size within normal limits. No mediastinal or hilar  abnormality. No pleural effusion, vascular congestion or acute airspace  disease is demonstrated.     CONCLUSION: No active disease of the chest     This report was finalized on 11/18/2023 1:31 PM by Dr. Prashant Rangel M.D on Workstation: [id number]             ECG/EMG Results (last 24 hours)       Procedure Component Value Units Date/Time    ECG 12 Lead Dyspnea [[id number]] Collected: 11/18/23 1220     Updated: 11/18/23 1748     QT Interval 301 ms      QTC Interval 440 ms     Narrative:      HEART RATE= 128  bpm  RR Interval= 469  ms  MD Interval= 149  ms  P Horizontal Axis= -11  deg  P Front Axis= 62  deg  QRSD Interval= 84  ms  QT Interval= 301  ms  QTcB= 440  ms  QRS Axis= 29  deg  T Wave Axis= 30  deg  - BORDERLINE ECG -  Sinus tachycardia  Probable left atrial enlargement  No change from previous tracing  Electronically Signed By: Tracy Rosales ([facility]) 18-Nov-2023 17:47:48  Date and Time of Study: 2023-11-18 12:20:05    SCANNED - TELEMETRY   [[id number]] Resulted: 11/18/23     Updated: 11/18/23 2238    SCANNED  - TELEMETRY   [[id number]] Resulted: 11/18/23     Updated: 11/18/23 2338    SCANNED - TELEMETRY   [[id number]] Resulted: 11/18/23     Updated: 11/19/23 0907          Orders (last 24 hrs)        Start     Ordered    11/19/23 0800  ferrous sulfate tablet 325 mg  Daily With Breakfast         11/18/23 1850 11/19/23 0608  POC Glucose Once  PROCEDURE ONCE        Comments: Complete no more than 45 minutes prior to patient eating      11/19/23 0606    11/19/23 0600  Basic Metabolic Panel  Morning Draw         11/18/23 1734    11/19/23 0600  CBC (No Diff)  Morning Draw         11/18/23 1734    11/18/23 2200  POC Glucose 4x Daily Before Meals & at Bedtime  4 Times Daily Before Meals & at Bedtime      Comments: Complete no more than 45 minutes prior to patient eating      11/18/23 1850 11/18/23 2110  POC Glucose Once  PROCEDURE ONCE        Comments: Complete no more than 45 minutes prior to patient eating      11/18/23 2058 11/18/23 2100  sennosides-docusate (PERICOLACE) 8.6-50 MG per tablet 2 tablet  2 Times Daily        See Hyperspace for full Linked Orders Report.    11/18/23 1734 11/18/23 2100  insulin lispro (HUMALOG/ADMELOG) injection 2-9 Units  4 Times Daily Before Meals & Nightly         11/18/23 1850 11/18/23 2000  Vital Signs  Every 4 Hours      Comments: Per per hospital policy    11/18/23 1734    11/18/23 2000  cefTRIAXone (ROCEPHIN) 2,000 mg in sodium chloride 0.9 % 100 mL IVPB-VTB  Every 24 Hours         11/18/23 1846 11/18/23 1945  sodium chloride 0.9 % with KCl 20 mEq/L infusion  Continuous         11/18/23 1846 11/18/23 1945  atorvastatin (LIPITOR) tablet 40 mg  Daily         11/18/23 1850 11/18/23 1945  clopidogrel (PLAVIX) tablet 75 mg  Daily         11/18/23 1850 11/18/23 1945  vitamin B-12 (CYANOCOBALAMIN) tablet 1,000 mcg  Daily         11/18/23 1850 11/18/23 1945  insulin glargine (LANTUS, SEMGLEE) injection 30 Units  Daily         11/18/23 1850 11/18/23 1945  losartan  (COZAAR) tablet 25 mg  Daily         11/18/23 1850    11/18/23 1945  multivitamin with minerals 1 tablet  Daily         11/18/23 1850    11/18/23 1945  pantoprazole (PROTONIX) EC tablet 40 mg  Daily         11/18/23 1850    11/18/23 1849  dextrose (GLUTOSE) oral gel 15 g  Every 15 Minutes PRN         11/18/23 1850    11/18/23 1849  dextrose (D50W) (25 g/50 mL) IV injection 25 g  Every 15 Minutes PRN         11/18/23 1850    11/18/23 1849  glucagon (GLUCAGEN) injection 1 mg  Every 15 Minutes PRN         11/18/23 1850    11/18/23 1846  Inpatient Infectious Diseases Consult  Once        Specialty:  Infectious Diseases  Provider:  Senia Carter MD    11/18/23 1846 11/18/23 1800  Oral Care  2 Times Daily       11/18/23 1734    11/18/23 1747  acetaminophen (TYLENOL) tablet 1,000 mg  Once         11/18/23 1731    11/18/23 1735  Code Status and Medical Interventions:  Continuous         11/18/23 1734    11/18/23 1735  Diet: Cardiac Diets; Healthy Heart (2-3 Na+); Texture: Regular Texture (IDDSI 7); Fluid Consistency: Thin (IDDSI 0)  Diet Effective Now         11/18/23 1734    11/18/23 1735  Daily Weights  Daily       11/18/23 1734    11/18/23 1735  Strict Intake & Output  Every Shift       11/18/23 1734    11/18/23 1735  Place Sequential Compression Device  Once         11/18/23 1734    11/18/23 1735  Maintain Sequential Compression Device  Continuous         11/18/23 1734    11/18/23 1734  acetaminophen (TYLENOL) tablet 650 mg  Every 4 Hours PRN         11/18/23 1734    11/18/23 1734  polyethylene glycol (MIRALAX) packet 17 g  Daily PRN        See Hyperspace for full Linked Orders Report.    11/18/23 1734    11/18/23 1734  bisacodyl (DULCOLAX) EC tablet 5 mg  Daily PRN        See Hyperspace for full Linked Orders Report.    11/18/23 1734    11/18/23 1734  bisacodyl (DULCOLAX) suppository 10 mg  Daily PRN        See Hyperspace for full Linked Orders Report.    11/18/23 1734    11/18/23 1734  ondansetron (ZOFRAN) tablet 4  mg  Every 6 Hours PRN        See Hyperspace for full Linked Orders Report.    11/18/23 1734    11/18/23 1734  ondansetron (ZOFRAN) injection 4 mg  Every 6 Hours PRN        See Hyperspace for full Linked Orders Report.    11/18/23 1734    11/18/23 1734  melatonin tablet 3 mg  Nightly PRN         11/18/23 1734    11/18/23 1729  Inpatient Admission  Once         11/18/23 1728    11/18/23 1729  Cardiac Monitoring  Continuous        Comments: Follow Standing Orders As Outlined in Process Instructions (Open Order Report to View Full Instructions)    11/18/23 1728    11/18/23 1715  [facility] (on-call MD unless specified) Details  Once        Specialty:  Hospitalist  Provider:  (Not yet assigned)    11/18/23 1714    11/18/23 1708  Urine Culture - Urine, Suprapubic Catheter  Once         11/18/23 1707    11/18/23 1651  Urinalysis, Microscopic Only - Suprapubic Catheter  Once         11/18/23 1650    11/18/23 1637  Discontinue Patient Isolation  Once         11/18/23 1636    11/18/23 1543  iopamidol (ISOVUE-370) 76 % injection 100 mL  Once in Imaging         11/18/23 1527    11/18/23 1513  sodium chloride 0.9 % bolus 1,000 mL  Once         11/18/23 1457    11/18/23 1431  CT Abdomen Pelvis With Contrast  1 Time Imaging         11/18/23 1430    11/18/23 1430  morphine injection 4 mg  Once         11/18/23 1414    11/18/23 1430  ampicillin-sulbactam (UNASYN) 3 g in sodium chloride 0.9 % 100 mL IVPB-VTB  Once         11/18/23 1414    11/18/23 1430  azithromycin (ZITHROMAX) 500 mg in sodium chloride 0.9 % 250 mL IVPB-VTB  Once         11/18/23 1414    11/18/23 1415  Urinalysis With Culture If Indicated - Suprapubic Catheter  Once         11/18/23 1415    11/18/23 1251  CT Angiogram Chest Pulmonary Embolism  1 Time Imaging         11/18/23 1250    11/18/23 1208  Respiratory Panel PCR w/COVID-19(SARS-CoV-2) ISAIAH/AMALIA/KEESHA/PAD/COR/KATHY In-House, NP Swab in UTM/VTM, 2 HR TAT - Swab, Nasopharynx  STAT         11/18/23 1207    11/18/23 1207  CBC  & Differential  Once         11/18/23 1207    11/18/23 1207  Comprehensive Metabolic Panel  Once         11/18/23 1207    11/18/23 1207  Protime-INR  Once         11/18/23 1207    11/18/23 1207  aPTT  Once         11/18/23 1207    11/18/23 1207  ECG 12 Lead Dyspnea  Once         11/18/23 1207    11/18/23 1207  XR Chest 1 View  1 Time Imaging         11/18/23 1207    11/18/23 1207  BNP  Once         11/18/23 1207    11/18/23 1207  Single High Sensitivity Troponin T  Once         11/18/23 1207    11/18/23 1207  Blood Culture - Blood, Arm, Right  Once         11/18/23 1207    11/18/23 1207  Blood Culture - Blood, Arm, Right  Once         11/18/23 1207    11/18/23 1207  Lactic Acid, Plasma  Once         11/18/23 1207    11/18/23 1207  Procalcitonin  Once         11/18/23 1207    11/18/23 1207  CBC Auto Differential  PROCEDURE ONCE         11/18/23 1207    Unscheduled  Up with assistance  As Needed       11/18/23 1734    Unscheduled  Follow Hypoglycemia Standing Orders For Blood Glucose <70 & Notify Provider of Treatment  As Needed      Comments: Follow Hypoglycemia Orders As Outlined in Process Instructions (Open Order Report to View Full Instructions)  Notify Provider Any Time Hypoglycemia Treatment is Administered    11/18/23 1850    --  SCANNED - TELEMETRY           11/18/23 0000    --  SCANNED - TELEMETRY           11/18/23 0000    --  SCANNED - TELEMETRY           11/18/23 0000                  Operative/Procedure Notes (last 24 hours)  Notes from 11/18/23 1043 through 11/19/23 1043   No notes of this type exist for this encounter.       Physician Progress Notes (last 24 hours)  Notes from 11/18/23 1043 through 11/19/23 1043   No notes of this type exist for this encounter.          Consult Notes (last 24 hours)        Gomez Adames MD at 11/19/23 0836        Consult Orders    1. Inpatient Infectious Diseases Consult [[id number]] ordered by Julieth Banegas MD at 11/18/23 9604                  Referring Provider: Julieth Banegas MD      Subjective   History of present illness: This very nice 72-year-old gentleman who has a history of prostate cancer and suprapubic catheter placement in February 2023.  He has intermittent UTIs.  He said he was doing well up until a few days ago when he developed some right back/flank pain.  He initially attributed this to musculoskeletal pain as he had spent a lot of time breaking up leaves.  Fortunately a progressive symptoms with myalgias and also developed fever as high as 103.  He denies any obstruction from the suprapubic or abnormal drainage.  Given progressive symptoms he presented to the emergency department where CT chest abdomen pelvis was obtained and showed right pyelonephritis.    I reviewed his prior microbiology and he had Klebsiella in his urine twice in September 2023 both of which were broadly sensitive.    Patient has been on ceftriaxone and says he feels mildly better although he still having some pain.    Past Medical History:   Diagnosis Date    Claustrophobia     Diabetes mellitus     GERD (gastroesophageal reflux disease)     History of gastric ulcer     History of pneumonia 2018    MULTIFOCAL, HOSPITALIZED X 6 DAYS    History of shingles 2016    History of TIA (transient ischemic attack)     X2    Hyperlipidemia     Hypertension     Infection due to parainfluenza virus 2 11/13/2018    Melanoma     LEFT FOREARM     Prostate cancer     FOLLOWED BY DR ALVARADO    Urinary catheter in place     SUPRA PUBIC    Uses self-applied continuous glucose monitoring device     FREESTYLE MACRINA    Vertebral artery insufficiency     Vertigo        Past Surgical History:   Procedure Laterality Date    CATARACT EXTRACTION      COLONOSCOPY      2011 Dr [facility]    COLONOSCOPY N/A 6/30/2023    Procedure: COLONOSCOPY TO CECUM WITH SALINE LIFT &  HOT SNARE POLYPECTOMIES, COLD SNARE POLYPECTOMIES;  Surgeon: Ian Comer MD;  Location: [facility]  ENDOSCOPY;  Service: Gastroenterology;  Laterality: N/A;  PRE- SCREENING  POST- COLON POLYPS, DIVERTICULOSIS, HEMORRHOIDS    ENDOSCOPY N/A 12/15/2016    Procedure: ESOPHAGOGASTRODUODENOSCOPY WITH COLD BIOPSIES;  Surgeon: Moshe Lux MD;  Location: [facility] ENDOSCOPY;  Service:     SKIN GRAFT SPLIT THICKNESS Left 10/27/2020    Procedure: EXCISION OF MELANOMA FROM THE LEFT FOREARM REQUIRING FULL THICKNESS SKIN GRAFT;  Surgeon: Nick Nuñez MD;  Location: [facility] MAIN OR;  Service: General;  Laterality: Left;    SUPRAPUBIC TUBE PLACEMENT N/A 2/8/2023    Procedure: CYSTOSCOPY SUPRAPUBIC CATHETER INSERTION;  Surgeon: Dom Davis MD;  Location: [facility] MAIN OR;  Service: Urology;  Laterality: N/A;           Allergies   Allergen Reactions    Bee Venom Shortness Of Breath    Shellfish-Derived Products Anaphylaxis and Rash    Aleve [Naproxen] Other (See Comments)     Bleeding      Asa [Aspirin] Other (See Comments)     bleeding    Ibuprofen GI Bleeding       Physical Exam:   Vital Signs   Temp:  [97.8 °F (36.6 °C)-103.2 °F (39.6 °C)] 97.8 °F (36.6 °C)  Heart Rate:  [] 126  Resp:  [16-32] 16  BP: (121-175)/() 140/74    GENERAL: Awake and alert, sickly appearing  HEENT: Oropharynx is clear. Hearing is grossly normal.   EYES: . No conjunctival injection. No lid lag.   LUNGS:normal respiratory effort.   SKIN: no cutaneous eruptions in exposed areas  PSYCHIATRIC: Appropriate mood, affect, insight, and judgment.   Right CVA tenderness  Results Review:  White count 11.87  Creatinine 0.63  Glucose 77 through 211  Procalcitonin 0.1  Urinalysis 6-12 white blood cells, 4+ bacteria  11/18 blood cultures pending  11/18 urine culture pending  Respiratory pathogen panel negative  CT chest abdomen pelvis shows no PE or pneumonia.  Diffuse bladder wall thickening with right perinephric stranding and no obstruction.  Mild splenomegaly  Chest x-ray independently interpreted: No acute pneumonia      A/p  1.  Sepsis  secondary #2  2.  Right pyelonephritis    Based on his prior urine cultures, I would recommend continuing ceftriaxone 2 g IV every 24 hours.  Once all his culture results are available hopefully will be able to transition over to oral antibiotics in the next 1 to 2 days pending his clinical response and culture results.  He is very motivated to try to get out of the hospital by Thanksgiving holiday so he can visit his family in [place], [place]    Thank you for this consult.  We will continue to follow along and tailor antibiotics as the patient's clinical course evolves.              Electronically signed by Gomez Adames MD at 11/19/23 0656

## 2023-11-20 PROBLEM — E87.6 HYPOPOTASSEMIA: Status: RESOLVED | Noted: 2023-11-19 | Resolved: 2023-11-20

## 2023-11-20 LAB
BACTERIA SPEC AEROBE CULT: ABNORMAL
GLUCOSE BLDC GLUCOMTR-MCNC: 145 MG/DL (ref 70–130)
GLUCOSE BLDC GLUCOMTR-MCNC: 187 MG/DL (ref 70–130)
GLUCOSE BLDC GLUCOMTR-MCNC: 196 MG/DL (ref 70–130)
GLUCOSE BLDC GLUCOMTR-MCNC: 204 MG/DL (ref 70–130)
GLUCOSE BLDC GLUCOMTR-MCNC: 70 MG/DL (ref 70–130)
MAGNESIUM SERPL-MCNC: 2.5 MG/DL (ref 1.6–2.4)

## 2023-11-20 PROCEDURE — 99232 SBSQ HOSP IP/OBS MODERATE 35: CPT | Performed by: INTERNAL MEDICINE

## 2023-11-20 PROCEDURE — 25010000002 CEFTRIAXONE PER 250 MG: Performed by: INTERNAL MEDICINE

## 2023-11-20 PROCEDURE — 63710000001 INSULIN LISPRO (HUMAN) PER 5 UNITS: Performed by: INTERNAL MEDICINE

## 2023-11-20 PROCEDURE — 82948 REAGENT STRIP/BLOOD GLUCOSE: CPT

## 2023-11-20 PROCEDURE — 87040 BLOOD CULTURE FOR BACTERIA: CPT | Performed by: INTERNAL MEDICINE

## 2023-11-20 PROCEDURE — 25810000003 LACTATED RINGERS PER 1000 ML: Performed by: HOSPITALIST

## 2023-11-20 PROCEDURE — 83735 ASSAY OF MAGNESIUM: CPT | Performed by: HOSPITALIST

## 2023-11-20 PROCEDURE — 63710000001 INSULIN GLARGINE PER 5 UNITS: Performed by: HOSPITALIST

## 2023-11-20 RX ADMIN — ACETAMINOPHEN 650 MG: 325 TABLET, FILM COATED ORAL at 16:34

## 2023-11-20 RX ADMIN — LOSARTAN POTASSIUM 25 MG: 25 TABLET, FILM COATED ORAL at 09:15

## 2023-11-20 RX ADMIN — ACETAMINOPHEN 650 MG: 325 TABLET, FILM COATED ORAL at 06:12

## 2023-11-20 RX ADMIN — INSULIN LISPRO 2 UNITS: 100 INJECTION, SOLUTION INTRAVENOUS; SUBCUTANEOUS at 11:31

## 2023-11-20 RX ADMIN — DOCUSATE SODIUM 50MG AND SENNOSIDES 8.6MG 2 TABLET: 8.6; 5 TABLET, FILM COATED ORAL at 09:15

## 2023-11-20 RX ADMIN — CLOPIDOGREL BISULFATE 75 MG: 75 TABLET, FILM COATED ORAL at 09:15

## 2023-11-20 RX ADMIN — FERROUS SULFATE TAB 325 MG (65 MG ELEMENTAL FE) 325 MG: 325 (65 FE) TAB at 09:15

## 2023-11-20 RX ADMIN — SODIUM CHLORIDE, POTASSIUM CHLORIDE, SODIUM LACTATE AND CALCIUM CHLORIDE 100 ML/HR: 600; 310; 30; 20 INJECTION, SOLUTION INTRAVENOUS at 01:06

## 2023-11-20 RX ADMIN — ATORVASTATIN CALCIUM 40 MG: 20 TABLET, FILM COATED ORAL at 09:15

## 2023-11-20 RX ADMIN — CEFTRIAXONE 2000 MG: 2 INJECTION, POWDER, FOR SOLUTION INTRAMUSCULAR; INTRAVENOUS at 20:40

## 2023-11-20 RX ADMIN — INSULIN GLARGINE 20 UNITS: 100 INJECTION, SOLUTION SUBCUTANEOUS at 09:15

## 2023-11-20 RX ADMIN — MULTIPLE VITAMINS W/ MINERALS TAB 1 TABLET: TAB at 09:15

## 2023-11-20 RX ADMIN — Medication 1000 MCG: at 09:15

## 2023-11-20 RX ADMIN — Medication 3 MG: at 20:43

## 2023-11-20 RX ADMIN — ACETAMINOPHEN 650 MG: 325 TABLET, FILM COATED ORAL at 20:43

## 2023-11-20 RX ADMIN — PANTOPRAZOLE SODIUM 40 MG: 40 TABLET, DELAYED RELEASE ORAL at 09:15

## 2023-11-20 RX ADMIN — ACETAMINOPHEN 650 MG: 325 TABLET, FILM COATED ORAL at 11:33

## 2023-11-20 NOTE — CONSULTS
FIRST UROLOGY CONSULT      Patient Identification:  NAME:  Harry Sierra  Age:  72 y.o.   Sex:  male   :  1950   MRN:  [id number]       Chief complaint: Kidney infection    History of present illness: 72-year-old gent with history of prostate cancer remarkable enlargement of his prostate and urinary retention with a hypotonic bladder unable to self cath placement suprapubic catheter is worked well for him patient developed irritative bladder complaints fever and chills and right flank pain with the Klebsiella pyelonephritis and septicemia normalizing with IV Rocephin  Admitting CT scan reviewed by me which I concurred with the diagnosis nonobstructive pyelonephritis with perinephric fat stranding on the right bladder and urothelial thickening of the ureter consistent again    Past medical history:  Past Medical History:   Diagnosis Date    Claustrophobia     Diabetes mellitus     GERD (gastroesophageal reflux disease)     History of gastric ulcer     History of pneumonia 2018    MULTIFOCAL, HOSPITALIZED X 6 DAYS    History of shingles 2016    History of TIA (transient ischemic attack)     X2    Hyperlipidemia     Hypertension     Infection due to parainfluenza virus 2 2018    Melanoma     LEFT FOREARM     Prostate cancer     FOLLOWED BY DR ALVARADO    Urinary catheter in place     SUPRA PUBIC    Uses self-applied continuous glucose monitoring device     FREESTYLE MACRINA    Vertebral artery insufficiency     Vertigo        Past surgical history:  Past Surgical History:   Procedure Laterality Date    CATARACT EXTRACTION      COLONOSCOPY       Dr Reynoso [facility] Urology    COLONOSCOPY N/A 2023    Procedure: COLONOSCOPY TO CECUM WITH SALINE LIFT &  HOT SNARE POLYPECTOMIES, COLD SNARE POLYPECTOMIES;  Surgeon: Ian Comer MD;  Location: [facility] ENDOSCOPY;  Service: Gastroenterology;  Laterality: N/A;  PRE- SCREENING  POST- COLON POLYPS, DIVERTICULOSIS, HEMORRHOIDS    ENDOSCOPY N/A 12/15/2016     Procedure: ESOPHAGOGASTRODUODENOSCOPY WITH COLD BIOPSIES;  Surgeon: Moshe Lux MD;  Location: [facility] ENDOSCOPY;  Service:     SKIN GRAFT SPLIT THICKNESS Left 10/27/2020    Procedure: EXCISION OF MELANOMA FROM THE LEFT FOREARM REQUIRING FULL THICKNESS SKIN GRAFT;  Surgeon: Nick Nuñez MD;  Location: [facility] MAIN OR;  Service: General;  Laterality: Left;    SUPRAPUBIC TUBE PLACEMENT N/A 2/8/2023    Procedure: CYSTOSCOPY SUPRAPUBIC CATHETER INSERTION;  Surgeon: Dom Davis MD;  Location: [facility] MAIN OR;  Service: Urology;  Laterality: N/A;       Allergies:  Bee venom, Shellfish-derived products, Aleve [naproxen], Asa [aspirin], and Ibuprofen    Home medications:  Medications Prior to Admission   Medication Sig Dispense Refill Last Dose    atorvastatin (LIPITOR) 40 MG tablet Take 1 tablet by mouth every night at bedtime. 90 tablet 4 11/17/2023    clopidogrel (PLAVIX) 75 MG tablet Take 1 tablet by mouth Daily. 90 tablet 1 11/18/2023    Continuous Blood Gluc Sensor (MicrotaskStyle Vaishali 14 Day Sensor) misc Place 1 each on the skin as directed by provider Daily. 2 each 11 11/18/2023    ferrous sulfate (FerrouSul) 325 (65 FE) MG tablet Take 1 tablet by mouth Daily With Breakfast. 90 tablet 1 11/18/2023    insulin detemir (Levemir FlexPen) 100 UNIT/ML injection Inject 30 Units under the skin into the appropriate area as directed Daily. 15 mL 8 11/17/2023    losartan (COZAAR) 25 MG tablet Take 1 tablet by mouth Daily. 90 tablet 4 11/18/2023    metFORMIN (GLUCOPHAGE) 500 MG tablet Take 1 tablet by mouth 2 (Two) Times a Day With Meals. 180 tablet 3 11/18/2023    Multiple Vitamins-Minerals (CENTRUM SILVER 50+MEN) tablet Take 1 tablet by mouth Daily.   11/18/2023    pantoprazole (PROTONIX) 40 MG EC tablet TAKE 1 TABLET BY MOUTH DAILY 90 tablet 4 11/18/2023    cyanocobalamin (VITAMIN B-12) 1000 MCG tablet Take 1 tablet by mouth Daily. 30 tablet 0 More than a month        Hospital medications:  atorvastatin, 40 mg,  Oral, Daily  cefTRIAXone, 2,000 mg, Intravenous, Q24H  clopidogrel, 75 mg, Oral, Daily  ferrous sulfate, 325 mg, Oral, Daily With Breakfast  insulin glargine, 20 Units, Subcutaneous, Daily  insulin lispro, 2-9 Units, Subcutaneous, 4x Daily AC & at Bedtime  losartan, 25 mg, Oral, Daily  multivitamin with minerals, 1 tablet, Oral, Daily  pantoprazole, 40 mg, Oral, Daily  senna-docusate sodium, 2 tablet, Oral, BID  cyanocobalamin, 1,000 mcg, Oral, Daily           acetaminophen    senna-docusate sodium **AND** polyethylene glycol **AND** bisacodyl **AND** bisacodyl    dextrose    dextrose    glucagon (human recombinant)    melatonin    ondansetron **OR** ondansetron    Family history:  Family History   Problem Relation Age of Onset    Diabetes Mother     Heart disease Mother     Uterine cancer Mother     Diabetes Father     Heart disease Father     Kidney disease Father     Skin cancer Father         melanoma    Cancer Father         testicle    Malig Hyperthermia Neg Hx        Social history:  Social History     Tobacco Use    Smoking status: Never     Passive exposure: Never    Smokeless tobacco: Never   Vaping Use    Vaping Use: Never used   Substance Use Topics    Alcohol use: No    Drug use: No       Review of systems:    Negative 12-system ROS except for the following: Urine clear      Objective:  TMax 24 hours:   Temp (24hrs), Av.7 °F (37.1 °C), Min:97.8 °F (36.6 °C), Max:100 °F (37.8 °C)      Vitals Ranges:   Temp:  [97.8 °F (36.6 °C)-100 °F (37.8 °C)] 97.8 °F (36.6 °C)  Heart Rate:  [100-115] 102  Resp:  [16-20] 16  BP: (134-156)/(64-88) 146/88    Intake/Output Last 3 shifts:  I/O last 3 completed shifts:  In: 3617.9 [P.O.:720; I.V.:2697.9; IV Piggyback:200]  Out: 3175 [Urine:3175]     Physical Exam:       General Appearance:    Alert, cooperative, in no acute distress   Head:    Normocephalic, without obvious abnormality, atraumatic   Eyes:           Ears:     Throat:    Neck:   Supple, no LAD, trachea  midline   Back:     No CVA tenderness   Lungs:     Respirations unlabored, symmetric excursion    Heart:    RRR, intact peripheral pulses   Abdomen:        :  Penoscrotal and testicles are normal   PETER:  Extremities:        Skin:      Neuro/Psych:   Orientation intact, mood/affect pleasant,        Results review:   I reviewed the patient's new clinical results.    Data review:  Lab Results (last 24 hours)       Procedure Component Value Units Date/Time    POC Glucose Once [[id number]]  (Abnormal) Collected: 11/20/23 1616    Specimen: Blood Updated: 11/20/23 1617     Glucose 145 mg/dL     Blood Culture - Blood, Arm, Right [[id number]]  (Normal) Collected: 11/18/23 1229    Specimen: Blood from Arm, Right Updated: 11/20/23 1245     Blood Culture No growth at 2 days    POC Glucose Once [[id number]]  (Abnormal) Collected: 11/20/23 1117    Specimen: Blood Updated: 11/20/23 1119     Glucose 196 mg/dL     Urine Culture - Urine, Suprapubic Catheter [[id number]]  (Abnormal)  (Susceptibility) Collected: 11/18/23 1635    Specimen: Urine from Suprapubic Catheter Updated: 11/20/23 0950     Urine Culture >100,000 CFU/mL Klebsiella pneumoniae ssp pneumoniae    Narrative:      Colonization of the urinary tract without infection is common. Treatment is discouraged unless the patient is symptomatic, pregnant, or undergoing an invasive urologic procedure.      Susceptibility        Klebsiella pneumoniae ssp pneumoniae      RIA      Ampicillin Resistant      Ampicillin + Sulbactam Resistant      Cefazolin Susceptible      Cefepime Susceptible      Ceftazidime Susceptible      Ceftriaxone Susceptible      Gentamicin Susceptible      Levofloxacin Susceptible      Nitrofurantoin Intermediate      Piperacillin + Tazobactam Susceptible      Trimethoprim + Sulfamethoxazole Susceptible                           POC Glucose Once [[id number]]  (Abnormal) Collected: 11/20/23 0914    Specimen: Blood Updated: 11/20/23 0925     Glucose 187 mg/dL      Blood Culture - Blood, Arm, Right [[id number]]  (Abnormal) Collected: 11/18/23 1427    Specimen: Blood from Arm, Right Updated: 11/20/23 0745     Blood Culture Gram Negative Bacilli     Isolated from Anaerobic Bottle     Gram Stain Anaerobic Bottle Gram negative bacilli      Aerobic Bottle Gram negative bacilli    POC Glucose Once [[id number]]  (Normal) Collected: 11/20/23 0604    Specimen: Blood Updated: 11/20/23 0609     Glucose 70 mg/dL     Magnesium [[id number]]  (Abnormal) Collected: 11/20/23 0014    Specimen: Blood Updated: 11/20/23 0053     Magnesium 2.5 mg/dL     Blood Culture - Blood, Arm, Right [[id number]] Collected: 11/20/23 0014    Specimen: Blood from Arm, Right Updated: 11/20/23 0032    Blood Culture - Blood, Arm, Left [[id number]] Collected: 11/20/23 0020    Specimen: Blood from Arm, Left Updated: 11/20/23 0032    POC Glucose Once [[id number]]  (Normal) Collected: 11/19/23 2130    Specimen: Blood Updated: 11/19/23 2133     Glucose 123 mg/dL     Vitamin B12 [[id number]]  (Normal) Collected: 11/19/23 1647    Specimen: Blood from Arm, Left Updated: 11/19/23 1805     Vitamin B-12 548 pg/mL     Narrative:      Results may be falsely increased if patient taking Biotin.      TSH [[id number]]  (Normal) Collected: 11/19/23 1647    Specimen: Blood from Arm, Left Updated: 11/19/23 1805     TSH 1.220 uIU/mL     Magnesium [[id number]]  (Abnormal) Collected: 11/19/23 1647    Specimen: Blood from Arm, Left Updated: 11/19/23 1743     Magnesium 1.5 mg/dL              Imaging:  Imaging Results (Last 24 Hours)       ** No results found for the last 24 hours. **               Assessment:       Pyelonephritis    Primary hypertension    Type 2 diabetes mellitus with hyperglycemia, with long-term current use of insulin    History of atrial fibrillation    Urinary retention due to benign prostatic hyperplasia    Prostate cancer    Chronic suprapubic catheter    Gram-negative bacteremia        Plan:     Overall improving continuation  of current therapy no intervention necessary nonobstructed pyelonephritis we will follow-up in the office in approximately 2 weeks my office will call him discussed with the patient    Dom Davis MD  11/20/23  16:52 EST

## 2023-11-20 NOTE — CASE MANAGEMENT/SOCIAL WORK
Discharge Planning Assessment  [facility]     Patient Name: Harry Sierra  MRN: [id number]  Today's Date: 11/20/2023    Admit Date: 11/18/2023    Plan: Home with family   Discharge Needs Assessment       Row Name 11/20/23 1346       Living Environment    People in Home spouse    Current Living Arrangements home    Potentially Unsafe Housing Conditions none    Primary Care Provided by self    Provides Primary Care For no one    Family Caregiver if Needed spouse    Quality of Family Relationships helpful;involved;supportive    Able to Return to Prior Arrangements yes       Resource/Environmental Concerns    Resource/Environmental Concerns none    Transportation Concerns none       Transition Planning    Patient/Family Anticipates Transition to home with family    Patient/Family Anticipated Services at Transition none    Transportation Anticipated family or friend will provide       Discharge Needs Assessment    Readmission Within the Last 30 Days no previous admission in last 30 days    Equipment Currently Used at Home glucometer    Concerns to be Addressed denies needs/concerns at this time;no discharge needs identified    Anticipated Changes Related to Illness none    Equipment Needed After Discharge none    Provided Post Acute Provider List? N/A    Provided Post Acute Provider Quality & Resource List? N/A                   Discharge Plan       Row Name 11/20/23 3379       Plan    Plan Home with family    Patient/Family in Agreement with Plan yes    Plan Comments CCP met with patient at bedside. Introduced self and explained role of CCP. Patient confirmed the information on his face sheet is accurate. Patient is enrolled into [facility]. Patients PCP is Warren Mohamud. Patient lives at home with his spouse. Patient is Independent at home. Patient does not use any DME. Patient plans to discharge home with spouse. Family can transport.                  Continued Care and Services - Admitted Since 11/18/2023    Coordination  has not been started for this encounter.          Demographic Summary       Row Name 11/20/23 1345       General Information    Admission Type inpatient    Arrived From emergency department    Referral Source admission list    Reason for Consult discharge planning    Preferred Language English                   Functional Status       Row Name 11/20/23 1345       Functional Status    Usual Activity Tolerance moderate    Current Activity Tolerance moderate       Functional Status, IADL    Medications independent    Meal Preparation independent    Housekeeping independent    Laundry independent    Shopping independent       Mental Status    General Appearance WDL WDL       Mental Status Summary    Recent Changes in Mental Status/Cognitive Functioning no changes       Employment/    Employment Status self-employed                   Psychosocial    No documentation.                  Abuse/Neglect    No documentation.                  Legal    No documentation.                  Substance Abuse    No documentation.                  Patient Forms    No documentation.

## 2023-11-20 NOTE — PROGRESS NOTES
ID note for sepsis  S: feels better  Af  R flank pain better    Physical Exam:   Vital Signs   Temp:  [97.8 °F (36.6 °C)-100 °F (37.8 °C)] 98.1 °F (36.7 °C)  Heart Rate:  [100-116] 107  Resp:  [16-20] 16  BP: (129-159)/(64-82) 141/82    GENERAL: Awake and alert, sickly appearing  HEENT: Oropharynx is clear. Hearing is grossly normal.   EYES: . No conjunctival injection. No lid lag.   LUNGS:normal respiratory effort.   SKIN: no cutaneous eruptions in exposed areas  PSYCHIATRIC: Appropriate mood, affect, insight, and judgment.   Right CVA tenderness improved    Results Review:  Glc   11/18 blood cultures 1/2 Klebsiella  11/18 urine culture >100K Klebsiella  11/20 BCx 2/2 pending    A/p  1.  Klebsiella septicemia secondary #2  2.  Right pyelonephritis    Improving  Blood and urine cx with klebsiella  Cont rocephin  Plan change over to po abx tomorrow if still doing well

## 2023-11-20 NOTE — PLAN OF CARE
Goal Outcome Evaluation:      VSS ex ST. A/o x4. Mag replaced. Up stand by to toilet. Suprapubic catheter intact, site assessed. Tylenol prn for pain.      Progress: improving

## 2023-11-20 NOTE — PROGRESS NOTES
"    DAILY PROGRESS NOTE  [facility]    Patient Identification:  Name: Harry Sierra  Age: 72 y.o.  Sex: male  :  1950  MRN: [id number]         Primary Care Physician: Warren Mohamud MD    Subjective:  Interval History: Patient feeling much better overall.  Right flank pain resolved.  Tolerating p.o. without nausea or vomiting.  He admits not having the best of appetite but that is also his baseline.  Denies any fever or chills    Objective: Elderly and frail though conversational pleasant and comical.  Nontoxic.  No family present at bedside though aide was noted    Scheduled Meds:atorvastatin, 40 mg, Oral, Daily  cefTRIAXone, 2,000 mg, Intravenous, Q24H  clopidogrel, 75 mg, Oral, Daily  ferrous sulfate, 325 mg, Oral, Daily With Breakfast  insulin glargine, 20 Units, Subcutaneous, Daily  insulin lispro, 2-9 Units, Subcutaneous, 4x Daily AC & at Bedtime  losartan, 25 mg, Oral, Daily  multivitamin with minerals, 1 tablet, Oral, Daily  pantoprazole, 40 mg, Oral, Daily  senna-docusate sodium, 2 tablet, Oral, BID  cyanocobalamin, 1,000 mcg, Oral, Daily      Continuous Infusions:       Vital signs in last 24 hours:  Temp:  [97.8 °F (36.6 °C)-100 °F (37.8 °C)] 98.7 °F (37.1 °C)  Heart Rate:  [100-116] 108  Resp:  [16-20] 16  BP: (134-159)/(64-86) 156/86    Intake/Output:    Intake/Output Summary (Last 24 hours) at 2023 1314  Last data filed at 2023 0647  Gross per 24 hour   Intake 1960 ml   Output 2550 ml   Net -590 ml       Exam:  /86 (BP Location: Left arm, Patient Position: Lying)   Pulse 108   Temp 98.7 °F (37.1 °C) (Oral)   Resp 16   Ht 175.3 cm (69\")   Wt 68.3 kg (150 lb 9.2 oz)   SpO2 100%   BMI 22.24 kg/m²     General Appearance:    Alert, cooperative, AAOx3                         Throat:   Oral mucosa pink and moist                           Neck:   No JVD                         Lungs:    Clear to auscultation bilaterally, respirations unlabored                    "       Heart:    Regular rate and rhythm, S1 and S2 normal                  Abdomen:     Soft, nontender, bowel sounds active, no CVA tenderness to palpation                 Extremities: Moving all, no cyanosis or edema                            Data Review:  Labs in chart were reviewed.    Assessment:  Active Hospital Problems    Diagnosis  POA    **Pyelonephritis [N12]  Yes    Gram-negative bacteremia [R78.81]  Unknown    Chronic suprapubic catheter [Z93.59]  Not Applicable    Prostate cancer [C61]  Yes    Urinary retention due to benign prostatic hyperplasia [N40.1, R33.8]  Yes    History of atrial fibrillation [Z86.79]  Not Applicable    Type 2 diabetes mellitus with hyperglycemia, with long-term current use of insulin [E11.65, Z79.4]  Not Applicable    Primary hypertension [I10]  Yes      Resolved Hospital Problems    Diagnosis Date Resolved POA    Hypopotassemia [E87.6] 11/20/2023 Unknown       Plan:    Klebsiella pneumoniae septicemia secondary to pyelonephritis secondary to suprapubic cath   -Clinically much improved on IV Rocephin as blood and urine cultures are aligned and agree with ID plans for p.o. transition tomorrow   -No plans for any acute intervention per urology -maintain SP tube and do not change   -Repeat BC x2 pending to demonstrate eradication    DM2 with an A1c of 7.7 -SSI/Levemir/hold metformin status post IV contrast    HTN stable on losartan    Saline lock IVF.  K replaced    PRANAY on p.o. iron    HLD on statin        Disposition -planning on tomorrow if further clinical improvement is noted    Heriberto Boss MD  11/20/2023  13:14 EST

## 2023-11-20 NOTE — PLAN OF CARE
Problem: Fall Injury Risk  Goal: Absence of Fall and Fall-Related Injury  Outcome: Ongoing, Progressing  Intervention: Promote Injury-Free Environment  Recent Flowsheet Documentation  Taken 11/20/2023 1200 by Kimberly Rubalcava, SANJUANA  Safety Promotion/Fall Prevention:   activity supervised   clutter free environment maintained   assistive device/personal items within reach   fall prevention program maintained   nonskid shoes/slippers when out of bed   room organization consistent   safety round/check completed   toileting scheduled  Taken 11/20/2023 1000 by Kimberly Rubalcava, RN  Safety Promotion/Fall Prevention:   activity supervised   clutter free environment maintained   assistive device/personal items within reach   fall prevention program maintained   nonskid shoes/slippers when out of bed   room organization consistent   safety round/check completed   toileting scheduled   Goal Outcome Evaluation:  Plan of Care Reviewed With: patient        Progress: improving  Outcome Evaluation: VSS. C/o pain managed with prn tylenol. Up with assist. Will monitor.

## 2023-11-21 ENCOUNTER — READMISSION MANAGEMENT (OUTPATIENT)
Dept: CALL CENTER | Facility: HOSPITAL | Age: 73
End: 2023-11-21
Payer: MEDICARE

## 2023-11-21 VITALS
OXYGEN SATURATION: 100 % | RESPIRATION RATE: 18 BRPM | WEIGHT: 150.57 LBS | BODY MASS INDEX: 22.3 KG/M2 | HEART RATE: 96 BPM | DIASTOLIC BLOOD PRESSURE: 92 MMHG | TEMPERATURE: 98.2 F | HEIGHT: 69 IN | SYSTOLIC BLOOD PRESSURE: 159 MMHG

## 2023-11-21 LAB
BACTERIA SPEC AEROBE CULT: ABNORMAL
GLUCOSE BLDC GLUCOMTR-MCNC: 111 MG/DL (ref 70–130)
GLUCOSE BLDC GLUCOMTR-MCNC: 144 MG/DL (ref 70–130)
GRAM STN SPEC: ABNORMAL
GRAM STN SPEC: ABNORMAL
ISOLATED FROM: ABNORMAL

## 2023-11-21 PROCEDURE — 99232 SBSQ HOSP IP/OBS MODERATE 35: CPT | Performed by: INTERNAL MEDICINE

## 2023-11-21 PROCEDURE — 63710000001 INSULIN GLARGINE PER 5 UNITS: Performed by: HOSPITALIST

## 2023-11-21 PROCEDURE — 82948 REAGENT STRIP/BLOOD GLUCOSE: CPT

## 2023-11-21 RX ORDER — SULFAMETHOXAZOLE AND TRIMETHOPRIM 800; 160 MG/1; MG/1
1 TABLET ORAL EVERY 12 HOURS SCHEDULED
Qty: 10 TABLET | Refills: 0 | Status: SHIPPED | OUTPATIENT
Start: 2023-11-21 | End: 2023-11-26

## 2023-11-21 RX ORDER — SULFAMETHOXAZOLE AND TRIMETHOPRIM 800; 160 MG/1; MG/1
1 TABLET ORAL EVERY 12 HOURS SCHEDULED
Status: DISCONTINUED | OUTPATIENT
Start: 2023-11-21 | End: 2023-11-21 | Stop reason: HOSPADM

## 2023-11-21 RX ADMIN — INSULIN GLARGINE 20 UNITS: 100 INJECTION, SOLUTION SUBCUTANEOUS at 08:57

## 2023-11-21 RX ADMIN — LOSARTAN POTASSIUM 25 MG: 25 TABLET, FILM COATED ORAL at 09:02

## 2023-11-21 RX ADMIN — ACETAMINOPHEN 650 MG: 325 TABLET, FILM COATED ORAL at 03:45

## 2023-11-21 RX ADMIN — FERROUS SULFATE TAB 325 MG (65 MG ELEMENTAL FE) 325 MG: 325 (65 FE) TAB at 08:56

## 2023-11-21 RX ADMIN — CLOPIDOGREL BISULFATE 75 MG: 75 TABLET, FILM COATED ORAL at 08:56

## 2023-11-21 RX ADMIN — PANTOPRAZOLE SODIUM 40 MG: 40 TABLET, DELAYED RELEASE ORAL at 08:56

## 2023-11-21 RX ADMIN — Medication 1000 MCG: at 08:55

## 2023-11-21 RX ADMIN — ATORVASTATIN CALCIUM 40 MG: 20 TABLET, FILM COATED ORAL at 08:57

## 2023-11-21 RX ADMIN — MULTIPLE VITAMINS W/ MINERALS TAB 1 TABLET: TAB at 08:55

## 2023-11-21 NOTE — PROGRESS NOTES
ID note for sepsis  S: feels better  Af  R flank pain better  Urology saw and recommend medical management per review of their note    Physical Exam:   Vital Signs   Temp:  [97.8 °F (36.6 °C)-98.7 °F (37.1 °C)] 98.2 °F (36.8 °C)  Heart Rate:  [] 96  Resp:  [16-18] 18  BP: (132-160)/(81-92) 159/92    GENERAL: Awake and alert, sickly appearing  HEENT: Oropharynx is clear. Hearing is grossly normal.   EYES: . No conjunctival injection. No lid lag.   LUNGS:normal respiratory effort.   SKIN: no cutaneous eruptions in exposed areas  PSYCHIATRIC: Appropriate mood, affect, insight, and judgment.   Right CVA tenderness improved    Results Review:  Glc   11/18 blood cultures 1/2 Klebsiella  11/18 urine culture >100K Klebsiella  11/20 BCx 2/2 ngtd    A/p  1.  Klebsiella septicemia secondary #2  2.  Right pyelonephritis    Improving  Blood and urine cx with klebsiella  D/c rocephin  Start bactrim ds po bid x5d  No objection to discharge when okay with others.  We will see as needed.

## 2023-11-21 NOTE — CASE MANAGEMENT/SOCIAL WORK
Continued Stay Note  [facility]     Patient Name: Harry Sierra  MRN: [id number]  Today's Date: 11/21/2023    Admit Date: 11/18/2023    Plan: Home with family support, family to transport home   Discharge Plan       Row Name 11/21/23 1046       Plan    Plan Home with family support, family to transport home    Plan Comments Met with pt at bedside. Introduced self and explained role of . Confirmed with pt that he had no discharge needs at present. Explained that CCP is available should any needs arise.                   Discharge Codes    No documentation.                 Expected Discharge Date and Time       Expected Discharge Date Expected Discharge Time    Nov 22, 2023               Hanny Dietz RN

## 2023-11-21 NOTE — CASE MANAGEMENT/SOCIAL WORK
Case Management Discharge Note      Final Note: Home, family to transport    Provided Post Acute Provider List?: N/A  Provided Post Acute Provider Quality & Resource List?: N/A    Selected Continued Care - Discharged on 11/21/2023 Admission date: 11/18/2023 - Discharge disposition: Home or Self Care      Destination    No services have been selected for the patient.                Durable Medical Equipment    No services have been selected for the patient.                Dialysis/Infusion    No services have been selected for the patient.                Home Medical Care    No services have been selected for the patient.                Therapy    No services have been selected for the patient.                Community Resources    No services have been selected for the patient.                Community & DME    No services have been selected for the patient.                    Transportation Services  Private: Car    Final Discharge Disposition Code: 01 - home or self-care

## 2023-11-21 NOTE — DISCHARGE SUMMARY
[facility]    Date of Discharge:  11/21/2023    PCP: Warren Mohamud MD    Discharge Diagnosis:   Active Hospital Problems    Diagnosis  POA    **Pyelonephritis [N12]  Yes    Gram-negative bacteremia [R78.81]  Unknown    Chronic suprapubic catheter [Z93.59]  Not Applicable    Prostate cancer [C61]  Yes    Urinary retention due to benign prostatic hyperplasia [N40.1, R33.8]  Yes    History of atrial fibrillation [Z86.79]  Not Applicable    Type 2 diabetes mellitus with hyperglycemia, with long-term current use of insulin [E11.65, Z79.4]  Not Applicable    Primary hypertension [I10]  Yes      Resolved Hospital Problems    Diagnosis Date Resolved POA    Hypopotassemia [E87.6] 11/20/2023 Unknown          Consults       Date and Time Order Name Status Description    11/19/2023  1:06 PM Inpatient Urology Consult Completed     11/18/2023  6:46 PM Inpatient Infectious Diseases Consult Completed     11/18/2023  5:14 PM [facility] (on-call MD unless specified) Details            Hospital Course  72 y.o. male who ultimately was admitted for complaints of right flank pain.  He had disregarded this for days thinking it was due to yardwork and aging but ultimately the discomfort he was feeling in his right flank was due to developing pyelonephritis confirmed by CT.  He does have a suprapubic catheter in place and the etiology of his UTI and pyelonephritis is secondary to that.  Urology saw in consultation and they do not want that exchanged at this time.  Infectious disease also followed and patient was maintained on Rocephin and switched over to Bactrim per their recommendations for an additional 5 days postdischarge.  He is afebrile repeat cultures demonstrate eradication as he had Klebsiella pneumoniae septicemia due to the above.  His diabetic control is stable with an A1c of 7.7.  His hypertension is stable on losartan.  He is maintained on p.o. iron for iron deficiency  anemia and a statin for HLD.  Vital signs are stable as well as afebrile.  I discussed the case at length with patient and family x 2 at bedside on day of discharge.  They have multiple questions about imaging of the brain and I have redirected them to see their PCP which is already scheduled for Monday the week following discharge.  I think further workup at that time can be ascertained for dementia like etiologies but these do not need to be worked up on an inpatient basis at this time.  CCP assisting with discharge needs which are none and patient is medically cleared to be discharged home.      Temp:  [97.8 °F (36.6 °C)-98.2 °F (36.8 °C)] 98.2 °F (36.8 °C)  Heart Rate:  [] 96  Resp:  [16-18] 18  BP: (132-160)/(81-92) 159/92  Body mass index is 22.24 kg/m².    Physical Exam  Constitutional:       Comments: Elderly and frail though fluent speech conversational and pleasant.  Nontoxic   HENT:      Head: Normocephalic.      Nose: Nose normal.      Mouth/Throat:      Mouth: Mucous membranes are moist.      Pharynx: Oropharynx is clear.   Eyes:      Extraocular Movements: Extraocular movements intact.      Conjunctiva/sclera: Conjunctivae normal.      Pupils: Pupils are equal, round, and reactive to light.   Cardiovascular:      Rate and Rhythm: Normal rate and regular rhythm.   Pulmonary:      Effort: Pulmonary effort is normal. No respiratory distress.      Breath sounds: Normal breath sounds.   Abdominal:      General: Bowel sounds are normal. There is no distension.      Palpations: Abdomen is soft.      Tenderness: There is no right CVA tenderness.   Musculoskeletal:         General: No swelling.   Skin:     General: Skin is warm and dry.      Coloration: Skin is not jaundiced.   Neurological:      Mental Status: He is alert and oriented to person, place, and time. Mental status is at baseline.      Cranial Nerves: No cranial nerve deficit.       Disposition: Home or Self Care       Discharge Medications         New Medications        Instructions Start Date   sulfamethoxazole-trimethoprim 800-160 MG per tablet  Commonly known as: BACTRIM DS,SEPTRA DS   1 tablet, Oral, Every 12 Hours Scheduled             Continue These Medications        Instructions Start Date   atorvastatin 40 MG tablet  Commonly known as: LIPITOR   40 mg, Oral, Every Night at Bedtime      Centrum Silver 50+Men tablet tablet  Generic drug: multivitamin with minerals   1 tablet, Oral, Daily      clopidogrel 75 MG tablet  Commonly known as: PLAVIX   75 mg, Oral, Daily      ferrous sulfate 325 (65 FE) MG tablet  Commonly known as: FerrouSul   325 mg, Oral, Daily With Breakfast      FreeStyle Vaishali 14 Day Sensor misc   1 each, Transdermal, Daily      Levemir FlexPen 100 UNIT/ML injection  Generic drug: insulin detemir   30 Units, Subcutaneous, Daily      losartan 25 MG tablet  Commonly known as: COZAAR   25 mg, Oral, Daily      metFORMIN 500 MG tablet  Commonly known as: GLUCOPHAGE   500 mg, Oral, 2 Times Daily With Meals      pantoprazole 40 MG EC tablet  Commonly known as: PROTONIX   40 mg, Oral, Daily      vitamin B-12 1000 MCG tablet  Commonly known as: CYANOCOBALAMIN   1,000 mcg, Oral, Daily                Additional Instructions for the Follow-ups that You Need to Schedule       Discharge Follow-up with PCP   As directed       Currently Documented PCP:    Warren Mohamud MD    PCP Phone Number:    [phone]     Follow Up Details: PCP as scheduled on Monday               Follow-up Information       Warren Mohamud MD .    Specialties: Family Medicine, Emergency Medicine  Why: PCP as scheduled on Monday  Contact information:  [address] [place] [address]  [phone]                            Future Appointments   Date Time Provider Department Center   11/27/2023  9:30 AM Warren Mohamud MD [facility]   1/23/2024  8:00 AM Warren Mohamud MD [facility]     Pending Labs       Order Current Status    Blood  Culture - Blood, Arm, Left Preliminary result    Blood Culture - Blood, Arm, Right Preliminary result    Blood Culture - Blood, Arm, Right Preliminary result           Heriberto Boss MD  [facility]  11/21/23    Discharge time spent greater than 30 minutes.

## 2023-11-22 ENCOUNTER — TRANSITIONAL CARE MANAGEMENT TELEPHONE ENCOUNTER (OUTPATIENT)
Dept: CALL CENTER | Facility: HOSPITAL | Age: 73
End: 2023-11-22
Payer: MEDICARE

## 2023-11-22 NOTE — OUTREACH NOTE
Call Center TCM Note      Flowsheet Row Responses   [facility] patient discharged from? [place]   Does the patient have one of the following disease processes/diagnoses(primary or secondary)? Other   TCM attempt successful? No   Unsuccessful attempts Attempt 1   Call Status Left message            Amira Yeager RN    11/22/2023, 11:09 EST

## 2023-11-22 NOTE — OUTREACH NOTE
Call Center TCM Note      Flowsheet Row Responses   [facility] patient discharged from? [place]   Does the patient have one of the following disease processes/diagnoses(primary or secondary)? Other   TCM attempt successful? Yes   Call start time 1304   Call end time 1306   Discharge diagnosis *Pyelonephritis   Meds reviewed with patient/caregiver? Yes   Is the patient having any side effects they believe may be caused by any medication additions or changes? No   Does the patient have all medications ordered at discharge? Yes   Is the patient taking all medications as directed (includes completed medication regime)? Yes   Comments Hospital d/c f/u appt on 11/27/23 @9:30am   Does the patient have an appointment with their PCP within 7-14 days of discharge? Yes   Has home health visited the patient within 72 hours of discharge? N/A   Psychosocial issues? No   What is the patient's perception of their health status since discharge? Improving   Is the patient/caregiver able to teach back the hierarchy of who to call/visit for symptoms/problems? PCP, Specialist, Home health nurse, Urgent Care, ED, 911 Yes   TCM call completed? Yes   Call end time 1306   Would this patient benefit from a Referral to Amb Social Work? No   Is the patient interested in additional calls from an ambulatory ? No            Amira Yeager RN    11/22/2023, 13:07 EST

## 2023-11-22 NOTE — OUTREACH NOTE
Prep Survey      Flowsheet Row Responses   [facility] patient discharged from? [place]   Is LACE score < 7 ? No   Eligibility [facility]   Date of Admission 11/18/23   Date of Discharge 11/21/23   Discharge Disposition Home or Self Care   Discharge diagnosis *Pyelonephritis (   Does the patient have one of the following disease processes/diagnoses(primary or secondary)? Other   Does the patient have Home health ordered? No   Is there a DME ordered? No   Prep survey completed? Yes            AIDE ROMO - Registered Nurse

## 2023-11-23 LAB — BACTERIA SPEC AEROBE CULT: NORMAL

## 2023-11-25 LAB
BACTERIA SPEC AEROBE CULT: NORMAL
BACTERIA SPEC AEROBE CULT: NORMAL

## 2023-11-27 ENCOUNTER — OFFICE VISIT (OUTPATIENT)
Dept: INTERNAL MEDICINE | Facility: CLINIC | Age: 73
End: 2023-11-27
Payer: MEDICARE

## 2023-11-27 VITALS
DIASTOLIC BLOOD PRESSURE: 78 MMHG | OXYGEN SATURATION: 100 % | HEIGHT: 69 IN | BODY MASS INDEX: 20.68 KG/M2 | RESPIRATION RATE: 20 BRPM | WEIGHT: 139.6 LBS | HEART RATE: 93 BPM | SYSTOLIC BLOOD PRESSURE: 122 MMHG

## 2023-11-27 DIAGNOSIS — N12 PYELONEPHRITIS: ICD-10-CM

## 2023-11-27 DIAGNOSIS — R41.0 DELIRIUM: ICD-10-CM

## 2023-11-27 DIAGNOSIS — Z85.46 HISTORY OF PROSTATE CANCER: ICD-10-CM

## 2023-11-27 DIAGNOSIS — Z93.59 CHRONIC SUPRAPUBIC CATHETER: ICD-10-CM

## 2023-11-27 DIAGNOSIS — Z09 HOSPITAL DISCHARGE FOLLOW-UP: Primary | ICD-10-CM

## 2023-11-27 DIAGNOSIS — I10 PRIMARY HYPERTENSION: Chronic | ICD-10-CM

## 2023-11-27 DIAGNOSIS — R78.81 GRAM-NEGATIVE BACTEREMIA: ICD-10-CM

## 2023-11-27 PROBLEM — E11.9 TYPE 2 DIABETES MELLITUS WITHOUT COMPLICATION, WITH LONG-TERM CURRENT USE OF INSULIN: Chronic | Status: ACTIVE | Noted: 2019-01-24

## 2023-11-27 RX ORDER — FLASH GLUCOSE SCANNING READER
EACH MISCELLANEOUS
COMMUNITY
Start: 2023-10-30

## 2023-11-27 NOTE — PATIENT INSTRUCTIONS
"MyChart Tips:    MyChart is a useful part of our patient care program and is a great way for us to communicate lab results and for you to request refills.  Not all medical questions are appropriate for MyChart such as new medical issues that require taking a history, performing an exam, getting labs/studies or researching medical questions needed to be addressed in the office.    Examples of medical issues that are APPROPRIATE for MyChart:  -Follow-up on problems we have already addressed in a visit such as home testing results, blood pressure readings, glucose readings  -Questions that can be answered with a simple \"yes\" or \"no\"    Communication that is NOT APPROPRIATE for MyChart:  -MyChart is not for new problems, serious problems or urgent problems.  Urgent matters should be addressed by phone, in the office, urgent care or the ER.  -Connotate messages are not email.  Staff will check messages each weekday.  We strive for a 48-hour turnaround on messaging.    -Sensitive Objectt is not for private issues.  Messages are received first by our office staff.    Please allow up to 7 business days for lab results to be sent through Connotate to you before contacting your provider.  Sometimes we are waiting for results to get back from the lab and also your provider needs time to analyze them thoroughly before making recommendations.  While the internet has great resources, it is not a substitute for interpreting lab results.    We also ask that you not send [employer] messages and telephone calls regarding the same issue simultaneously.  This slows down the process of returning your call/message and confuses staff.    Be mindful that [employer] messages and telephone calls become part of your permanent medical record.    Lastly, your provider cannot access your [employer].  It is a service which communicates with the EHR (Electronic Health Record).  Sometimes there are errors in Connotate that staff and providers cannot see and these errors " are not part of your EHR.  Vaccines and screening reminders have been incorrect in MyChart.    We appreciate your respect for the limitations and boundaries of [employer].

## 2023-11-27 NOTE — PROGRESS NOTES
Transitional Care Follow Up Visit  Subjective     Harry Sierra is a 72 y.o. male who presents for a transitional care management visit.    Within 48 business hours after discharge our office contacted him via telephone to coordinate his care and needs.      I reviewed and discussed the details of that call along with the discharge summary, hospital problems, inpatient lab results, inpatient diagnostic studies, and consultation reports with Harry.     Current outpatient and discharge medications have been reconciled for the patient.  Reviewed by: Warren Mohamud MD          11/21/2023     8:35 PM   Date of TCM Phone Call   [facility]   Date of Admission 11/18/2023   Date of Discharge 11/21/2023   Discharge Disposition Home or Self Care     Risk for Readmission (LACE) Score: 13 (11/21/2023  6:00 AM)      History of Present Illness   Course During Hospital Stay:      He was recently admitted to the hospital as above for pyelonephritis and gram-negative bacteremia.  He has a history of chronic suprapubic catheter, prostate cancer, urinary retention.  He had presented with right flank pain and was found to be developing pyelonephritis confirmed by CT scan.  He was followed by urology and infectious disease while in the hospital for treatment.  His catheter was replaced.  His diabetes and blood pressure were stable with an A1c of 7.7.  He was confused while in the hospital but is clearing.       Current Outpatient Medications:     atorvastatin (LIPITOR) 40 MG tablet, Take 1 tablet by mouth every night at bedtime., Disp: 90 tablet, Rfl: 4    clopidogrel (PLAVIX) 75 MG tablet, Take 1 tablet by mouth Daily., Disp: 90 tablet, Rfl: 1    Continuous Blood Gluc  (FreeStyle Vaishali 14 Day [place]) device, , Disp: , Rfl:     Continuous Blood Gluc Sensor (FreeStyle Vaishali 14 Day Sensor) misc, Place 1 each on the skin as directed by provider Daily., Disp: 2 each, Rfl: 11    ferrous sulfate (FerrouSul) 325  (65 FE) MG tablet, Take 1 tablet by mouth Daily With Breakfast., Disp: 90 tablet, Rfl: 1    insulin detemir (Levemir FlexPen) 100 UNIT/ML injection, Inject 30 Units under the skin into the appropriate area as directed Daily. (Patient taking differently: Inject 36 Units under the skin into the appropriate area as directed Daily.), Disp: 15 mL, Rfl: 8    losartan (COZAAR) 25 MG tablet, Take 1 tablet by mouth Daily., Disp: 90 tablet, Rfl: 4    metFORMIN (GLUCOPHAGE) 500 MG tablet, Take 1 tablet by mouth 2 (Two) Times a Day With Meals., Disp: 180 tablet, Rfl: 3    Multiple Vitamins-Minerals (CENTRUM SILVER 50+MEN) tablet, Take 1 tablet by mouth Daily., Disp: , Rfl:     pantoprazole (PROTONIX) 40 MG EC tablet, TAKE 1 TABLET BY MOUTH DAILY, Disp: 90 tablet, Rfl: 4    cyanocobalamin (VITAMIN B-12) 1000 MCG tablet, Take 1 tablet by mouth Daily. (Patient not taking: Reported on 11/27/2023), Disp: 30 tablet, Rfl: 0      Vitals:    11/27/23 0920   BP: 122/78   Pulse: 93   Resp: 20   SpO2: 100%     Body mass index is 20.62 kg/m².      Objective   Physical Exam  Vitals and nursing note reviewed.   Constitutional:       General: He is not in acute distress.     Appearance: Normal appearance.   Cardiovascular:      Rate and Rhythm: Normal rate and regular rhythm.      Heart sounds: Normal heart sounds. No murmur heard.  Pulmonary:      Effort: Pulmonary effort is normal.      Breath sounds: Normal breath sounds.   Neurological:      Mental Status: He is alert.           Common labs          9/21/2023    00:00 11/18/2023    14:16 11/19/2023    04:57   Common Labs   Glucose 279  247  77    BUN 11  13  11    Creatinine 0.65  0.70  0.63    Sodium 140  133  139    Potassium 3.9  3.8  3.4    Chloride 104  97  102    Calcium 8.9  9.2  8.5    Total Protein 6.4      Albumin 4.1  3.9     Total Bilirubin <0.2  0.5     Alkaline Phosphatase 197  155     AST (SGOT) 40  12     ALT (SGPT) 48  18     WBC 4.81  6.34  11.87    Hemoglobin 10.2  9.4   9.4    Hematocrit 30.4  27.4  27.8    Platelets 293  253  241          Assessment & Plan   Diagnoses and all orders for this visit:    1. Hospital discharge follow-up (Primary)    2. Pyelonephritis    3. Gram-negative bacteremia    4. History of prostate cancer    5. Chronic suprapubic catheter    6. Primary hypertension    7. Delirium        Clinically stable acute and chronic conditions as above.  Continue all medications as above.  Labs reviewed as above.

## 2023-12-04 ENCOUNTER — TELEPHONE (OUTPATIENT)
Dept: INTERNAL MEDICINE | Facility: CLINIC | Age: 73
End: 2023-12-04

## 2023-12-04 NOTE — TELEPHONE ENCOUNTER
Caller: Tamra Sierra    Relationship: Emergency Contact    Best call back number:     [phone]       What orders are you requesting (i.e. lab or imaging):  CONTRAST MRI OF HIS BRAIN  DUE TO PERSONALITY CHANGES AND CONFUSION  TEST TSH THYROID AND B12, RPR     In what timeframe would the patient need to come in:   PATIENT IN ER 11/18/23-11/21/23  [facility]- REQUEST FROM HOME [facility]    Where will you receive your lab/imaging services: [facility]     Additional notes: COULD NOT SCHEDULE THE MRI AT THE TIME OF THE ER VISIT

## 2023-12-05 ENCOUNTER — TELEPHONE (OUTPATIENT)
Dept: INTERNAL MEDICINE | Facility: CLINIC | Age: 73
End: 2023-12-05
Payer: MEDICARE

## 2023-12-05 NOTE — TELEPHONE ENCOUNTER
Hub okay to let charlotte know       I would not recommend those tests as we have a clear cause for the delirium.  I would only recommend those kind of tests for ongoing memory deficits or confusion which are not clearing within a month or so.

## 2023-12-05 NOTE — TELEPHONE ENCOUNTER
I would not recommend those tests as we have a clear cause for the delirium.  I would only recommend those kind of tests for ongoing memory deficits or confusion which are not clearing within a month or so.

## 2023-12-05 NOTE — TELEPHONE ENCOUNTER
Name: Tamra Sierra      Relationship: Emergency Contact      Best Callback Number: [phone]      HUB PROVIDED THE RELAY MESSAGE FROM THE OFFICE      PATIENT: HAS FURTHER QUESTIONS AND WOULD LIKE A CALL BACK AT THE FOLLOWING PHONE NUMBER     ADDITIONAL INFORMATION:

## 2023-12-05 NOTE — TELEPHONE ENCOUNTER
Lvm for pt to return call     Hub okay to rely message '     I would not recommend those tests as we have a clear cause for the delirium.  I would only recommend those kind of tests for ongoing memory deficits or confusion which are not clearing within a month or so.

## 2023-12-06 NOTE — TELEPHONE ENCOUNTER
The discharge summary mentioned working up for dementia-like symptoms outpatient if mental status was not clearing.  He has no clinical indications of a tumor in the brain.  Tumors/masses cause focal neurologic deficits rather than memory issues.  I would not run any of those tests before his next appointment in January 2024 as we are too close to when the infection and delirium occurred.  The delirium needs time to completely clear.

## 2023-12-06 NOTE — TELEPHONE ENCOUNTER
They are wanting it because they said he been acting weird for a few years he's been sleeping a lot and he is having some speech issues she said they told the people from the hospital and they recommended getting it done but wanted to wait a few weeks due to him being so weak from the infection the daughter states sometimes he forget what side of the road he is supposed to be on she wants to make sure there is no tumor up there or anything else going on in the brain

## 2024-01-16 DIAGNOSIS — Z86.79 HISTORY OF ATRIAL FIBRILLATION: ICD-10-CM

## 2024-01-16 DIAGNOSIS — I65.23 BILATERAL CAROTID ARTERY STENOSIS: Chronic | ICD-10-CM

## 2024-01-16 RX ORDER — CLOPIDOGREL BISULFATE 75 MG/1
75 TABLET ORAL DAILY
Qty: 90 TABLET | Refills: 4 | Status: SHIPPED | OUTPATIENT
Start: 2024-01-16

## 2024-01-16 RX ORDER — ATORVASTATIN CALCIUM 40 MG/1
40 TABLET, FILM COATED ORAL
Qty: 90 TABLET | Refills: 4 | Status: SHIPPED | OUTPATIENT
Start: 2024-01-16

## 2024-01-23 ENCOUNTER — OFFICE VISIT (OUTPATIENT)
Dept: INTERNAL MEDICINE | Facility: CLINIC | Age: 74
End: 2024-01-23
Payer: MEDICARE

## 2024-01-23 VITALS
DIASTOLIC BLOOD PRESSURE: 80 MMHG | HEART RATE: 98 BPM | SYSTOLIC BLOOD PRESSURE: 130 MMHG | RESPIRATION RATE: 18 BRPM | OXYGEN SATURATION: 98 % | WEIGHT: 133 LBS | BODY MASS INDEX: 19.7 KG/M2 | HEIGHT: 69 IN

## 2024-01-23 DIAGNOSIS — E78.5 HYPERLIPIDEMIA ASSOCIATED WITH TYPE 2 DIABETES MELLITUS: Chronic | ICD-10-CM

## 2024-01-23 DIAGNOSIS — E11.69 HYPERLIPIDEMIA ASSOCIATED WITH TYPE 2 DIABETES MELLITUS: Chronic | ICD-10-CM

## 2024-01-23 DIAGNOSIS — Z79.4 TYPE 2 DIABETES MELLITUS WITHOUT COMPLICATION, WITH LONG-TERM CURRENT USE OF INSULIN: Primary | Chronic | ICD-10-CM

## 2024-01-23 DIAGNOSIS — Z85.46 HISTORY OF PROSTATE CANCER: ICD-10-CM

## 2024-01-23 DIAGNOSIS — E11.9 TYPE 2 DIABETES MELLITUS WITHOUT COMPLICATION, WITH LONG-TERM CURRENT USE OF INSULIN: Primary | Chronic | ICD-10-CM

## 2024-01-23 DIAGNOSIS — E11.9 ENCOUNTER FOR DIABETIC FOOT EXAM: ICD-10-CM

## 2024-01-23 DIAGNOSIS — R63.4 ABNORMAL WEIGHT LOSS: ICD-10-CM

## 2024-01-23 DIAGNOSIS — I10 PRIMARY HYPERTENSION: Chronic | ICD-10-CM

## 2024-01-23 LAB
ALBUMIN SERPL-MCNC: 4.5 G/DL (ref 3.5–5.2)
ALBUMIN/GLOB SERPL: 1.9 G/DL
ALP SERPL-CCNC: 126 U/L (ref 39–117)
ALT SERPL-CCNC: 14 U/L (ref 1–41)
AST SERPL-CCNC: 11 U/L (ref 1–40)
BILIRUB SERPL-MCNC: 0.4 MG/DL (ref 0–1.2)
BUN SERPL-MCNC: 17 MG/DL (ref 8–23)
BUN/CREAT SERPL: 22.1 (ref 7–25)
CALCIUM SERPL-MCNC: 9.3 MG/DL (ref 8.6–10.5)
CHLORIDE SERPL-SCNC: 99 MMOL/L (ref 98–107)
CHOLEST SERPL-MCNC: 94 MG/DL (ref 0–200)
CO2 SERPL-SCNC: 26.4 MMOL/L (ref 22–29)
CREAT SERPL-MCNC: 0.77 MG/DL (ref 0.76–1.27)
EGFRCR SERPLBLD CKD-EPI 2021: 93.4 ML/MIN/1.73
ERYTHROCYTE [DISTWIDTH] IN BLOOD BY AUTOMATED COUNT: 15.2 % (ref 12.3–15.4)
GLOBULIN SER CALC-MCNC: 2.4 GM/DL
GLUCOSE SERPL-MCNC: 216 MG/DL (ref 65–99)
HBA1C MFR BLD: 9.6 % (ref 4.8–5.6)
HCT VFR BLD AUTO: 32.6 % (ref 37.5–51)
HDLC SERPL-MCNC: 42 MG/DL (ref 40–60)
HGB BLD-MCNC: 11.1 G/DL (ref 13–17.7)
LDLC SERPL CALC-MCNC: 39 MG/DL (ref 0–100)
LDLC/HDLC SERPL: 0.98 {RATIO}
MCH RBC QN AUTO: 31.1 PG (ref 26.6–33)
MCHC RBC AUTO-ENTMCNC: 34 G/DL (ref 31.5–35.7)
MCV RBC AUTO: 91.3 FL (ref 79–97)
PLATELET # BLD AUTO: 246 10*3/MM3 (ref 140–450)
POTASSIUM SERPL-SCNC: 4.3 MMOL/L (ref 3.5–5.2)
PROT SERPL-MCNC: 6.9 G/DL (ref 6–8.5)
PSA SERPL-MCNC: 8.91 NG/ML (ref 0–4)
RBC # BLD AUTO: 3.57 10*6/MM3 (ref 4.14–5.8)
SODIUM SERPL-SCNC: 141 MMOL/L (ref 136–145)
TRIGL SERPL-MCNC: 54 MG/DL (ref 0–150)
TSH SERPL DL<=0.005 MIU/L-ACNC: 2.22 UIU/ML (ref 0.27–4.2)
VLDLC SERPL CALC-MCNC: 13 MG/DL (ref 5–40)
WBC # BLD AUTO: 4.26 10*3/MM3 (ref 3.4–10.8)

## 2024-01-23 NOTE — PROGRESS NOTES
Chief Complaint  Follow-up and Weight Loss    Subjective        Harry Sierra presents to [facility]  History of Present Illness    He is following up today for diabetes, hyperlipidemia, hypertension.  No new complaints regarding these conditions.  Blood pressure controlled in the office today.  He is taking his medications as prescribed below.  He has not noticed no increased blood sugars lately.    He has lost a considerable mount of weight in three months from 150 lbs to 133 lbs.  Still having productive cough.  CRC screening up to date.  He is not a cig smoker but occasional puffs a pipe.  He has noticed an increase in appetite, increased thirst and increased urination.      Current Outpatient Medications:     atorvastatin (LIPITOR) 40 MG tablet, TAKE ONE TABLET BY MOUTH EVERY NIGHT AT BEDTIME, Disp: 90 tablet, Rfl: 4    clopidogrel (PLAVIX) 75 MG tablet, TAKE 1 TABLET BY MOUTH DAILY, Disp: 90 tablet, Rfl: 4    Continuous Blood Gluc  (FreeStyle Vaishali 14 Day [place]) device, , Disp: , Rfl:     Continuous Blood Gluc Sensor (FreeStyle Vaishali 14 Day Sensor) misc, Place 1 each on the skin as directed by provider Daily., Disp: 2 each, Rfl: 11    cyanocobalamin (VITAMIN B-12) 1000 MCG tablet, Take 1 tablet by mouth Daily., Disp: 30 tablet, Rfl: 0    ferrous sulfate (FerrouSul) 325 (65 FE) MG tablet, Take 1 tablet by mouth Daily With Breakfast., Disp: 90 tablet, Rfl: 1    insulin detemir (Levemir FlexPen) 100 UNIT/ML injection, Inject 30 Units under the skin into the appropriate area as directed Daily. (Patient taking differently: Inject 36 Units under the skin into the appropriate area as directed Daily.), Disp: 15 mL, Rfl: 8    losartan (COZAAR) 25 MG tablet, Take 1 tablet by mouth Daily., Disp: 90 tablet, Rfl: 4    metFORMIN (GLUCOPHAGE) 500 MG tablet, Take 1 tablet by mouth 2 (Two) Times a Day With Meals., Disp: 180 tablet, Rfl: 3    Multiple Vitamins-Minerals (CENTRUM SILVER  "50+MEN) tablet, Take 1 tablet by mouth Daily., Disp: , Rfl:     pantoprazole (PROTONIX) 40 MG EC tablet, TAKE 1 TABLET BY MOUTH DAILY, Disp: 90 tablet, Rfl: 4      Objective   Vital Signs:  /80 (BP Location: Left arm, Patient Position: Sitting, Cuff Size: Small Adult)   Pulse 98   Resp 18   Ht 175.3 cm (69\")   Wt 60.3 kg (133 lb)   SpO2 98%   BMI 19.64 kg/m²   Estimated body mass index is 19.64 kg/m² as calculated from the following:    Height as of this encounter: 175.3 cm (69\").    Weight as of this encounter: 60.3 kg (133 lb).       BMI is within normal parameters. No other follow-up for BMI required.      Physical Exam  Vitals and nursing note reviewed.   Constitutional:       General: He is not in acute distress.     Appearance: Normal appearance.   Cardiovascular:      Rate and Rhythm: Normal rate and regular rhythm.      Pulses:           Dorsalis pedis pulses are 2+ on the right side and 2+ on the left side.        Posterior tibial pulses are 2+ on the right side and 2+ on the left side.      Heart sounds: Normal heart sounds. No murmur heard.  Pulmonary:      Effort: Pulmonary effort is normal.      Breath sounds: Normal breath sounds.   Feet:      Right foot:      Protective Sensation: 5 sites tested.  5 sites sensed.      Skin integrity: Skin integrity normal.      Toenail Condition: Fungal disease present.     Left foot:      Protective Sensation: 5 sites tested.  5 sites sensed.      Skin integrity: Skin integrity normal.      Toenail Condition: Fungal disease present.  Neurological:      Mental Status: He is alert.        Result Review :    The following data was reviewed by: Warren Mohamud MD on 01/23/2024:  Common labs          9/21/2023    00:00 11/18/2023    14:16 11/19/2023    04:57   Common Labs   Glucose 279  247  77    BUN 11  13  11    Creatinine 0.65  0.70  0.63    Sodium 140  133  139    Potassium 3.9  3.8  3.4    Chloride 104  97  102    Calcium 8.9  9.2  8.5    Total Protein " 6.4      Albumin 4.1  3.9     Total Bilirubin <0.2  0.5     Alkaline Phosphatase 197  155     AST (SGOT) 40  12     ALT (SGPT) 48  18     WBC 4.81  6.34  11.87    Hemoglobin 10.2  9.4  9.4    Hematocrit 30.4  27.4  27.8    Platelets 293  253  241                   Assessment and Plan     Diagnoses and all orders for this visit:    1. Type 2 diabetes mellitus without complication, with long-term current use of insulin (Primary)  -     CBC (No Diff)  -     Comprehensive Metabolic Panel  -     Hemoglobin A1c    2. Primary hypertension  -     CBC (No Diff)  -     Comprehensive Metabolic Panel    3. Hyperlipidemia associated with type 2 diabetes mellitus  -     CBC (No Diff)  -     Comprehensive Metabolic Panel  -     Lipid Panel With LDL / HDL Ratio    4. Encounter for diabetic foot exam    5. Abnormal weight loss  -     CBC (No Diff)  -     Comprehensive Metabolic Panel  -     TSH Rfx On Abnormal To Free T4  -     PSA DIAGNOSTIC    6. History of prostate cancer  -     PSA DIAGNOSTIC        Clinically stable chronic conditions as above.  Continue all medications as above.  Labs reviewed/ordered as above.  Passed diabetic foot exam.  I will check some labs as above for the weight loss.  I may check a CT chest if the labs do not offer any explanation for the weight changes such as hyperglycemia.           Follow Up     Return in about 4 months (around 5/23/2024) for Next scheduled follow up.  Patient was given instructions and counseling regarding his condition or for health maintenance advice. Please see specific information pulled into the AVS if appropriate.

## 2024-01-23 NOTE — PATIENT INSTRUCTIONS
"MyChart/Telephone call/Lab results Tips:    MyChart and telephone calls are a useful part of our patient care program and is a way for us to communicate lab results and for you to request refills.  Not all medical questions are appropriate for MyChart such as new medical issues that require taking a history, performing an exam, getting labs/studies or researching medical questions needed to be addressed in the office.  Similarly, telephone calls should not be used for new problems requiring an evaluation as well.    Examples of medical issues that are APPROPRIATE for MyChart and telephone calls:  -Follow-up on problems we have already addressed in a visit such as home testing results, blood pressure readings, glucose readings  -Questions that can be answered with a simple \"yes\" or \"no\"    Communication that is NOT APPROPRIATE for MyChart:  -New problems, serious problems or urgent problems.  Urgent matters should be addressed by phone for advice, in the office, urgent care or the ER.  -Requesting Covid or bacterial infection treatments: These types of problems require an evaluation.    -PowerDMS messages are not email.  Staff will check messages each weekday.  We strive for a 48-hour turnaround on messaging.    -[employer] is not for private issues.  Messages are received first by our office staff.    Please allow up to 7 business days for lab results to be sent through PowerDMS to you before contacting your provider.  Sometimes we are waiting for results to get back from the lab and also your provider needs time to analyze them thoroughly before making recommendations.  While the internet has great resources, it is not a substitute for interpreting lab results.    We also ask that you not send [employer] messages and telephone calls regarding the same issue simultaneously.  This slows down the process of returning your call/message and confuses staff.    Be mindful that MyChart messages and telephone calls become part of " your permanent medical record.    Your provider cannot access your lifeaction gameshart.  It is a service which communicates with the EHR (Electronic Health Record).  Sometimes there are errors in [employer] that staff and providers cannot see and these errors are not part of your EHR.  Vaccines and screening reminders have been frequently incorrect in lifeaction gameshart.    Per Dr. Mohamud, when sending messages via [employer], please be as concise and accurate as possible.  Much of our time each day is utilized looking up information for patients only to find out that it was another practice or pharmacy responsible for the issue.  I will be the first to admit that I am not fond of [employer].  Much information is inaccurate or difficult/time consuming to look up in our system.  When using the messaging, please use it for short and simple questions.  Anything further than that should go through the phone system or better yet addressed in a visit.      We appreciate your respect for the limitations and boundaries of [employer] and the telephone answering service.    Thank you,  Dr. Mohamud

## 2024-02-23 DIAGNOSIS — Z79.4 TYPE 2 DIABETES MELLITUS WITH HYPERGLYCEMIA, WITH LONG-TERM CURRENT USE OF INSULIN: Chronic | ICD-10-CM

## 2024-02-23 DIAGNOSIS — E11.65 TYPE 2 DIABETES MELLITUS WITH HYPERGLYCEMIA, WITH LONG-TERM CURRENT USE OF INSULIN: Chronic | ICD-10-CM

## 2024-03-01 DIAGNOSIS — Z79.4 TYPE 2 DIABETES MELLITUS WITH HYPERGLYCEMIA, WITH LONG-TERM CURRENT USE OF INSULIN: Chronic | ICD-10-CM

## 2024-03-01 DIAGNOSIS — E11.65 TYPE 2 DIABETES MELLITUS WITH HYPERGLYCEMIA, WITH LONG-TERM CURRENT USE OF INSULIN: Chronic | ICD-10-CM

## 2024-03-01 RX ORDER — FLASH GLUCOSE SENSOR
KIT MISCELLANEOUS
Qty: 2 EACH | Refills: 5 | Status: SHIPPED | OUTPATIENT
Start: 2024-03-01

## 2024-03-01 NOTE — TELEPHONE ENCOUNTER
Rx Refill Note  Requested Prescriptions     Pending Prescriptions Disp Refills    Continuous Blood Gluc Sensor (FreeStyle Vaishali 14 Day Sensor) misc [Pharmacy Med Name: FREESTYLE VAISHLAI 14 DAY SENSOR]       Sig: PLACE ONE SENSOR ON THE SKIN AS DIRECTED BY PROVIDER DAILY      Last office visit with prescribing clinician: 1/23/2024   Last telemedicine visit with prescribing clinician: Visit date not found   Next office visit with prescribing clinician: 5/23/2024

## 2024-03-03 DIAGNOSIS — I10 PRIMARY HYPERTENSION: Chronic | ICD-10-CM

## 2024-03-04 RX ORDER — LOSARTAN POTASSIUM 25 MG/1
25 TABLET ORAL DAILY
Qty: 90 TABLET | Refills: 4 | Status: SHIPPED | OUTPATIENT
Start: 2024-03-04

## 2024-03-06 DIAGNOSIS — I65.23 BILATERAL CAROTID ARTERY OCCLUSION: Primary | ICD-10-CM

## 2024-04-01 NOTE — ED NOTES
Nursing report ED to floor  Harry Sierra  72 y.o.  male    HPI :   Chief Complaint   Patient presents with    Abdominal Pain       Admitting doctor:   Julieth Banegas MD    Admitting diagnosis:   The primary encounter diagnosis was Acute UTI. Diagnoses of Lactic acidosis, Generalized abdominal pain, and Type 2 diabetes mellitus with other specified complication, without long-term current use of insulin were also pertinent to this visit.    Code status:   Current Code Status       Date Active Code Status Order ID Comments User Context       Prior            Allergies:   Bee venom, Shellfish-derived products, Aleve [naproxen], Asa [aspirin], and Ibuprofen    Isolation:   No active isolations    Intake and Output  No intake or output data in the 24 hours ending 09/07/23 1534    Weight:       09/07/23  1255   Weight: 63.5 kg (139 lb 15.9 oz)       Most recent vitals:   Vitals:    09/07/23 1317 09/07/23 1328 09/07/23 1334 09/07/23 1431   BP:  151/86 143/83 144/87   Pulse: (!) 128  (!) 122 (!) 121   Resp:       Temp:       SpO2:    99%   Weight:       Height:           Active LDAs/IV Access:   Lines, Drains & Airways       Active LDAs       Name Placement date Placement time Site Days    Peripheral IV 09/07/23 1432 Left Antecubital 09/07/23  1432  Antecubital  less than 1    Suprapubic Catheter 02/08/23  0920  -- 211                    Labs (abnormal labs have a star):   Labs Reviewed   COMPREHENSIVE METABOLIC PANEL - Abnormal; Notable for the following components:       Result Value    Glucose 222 (*)     Creatinine 0.66 (*)     Alkaline Phosphatase 125 (*)     All other components within normal limits    Narrative:     GFR Normal >60  Chronic Kidney Disease <60  Kidney Failure <15    The GFR formula is only valid for adults with stable renal function between ages 18 and 70.   LIPASE - Abnormal; Notable for the following components:    Lipase 10 (*)     All other components within normal limits   URINALYSIS W/  MICROSCOPIC IF INDICATED (NO CULTURE) - Abnormal; Notable for the following components:    Appearance, UA Cloudy (*)     Glucose, UA >=1000 mg/dL (3+) (*)     Ketones, UA Trace (*)     Leuk Esterase, UA Moderate (2+) (*)     Nitrite, UA Positive (*)     All other components within normal limits   LACTIC ACID, PLASMA - Abnormal; Notable for the following components:    Lactate 2.2 (*)     All other components within normal limits   CBC WITH AUTO DIFFERENTIAL - Abnormal; Notable for the following components:    RBC 3.72 (*)     Hemoglobin 11.7 (*)     Hematocrit 34.3 (*)     Monocyte % 16.6 (*)     Eosinophil % 0.0 (*)     All other components within normal limits   LACTIC ACID, REFLEX - Abnormal; Notable for the following components:    Lactate 2.3 (*)     All other components within normal limits   URINALYSIS, MICROSCOPIC ONLY - Abnormal; Notable for the following components:    WBC, UA Too Numerous to Count (*)     Bacteria, UA 3+ (*)     All other components within normal limits   TROPONIN - Normal    Narrative:     High Sensitive Troponin T Reference Range:  <10.0 ng/L- Negative Female for AMI  <15.0 ng/L- Negative Male for AMI  >=10 - Abnormal Female indicating possible myocardial injury.  >=15 - Abnormal Male indicating possible myocardial injury.   Clinicians would have to utilize clinical acumen, EKG, Troponin, and serial changes to determine if it is an Acute Myocardial Infarction or myocardial injury due to an underlying chronic condition.        BNP (IN-HOUSE) - Normal    Narrative:     Among patients with dyspnea, NT-proBNP is highly sensitive for the detection of acute congestive heart failure. In addition NT-proBNP of <300 pg/ml effectively rules out acute congestive heart failure with 99% negative predictive value.     PROCALCITONIN - Normal    Narrative:     As a Marker for Sepsis (Non-Neonates):    1. <0.5 ng/mL represents a low risk of severe sepsis and/or septic shock.  2. >2 ng/mL represents a  "high risk of severe sepsis and/or septic shock.    As a Marker for Lower Respiratory Tract Infections that require antibiotic therapy:    PCT on Admission    Antibiotic Therapy       6-12 Hrs later    >0.5                Strongly Recommended  >0.25 - <0.5        Recommended   0.1 - 0.25          Discouraged              Remeasure/reassess PCT  <0.1                Strongly Discouraged     Remeasure/reassess PCT    As 28 day mortality risk marker: \"Change in Procalcitonin Result\" (>80% or <=80%) if Day 0 (or Day 1) and Day 4 values are available. Refer to [email or web address]-[email or web address]    Change in PCT <=80%  A decrease of PCT levels below or equal to 80% defines a positive change in PCT test result representing a higher risk for 28-day all-cause mortality of patients diagnosed with severe sepsis for septic shock.    Change in PCT >80%  A decrease of PCT levels of more than 80% defines a negative change in PCT result representing a lower risk for 28-day all-cause mortality of patients diagnosed with severe sepsis or septic shock.      BLOOD CULTURE   BLOOD CULTURE   RAINBOW DRAW    Narrative:     The following orders were created for panel order [place] Draw.  Procedure                               Abnormality         Status                     ---------                               -----------         ------                     Green Top (Gel)[[id number]]                                  Final result               Lavender Top[[id number]]                                     Final result               Gold Top - SST[[id number]]                                   Final result               Light Blue Top[[id number]]                                   Final result                 Please view results for these tests on the individual orders.   URINALYSIS W/ CULTURE IF INDICATED   LACTIC ACID, REFLEX   HIGH SENSITIVITIY TROPONIN T 2HR   POCT OCCULT BLOOD STOOL (ED ONLY)   CBC AND DIFFERENTIAL    Narrative:     The " following orders were created for panel order CBC & Differential.  Procedure                               Abnormality         Status                     ---------                               -----------         ------                     CBC Auto Differential[[id number]]        Abnormal            Final result                 Please view results for these tests on the individual orders.   GREEN TOP   LAVENDER TOP   GOLD TOP - SST   LIGHT BLUE TOP       EKG:   ECG 12 Lead Tachycardia   Preliminary Result   HEART RATE= 117  bpm   RR Interval= 513  ms   HI Interval= 162  ms   P Horizontal Axis= -10  deg   P Front Axis= 70  deg   QRSD Interval= 83  ms   QT Interval= 310  ms   QTcB= 433  ms   QRS Axis= 41  deg   T Wave Axis= 47  deg   - BORDERLINE ECG -   Sinus tachycardia   Probable left atrial enlargement   Baseline wander in lead(s) II,III,aVR,aVF   Electronically Signed By:    Date and Time of Study: 2023-09-07 14:07:09          Meds given in ED:   Medications   sodium chloride 0.9 % flush 10 mL (has no administration in time range)   sodium chloride 0.9 % bolus 1,000 mL (has no administration in time range)   cefTRIAXone (ROCEPHIN) 2,000 mg in sodium chloride 0.9 % 100 mL IVPB-VTB (has no administration in time range)       Imaging results:  XR Chest 1 View    Result Date: 9/7/2023  FINDINGS AND IMPRESSION: No pulmonary consolidation, pleural effusion or pneumothorax is visualized cardiac silhouette is within normal limits for size..  This report was finalized on 9/7/2023 2:41 PM by Dr. Terrance Hauser M.D.       Ambulatory status:   - up with assistance    Social issues:   Social History     Socioeconomic History    Marital status:     Number of children: 4   Tobacco Use    Smoking status: Never     Passive exposure: Never    Smokeless tobacco: Never   Vaping Use    Vaping Use: Never used   Substance and Sexual Activity    Alcohol use: No    Drug use: No    Sexual activity: Defer       NIH Stroke Scale:        Carmella Robertson RN  09/07/23 15:34 EDT        01-Apr-2024 06:30

## 2024-04-04 ENCOUNTER — OFFICE VISIT (OUTPATIENT)
Dept: INTERNAL MEDICINE | Facility: CLINIC | Age: 74
End: 2024-04-04
Payer: MEDICARE

## 2024-04-04 VITALS
DIASTOLIC BLOOD PRESSURE: 89 MMHG | HEART RATE: 75 BPM | HEIGHT: 69 IN | BODY MASS INDEX: 19.4 KG/M2 | SYSTOLIC BLOOD PRESSURE: 149 MMHG | OXYGEN SATURATION: 99 % | WEIGHT: 131 LBS | TEMPERATURE: 97.7 F

## 2024-04-04 DIAGNOSIS — D03.62 MELANOMA IN SITU OF LEFT UPPER EXTREMITY INCLUDING SHOULDER: ICD-10-CM

## 2024-04-04 DIAGNOSIS — R05.3 CHRONIC COUGH: ICD-10-CM

## 2024-04-04 DIAGNOSIS — R97.20 ABNORMAL PSA: ICD-10-CM

## 2024-04-04 DIAGNOSIS — R63.4 WEIGHT LOSS, NON-INTENTIONAL: ICD-10-CM

## 2024-04-04 DIAGNOSIS — Z93.59 CHRONIC SUPRAPUBIC CATHETER: ICD-10-CM

## 2024-04-04 DIAGNOSIS — Z79.4 TYPE 2 DIABETES MELLITUS WITHOUT COMPLICATION, WITH LONG-TERM CURRENT USE OF INSULIN: Chronic | ICD-10-CM

## 2024-04-04 DIAGNOSIS — R30.0 DYSURIA: Primary | ICD-10-CM

## 2024-04-04 DIAGNOSIS — N40.1 URINARY RETENTION DUE TO BENIGN PROSTATIC HYPERPLASIA: ICD-10-CM

## 2024-04-04 DIAGNOSIS — R35.0 URINE FREQUENCY: ICD-10-CM

## 2024-04-04 DIAGNOSIS — C61 PROSTATE CANCER: ICD-10-CM

## 2024-04-04 DIAGNOSIS — R33.8 URINARY RETENTION DUE TO BENIGN PROSTATIC HYPERPLASIA: ICD-10-CM

## 2024-04-04 DIAGNOSIS — E11.9 TYPE 2 DIABETES MELLITUS WITHOUT COMPLICATION, WITH LONG-TERM CURRENT USE OF INSULIN: Chronic | ICD-10-CM

## 2024-04-04 DIAGNOSIS — R41.3 MEMORY LOSS: ICD-10-CM

## 2024-04-04 DIAGNOSIS — R10.2 PELVIC PAIN: ICD-10-CM

## 2024-04-04 NOTE — PROGRESS NOTES
"        Chief Complaint  No chief complaint on file.     Subjective:      History of Present Illness {CC  Problem List  Visit  Diagnosis   Encounters  Notes  Medications  Labs  Result Review Imaging  Media :23}     Harry Sierra presents to [facility] for:      History of Present Illness     Pt is here for follow-up. Pt has suprapubic catheter. Pt has had repeated bladder infection, some of which have lead the pt to sepsis. [facility], but states he feels like he continues to get infections with no resolution. Pt states he was also dx with prostatitis. Pt states he took antibiotics and symptoms came back. Pt states that he has \"lost confidence\" in [facility].  Pt had prostate cancer in the past and pt states that his PSA keeps increasing.pt states his entire pelvis has always been in pain.    -  pt states that he Is taking gemtesa and tylenol.    - pt states he has kidney stones and gallstones.    IMAGING SCANNED (03/07/2024)      Pts wife states this has been two years since they have had  no answers.     Pt states his balance is suffering and he has trouble finding words. Pt gives example of saying \"fancy\" instead of \"trish\". Pt states his family thinks he has lewey body dementia.    Pt states that he has lost 60lbs in the last year.    Pt's dad had testicular cancer and his brother had prostate cancer.     Pt states he has always had a cough. Pt staets he had septic pneumonia. Pt states the cough is productive at times.     Pt also had skin cancer on his arm that was removed and was told by dermatologist to get a PET scan.     Patient is here with his wife and his daughter, and brings an extensive list of symptoms from his other daughter that she has noticed over the span of a few months.  Patient's family is very worried about him and states he has not been normal in the last 2 years.  Patient's family request multiple scans and referrals for " patient out.  Patient states his urologist is very nice and he likes to him, however they are not helping him figure out what is wrong.  Patient states he has not been out of pain since 2 years prior.    I have reviewed patient's medical history, any new submitted information provided by patient or medical assistant and updated medical record.      Objective:      Physical Exam  Vitals reviewed.   Constitutional:       General: He is not in acute distress.     Appearance: He is ill-appearing. He is not toxic-appearing or diaphoretic.   HENT:      Head: Normocephalic.   Cardiovascular:      Rate and Rhythm: Normal rate and regular rhythm.      Pulses: Normal pulses.      Heart sounds: Normal heart sounds.   Pulmonary:      Effort: Pulmonary effort is normal.      Breath sounds: Normal breath sounds.   Abdominal:      General: Abdomen is flat. Bowel sounds are normal.      Palpations: Abdomen is soft.      Comments: Pt has suprapubic catheter   Lymphadenopathy:      Cervical: No cervical adenopathy.   Skin:     General: Skin is warm and dry.      Capillary Refill: Capillary refill takes less than 2 seconds.   Neurological:      General: No focal deficit present.      Mental Status: He is alert.      Motor: Weakness present.   Psychiatric:         Mood and Affect: Mood normal. Mood is depressed. Affect is tearful.         Behavior: Behavior normal.        Result Review  Data Reviewed:{ Labs  Result Review  Imaging  Med Tab  Media :23}     The following data was reviewed by: JO Juárez on 04/04/2024  Common labs          1/23/2024    08:39 4/1/2024    08:22 4/5/2024    10:50   Common Labs   Glucose 216   427    BUN 17   13    Creatinine 0.77   0.74    Sodium 141   137    Potassium 4.3   4.7    Chloride 99   98    Calcium 9.3   9.2    Total Protein 6.9   6.5    Albumin 4.5   4.3    Total Bilirubin 0.4   0.3    Alkaline Phosphatase 126   138    AST (SGOT) 11   10    ALT (SGPT) 14   15    WBC 4.26    "3.98    Hemoglobin 11.1   10.4    Hematocrit 32.6   31.5    Platelets 246   235    Total Cholesterol 94      Triglycerides 54      HDL Cholesterol 42      LDL Cholesterol  39      Hemoglobin A1C 9.60  9.80     PSA 8.910        URINE DIPSTICK:  Lab Results - Last 18 Months   Lab Units 04/05/24  1030   COLOR UA  Yellow   CLARITY UA  Cloudy*   SPECIFIC GRAVITY, URINE  1.015   PH, URINE  7.0   LEUKOCYTES UA  Trace*   NITRITE UA  Negative   PROTEIN UA mg/dL 2+*   UROBILINOGEN UA  0.2 E.U./dL   BILIRUBIN UA  Negative   BLOOD UA  2+*          Vital Signs:   /89 (BP Location: Right arm, Patient Position: Sitting, Cuff Size: Adult)   Pulse 75   Temp 97.7 °F (36.5 °C) (Oral)   Ht 175.3 cm (69\")   Wt 59.4 kg (131 lb)   SpO2 99%   BMI 19.35 kg/m²         Requested Prescriptions      No prescriptions requested or ordered in this encounter       Routine medications provided by this office will also be refilled via pharmacy request.       Current Outpatient Medications:     atorvastatin (LIPITOR) 40 MG tablet, TAKE ONE TABLET BY MOUTH EVERY NIGHT AT BEDTIME, Disp: 90 tablet, Rfl: 4    clopidogrel (PLAVIX) 75 MG tablet, TAKE 1 TABLET BY MOUTH DAILY, Disp: 90 tablet, Rfl: 4    Continuous Blood Gluc  (FreeStyle Vaishali 14 Day [place]) device, , Disp: , Rfl:     Continuous Blood Gluc Sensor (FreeStyle Vaishali 14 Day Sensor) misc, PLACE ONE SENSOR ON THE SKIN AS DIRECTED BY PROVIDER DAILY, Disp: 2 each, Rfl: 5    cyanocobalamin (VITAMIN B-12) 1000 MCG tablet, Take 1 tablet by mouth Daily., Disp: 30 tablet, Rfl: 0    ferrous sulfate (FerrouSul) 325 (65 FE) MG tablet, Take 1 tablet by mouth Daily With Breakfast. (Patient not taking: Reported on 4/12/2024), Disp: 90 tablet, Rfl: 1    insulin detemir (Levemir FlexPen) 100 UNIT/ML injection, Inject 30 Units under the skin into the appropriate area as directed Daily. (Patient taking differently: Inject 36 Units under the skin into the appropriate area as directed Daily.), " Disp: 15 mL, Rfl: 8    losartan (COZAAR) 25 MG tablet, TAKE ONE TABLET BY MOUTH DAILY, Disp: 90 tablet, Rfl: 4    metFORMIN (GLUCOPHAGE) 500 MG tablet, TAKE ONE TABLET BY MOUTH TWICE A DAY WITH MEALS, Disp: 180 tablet, Rfl: 0    Multiple Vitamins-Minerals (CENTRUM SILVER 50+MEN) tablet, Take 1 tablet by mouth Daily., Disp: , Rfl:     pantoprazole (PROTONIX) 40 MG EC tablet, TAKE 1 TABLET BY MOUTH DAILY, Disp: 90 tablet, Rfl: 4    Gemtesa 75 MG tablet, , Disp: , Rfl:     HYDROcodone-acetaminophen (NORCO) 5-325 MG per tablet, Take 1 tablet by mouth As Needed., Disp: , Rfl:      Assessment and Plan:      Assessment and Plan {CC Problem List  Visit Diagnosis  ROS  Review (Popup)  Health Maintenance  Quality  BestPractice  Medications  SmartSets  SnapShot Encounters  Media :23}     Problem List Items Addressed This Visit       Abnormal PSA    Overview     4.3 9/1/16         Type 2 diabetes mellitus without complication, with long-term current use of insulin (Chronic)    Melanoma in situ of left upper extremity including shoulder    Overview     Added automatically from request for surgery [id number]         Chronic cough    Relevant Orders    XR Chest PA & Lateral (Completed)    Urinary retention due to benign prostatic hyperplasia    Relevant Orders    CBC w AUTO Differential (Completed)    Comprehensive Metabolic Panel (Completed)    Prostate cancer    Chronic suprapubic catheter     Other Visit Diagnoses       Dysuria    -  Primary    Relevant Orders    Comprehensive Metabolic Panel (Completed)    Urine frequency        Relevant Orders    CBC w AUTO Differential (Completed)    Comprehensive Metabolic Panel (Completed)    Memory loss        Pelvic pain        Weight loss, non-intentional              After reviewing this information I will need to do further digging in the patient's chart to determine next steps.  I believe that I would like to send patient referral to [facility], urology-possibly  April or [facility] consult, possible imaging-PET scan or further CT scans.  Will get labs today and review with patient once available.  Will get x-ray and review with patient once available will like to see patient back in 1 week for further management as this appointment was not long enough to really get deep in discussion about exact symptoms as I have not dealt with the patient's chronic conditions.  Patient and family verbalized understanding and are comfortable with plan of care.    Follow Up {Instructions Charge Capture  Follow-up Communications :23}     Return in about 1 week (around 4/11/2024) for Recheck.        I spent 52 minutes caring for Harry on this date of service. This time includes time spent by me in the following activities: preparing for the visit, reviewing tests, obtaining and/or reviewing a separately obtained history, performing a medically appropriate examination and/or evaluation, counseling and educating the patient/family/caregiver, ordering medications, tests, or procedures, referring and communicating with other health care professionals, documenting information in the medical record, independently interpreting results and communicating that information with the patient/family/caregiver, care coordination, and pt was with wife and daugther. All people involved had many questions.     Patient was given instructions and counseling regarding his condition or for health maintenance advice. Please see specific information pulled into the AVS if appropriate.    Dragon disclaimer:   Much of this encounter note is an electronic transcription/translation of spoken language to printed text. The electronic translation of spoken language may permit erroneous, or at times, nonsensical words or phrases to be inadvertently transcribed; Although I have reviewed the note for such errors, some may still exist.     Additional Patient Counseling:       There are no Patient Instructions on file  for this visit.

## 2024-04-05 ENCOUNTER — TELEPHONE (OUTPATIENT)
Dept: INTERNAL MEDICINE | Facility: CLINIC | Age: 74
End: 2024-04-05
Payer: MEDICARE

## 2024-04-05 ENCOUNTER — HOSPITAL ENCOUNTER (OUTPATIENT)
Facility: HOSPITAL | Age: 74
Discharge: HOME | End: 2024-04-05
Payer: MEDICARE

## 2024-04-05 DIAGNOSIS — N40.1 URINARY RETENTION DUE TO BENIGN PROSTATIC HYPERPLASIA: ICD-10-CM

## 2024-04-05 DIAGNOSIS — R33.8 URINARY RETENTION DUE TO BENIGN PROSTATIC HYPERPLASIA: ICD-10-CM

## 2024-04-05 DIAGNOSIS — R30.0 DYSURIA: ICD-10-CM

## 2024-04-05 DIAGNOSIS — R35.0 URINE FREQUENCY: ICD-10-CM

## 2024-04-05 DIAGNOSIS — R30.0 DYSURIA: Primary | ICD-10-CM

## 2024-04-05 DIAGNOSIS — R05.3 CHRONIC COUGH: ICD-10-CM

## 2024-04-05 DIAGNOSIS — N39.0 URINARY TRACT INFECTION WITH HEMATURIA, SITE UNSPECIFIED: Primary | ICD-10-CM

## 2024-04-05 DIAGNOSIS — R31.9 URINARY TRACT INFECTION WITH HEMATURIA, SITE UNSPECIFIED: Primary | ICD-10-CM

## 2024-04-05 LAB
ALBUMIN SERPL-MCNC: 4.3 G/DL (ref 3.5–5.2)
ALBUMIN/GLOB SERPL: 2 G/DL
ALP SERPL-CCNC: 138 U/L (ref 39–117)
ALT SERPL-CCNC: 15 U/L (ref 1–41)
AST SERPL-CCNC: 10 U/L (ref 1–40)
BACTERIA UR QL AUTO: ABNORMAL /HPF
BASOPHILS # BLD AUTO: 0.01 10*3/MM3 (ref 0–0.2)
BASOPHILS NFR BLD AUTO: 0.3 % (ref 0–1.5)
BILIRUB BLD-MCNC: NEGATIVE MG/DL
BILIRUB SERPL-MCNC: 0.3 MG/DL (ref 0–1.2)
BUN SERPL-MCNC: 13 MG/DL (ref 8–23)
BUN/CREAT SERPL: 17.6 (ref 7–25)
CALCIUM SERPL-MCNC: 9.2 MG/DL (ref 8.6–10.5)
CHLORIDE SERPL-SCNC: 98 MMOL/L (ref 98–107)
CLARITY, POC: ABNORMAL
CO2 SERPL-SCNC: 28.2 MMOL/L (ref 22–29)
COLOR UR: YELLOW
CREAT SERPL-MCNC: 0.74 MG/DL (ref 0.76–1.27)
EGFRCR SERPLBLD CKD-EPI 2021: 94.5 ML/MIN/1.73
EOSINOPHIL # BLD AUTO: 0.01 10*3/MM3 (ref 0–0.4)
EOSINOPHIL NFR BLD AUTO: 0.3 % (ref 0.3–6.2)
ERYTHROCYTE [DISTWIDTH] IN BLOOD BY AUTOMATED COUNT: 12.7 % (ref 12.3–15.4)
EXPIRATION DATE: ABNORMAL
GLOBULIN SER CALC-MCNC: 2.2 GM/DL
GLUCOSE SERPL-MCNC: 427 MG/DL (ref 65–99)
GLUCOSE UR STRIP-MCNC: ABNORMAL MG/DL
HCT VFR BLD AUTO: 31.5 % (ref 37.5–51)
HGB BLD-MCNC: 10.4 G/DL (ref 13–17.7)
IMM GRANULOCYTES # BLD AUTO: 0.09 10*3/MM3 (ref 0–0.05)
IMM GRANULOCYTES NFR BLD AUTO: 2.3 % (ref 0–0.5)
KETONES UR QL: NEGATIVE
LEUKOCYTE EST, POC: ABNORMAL
LYMPHOCYTES # BLD AUTO: 1.67 10*3/MM3 (ref 0.7–3.1)
LYMPHOCYTES NFR BLD AUTO: 42 % (ref 19.6–45.3)
Lab: ABNORMAL
MCH RBC QN AUTO: 31.2 PG (ref 26.6–33)
MCHC RBC AUTO-ENTMCNC: 33 G/DL (ref 31.5–35.7)
MCV RBC AUTO: 94.6 FL (ref 79–97)
MONOCYTES # BLD AUTO: 0.43 10*3/MM3 (ref 0.1–0.9)
MONOCYTES NFR BLD AUTO: 10.8 % (ref 5–12)
MUCUS, POC: ABNORMAL
NEUTROPHILS # BLD AUTO: 1.77 10*3/MM3 (ref 1.7–7)
NEUTROPHILS NFR BLD AUTO: 44.3 % (ref 42.7–76)
NITRITE UR-MCNC: NEGATIVE MG/ML
NRBC BLD AUTO-RTO: 0 /100 WBC (ref 0–0.2)
PH UR: 7 [PH] (ref 5–8)
PLATELET # BLD AUTO: 235 10*3/MM3 (ref 140–450)
POTASSIUM SERPL-SCNC: 4.7 MMOL/L (ref 3.5–5.2)
PROT SERPL-MCNC: 6.5 G/DL (ref 6–8.5)
PROT UR STRIP-MCNC: ABNORMAL MG/DL
RBC # BLD AUTO: 3.33 10*6/MM3 (ref 4.14–5.8)
RBC # UR STRIP: ABNORMAL /HPF
RBC # UR STRIP: ABNORMAL /UL
SODIUM SERPL-SCNC: 137 MMOL/L (ref 136–145)
SP GR UR: 1.01 (ref 1–1.03)
SQUAMOUS EPITHELIAL, POC: ABNORMAL
UROBILINOGEN UR QL: ABNORMAL
WBC # BLD AUTO: 3.98 10*3/MM3 (ref 3.4–10.8)
WBC # UR STRIP: ABNORMAL /HPF
WBC CLUMPS # UR AUTO: ABNORMAL /HPF

## 2024-04-05 PROCEDURE — 71046 X-RAY EXAM CHEST 2 VIEWS: CPT

## 2024-04-05 PROCEDURE — 81003 URINALYSIS AUTO W/O SCOPE: CPT

## 2024-04-05 PROCEDURE — 81001 URINALYSIS AUTO W/SCOPE: CPT

## 2024-04-05 RX ORDER — CEFUROXIME AXETIL 250 MG/1
250 TABLET ORAL 2 TIMES DAILY
Qty: 14 TABLET | Refills: 0 | Status: SHIPPED | OUTPATIENT
Start: 2024-04-05 | End: 2024-04-12

## 2024-04-05 NOTE — TELEPHONE ENCOUNTER
Hub okay to rely        ----- Message from JO Juárez sent at 4/5/2024  1:50 PM EDT -----  Please call pt-    Your chest xray was normal. Chronic coughs can also be due to reflux- are you taking your protonix daily? Also, we may want to entertain the idea of losartan causing this cough.     Stay Well,    JO Viveros

## 2024-04-07 LAB
BACTERIA UR CULT: NO GROWTH
BACTERIA UR CULT: NORMAL

## 2024-04-09 DIAGNOSIS — R93.5 ABNORMAL CT OF THE ABDOMEN: ICD-10-CM

## 2024-04-09 DIAGNOSIS — R05.3 CHRONIC COUGH: ICD-10-CM

## 2024-04-09 DIAGNOSIS — R47.01 APHASIA: ICD-10-CM

## 2024-04-09 DIAGNOSIS — J84.10 PULMONARY GRANULOMA: ICD-10-CM

## 2024-04-09 DIAGNOSIS — D03.62 MELANOMA IN SITU OF LEFT UPPER EXTREMITY INCLUDING SHOULDER: ICD-10-CM

## 2024-04-09 DIAGNOSIS — R55 NEAR SYNCOPE: ICD-10-CM

## 2024-04-09 DIAGNOSIS — R41.3 MEMORY LOSS: ICD-10-CM

## 2024-04-09 DIAGNOSIS — C61 PROSTATE CANCER: ICD-10-CM

## 2024-04-09 DIAGNOSIS — R63.4 ABNORMAL WEIGHT LOSS: ICD-10-CM

## 2024-04-09 DIAGNOSIS — R10.84 GENERALIZED ABDOMINAL PAIN: ICD-10-CM

## 2024-04-09 DIAGNOSIS — Z93.59 SUPRAPUBIC CATHETER: ICD-10-CM

## 2024-04-09 DIAGNOSIS — R97.20 ABNORMAL PSA: Primary | ICD-10-CM

## 2024-04-10 PROBLEM — I65.29 CAROTID ATHEROSCLEROSIS: Status: ACTIVE | Noted: 2024-04-10

## 2024-04-10 PROBLEM — Z86.73 REMOTE HISTORY OF TIA: Status: ACTIVE | Noted: 2024-04-10

## 2024-04-12 ENCOUNTER — OFFICE VISIT (OUTPATIENT)
Dept: INTERNAL MEDICINE | Facility: CLINIC | Age: 74
End: 2024-04-12
Payer: MEDICARE

## 2024-04-12 ENCOUNTER — OFFICE VISIT (OUTPATIENT)
Age: 74
End: 2024-04-12
Payer: MEDICARE

## 2024-04-12 ENCOUNTER — HOSPITAL ENCOUNTER (OUTPATIENT)
Facility: HOSPITAL | Age: 74
Discharge: HOME | End: 2024-04-12
Payer: MEDICARE

## 2024-04-12 VITALS
HEART RATE: 115 BPM | BODY MASS INDEX: 19.46 KG/M2 | TEMPERATURE: 97.6 F | SYSTOLIC BLOOD PRESSURE: 150 MMHG | WEIGHT: 131.4 LBS | HEIGHT: 69 IN | OXYGEN SATURATION: 99 % | DIASTOLIC BLOOD PRESSURE: 90 MMHG

## 2024-04-12 VITALS
SYSTOLIC BLOOD PRESSURE: 140 MMHG | BODY MASS INDEX: 19.4 KG/M2 | DIASTOLIC BLOOD PRESSURE: 84 MMHG | HEIGHT: 69 IN | WEIGHT: 131 LBS

## 2024-04-12 DIAGNOSIS — E11.65 TYPE 2 DIABETES MELLITUS WITH HYPERGLYCEMIA, WITH LONG-TERM CURRENT USE OF INSULIN: ICD-10-CM

## 2024-04-12 DIAGNOSIS — R41.3 MEMORY LOSS: ICD-10-CM

## 2024-04-12 DIAGNOSIS — R10.84 GENERALIZED ABDOMINAL PAIN: Primary | ICD-10-CM

## 2024-04-12 DIAGNOSIS — Z93.59 CHRONIC SUPRAPUBIC CATHETER: ICD-10-CM

## 2024-04-12 DIAGNOSIS — R63.4 ABNORMAL WEIGHT LOSS: ICD-10-CM

## 2024-04-12 DIAGNOSIS — I65.23 BILATERAL CAROTID ARTERY STENOSIS: Primary | Chronic | ICD-10-CM

## 2024-04-12 DIAGNOSIS — R10.2 PELVIC PAIN: ICD-10-CM

## 2024-04-12 DIAGNOSIS — I65.23 CAROTID STENOSIS, BILATERAL: ICD-10-CM

## 2024-04-12 DIAGNOSIS — Z79.4 TYPE 2 DIABETES MELLITUS WITH HYPERGLYCEMIA, WITH LONG-TERM CURRENT USE OF INSULIN: ICD-10-CM

## 2024-04-12 DIAGNOSIS — I65.23 BILATERAL CAROTID ARTERY OCCLUSION: ICD-10-CM

## 2024-04-12 DIAGNOSIS — I10 PRIMARY HYPERTENSION: Chronic | ICD-10-CM

## 2024-04-12 DIAGNOSIS — R35.0 URINE FREQUENCY: ICD-10-CM

## 2024-04-12 LAB
BH CV XLRA MEAS LEFT CAROTID BULB EDV: 20.3 CM/SEC
BH CV XLRA MEAS LEFT CAROTID BULB PSV: 97.4 CM/SEC
BH CV XLRA MEAS LEFT DIST CCA EDV: -28.1 CM/SEC
BH CV XLRA MEAS LEFT DIST CCA PSV: -135.6 CM/SEC
BH CV XLRA MEAS LEFT DIST ICA EDV: -23.3 CM/SEC
BH CV XLRA MEAS LEFT DIST ICA PSV: -82.8 CM/SEC
BH CV XLRA MEAS LEFT ICA/CCA RATIO: 0.74
BH CV XLRA MEAS LEFT MID CCA EDV: 28.5 CM/SEC
BH CV XLRA MEAS LEFT MID CCA PSV: 172.3 CM/SEC
BH CV XLRA MEAS LEFT MID ICA EDV: -26.1 CM/SEC
BH CV XLRA MEAS LEFT MID ICA PSV: -96.3 CM/SEC
BH CV XLRA MEAS LEFT PROX CCA EDV: 26.8 CM/SEC
BH CV XLRA MEAS LEFT PROX CCA PSV: 163.4 CM/SEC
BH CV XLRA MEAS LEFT PROX ECA PSV: -145.3 CM/SEC
BH CV XLRA MEAS LEFT PROX ICA EDV: -24.5 CM/SEC
BH CV XLRA MEAS LEFT PROX ICA PSV: -100.9 CM/SEC
BH CV XLRA MEAS LEFT PROX SCLA PSV: 164.6 CM/SEC
BH CV XLRA MEAS LEFT VERTEBRAL A EDV: -21.4 CM/SEC
BH CV XLRA MEAS LEFT VERTEBRAL A PSV: -90 CM/SEC
BH CV XLRA MEAS RIGHT CAROTID BULB EDV: 25.2 CM/SEC
BH CV XLRA MEAS RIGHT CAROTID BULB PSV: 98.1 CM/SEC
BH CV XLRA MEAS RIGHT DIST CCA EDV: -25.1 CM/SEC
BH CV XLRA MEAS RIGHT DIST CCA PSV: -111.3 CM/SEC
BH CV XLRA MEAS RIGHT DIST ICA EDV: -27.3 CM/SEC
BH CV XLRA MEAS RIGHT DIST ICA PSV: -113.7 CM/SEC
BH CV XLRA MEAS RIGHT ICA/CCA RATIO: 1.03
BH CV XLRA MEAS RIGHT MID CCA EDV: 25.2 CM/SEC
BH CV XLRA MEAS RIGHT MID CCA PSV: 147.1 CM/SEC
BH CV XLRA MEAS RIGHT MID ICA EDV: -23 CM/SEC
BH CV XLRA MEAS RIGHT MID ICA PSV: -91.3 CM/SEC
BH CV XLRA MEAS RIGHT PROX CCA EDV: 21.2 CM/SEC
BH CV XLRA MEAS RIGHT PROX CCA PSV: 119.2 CM/SEC
BH CV XLRA MEAS RIGHT PROX ECA EDV: -18.7 CM/SEC
BH CV XLRA MEAS RIGHT PROX ECA PSV: -173.4 CM/SEC
BH CV XLRA MEAS RIGHT PROX ICA EDV: -26 CM/SEC
BH CV XLRA MEAS RIGHT PROX ICA PSV: -114.4 CM/SEC
BH CV XLRA MEAS RIGHT PROX SCLA PSV: 150 CM/SEC
BH CV XLRA MEAS RIGHT VERTEBRAL A EDV: -21.7 CM/SEC
BH CV XLRA MEAS RIGHT VERTEBRAL A PSV: -88.8 CM/SEC
LEFT ARM BP: NORMAL MMHG
RIGHT ARM BP: NORMAL MMHG

## 2024-04-12 PROCEDURE — 3077F SYST BP >= 140 MM HG: CPT

## 2024-04-12 PROCEDURE — 1160F RVW MEDS BY RX/DR IN RCRD: CPT

## 2024-04-12 PROCEDURE — 3046F HEMOGLOBIN A1C LEVEL >9.0%: CPT

## 2024-04-12 PROCEDURE — 93880 EXTRACRANIAL BILAT STUDY: CPT

## 2024-04-12 PROCEDURE — 99213 OFFICE O/P EST LOW 20 MIN: CPT

## 2024-04-12 PROCEDURE — 3080F DIAST BP >= 90 MM HG: CPT

## 2024-04-12 PROCEDURE — 1159F MED LIST DOCD IN RCRD: CPT

## 2024-04-12 RX ORDER — VIBEGRON 75 MG/1
TABLET, FILM COATED ORAL
COMMUNITY
Start: 2024-04-09

## 2024-04-12 NOTE — PATIENT INSTRUCTIONS
Prostatitis    Prostatitis is swelling of the prostate gland, also called the prostate. This gland is about 1.5 inches wide and 1 inch high, and it helps to make a fluid called semen. The prostate is below a man's bladder, in front of the butt (rectum). There are different types of prostatitis.  What are the causes?  One type of prostatitis is caused by an infection from germs (bacteria).  Another type is not caused by germs. It may be caused by:  Things having to do with the nervous system. This system includes thebrain, spinal cord, and nerves.  An autoimmune response. This happens when the body's disease-fighting system attacks healthy tissue in the body by mistake.  Psychological factors. These have to do with how the mind works.  The causes of other types of prostatitis are normally not known.  What are the signs or symptoms?  Symptoms of this condition depend on the type of prostatitis you have.  If your condition is caused by germs:  You may feel pain or burning when you pee (urinate).  You may pee often and all of a sudden.  You may have problems starting to pee.  You may have trouble emptying your bladder when you pee.  You may have fever or chills.  You may feel pain in your muscles, joints, low back, or lower belly.  If you have other types of prostatitis:  You may pee often or all of a sudden.  You may have trouble starting to pee.  You may have a weak flow when you pee.  You may leak pee after using the bathroom.  You may have other problems, such as:  Abnormal fluid coming from the penis.  Pain in the testicles or penis.  Pain between the butt and the testicles.  Pain when fluid comes out of the penis during sex.  How is this treated?  Treatment for this condition depends on the type of prostatitis. Treatment may include:  Medicines. These may treat pain or swelling, or they may help relax muscles.  Exercises to help you move better or get stronger (physical therapy).  Heat therapy.  Techniques to help  you control some of the ways that your body works.  Exercises to help you relax.  Antibiotic medicine, if your condition is caused by germs.  Warm water baths (sitz baths) to relax muscles.  Follow these instructions at home:  Medicines  Take over-the-counter and prescription medicines only as told by your doctor.  If you were prescribed an antibiotic medicine, take it as told by your doctor. Do not stop using the antibiotic even if you start to feel better.  Managing pain and swelling    Take sitz baths as told by your doctor. For a sitz bath, sit in warm water that is deep enough to cover your hips and butt.  If told, put heat on the painful area. Do this as often as told by your doctor. Use the heat source that your doctor recommends, such as a moist heat pack or a heating pad.  Place a towel between your skin and the heat source.  Leave the heat on for 20-30 minutes.  Take off the heat if your skin turns bright red. This is very important if you are unable to feel pain, heat, or cold. You may have a greater risk of getting burned.  General instructions  Do exercises as told by your doctor, if your doctor prescribed them.  Keep all follow-up visits as told by your doctor. This is important.  Where to find more information  National [place] of Diabetes and Digestive and Kidney Diseases: https://www.niddk.nih.gov  Contact a doctor if:  Your symptoms get worse.  You have a fever.  Get help right away if:  You have chills.  You feel light-headed.  You feel like you may faint.  You cannot pee.  You have blood or clumps of blood (blood clots) in your pee.  Summary  Prostatitis is swelling of the prostate gland.  There are different types of prostatitis. Treatment depends on the type that you have.  Take over-the-counter and prescription medicines only as told by your doctor.  Get help right away of you have chills, feel light-headed, or feel like you may faint. Also get help right away if you cannot pee or you have  blood or clumps of blood in your pee.  This information is not intended to replace advice given to you by your health care provider. Make sure you discuss any questions you have with your health care provider.  Document Revised: 11/02/2023 Document Reviewed: 11/02/2023  Elsevier Patient Education © 2023 Elsevier Inc.

## 2024-04-12 NOTE — PROGRESS NOTES
Chief Complaint  Carotid Artery Disease    Subjective        Harry Sierra presents to [facility] VASCULAR SURGERY  HPI   Harry Sierra is a 73 y.o. male that has been followed in our office for carotid artery stenosis. He returns today in follow up along with a carotid duplex. He  reports he was in the hospital for pyelonephritis.  He denies any symptoms consistent with CVA, TIA, or amaurosis fugax.     Review of Systems   Constitutional:  Negative for fever.   Eyes:  Negative for visual disturbance.   Cardiovascular:  Negative for leg swelling.   Gastrointestinal:  Negative for abdominal pain.   Musculoskeletal:  Negative for back pain.   Skin:  Negative for color change, pallor and wound.   Neurological:  Negative for dizziness, facial asymmetry, speech difficulty and weakness.        Harry Sierra  reports that he has never smoked. He has never been exposed to tobacco smoke. He has never used smokeless tobacco..        Objective   Vital Signs:  Vitals:    04/12/24 1050   BP: 140/84      Body mass index is 19.35 kg/m².       Physical Exam  Vitals reviewed.   Constitutional:       Appearance: Normal appearance.   HENT:      Head: Normocephalic.   Cardiovascular:      Rate and Rhythm: Normal rate and regular rhythm.      Pulses: Normal pulses.           Dorsalis pedis pulses are 3+ on the right side and 3+ on the left side.        Posterior tibial pulses are 3+ on the right side and 3+ on the left side.   Pulmonary:      Effort: Pulmonary effort is normal.   Skin:     General: Skin is warm.   Neurological:      General: No focal deficit present.      Mental Status: He is alert and oriented to person, place, and time.   Psychiatric:         Mood and Affect: Mood normal.          Result Review :    ED to Hosp-Admission (Discharged) with Heriberto Boss MD; Kurt Leos MD (11/18/2023)     Previous carotid duplex: Less than 50% stenosis bilaterally    Carotid duplex from today: <50% stenosis  bilaterally                    Assessment and Plan     Diagnoses and all orders for this visit:    1. Bilateral carotid artery stenosis (Primary)    2. Primary hypertension  -     Ambulatory Referral to Family Practice    3. Carotid stenosis, bilateral  -     Duplex Carotid Ultrasound CAR; Future             Patient has stable carotid artery stenosis. He is to continue his antiplatelet agent which is Plavix. He is on a statin for cholesterol control.  We discussed adequate blood pressure control. He will return in 1 year along with a repeat carotid artery duplex. His blood pressure was elevated today; he reports he did not take his Losartan today. Will notify his PCP of reading today.     Follow Up     Return in about 1 year (around 4/12/2025) for carotid duplex.  Patient was given instructions and counseling regarding his condition or for health maintenance advice. Please see specific information pulled into the AVS if appropriate.     JO Souza

## 2024-04-12 NOTE — PROGRESS NOTES
"        Chief Complaint  Follow-up     Subjective:      History of Present Illness {CC  Problem List  Visit  Diagnosis   Encounters  Notes  Medications  Labs  Result Review Imaging  Media :23}     Harry Sierra presents to [facility] for:      History of Present Illness     Patient came for follow-up today from last visit.  Wanted to see if patient was getting into referrals and obtain additional information.  Patient and patient's spouse continuously states that patient has not been well for the last 2 years.  Patient is tearful throughout the appointment stating that he is \"done\" with the pain.  Pt states that he is currently taking gemtesa, but states that his pain and bladder spasms have slightly improved.  Patient is actively being scheduled for oncology appointment and has been assigned to urology and memory clinic and pulmonology.  Patient is satisfactory with referrals being placed.    Discussed thoroughly his diabetes.  Patient states he does not check his blood sugar is much as he should, nor does he eat healthy and maintain his blood sugar appropriately.  Patient does experience polyuria, however this is hard to say given the patient has suprapubic catheter.    I have reviewed patient's medical history, any new submitted information provided by patient or medical assistant and updated medical record.      Objective:      Physical Exam  Vitals reviewed.   Constitutional:       General: He is not in acute distress.     Appearance: He is ill-appearing. He is not toxic-appearing or diaphoretic.   HENT:      Head: Normocephalic.   Cardiovascular:      Rate and Rhythm: Normal rate and regular rhythm.      Pulses: Normal pulses.      Heart sounds: Normal heart sounds.   Pulmonary:      Effort: Pulmonary effort is normal.      Breath sounds: Normal breath sounds.   Abdominal:      General: Abdomen is flat. Bowel sounds are normal.      Palpations: Abdomen is soft.      " "Comments: Pt has suprapubic catheter   Lymphadenopathy:      Cervical: No cervical adenopathy.   Skin:     General: Skin is warm and dry.      Capillary Refill: Capillary refill takes less than 2 seconds.   Neurological:      General: No focal deficit present.      Mental Status: He is alert.      Motor: Weakness present.   Psychiatric:         Mood and Affect: Mood is depressed. Affect is tearful.         Behavior: Behavior normal.        Result Review  Data Reviewed:{ Labs  Result Review  Imaging  Med Tab  Media :23}                Vital Signs:   /90 (BP Location: Left arm, Patient Position: Sitting, Cuff Size: Adult)   Pulse 115   Temp 97.6 °F (36.4 °C) (Oral)   Ht 175.3 cm (69\")   Wt 59.6 kg (131 lb 6.4 oz)   SpO2 99%   BMI 19.40 kg/m²         Requested Prescriptions      No prescriptions requested or ordered in this encounter       Routine medications provided by this office will also be refilled via pharmacy request.       Current Outpatient Medications:     atorvastatin (LIPITOR) 40 MG tablet, TAKE ONE TABLET BY MOUTH EVERY NIGHT AT BEDTIME, Disp: 90 tablet, Rfl: 4    clopidogrel (PLAVIX) 75 MG tablet, TAKE 1 TABLET BY MOUTH DAILY, Disp: 90 tablet, Rfl: 4    Continuous Blood Gluc  (FreeStyle Vaishali 14 Day [place]) device, , Disp: , Rfl:     Continuous Blood Gluc Sensor (FreeStyle Vaishali 14 Day Sensor) misc, PLACE ONE SENSOR ON THE SKIN AS DIRECTED BY PROVIDER DAILY, Disp: 2 each, Rfl: 5    cyanocobalamin (VITAMIN B-12) 1000 MCG tablet, Take 1 tablet by mouth Daily., Disp: 30 tablet, Rfl: 0    Gemtesa 75 MG tablet, , Disp: , Rfl:     HYDROcodone-acetaminophen (NORCO) 5-325 MG per tablet, Take 1 tablet by mouth As Needed., Disp: , Rfl:     insulin detemir (Levemir FlexPen) 100 UNIT/ML injection, Inject 30 Units under the skin into the appropriate area as directed Daily. (Patient taking differently: Inject 36 Units under the skin into the appropriate area as directed Daily.), Disp: 15 mL, " Rfl: 8    losartan (COZAAR) 25 MG tablet, TAKE ONE TABLET BY MOUTH DAILY, Disp: 90 tablet, Rfl: 4    metFORMIN (GLUCOPHAGE) 500 MG tablet, TAKE ONE TABLET BY MOUTH TWICE A DAY WITH MEALS, Disp: 180 tablet, Rfl: 0    Multiple Vitamins-Minerals (CENTRUM SILVER 50+MEN) tablet, Take 1 tablet by mouth Daily., Disp: , Rfl:     pantoprazole (PROTONIX) 40 MG EC tablet, TAKE 1 TABLET BY MOUTH DAILY, Disp: 90 tablet, Rfl: 4    ferrous sulfate (FerrouSul) 325 (65 FE) MG tablet, Take 1 tablet by mouth Daily With Breakfast. (Patient not taking: Reported on 4/12/2024), Disp: 90 tablet, Rfl: 1     Assessment and Plan:      Assessment and Plan {CC Problem List  Visit Diagnosis  ROS  Review (Popup)  Health Maintenance  Quality  BestPractice  Medications  SmartSets  SnapShot Encounters  Media :23}     Problem List Items Addressed This Visit       Abnormal weight loss    Chronic suprapubic catheter     Other Visit Diagnoses       Generalized abdominal pain    -  Primary    Memory loss        Pelvic pain        Urine frequency        Type 2 diabetes mellitus with hyperglycemia, with long-term current use of insulin                Discussed pt's recent lab results with pt. educated patient on all referrals in progress.  Educated patient on next steps of sending patient to oncology and possible scan.  Educated patient on the importance of taking care of his diabetes.  Educated patient that many of the symptoms could be directly correlated to uncontrolled diabetes.  Educated family and patient to go with patient to his exams as patient does not seem to explain the severity of this issue.  Educated patient on management of prostatitis, like using hot water in a basin.  Patient and family verbalized understanding and are comfortable with the plan of care.    Follow Up {Instructions Charge Capture  Follow-up Communications :23}     Return in about 2 weeks (around 4/26/2024).    I spent 27 minutes caring for Harry on this date  of service. This time includes time spent by me in the following activities: preparing for the visit, reviewing tests, obtaining and/or reviewing a separately obtained history, performing a medically appropriate examination and/or evaluation, counseling and educating the patient/family/caregiver, ordering medications, tests, or procedures, referring and communicating with other health care professionals, documenting information in the medical record, independently interpreting results and communicating that information with the patient/family/caregiver, care coordination, and patient and wife had frequent questions and multiple topics to cover.    Patient was given instructions and counseling regarding his condition or for health maintenance advice. Please see specific information pulled into the AVS if appropriate.    Dragon disclaimer:   Much of this encounter note is an electronic transcription/translation of spoken language to printed text. The electronic translation of spoken language may permit erroneous, or at times, nonsensical words or phrases to be inadvertently transcribed; Although I have reviewed the note for such errors, some may still exist.     Additional Patient Counseling:       Patient Instructions   Prostatitis    Prostatitis is swelling of the prostate gland, also called the prostate. This gland is about 1.5 inches wide and 1 inch high, and it helps to make a fluid called semen. The prostate is below a man's bladder, in front of the butt (rectum). There are different types of prostatitis.  What are the causes?  One type of prostatitis is caused by an infection from germs (bacteria).  Another type is not caused by germs. It may be caused by:  Things having to do with the nervous system. This system includes thebrain, spinal cord, and nerves.  An autoimmune response. This happens when the body's disease-fighting system attacks healthy tissue in the body by mistake.  Psychological factors. These  have to do with how the mind works.  The causes of other types of prostatitis are normally not known.  What are the signs or symptoms?  Symptoms of this condition depend on the type of prostatitis you have.  If your condition is caused by germs:  You may feel pain or burning when you pee (urinate).  You may pee often and all of a sudden.  You may have problems starting to pee.  You may have trouble emptying your bladder when you pee.  You may have fever or chills.  You may feel pain in your muscles, joints, low back, or lower belly.  If you have other types of prostatitis:  You may pee often or all of a sudden.  You may have trouble starting to pee.  You may have a weak flow when you pee.  You may leak pee after using the bathroom.  You may have other problems, such as:  Abnormal fluid coming from the penis.  Pain in the testicles or penis.  Pain between the butt and the testicles.  Pain when fluid comes out of the penis during sex.  How is this treated?  Treatment for this condition depends on the type of prostatitis. Treatment may include:  Medicines. These may treat pain or swelling, or they may help relax muscles.  Exercises to help you move better or get stronger (physical therapy).  Heat therapy.  Techniques to help you control some of the ways that your body works.  Exercises to help you relax.  Antibiotic medicine, if your condition is caused by germs.  Warm water baths (sitz baths) to relax muscles.  Follow these instructions at home:  Medicines  Take over-the-counter and prescription medicines only as told by your doctor.  If you were prescribed an antibiotic medicine, take it as told by your doctor. Do not stop using the antibiotic even if you start to feel better.  Managing pain and swelling    Take sitz baths as told by your doctor. For a sitz bath, sit in warm water that is deep enough to cover your hips and butt.  If told, put heat on the painful area. Do this as often as told by your doctor. Use the  heat source that your doctor recommends, such as a moist heat pack or a heating pad.  Place a towel between your skin and the heat source.  Leave the heat on for 20-30 minutes.  Take off the heat if your skin turns bright red. This is very important if you are unable to feel pain, heat, or cold. You may have a greater risk of getting burned.  General instructions  Do exercises as told by your doctor, if your doctor prescribed them.  Keep all follow-up visits as told by your doctor. This is important.  Where to find more information  National [place] of Diabetes and Digestive and Kidney Diseases: https://www.niddk.nih.gov  Contact a doctor if:  Your symptoms get worse.  You have a fever.  Get help right away if:  You have chills.  You feel light-headed.  You feel like you may faint.  You cannot pee.  You have blood or clumps of blood (blood clots) in your pee.  Summary  Prostatitis is swelling of the prostate gland.  There are different types of prostatitis. Treatment depends on the type that you have.  Take over-the-counter and prescription medicines only as told by your doctor.  Get help right away of you have chills, feel light-headed, or feel like you may faint. Also get help right away if you cannot pee or you have blood or clumps of blood in your pee.  This information is not intended to replace advice given to you by your health care provider. Make sure you discuss any questions you have with your health care provider.  Document Revised: 11/02/2023 Document Reviewed: 11/02/2023  Elsevier Patient Education © 2023 Elsevier Inc.

## 2024-04-17 ENCOUNTER — TELEPHONE (OUTPATIENT)
Dept: ONCOLOGY | Facility: CLINIC | Age: 74
End: 2024-04-17

## 2024-04-17 NOTE — TELEPHONE ENCOUNTER
The [facility] received a fax that requires your attention. The document has been indexed to the patient’s chart for your review.      Reason for sending: RECEIVED MEDICAL RECORDS FOR SCHEDULED PATIENT    Documents Description: PROGRESS NOTE 07/06/23, PROCEDURE NOTES 07/08/23, COLONOSCOPY 07/08/23, UPPER GI 07/06/23, DISCHARGE SUMMARY 07/14/23, PATHOLOGY 07/06/23.> INDEXED IN CHART    Name of Sender: [facility]     Date Indexed: 04/17/24    Notes (if needed): THANKS!

## 2024-04-19 ENCOUNTER — TELEPHONE (OUTPATIENT)
Dept: INTERNAL MEDICINE | Facility: CLINIC | Age: 74
End: 2024-04-19
Payer: MEDICARE

## 2024-04-19 NOTE — TELEPHONE ENCOUNTER
Caller: Harry Sierra    Relationship to patient: Self    Best call back number: [phone]     Chief complaint: FEVER, DEEP COUGH, BODY ACHES AND PAIN, SLIGHT PAIN WHEN URINATING, TYPE 2 DIABETIC HAS NOT EATEN THIS MORNING,     Patient directed to call 911 or go to their nearest emergency room.     Patient verbalized understanding: [x] Yes  [] No  If no, why?    Additional notes: PATIENT WILL BE TRYING TO GET A RIDE TO [facility].  WAS ADVISED TO CALL 911 IF HE COULD NOT GET A RIDE TO HOSPITAL

## 2024-04-26 ENCOUNTER — OFFICE VISIT (OUTPATIENT)
Dept: INTERNAL MEDICINE | Facility: CLINIC | Age: 74
End: 2024-04-26
Payer: MEDICARE

## 2024-04-26 VITALS
HEART RATE: 120 BPM | BODY MASS INDEX: 19.13 KG/M2 | DIASTOLIC BLOOD PRESSURE: 74 MMHG | TEMPERATURE: 98.7 F | WEIGHT: 129.2 LBS | HEIGHT: 69 IN | OXYGEN SATURATION: 99 % | SYSTOLIC BLOOD PRESSURE: 140 MMHG

## 2024-04-26 DIAGNOSIS — F33.1 MAJOR DEPRESSIVE DISORDER, RECURRENT, MODERATE: ICD-10-CM

## 2024-04-26 DIAGNOSIS — F41.9 ANXIETY: ICD-10-CM

## 2024-04-26 DIAGNOSIS — E11.65 TYPE 2 DIABETES MELLITUS WITH HYPERGLYCEMIA, WITH LONG-TERM CURRENT USE OF INSULIN: ICD-10-CM

## 2024-04-26 DIAGNOSIS — R41.3 MEMORY LOSS: ICD-10-CM

## 2024-04-26 DIAGNOSIS — R10.2 PELVIC PAIN: Primary | ICD-10-CM

## 2024-04-26 DIAGNOSIS — R10.84 GENERALIZED ABDOMINAL PAIN: ICD-10-CM

## 2024-04-26 DIAGNOSIS — R63.4 ABNORMAL WEIGHT LOSS: ICD-10-CM

## 2024-04-26 DIAGNOSIS — Z79.4 TYPE 2 DIABETES MELLITUS WITH HYPERGLYCEMIA, WITH LONG-TERM CURRENT USE OF INSULIN: ICD-10-CM

## 2024-04-26 PROCEDURE — 1126F AMNT PAIN NOTED NONE PRSNT: CPT

## 2024-04-26 PROCEDURE — 3078F DIAST BP <80 MM HG: CPT

## 2024-04-26 PROCEDURE — 3077F SYST BP >= 140 MM HG: CPT

## 2024-04-26 PROCEDURE — 99214 OFFICE O/P EST MOD 30 MIN: CPT

## 2024-04-26 PROCEDURE — 3046F HEMOGLOBIN A1C LEVEL >9.0%: CPT

## 2024-04-29 ENCOUNTER — LAB (OUTPATIENT)
Dept: LAB | Facility: HOSPITAL | Age: 74
End: 2024-04-29
Payer: MEDICARE

## 2024-04-29 ENCOUNTER — CONSULT (OUTPATIENT)
Dept: ONCOLOGY | Facility: CLINIC | Age: 74
End: 2024-04-29
Payer: MEDICARE

## 2024-04-29 VITALS
WEIGHT: 130 LBS | HEART RATE: 103 BPM | HEIGHT: 69 IN | OXYGEN SATURATION: 98 % | DIASTOLIC BLOOD PRESSURE: 83 MMHG | BODY MASS INDEX: 19.26 KG/M2 | RESPIRATION RATE: 18 BRPM | TEMPERATURE: 98.7 F | SYSTOLIC BLOOD PRESSURE: 138 MMHG

## 2024-04-29 DIAGNOSIS — E55.9 VITAMIN D DEFICIENCY, UNSPECIFIED: ICD-10-CM

## 2024-04-29 DIAGNOSIS — R53.83 OTHER FATIGUE: ICD-10-CM

## 2024-04-29 DIAGNOSIS — C61 PROSTATE CANCER: Primary | ICD-10-CM

## 2024-04-29 DIAGNOSIS — R79.89 ABNORMAL CBC: Primary | ICD-10-CM

## 2024-04-29 LAB
25(OH)D3 SERPL-MCNC: 30.9 NG/ML (ref 30–100)
BASOPHILS # BLD AUTO: 0.01 10*3/MM3 (ref 0–0.2)
BASOPHILS NFR BLD AUTO: 0.2 % (ref 0–1.5)
DEPRECATED RDW RBC AUTO: 47.4 FL (ref 37–54)
EOSINOPHIL # BLD AUTO: 0.01 10*3/MM3 (ref 0–0.4)
EOSINOPHIL NFR BLD AUTO: 0.2 % (ref 0.3–6.2)
ERYTHROCYTE [DISTWIDTH] IN BLOOD BY AUTOMATED COUNT: 13.2 % (ref 12.3–15.4)
FERRITIN SERPL-MCNC: 244 NG/ML (ref 30–400)
FOLATE SERPL-MCNC: 19.3 NG/ML (ref 4.78–24.2)
HCT VFR BLD AUTO: 31.1 % (ref 37.5–51)
HGB BLD-MCNC: 10.2 G/DL (ref 13–17.7)
IMM GRANULOCYTES # BLD AUTO: 0.1 10*3/MM3 (ref 0–0.05)
IMM GRANULOCYTES NFR BLD AUTO: 2.3 % (ref 0–0.5)
IRON 24H UR-MRATE: 117 MCG/DL (ref 59–158)
IRON SATN MFR SERPL: 34 % (ref 20–50)
LYMPHOCYTES # BLD AUTO: 1.42 10*3/MM3 (ref 0.7–3.1)
LYMPHOCYTES NFR BLD AUTO: 33.3 % (ref 19.6–45.3)
MCH RBC QN AUTO: 32.2 PG (ref 26.6–33)
MCHC RBC AUTO-ENTMCNC: 32.8 G/DL (ref 31.5–35.7)
MCV RBC AUTO: 98.1 FL (ref 79–97)
MONOCYTES # BLD AUTO: 0.48 10*3/MM3 (ref 0.1–0.9)
MONOCYTES NFR BLD AUTO: 11.3 % (ref 5–12)
NEUTROPHILS NFR BLD AUTO: 2.24 10*3/MM3 (ref 1.7–7)
NEUTROPHILS NFR BLD AUTO: 52.7 % (ref 42.7–76)
NRBC BLD AUTO-RTO: 0 /100 WBC (ref 0–0.2)
PLATELET # BLD AUTO: 267 10*3/MM3 (ref 140–450)
PMV BLD AUTO: 9.4 FL (ref 6–12)
RBC # BLD AUTO: 3.17 10*6/MM3 (ref 4.14–5.8)
T4 FREE SERPL-MCNC: 1.86 NG/DL (ref 0.93–1.7)
TIBC SERPL-MCNC: 344 MCG/DL (ref 298–536)
TRANSFERRIN SERPL-MCNC: 231 MG/DL (ref 200–360)
TSH SERPL DL<=0.05 MIU/L-ACNC: 1.13 UIU/ML (ref 0.27–4.2)
VIT B12 BLD-MCNC: 529 PG/ML (ref 211–946)
WBC NRBC COR # BLD AUTO: 4.26 10*3/MM3 (ref 3.4–10.8)

## 2024-04-29 PROCEDURE — 82306 VITAMIN D 25 HYDROXY: CPT | Performed by: INTERNAL MEDICINE

## 2024-04-29 PROCEDURE — 99205 OFFICE O/P NEW HI 60 MIN: CPT | Performed by: INTERNAL MEDICINE

## 2024-04-29 PROCEDURE — 82728 ASSAY OF FERRITIN: CPT | Performed by: INTERNAL MEDICINE

## 2024-04-29 PROCEDURE — 82746 ASSAY OF FOLIC ACID SERUM: CPT | Performed by: INTERNAL MEDICINE

## 2024-04-29 PROCEDURE — 84466 ASSAY OF TRANSFERRIN: CPT | Performed by: INTERNAL MEDICINE

## 2024-04-29 PROCEDURE — 85025 COMPLETE CBC W/AUTO DIFF WBC: CPT

## 2024-04-29 PROCEDURE — 84439 ASSAY OF FREE THYROXINE: CPT | Performed by: INTERNAL MEDICINE

## 2024-04-29 PROCEDURE — 1126F AMNT PAIN NOTED NONE PRSNT: CPT | Performed by: INTERNAL MEDICINE

## 2024-04-29 PROCEDURE — 36415 COLL VENOUS BLD VENIPUNCTURE: CPT

## 2024-04-29 PROCEDURE — 3075F SYST BP GE 130 - 139MM HG: CPT | Performed by: INTERNAL MEDICINE

## 2024-04-29 PROCEDURE — 3079F DIAST BP 80-89 MM HG: CPT | Performed by: INTERNAL MEDICINE

## 2024-04-29 PROCEDURE — 83540 ASSAY OF IRON: CPT | Performed by: INTERNAL MEDICINE

## 2024-04-29 PROCEDURE — 82607 VITAMIN B-12: CPT | Performed by: INTERNAL MEDICINE

## 2024-04-29 PROCEDURE — 84443 ASSAY THYROID STIM HORMONE: CPT | Performed by: INTERNAL MEDICINE

## 2024-04-29 RX ORDER — FLUCONAZOLE 100 MG/1
TABLET ORAL
COMMUNITY
Start: 2024-03-11

## 2024-04-29 RX ORDER — TAMSULOSIN HYDROCHLORIDE 0.4 MG/1
0.4 CAPSULE ORAL DAILY
COMMUNITY

## 2024-04-29 RX ORDER — MUPIROCIN CALCIUM 20 MG/G
CREAM TOPICAL
COMMUNITY
Start: 2024-02-17

## 2024-04-29 RX ORDER — GABAPENTIN 300 MG/1
TABLET, FILM COATED ORAL
COMMUNITY

## 2024-04-29 RX ORDER — LEVOFLOXACIN 500 MG/1
TABLET, FILM COATED ORAL
COMMUNITY
Start: 2024-02-16

## 2024-04-29 RX ORDER — CEFUROXIME AXETIL 250 MG/1
TABLET ORAL
COMMUNITY
Start: 2024-04-05

## 2024-04-29 NOTE — PROGRESS NOTES
[facility]    CONSULTING IN BLOOD DISORDERS & CANCER      REASON FOR CONSULTATION/CHIEF COMPLAINT:     Evaluation and management for prostate cancer                             REQUESTING PHYSICIAN: Barbara Costa,*  RECORDS OBTAINED:  Records of the patients history including those from the electronic medical record were reviewed and summarized in detail.    HISTORY OF PRESENT ILLNESS:    The patient is a 73 y.o. year old male with medical history significant for DM 2, coronary artery disease, BPH and chronic urinary retention s/p suprapubic catheter (2/2023) who comes for prostate cancer evaluation and management.    Patient was noted to have elevated PSA in 4-5 range and 2017.  A prostatic biopsy performed on 1/8/2018 showed prostate adenocarcinoma, max Chau score 6 (3+3).  3 out of 12 cores positive.  Prostate measured 63.6 cc.  Decipher score 0.21, low risk.  He was seen by Dr. Mayo Davis, urology and plan made for active surveillance.  Patient also follows up with him for BPH and history of urinary retention.  Urodynamic study in October 2022 was a poor study.  He underwent cystoscopy and suprapubic catheter placement on 2/8/2023.    Most recent PSA are from January 2024 (8.910) and March 2024 (9.77).    A CT C/A/P from 11/18/2023 showed no evidence of lymphadenopathy or metastasis.  Numerous tiny calcified benign granulomas demonstrated throughout both lungs.  There is diffuse bladder wall thickening with suprapubic catheter in position.  Suspected right kidney lower pole pyelonephritis.  The spleen is mildly enlarged, 13.5 cm.    A CT A/P from 3/7/2024 too showed enlarged prostrate, no lymphadenopathy or metastatic lesions.    Patient was referred to this clinic due to ongoing symptoms of weight loss, poor appetite,  severe fatigue and concern for cancer.  Accompanied by his daughter who is a  in [place].    Patient's father is a cardiologist with [facility].  Past Medical  History:   Diagnosis Date    Bilateral carotid artery stenosis     Claustrophobia     Diabetes mellitus     GERD (gastroesophageal reflux disease)     History of gastric ulcer     History of pneumonia 2018    MULTIFOCAL, HOSPITALIZED X 6 DAYS    History of shingles 2016    History of TIA (transient ischemic attack)     X2    Hyperlipidemia     Hypertension     Infection due to parainfluenza virus 2 11/13/2018    Melanoma     LEFT FOREARM     Occlusion and stenosis of bilateral carotid arteries 02/17/2023    Prostate cancer     FOLLOWED BY DR ALVARADO    Type 2 diabetes mellitus with hyperglycemia     Urinary catheter in place     SUPRA PUBIC    Uses self-applied continuous glucose monitoring device     FREESTYLE MACRINA    Vertebral artery insufficiency     Vertigo      Past Surgical History:   Procedure Laterality Date    CATARACT EXTRACTION      COLONOSCOPY      2011 Dr [facility]    COLONOSCOPY N/A 6/30/2023    Procedure: COLONOSCOPY TO CECUM WITH SALINE LIFT &  HOT SNARE POLYPECTOMIES, COLD SNARE POLYPECTOMIES;  Surgeon: Ian Comer MD;  Location: [facility] ENDOSCOPY;  Service: Gastroenterology;  Laterality: N/A;  PRE- SCREENING  POST- COLON POLYPS, DIVERTICULOSIS, HEMORRHOIDS    ENDOSCOPY N/A 12/15/2016    Procedure: ESOPHAGOGASTRODUODENOSCOPY WITH COLD BIOPSIES;  Surgeon: Moshe Lux MD;  Location: [facility] ENDOSCOPY;  Service:     SKIN GRAFT SPLIT THICKNESS Left 10/27/2020    Procedure: EXCISION OF MELANOMA FROM THE LEFT FOREARM REQUIRING FULL THICKNESS SKIN GRAFT;  Surgeon: Nick Nuñez MD;  Location: [facility] OR;  Service: General;  Laterality: Left;    SUPRAPUBIC TUBE PLACEMENT N/A 2/8/2023    Procedure: CYSTOSCOPY SUPRAPUBIC CATHETER INSERTION;  Surgeon: Dom Davis MD;  Location: [facility] OR;  Service: Urology;  Laterality: N/A;       MEDICATIONS    Current Outpatient Medications:     atorvastatin (LIPITOR) 40 MG tablet, TAKE ONE TABLET BY MOUTH EVERY NIGHT AT BEDTIME,  Disp: 90 tablet, Rfl: 4    cefuroxime (CEFTIN) 250 MG tablet, , Disp: , Rfl:     clopidogrel (PLAVIX) 75 MG tablet, TAKE 1 TABLET BY MOUTH DAILY, Disp: 90 tablet, Rfl: 4    Continuous Blood Gluc  (FreeStyle Vaishali 14 Day [place]) device, , Disp: , Rfl:     Continuous Blood Gluc Sensor (FreeStyle Vaishali 14 Day Sensor) misc, PLACE ONE SENSOR ON THE SKIN AS DIRECTED BY PROVIDER DAILY, Disp: 2 each, Rfl: 5    cyanocobalamin (VITAMIN B-12) 1000 MCG tablet, Take 1 tablet by mouth Daily., Disp: 30 tablet, Rfl: 0    ferrous sulfate (FerrouSul) 325 (65 FE) MG tablet, Take 1 tablet by mouth Daily With Breakfast., Disp: 90 tablet, Rfl: 1    fluconazole (DIFLUCAN) 100 MG tablet, , Disp: , Rfl:     Gabapentin, Once-Daily, 300 MG tablet, Take  by mouth., Disp: , Rfl:     Gemtesa 75 MG tablet, , Disp: , Rfl:     HYDROcodone-acetaminophen (NORCO) 5-325 MG per tablet, Take 1 tablet by mouth As Needed., Disp: , Rfl:     insulin detemir (Levemir FlexPen) 100 UNIT/ML injection, Inject 30 Units under the skin into the appropriate area as directed Daily. (Patient taking differently: Inject 36 Units under the skin into the appropriate area as directed Daily.), Disp: 15 mL, Rfl: 8    levoFLOXacin (LEVAQUIN) 500 MG tablet, , Disp: , Rfl:     losartan (COZAAR) 25 MG tablet, TAKE ONE TABLET BY MOUTH DAILY, Disp: 90 tablet, Rfl: 4    metFORMIN (GLUCOPHAGE) 500 MG tablet, TAKE ONE TABLET BY MOUTH TWICE A DAY WITH MEALS, Disp: 180 tablet, Rfl: 0    Multiple Vitamins-Minerals (CENTRUM SILVER 50+MEN) tablet, Take 1 tablet by mouth Daily., Disp: , Rfl:     mupirocin (BACTROBAN) 2 % cream, , Disp: , Rfl:     pantoprazole (PROTONIX) 40 MG EC tablet, TAKE 1 TABLET BY MOUTH DAILY, Disp: 90 tablet, Rfl: 4    tamsulosin (FLOMAX) 0.4 MG capsule 24 hr capsule, Take 1 capsule by mouth Daily., Disp: , Rfl:     ALLERGIES:     Allergies   Allergen Reactions    Bee Venom Shortness Of Breath    Shellfish-Derived Products Anaphylaxis and Rash    Aleve  "[Naproxen] Other (See Comments)     Bleeding      Asa [Aspirin] Other (See Comments)     bleeding    Ibuprofen GI Bleeding       SOCIAL HISTORY:       Social History     Socioeconomic History    Marital status:     Number of children: 4   Tobacco Use    Smoking status: Never     Passive exposure: Never    Smokeless tobacco: Never   Vaping Use    Vaping status: Never Used   Substance and Sexual Activity    Alcohol use: No    Drug use: No    Sexual activity: Defer         FAMILY HISTORY:  Family History   Problem Relation Age of Onset    Diabetes Mother     Heart disease Mother     Uterine cancer Mother     Heart attack Mother         early    Diabetes Father     Heart disease Father     Kidney disease Father     Skin cancer Father         melanoma    Cancer Father         testicle    Malig Hyperthermia Neg Hx        REVIEW OF SYSTEMS:  As per HPI       Vitals:    04/29/24 1007   BP: 138/83   Pulse: 103   Resp: 18   Temp: 98.7 °F (37.1 °C)   TempSrc: Temporal   SpO2: 98%   Weight: 59 kg (130 lb)   Height: 175.3 cm (69.02\")   PainSc: 0-No pain         4/29/2024     9:54 AM   Current Status   ECOG score 0      PHYSICAL EXAM:    CONSTITUTIONAL:  Vital signs reviewed.  No distress, looks comfortable.  EYES:  Conjunctiva and lids unremarkable.   EARS,NOSE,MOUTH,THROAT:  Ears and nose appear unremarkable.  Lips, teeth, gums appear unremarkable.  RESPIRATORY:  Normal respiratory effort.  Lungs clear to auscultation bilaterally.  CARDIOVASCULAR:  Normal S1, S2.  No murmurs rubs or gallops.  No significant lower extremity edema.  GASTROINTESTINAL: Abdomen appears unremarkable.  Nondistended  LYMPHATIC:  No cervical, supraclavicular lymphadenopathy.  NEURO: AAOx3, no focal deficits.  Appears to have equal strength all 4 extremities.  MUSCULOSKELETAL:  Unremarkable digits/nails.  No cyanosis or clubbing.  No apparent joint deformities.  SKIN:  Warm.  No rashes.  PSYCHIATRIC:  Normal judgment and insight.  Normal mood and " affect.     RECENT LABS:        Consult on 04/29/2024   Component Date Value Ref Range Status    Iron 04/29/2024 117  59 - 158 mcg/dL Final    Iron Saturation (TSAT) 04/29/2024 34  20 - 50 % Final    Transferrin 04/29/2024 231  200 - 360 mg/dL Final    TIBC 04/29/2024 344  298 - 536 mcg/dL Final    Ferritin 04/29/2024 244.00  30.00 - 400.00 ng/mL Final   Lab on 04/29/2024   Component Date Value Ref Range Status    WBC 04/29/2024 4.26  3.40 - 10.80 10*3/mm3 Final    RBC 04/29/2024 3.17 (L)  4.14 - 5.80 10*6/mm3 Final    Hemoglobin 04/29/2024 10.2 (L)  13.0 - 17.7 g/dL Final    Hematocrit 04/29/2024 31.1 (L)  37.5 - 51.0 % Final    MCV 04/29/2024 98.1 (H)  79.0 - 97.0 fL Final    MCH 04/29/2024 32.2  26.6 - 33.0 pg Final    MCHC 04/29/2024 32.8  31.5 - 35.7 g/dL Final    RDW 04/29/2024 13.2  12.3 - 15.4 % Final    RDW-SD 04/29/2024 47.4  37.0 - 54.0 fl Final    MPV 04/29/2024 9.4  6.0 - 12.0 fL Final    Platelets 04/29/2024 267  140 - 450 10*3/mm3 Final    Neutrophil % 04/29/2024 52.7  42.7 - 76.0 % Final    Lymphocyte % 04/29/2024 33.3  19.6 - 45.3 % Final    Monocyte % 04/29/2024 11.3  5.0 - 12.0 % Final    Eosinophil % 04/29/2024 0.2 (L)  0.3 - 6.2 % Final    Basophil % 04/29/2024 0.2  0.0 - 1.5 % Final    Immature Grans % 04/29/2024 2.3 (H)  0.0 - 0.5 % Final    Neutrophils, Absolute 04/29/2024 2.24  1.70 - 7.00 10*3/mm3 Final    Lymphocytes, Absolute 04/29/2024 1.42  0.70 - 3.10 10*3/mm3 Final    Monocytes, Absolute 04/29/2024 0.48  0.10 - 0.90 10*3/mm3 Final    Eosinophils, Absolute 04/29/2024 0.01  0.00 - 0.40 10*3/mm3 Final    Basophils, Absolute 04/29/2024 0.01  0.00 - 0.20 10*3/mm3 Final    Immature Grans, Absolute 04/29/2024 0.10 (H)  0.00 - 0.05 10*3/mm3 Final    nRBC 04/29/2024 0.0  0.0 - 0.2 /100 WBC Final         ASSESSMENT:  Patient is a pleasant 73-year-old male with medical history significant for DM 2, coronary artery disease, BPH and chronic urinary retention s/p suprapubic catheter (2/2023) who  comes for prostate cancer evaluation and management.    # Prostate adenocarcinoma, Chau 6 (3+3):  Patient was noted to have elevated PSA in 4-5 range and 2017.    A prostatic biopsy performed on 1/8/2018 showed prostate adenocarcinoma, max Chau score 6 (3+3).  3 out of 12 cores positive.  Prostate measured 63.6 cc.  Decipher score 0.21, low risk.  He was seen by Dr. Mayo Davis, urology and plan made for active surveillance.   Most recent PSA are from January 2024 (8.910) and March 2024 (9.77).  Most recent scans from November 2023 and March 2024 showed no evidence of barbara or distant metastatic disease.  Patient's symptoms do not appear to be related to prostate cancer.  Chau 6 prostate cancer is considered a low risk disease.  He is scheduled for repeat MRI prostate to [facility].  Will follow-up the results    # Poor appetite, fatigue and weight loss:  Appears multifactorial related to advanced age, poorly controlled DM2, anemia and depression.  Daughter is concerned about possible Lewy body dementia.  They have an appointment with neurology/memory clinic with [facility] on 5/19/2024.  Discussed plan to check iron profile, B12, folate levels, vitamin D, TSH/T4 and testosterone levels  Will replace if low    # Suspected dementia: Plans to follow-up with neurology    # Urinary retention s/p suprapubic catheter: Management as per urology    PLAN:   - check iron profile, B12, folate levels, vitamin D, TSH/T4 and testosterone levels  -Will follow-up MRI prostate scheduled for 5/17/2024 at [facility]  -Patient advised to keep his appointment with neurology/memory clinic on 5/29/2024  -Encouraged adequate diabetes control.  Last A1c 9.6 in January 2024.  Follows up with endocrinology  -Follow-up in 5 to 6 weeks or sooner if needed  Orders Placed This Encounter   Procedures    Iron Profile     Order Specific Question:   Release to patient     Answer:   Routine Release [[id number]]    Ferritin     Order Specific  Question:   Release to patient     Answer:   Routine Release [[id number]]    Vitamin B12     Order Specific Question:   Release to patient     Answer:   Routine Release [[id number]]    Folate     Order Specific Question:   Release to patient     Answer:   Routine Release [[id number]]    TSH     Order Specific Question:   Release to patient     Answer:   Routine Release [[id number]]    T4, Free     Order Specific Question:   Release to patient     Answer:   Routine Release [[id number]]    Vitamin D 25 Hydroxy     Order Specific Question:   Release to patient     Answer:   Routine Release [[id number]]    Testosterone, Free, Total     Order Specific Question:   Release to patient     Answer:   Routine Release [[id number]]   Total time spent during this patient encounter is 65 minutes. The total time spent with the patient includes: preparing to see the patient by reviewing of tests, prior notes or other relevant information, performing appropriate independent examination & evaluation, counseling, ordering of medications, tests or procedures, documenting clinic information in the electronic medical records or other health records, independently interpreting results of tests and communicating the results to the patient/family or caregiver.

## 2024-05-03 DIAGNOSIS — E11.65 TYPE 2 DIABETES MELLITUS WITH HYPERGLYCEMIA, WITH LONG-TERM CURRENT USE OF INSULIN: Primary | ICD-10-CM

## 2024-05-03 DIAGNOSIS — Z79.4 TYPE 2 DIABETES MELLITUS WITH HYPERGLYCEMIA, WITH LONG-TERM CURRENT USE OF INSULIN: Primary | ICD-10-CM

## 2024-05-05 DIAGNOSIS — E11.65 TYPE 2 DIABETES MELLITUS WITH HYPERGLYCEMIA, WITH LONG-TERM CURRENT USE OF INSULIN: Chronic | ICD-10-CM

## 2024-05-05 DIAGNOSIS — Z79.4 TYPE 2 DIABETES MELLITUS WITH HYPERGLYCEMIA, WITH LONG-TERM CURRENT USE OF INSULIN: Chronic | ICD-10-CM

## 2024-05-05 LAB
TESTOST FREE SERPL-MCNC: 2 PG/ML (ref 6.6–18.1)
TESTOST SERPL-MCNC: 173 NG/DL (ref 264–916)

## 2024-05-09 ENCOUNTER — PATIENT OUTREACH (OUTPATIENT)
Dept: OTHER | Facility: HOSPITAL | Age: 74
End: 2024-05-09
Payer: MEDICARE

## 2024-05-09 ENCOUNTER — OFFICE VISIT (OUTPATIENT)
Dept: ENDOCRINOLOGY | Age: 74
End: 2024-05-09
Payer: MEDICARE

## 2024-05-09 VITALS
SYSTOLIC BLOOD PRESSURE: 124 MMHG | OXYGEN SATURATION: 98 % | DIASTOLIC BLOOD PRESSURE: 74 MMHG | BODY MASS INDEX: 19.16 KG/M2 | WEIGHT: 129.4 LBS | HEIGHT: 69 IN | HEART RATE: 109 BPM

## 2024-05-09 DIAGNOSIS — E11.69 HYPERLIPIDEMIA ASSOCIATED WITH TYPE 2 DIABETES MELLITUS: Chronic | ICD-10-CM

## 2024-05-09 DIAGNOSIS — Z79.4 TYPE 2 DIABETES MELLITUS WITH HYPERGLYCEMIA, WITH LONG-TERM CURRENT USE OF INSULIN: Primary | ICD-10-CM

## 2024-05-09 DIAGNOSIS — E78.5 HYPERLIPIDEMIA ASSOCIATED WITH TYPE 2 DIABETES MELLITUS: Chronic | ICD-10-CM

## 2024-05-09 DIAGNOSIS — E11.65 TYPE 2 DIABETES MELLITUS WITH HYPERGLYCEMIA, WITH LONG-TERM CURRENT USE OF INSULIN: Primary | ICD-10-CM

## 2024-05-09 RX ORDER — PEN NEEDLE, DIABETIC 30 GX3/16"
1 NEEDLE, DISPOSABLE MISCELLANEOUS 4 TIMES DAILY
Qty: 400 EACH | Refills: 3 | Status: SHIPPED | OUTPATIENT
Start: 2024-05-09

## 2024-05-09 RX ORDER — INSULIN LISPRO 100 [IU]/ML
8 INJECTION, SOLUTION INTRAVENOUS; SUBCUTANEOUS
Qty: 15 ML | Refills: 5 | Status: SHIPPED | OUTPATIENT
Start: 2024-05-09

## 2024-05-10 LAB
ALBUMIN SERPL-MCNC: 4.4 G/DL (ref 3.5–5.2)
ALBUMIN/GLOB SERPL: 1.8 G/DL
ALP SERPL-CCNC: 103 U/L (ref 39–117)
ALT SERPL-CCNC: 12 U/L (ref 1–41)
AST SERPL-CCNC: 14 U/L (ref 1–40)
BILIRUB SERPL-MCNC: 0.4 MG/DL (ref 0–1.2)
BUN SERPL-MCNC: 17 MG/DL (ref 8–23)
BUN/CREAT SERPL: 23.9 (ref 7–25)
CALCIUM SERPL-MCNC: 9.4 MG/DL (ref 8.6–10.5)
CHLORIDE SERPL-SCNC: 102 MMOL/L (ref 98–107)
CHOLEST SERPL-MCNC: 91 MG/DL (ref 0–200)
CO2 SERPL-SCNC: 24.2 MMOL/L (ref 22–29)
CREAT SERPL-MCNC: 0.71 MG/DL (ref 0.76–1.27)
EGFRCR SERPLBLD CKD-EPI 2021: 95.7 ML/MIN/1.73
GLOBULIN SER CALC-MCNC: 2.5 GM/DL
GLUCOSE SERPL-MCNC: 143 MG/DL (ref 65–99)
HBA1C MFR BLD: 9.4 % (ref 4.8–5.6)
HDLC SERPL-MCNC: 45 MG/DL (ref 40–60)
IMP & REVIEW OF LAB RESULTS: NORMAL
LDLC SERPL CALC-MCNC: 32 MG/DL (ref 0–100)
POTASSIUM SERPL-SCNC: 4.4 MMOL/L (ref 3.5–5.2)
PROT SERPL-MCNC: 6.9 G/DL (ref 6–8.5)
SODIUM SERPL-SCNC: 140 MMOL/L (ref 136–145)
TRIGL SERPL-MCNC: 63 MG/DL (ref 0–150)
VLDLC SERPL CALC-MCNC: 14 MG/DL (ref 5–40)

## 2024-05-12 DIAGNOSIS — K21.00 GASTROESOPHAGEAL REFLUX DISEASE WITH ESOPHAGITIS WITHOUT HEMORRHAGE: Chronic | ICD-10-CM

## 2024-05-13 RX ORDER — PANTOPRAZOLE SODIUM 40 MG/1
40 TABLET, DELAYED RELEASE ORAL DAILY
Qty: 90 TABLET | Refills: 0 | Status: SHIPPED | OUTPATIENT
Start: 2024-05-13

## 2024-05-14 ENCOUNTER — DOCUMENTATION (OUTPATIENT)
Dept: INTERNAL MEDICINE | Facility: CLINIC | Age: 74
End: 2024-05-14
Payer: MEDICARE

## 2024-05-24 ENCOUNTER — OFFICE VISIT (OUTPATIENT)
Dept: INTERNAL MEDICINE | Facility: CLINIC | Age: 74
End: 2024-05-24
Payer: MEDICARE

## 2024-05-24 VITALS
HEART RATE: 74 BPM | OXYGEN SATURATION: 95 % | RESPIRATION RATE: 18 BRPM | BODY MASS INDEX: 19.99 KG/M2 | SYSTOLIC BLOOD PRESSURE: 122 MMHG | HEIGHT: 69 IN | DIASTOLIC BLOOD PRESSURE: 78 MMHG | WEIGHT: 135 LBS

## 2024-05-24 DIAGNOSIS — E78.5 HYPERLIPIDEMIA ASSOCIATED WITH TYPE 2 DIABETES MELLITUS: Chronic | ICD-10-CM

## 2024-05-24 DIAGNOSIS — Z79.4 TYPE 2 DIABETES MELLITUS WITHOUT COMPLICATION, WITH LONG-TERM CURRENT USE OF INSULIN: Chronic | ICD-10-CM

## 2024-05-24 DIAGNOSIS — E11.9 TYPE 2 DIABETES MELLITUS WITHOUT COMPLICATION, WITH LONG-TERM CURRENT USE OF INSULIN: Chronic | ICD-10-CM

## 2024-05-24 DIAGNOSIS — E11.69 HYPERLIPIDEMIA ASSOCIATED WITH TYPE 2 DIABETES MELLITUS: Chronic | ICD-10-CM

## 2024-05-24 DIAGNOSIS — I10 PRIMARY HYPERTENSION: Primary | Chronic | ICD-10-CM

## 2024-05-24 NOTE — PATIENT INSTRUCTIONS
"MyChart Tips:    [employer] can be a useful part of our patient care program and is a way for us to communicate lab results and for you to request refills.  Here is some information regarding the best way to use [employer] for communication.       Examples of medical issues that are APPROPRIATE for [employer]:  -Follow-up on problems we have already addressed in a visit such as home testing results, blood pressure readings, glucose readings  -Questions that can be answered with a simple \"yes\" or \"no\"  -Please keep communication concise and to the point.  All other types of communication should be held for an office visit.    Communication that is NOT APPROPRIATE for [employer]:  -New problems, serious problems or urgent problems.  Urgent matters should be addressed by phone for advice, in the office, urgent care or the ER.  -Requesting Paxlovid or Antibiotics: These types of problems require an evaluation unless your provider approved this previously.    -[employer] messages are not email.  Staff will check messages each weekday.  We strive for a 48-hour turnaround on messaging.    -[employer] is not for private issues.  Messages are received first by our office staff.    Please be mindful that sometimes it may take longer to receive a response on [employer].  Many times of the year we have high volumes of messages coming into physician inboxes.      Please also be mindful that [employer] messages become part of your permanent medical record.    We appreciate your respect for the limitations and boundaries of [employer].      Lab Results:  Please allow up to 7 business days for lab results to be sent through [employer] to you before contacting your provider.  Sometimes we are waiting for results to get back from the lab and also your provider needs time to analyze them thoroughly before making recommendations.  Also, providers may be out of the office on vacation.      While the internet has great resources, it is not a substitute for " interpreting lab results.

## 2024-05-24 NOTE — PROGRESS NOTES
Chief Complaint  Follow-up (diabetes, hyperlipidemia and hypertension)    Az Sierra presents to [facility]  History of Present Illness    This patient is following up today for diabetes, hyperlipidemia and hypertension.  No new complaints regarding these conditions and has been feeling some better.  Taking medications as prescribed below.  Blood pressure controlled in the office today.  He is also drinking some protein shakes daily.  He has been able to gain a few pounds since his last weight check.      Current Outpatient Medications:     atorvastatin (LIPITOR) 40 MG tablet, TAKE ONE TABLET BY MOUTH EVERY NIGHT AT BEDTIME, Disp: 90 tablet, Rfl: 4    cefuroxime (CEFTIN) 250 MG tablet, , Disp: , Rfl:     clopidogrel (PLAVIX) 75 MG tablet, TAKE 1 TABLET BY MOUTH DAILY, Disp: 90 tablet, Rfl: 4    Continuous Blood Gluc  (FreeStyle Vaishali 14 Day [place]) device, , Disp: , Rfl:     Continuous Blood Gluc Sensor (FreeStyle Vaishali 14 Day Sensor) misc, PLACE ONE SENSOR ON THE SKIN AS DIRECTED BY PROVIDER DAILY, Disp: 2 each, Rfl: 5    cyanocobalamin (VITAMIN B-12) 1000 MCG tablet, Take 1 tablet by mouth Daily., Disp: 30 tablet, Rfl: 0    ferrous sulfate (FerrouSul) 325 (65 FE) MG tablet, Take 1 tablet by mouth Daily With Breakfast., Disp: 90 tablet, Rfl: 1    fluconazole (DIFLUCAN) 100 MG tablet, , Disp: , Rfl:     Gabapentin, Once-Daily, 300 MG tablet, Take  by mouth., Disp: , Rfl:     Gemtesa 75 MG tablet, , Disp: , Rfl:     HYDROcodone-acetaminophen (NORCO) 5-325 MG per tablet, Take 1 tablet by mouth As Needed., Disp: , Rfl:     insulin detemir (Levemir FlexPen) 100 UNIT/ML injection, Inject 30 Units under the skin into the appropriate area as directed Daily. (Patient taking differently: Inject 31 Units under the skin into the appropriate area as directed Daily.), Disp: 15 mL, Rfl: 8    Insulin Lispro, 1 Unit Dial, (HumaLOG KwikPen) 100 UNIT/ML solution pen-injector,  "Inject 8 Units under the skin into the appropriate area as directed 3 (Three) Times a Day With Meals., Disp: 15 mL, Rfl: 5    Insulin Pen Needle (Pen Needles) 31G X 5 MM misc, Use 1 each 4 (Four) Times a Day., Disp: 400 each, Rfl: 3    levoFLOXacin (LEVAQUIN) 500 MG tablet, , Disp: , Rfl:     losartan (COZAAR) 25 MG tablet, TAKE ONE TABLET BY MOUTH DAILY, Disp: 90 tablet, Rfl: 4    metFORMIN (GLUCOPHAGE) 500 MG tablet, TAKE 1 TABLET BY MOUTH TWICE A DAY WITH A MEAL, Disp: 180 tablet, Rfl: 0    Multiple Vitamins-Minerals (CENTRUM SILVER 50+MEN) tablet, Take 1 tablet by mouth Daily., Disp: , Rfl:     mupirocin (BACTROBAN) 2 % cream, , Disp: , Rfl:     pantoprazole (PROTONIX) 40 MG EC tablet, TAKE ONE TABLET BY MOUTH DAILY, Disp: 90 tablet, Rfl: 0    tamsulosin (FLOMAX) 0.4 MG capsule 24 hr capsule, Take 1 capsule by mouth Daily., Disp: , Rfl:         Objective   Vital Signs:  /78 (BP Location: Left arm, Patient Position: Sitting, Cuff Size: Small Adult)   Pulse 74   Resp 18   Ht 175.3 cm (69\")   Wt 61.2 kg (135 lb)   SpO2 95%   BMI 19.94 kg/m²   Estimated body mass index is 19.94 kg/m² as calculated from the following:    Height as of this encounter: 175.3 cm (69\").    Weight as of this encounter: 61.2 kg (135 lb).       BMI is within normal parameters. No other follow-up for BMI required.      Physical Exam  Vitals and nursing note reviewed.   Constitutional:       General: He is not in acute distress.     Appearance: Normal appearance.   Cardiovascular:      Rate and Rhythm: Normal rate and regular rhythm.      Heart sounds: Normal heart sounds. No murmur heard.  Pulmonary:      Effort: Pulmonary effort is normal.      Breath sounds: Normal breath sounds.   Neurological:      Mental Status: He is alert.        Result Review :    The following data was reviewed by: Warren Mohamud MD on 05/24/2024:  Common labs          4/5/2024    10:50 4/29/2024    09:33 5/9/2024    09:34   Common Labs   Glucose 427   " 143    BUN 13   17    Creatinine 0.74   0.71    Sodium 137   140    Potassium 4.7   4.4    Chloride 98   102    Calcium 9.2   9.4    Total Protein 6.5   6.9    Albumin 4.3   4.4    Total Bilirubin 0.3   0.4    Alkaline Phosphatase 138   103    AST (SGOT) 10   14    ALT (SGPT) 15   12    WBC 3.98  4.26     Hemoglobin 10.4  10.2     Hematocrit 31.5  31.1     Platelets 235  267     Total Cholesterol   91    Triglycerides   63    HDL Cholesterol   45    LDL Cholesterol    32    Hemoglobin A1C   9.40      Data reviewed : Consultant notes Dr. Mendoza 4/29/2024, Dr. Franco 5/9/2024             Assessment and Plan     Diagnoses and all orders for this visit:    1. Primary hypertension (Primary)    2. Hyperlipidemia associated with type 2 diabetes mellitus    3. Type 2 diabetes mellitus without complication, with long-term current use of insulin      Clinically stable chronic conditions as above.  Continue all medications as above.  Labs reviewed as above.             Follow Up     Return if symptoms worsen or fail to improve.  Patient was given instructions and counseling regarding his condition or for health maintenance advice. Please see specific information pulled into the AVS if appropriate.

## 2024-05-29 ENCOUNTER — TELEPHONE (OUTPATIENT)
Dept: ONCOLOGY | Facility: CLINIC | Age: 74
End: 2024-05-29
Payer: MEDICARE

## 2024-05-29 NOTE — TELEPHONE ENCOUNTER
Called [facility] and staff said patient had MRI prostate done and is scheduled to see his Urologist Savannah Poole on 5/30/24 to discuss the results. Request results be faxed to [phone].

## 2024-06-04 ENCOUNTER — OFFICE VISIT (OUTPATIENT)
Dept: ONCOLOGY | Facility: CLINIC | Age: 74
End: 2024-06-04
Payer: MEDICARE

## 2024-06-04 ENCOUNTER — LAB (OUTPATIENT)
Dept: LAB | Facility: HOSPITAL | Age: 74
End: 2024-06-04
Payer: MEDICARE

## 2024-06-04 VITALS
OXYGEN SATURATION: 97 % | WEIGHT: 137.2 LBS | HEIGHT: 69 IN | DIASTOLIC BLOOD PRESSURE: 72 MMHG | BODY MASS INDEX: 20.32 KG/M2 | RESPIRATION RATE: 18 BRPM | SYSTOLIC BLOOD PRESSURE: 130 MMHG | HEART RATE: 116 BPM | TEMPERATURE: 98 F

## 2024-06-04 DIAGNOSIS — R53.83 OTHER FATIGUE: ICD-10-CM

## 2024-06-04 DIAGNOSIS — E55.9 VITAMIN D DEFICIENCY, UNSPECIFIED: ICD-10-CM

## 2024-06-04 DIAGNOSIS — C61 PROSTATE CANCER: Primary | ICD-10-CM

## 2024-06-04 DIAGNOSIS — C61 PROSTATE CANCER: ICD-10-CM

## 2024-06-04 LAB
ALBUMIN SERPL-MCNC: 4 G/DL (ref 3.5–5.2)
ALBUMIN/GLOB SERPL: 1.4 G/DL
ALP SERPL-CCNC: 128 U/L (ref 39–117)
ALT SERPL W P-5'-P-CCNC: 29 U/L (ref 1–41)
ANION GAP SERPL CALCULATED.3IONS-SCNC: 13.5 MMOL/L (ref 5–15)
AST SERPL-CCNC: 36 U/L (ref 1–40)
BASOPHILS # BLD AUTO: 0.01 10*3/MM3 (ref 0–0.2)
BASOPHILS NFR BLD AUTO: 0.1 % (ref 0–1.5)
BILIRUB SERPL-MCNC: 0.5 MG/DL (ref 0–1.2)
BUN SERPL-MCNC: 14 MG/DL (ref 8–23)
BUN/CREAT SERPL: 20.6 (ref 7–25)
CALCIUM SPEC-SCNC: 9.2 MG/DL (ref 8.6–10.5)
CHLORIDE SERPL-SCNC: 98 MMOL/L (ref 98–107)
CO2 SERPL-SCNC: 23.5 MMOL/L (ref 22–29)
CREAT SERPL-MCNC: 0.68 MG/DL (ref 0.76–1.27)
DEPRECATED RDW RBC AUTO: 53.2 FL (ref 37–54)
EGFRCR SERPLBLD CKD-EPI 2021: 96.9 ML/MIN/1.73
EOSINOPHIL # BLD AUTO: 0 10*3/MM3 (ref 0–0.4)
EOSINOPHIL NFR BLD AUTO: 0 % (ref 0.3–6.2)
ERYTHROCYTE [DISTWIDTH] IN BLOOD BY AUTOMATED COUNT: 14.6 % (ref 12.3–15.4)
GLOBULIN UR ELPH-MCNC: 2.9 GM/DL
GLUCOSE SERPL-MCNC: 253 MG/DL (ref 65–99)
HCT VFR BLD AUTO: 31.3 % (ref 37.5–51)
HGB BLD-MCNC: 10.3 G/DL (ref 13–17.7)
IMM GRANULOCYTES # BLD AUTO: 0.15 10*3/MM3 (ref 0–0.05)
IMM GRANULOCYTES NFR BLD AUTO: 1.8 % (ref 0–0.5)
LYMPHOCYTES # BLD AUTO: 1.31 10*3/MM3 (ref 0.7–3.1)
LYMPHOCYTES NFR BLD AUTO: 15.6 % (ref 19.6–45.3)
MCH RBC QN AUTO: 33.3 PG (ref 26.6–33)
MCHC RBC AUTO-ENTMCNC: 32.9 G/DL (ref 31.5–35.7)
MCV RBC AUTO: 101.3 FL (ref 79–97)
MONOCYTES # BLD AUTO: 0.84 10*3/MM3 (ref 0.1–0.9)
MONOCYTES NFR BLD AUTO: 10 % (ref 5–12)
NEUTROPHILS NFR BLD AUTO: 6.1 10*3/MM3 (ref 1.7–7)
NEUTROPHILS NFR BLD AUTO: 72.5 % (ref 42.7–76)
NRBC BLD AUTO-RTO: 0 /100 WBC (ref 0–0.2)
PLATELET # BLD AUTO: 206 10*3/MM3 (ref 140–450)
PMV BLD AUTO: 10.2 FL (ref 6–12)
POTASSIUM SERPL-SCNC: 4.2 MMOL/L (ref 3.5–5.2)
PROT SERPL-MCNC: 6.9 G/DL (ref 6–8.5)
RBC # BLD AUTO: 3.09 10*6/MM3 (ref 4.14–5.8)
SODIUM SERPL-SCNC: 135 MMOL/L (ref 136–145)
WBC NRBC COR # BLD AUTO: 8.41 10*3/MM3 (ref 3.4–10.8)

## 2024-06-04 PROCEDURE — 36415 COLL VENOUS BLD VENIPUNCTURE: CPT

## 2024-06-04 PROCEDURE — 85025 COMPLETE CBC W/AUTO DIFF WBC: CPT

## 2024-06-04 PROCEDURE — 80053 COMPREHEN METABOLIC PANEL: CPT

## 2024-06-04 RX ORDER — IBUPROFEN 800 MG
400 TABLET ORAL DAILY
Qty: 150 CAPSULE | Refills: 0 | Status: SHIPPED | OUTPATIENT
Start: 2024-06-04

## 2024-06-04 NOTE — PROGRESS NOTES
[facility]    CONSULTING IN BLOOD DISORDERS & CANCER      REASON FOR CONSULTATION/CHIEF COMPLAINT:     Evaluation and management for prostate cancer                             REQUESTING PHYSICIAN: No ref. provider found  RECORDS OBTAINED:  Records of the patients history including those from the electronic medical record were reviewed and summarized in detail.    HISTORY OF PRESENT ILLNESS:    The patient is a 73 y.o. year old male with medical history significant for DM 2, coronary artery disease, BPH and chronic urinary retention s/p suprapubic catheter (2/2023) who comes for prostate cancer evaluation and management.    Patient was noted to have elevated PSA in 4-5 range and 2017.  A prostatic biopsy performed on 1/8/2018 showed prostate adenocarcinoma, max [place] score 6 (3+3).  3 out of 12 cores positive.  Prostate measured 63.6 cc.  Decipher score 0.21, low risk.  He was seen by Dr. Mayo Davis, urology and plan made for active surveillance.  Patient also follows up with him for BPH and history of urinary retention.  Urodynamic study in October 2022 was a poor study.  He underwent cystoscopy and suprapubic catheter placement on 2/8/2023.    Most recent PSA are from January 2024 (8.910) and March 2024 (9.77).    A CT C/A/P from 11/18/2023 showed no evidence of lymphadenopathy or metastasis.  Numerous tiny calcified benign granulomas demonstrated throughout both lungs.  There is diffuse bladder wall thickening with suprapubic catheter in position.  Suspected right kidney lower pole pyelonephritis.  The spleen is mildly enlarged, 13.5 cm.    A CT A/P from 3/7/2024 too showed enlarged prostrate, no lymphadenopathy or metastatic lesions.    Patient was referred to this clinic due to ongoing symptoms of weight loss, poor appetite,  severe fatigue and concern for cancer.  Accompanied by his daughter who is a  in [place].    Patient's father was a cardiologist with [facility].    Lab work in  April 2024: Noted normal iron, vitamin B12 and folate levels.  Mild vitamin D deficiency (30.9) and low testosterone (total testosterone 173).    INTERIM HISTORY:  Patient returns to the clinic for a follow-up visit.  Today, he reports of doing much better than last visit.  Says he has been trying to eat more regularly and also drink a protein drink once a day.  He has been able to gain more than 10 pounds since last visit.  He maintains close follow-up with PCP, urology and other specialists.  Says his diabetes is much better controlled after starting to take insulin prior to his meals.    Past Medical History:   Diagnosis Date    Bilateral carotid artery stenosis     Claustrophobia     Diabetes mellitus     GERD (gastroesophageal reflux disease)     History of gastric ulcer     History of pneumonia 2018    MULTIFOCAL, HOSPITALIZED X 6 DAYS    History of shingles 2016    History of TIA (transient ischemic attack)     X2    Hyperlipidemia     Hypertension     Infection due to parainfluenza virus 2 11/13/2018    Melanoma     LEFT FOREARM     Occlusion and stenosis of bilateral carotid arteries 02/17/2023    Prostate cancer     FOLLOWED BY DR ALVARADO    Type 2 diabetes mellitus with hyperglycemia     Urinary catheter in place     SUPRA PUBIC    Uses self-applied continuous glucose monitoring device     FREESTYLE MACRINA    Vertebral artery insufficiency     Vertigo      Past Surgical History:   Procedure Laterality Date    CATARACT EXTRACTION      COLONOSCOPY      2011 Dr [facility]    COLONOSCOPY N/A 6/30/2023    Procedure: COLONOSCOPY TO CECUM WITH SALINE LIFT &  HOT SNARE POLYPECTOMIES, COLD SNARE POLYPECTOMIES;  Surgeon: Ian Comer MD;  Location: [facility] ENDOSCOPY;  Service: Gastroenterology;  Laterality: N/A;  PRE- SCREENING  POST- COLON POLYPS, DIVERTICULOSIS, HEMORRHOIDS    ENDOSCOPY N/A 12/15/2016    Procedure: ESOPHAGOGASTRODUODENOSCOPY WITH COLD BIOPSIES;  Surgeon: Moshe Lux MD;   Location: [facility] ENDOSCOPY;  Service:     SKIN GRAFT SPLIT THICKNESS Left 10/27/2020    Procedure: EXCISION OF MELANOMA FROM THE LEFT FOREARM REQUIRING FULL THICKNESS SKIN GRAFT;  Surgeon: Nick Nuñez MD;  Location: [facility] MAIN OR;  Service: General;  Laterality: Left;    SUPRAPUBIC TUBE PLACEMENT N/A 2/8/2023    Procedure: CYSTOSCOPY SUPRAPUBIC CATHETER INSERTION;  Surgeon: Dom Davis MD;  Location: [facility] MAIN OR;  Service: Urology;  Laterality: N/A;       MEDICATIONS    Current Outpatient Medications:     atorvastatin (LIPITOR) 40 MG tablet, TAKE ONE TABLET BY MOUTH EVERY NIGHT AT BEDTIME, Disp: 90 tablet, Rfl: 4    cefuroxime (CEFTIN) 250 MG tablet, , Disp: , Rfl:     clopidogrel (PLAVIX) 75 MG tablet, TAKE 1 TABLET BY MOUTH DAILY, Disp: 90 tablet, Rfl: 4    Continuous Blood Gluc  (FreeStyle Vaishali 14 Day Southold) device, , Disp: , Rfl:     Continuous Blood Gluc Sensor (FreeStyle Vaishali 14 Day Sensor) misc, PLACE ONE SENSOR ON THE SKIN AS DIRECTED BY PROVIDER DAILY, Disp: 2 each, Rfl: 5    cyanocobalamin (VITAMIN B-12) 1000 MCG tablet, Take 1 tablet by mouth Daily., Disp: 30 tablet, Rfl: 0    ferrous sulfate (FerrouSul) 325 (65 FE) MG tablet, Take 1 tablet by mouth Daily With Breakfast., Disp: 90 tablet, Rfl: 1    fluconazole (DIFLUCAN) 100 MG tablet, , Disp: , Rfl:     Gabapentin, Once-Daily, 300 MG tablet, Take  by mouth., Disp: , Rfl:     Gemtesa 75 MG tablet, , Disp: , Rfl:     HYDROcodone-acetaminophen (NORCO) 5-325 MG per tablet, Take 1 tablet by mouth As Needed., Disp: , Rfl:     insulin detemir (Levemir FlexPen) 100 UNIT/ML injection, Inject 30 Units under the skin into the appropriate area as directed Daily. (Patient taking differently: Inject 31 Units under the skin into the appropriate area as directed Daily.), Disp: 15 mL, Rfl: 8    Insulin Lispro, 1 Unit Dial, (HumaLOG KwikPen) 100 UNIT/ML solution pen-injector, Inject 8 Units under the skin into the appropriate area as  directed 3 (Three) Times a Day With Meals., Disp: 15 mL, Rfl: 5    Insulin Pen Needle (Pen Needles) 31G X 5 MM misc, Use 1 each 4 (Four) Times a Day., Disp: 400 each, Rfl: 3    levoFLOXacin (LEVAQUIN) 500 MG tablet, , Disp: , Rfl:     losartan (COZAAR) 25 MG tablet, TAKE ONE TABLET BY MOUTH DAILY, Disp: 90 tablet, Rfl: 4    metFORMIN (GLUCOPHAGE) 500 MG tablet, TAKE 1 TABLET BY MOUTH TWICE A DAY WITH A MEAL, Disp: 180 tablet, Rfl: 0    Multiple Vitamins-Minerals (CENTRUM SILVER 50+MEN) tablet, Take 1 tablet by mouth Daily., Disp: , Rfl:     mupirocin (BACTROBAN) 2 % cream, , Disp: , Rfl:     pantoprazole (PROTONIX) 40 MG EC tablet, TAKE ONE TABLET BY MOUTH DAILY, Disp: 90 tablet, Rfl: 0    tamsulosin (FLOMAX) 0.4 MG capsule 24 hr capsule, Take 1 capsule by mouth Daily., Disp: , Rfl:     Cholecalciferol (Vitamin D3) 10 MCG (400 UNIT) capsule, Take 400 Units by mouth Daily., Disp: 150 capsule, Rfl: 0    ALLERGIES:     Allergies   Allergen Reactions    Bee Venom Shortness Of Breath    Shellfish-Derived Products Anaphylaxis and Rash    Aleve [Naproxen] Other (See Comments)     Bleeding      Asa [Aspirin] Other (See Comments)     bleeding    Ibuprofen GI Bleeding       SOCIAL HISTORY:       Social History     Socioeconomic History    Marital status:     Number of children: 4   Tobacco Use    Smoking status: Never     Passive exposure: Never    Smokeless tobacco: Never   Vaping Use    Vaping status: Never Used   Substance and Sexual Activity    Alcohol use: No    Drug use: No    Sexual activity: Defer         FAMILY HISTORY:  Family History   Problem Relation Age of Onset    Diabetes Mother     Heart disease Mother     Uterine cancer Mother     Heart attack Mother         early    Diabetes Father     Heart disease Father     Kidney disease Father     Skin cancer Father         melanoma    Cancer Father         testicle    Malig Hyperthermia Neg Hx        REVIEW OF SYSTEMS:  As per HPI       Vitals:    06/04/24 1011  "  BP: 130/72   Pulse: 116   Resp: 18   Temp: 98 °F (36.7 °C)   TempSrc: Oral   SpO2: 97%   Weight: 62.2 kg (137 lb 3.2 oz)   Height: 175.3 cm (69.02\")   PainSc: 0-No pain           6/4/2024    10:04 AM   Current Status   ECOG score 0      PHYSICAL EXAM:    CONSTITUTIONAL:  Vital signs reviewed.  No distress, looks comfortable.  EYES:  Conjunctiva and lids unremarkable.   EARS,NOSE,MOUTH,THROAT:  Ears and nose appear unremarkable.  Lips, teeth, gums appear unremarkable.  RESPIRATORY:  Normal respiratory effort.  Lungs clear to auscultation bilaterally.  CARDIOVASCULAR:  Normal S1, S2.  No murmurs rubs or gallops.  No significant lower extremity edema.  GASTROINTESTINAL: Abdomen appears unremarkable.  Nondistended  LYMPHATIC:  No cervical, supraclavicular lymphadenopathy.  NEURO: AAOx3, no focal deficits.  Appears to have equal strength all 4 extremities.  MUSCULOSKELETAL:  Unremarkable digits/nails.  No cyanosis or clubbing.  No apparent joint deformities.  SKIN:  Warm.  No rashes.  PSYCHIATRIC:  Normal judgment and insight.  Normal mood and affect.     RECENT LABS:        Lab on 06/04/2024   Component Date Value Ref Range Status    Glucose 06/04/2024 253 (H)  65 - 99 mg/dL Final    BUN 06/04/2024 14  8 - 23 mg/dL Final    Creatinine 06/04/2024 0.68 (L)  0.76 - 1.27 mg/dL Final    Sodium 06/04/2024 135 (L)  136 - 145 mmol/L Final    Potassium 06/04/2024 4.2  3.5 - 5.2 mmol/L Final    Chloride 06/04/2024 98  98 - 107 mmol/L Final    CO2 06/04/2024 23.5  22.0 - 29.0 mmol/L Final    Calcium 06/04/2024 9.2  8.6 - 10.5 mg/dL Final    Total Protein 06/04/2024 6.9  6.0 - 8.5 g/dL Final    Albumin 06/04/2024 4.0  3.5 - 5.2 g/dL Final    ALT (SGPT) 06/04/2024 29  1 - 41 U/L Final    AST (SGOT) 06/04/2024 36  1 - 40 U/L Final    Alkaline Phosphatase 06/04/2024 128 (H)  39 - 117 U/L Final    Total Bilirubin 06/04/2024 0.5  0.0 - 1.2 mg/dL Final    Globulin 06/04/2024 2.9  gm/dL Final    A/G Ratio 06/04/2024 1.4  g/dL Final    " BUN/Creatinine Ratio 06/04/2024 20.6  7.0 - 25.0 Final    Anion Gap 06/04/2024 13.5  5.0 - 15.0 mmol/L Final    eGFR 06/04/2024 96.9  >60.0 mL/min/1.73 Final    WBC 06/04/2024 8.41  3.40 - 10.80 10*3/mm3 Final    RBC 06/04/2024 3.09 (L)  4.14 - 5.80 10*6/mm3 Final    Hemoglobin 06/04/2024 10.3 (L)  13.0 - 17.7 g/dL Final    Hematocrit 06/04/2024 31.3 (L)  37.5 - 51.0 % Final    MCV 06/04/2024 101.3 (H)  79.0 - 97.0 fL Final    MCH 06/04/2024 33.3 (H)  26.6 - 33.0 pg Final    MCHC 06/04/2024 32.9  31.5 - 35.7 g/dL Final    RDW 06/04/2024 14.6  12.3 - 15.4 % Final    RDW-SD 06/04/2024 53.2  37.0 - 54.0 fl Final    MPV 06/04/2024 10.2  6.0 - 12.0 fL Final    Platelets 06/04/2024 206  140 - 450 10*3/mm3 Final    Neutrophil % 06/04/2024 72.5  42.7 - 76.0 % Final    Lymphocyte % 06/04/2024 15.6 (L)  19.6 - 45.3 % Final    Monocyte % 06/04/2024 10.0  5.0 - 12.0 % Final    Eosinophil % 06/04/2024 0.0 (L)  0.3 - 6.2 % Final    Basophil % 06/04/2024 0.1  0.0 - 1.5 % Final    Immature Grans % 06/04/2024 1.8 (H)  0.0 - 0.5 % Final    Neutrophils, Absolute 06/04/2024 6.10  1.70 - 7.00 10*3/mm3 Final    Lymphocytes, Absolute 06/04/2024 1.31  0.70 - 3.10 10*3/mm3 Final    Monocytes, Absolute 06/04/2024 0.84  0.10 - 0.90 10*3/mm3 Final    Eosinophils, Absolute 06/04/2024 0.00  0.00 - 0.40 10*3/mm3 Final    Basophils, Absolute 06/04/2024 0.01  0.00 - 0.20 10*3/mm3 Final    Immature Grans, Absolute 06/04/2024 0.15 (H)  0.00 - 0.05 10*3/mm3 Final    nRBC 06/04/2024 0.0  0.0 - 0.2 /100 WBC Final         ASSESSMENT:  Patient is a pleasant 73-year-old male with medical history significant for DM 2, coronary artery disease, BPH and chronic urinary retention s/p suprapubic catheter (2/2023) who comes for prostate cancer evaluation and management.    # Prostate adenocarcinoma, [place] 6 (3+3):  Patient was noted to have elevated PSA in 4-5 range in 2017.    A prostatic biopsy performed on 1/8/2018 showed prostate adenocarcinoma, max [place]  score 6 (3+3).  3 out of 12 cores positive.  Prostate measured 63.6 cc.  Decipher score 0.21, low risk.  He was seen by Dr. Mayo Davis, urology and plan made for active surveillance.   Most recent PSA are from January 2024 (8.910) and March 2024 (9.77).  Most recent scans from November 2023 and March 2024 showed no evidence of barbara or distant metastatic disease.  Patient's symptoms do not appear to be related to prostate cancer.  Chau 6 prostate cancer is considered a low risk disease.    A MRI prostate performed in May 2024 did not show any evidence of prostatic malignancy (verbal report from the radiologist).  It did show bladder wall thickening, likely from chronic suprapubic catheter.  Patient has been advised to follow-up with Dr. Mayo Davis, urologist closely.    # Poor appetite, fatigue and weight loss:  Appears multifactorial related to advanced age, poorly controlled DM2, anemia and depression.  Daughter is concerned about possible Lewy body dementia.  They have an appointment with neurology/memory clinic with [facility] on 5/19/2024.  Lab work in April 2024: Noted normal iron, vitamin B12 and folate levels.  Normal thyroid labs.  Mild vitamin D deficiency (30.9) and low testosterone (total testosterone 173).  6/4/2024: Appetite and fatigue much improved now with better nutrition.  Encouraged to maintain adequate nutrition and drinks at least 1 protein drinks every day.  Given low normal vitamin D levels, will start him on oral vitamin D 400 units daily.  Follow-up in 6 months with repeat labs    # Suspected dementia: Plans to follow-up with neurology    # Urinary retention s/p suprapubic catheter: Management as per urology    PLAN:   -Start oral vitamin D supplements  -Continue to maintain adequate nutritional intake.  -Patient advised to keep his appointment with neurology/memory clinic  -Encouraged adequate diabetes control.  Last A1c 9.6 in January 2024.  Follows up with  endocrinology  -Follow-up in 6 months with repeat labs or sooner if needed  Orders Placed This Encounter   Procedures    Comprehensive Metabolic Panel     Standing Status:   Future     Number of Occurrences:   1     Standing Expiration Date:   5/2/2025     Order Specific Question:   Release to patient     Answer:   Routine Release [[id number]]    Comprehensive Metabolic Panel     Standing Status:   Future     Standing Expiration Date:   6/4/2025     Order Specific Question:   Release to patient     Answer:   Routine Release [[id number]]    Vitamin D 25 Hydroxy     Standing Status:   Future     Standing Expiration Date:   6/4/2025     Order Specific Question:   Release to patient     Answer:   Routine Release [[id number]]    CBC & Differential     Standing Status:   Future     Number of Occurrences:   1     Standing Expiration Date:   5/2/2025     Order Specific Question:   Manual Differential     Answer:   No     Order Specific Question:   Release to patient     Answer:   Routine Release [[id number]]    CBC & Differential     Standing Status:   Future     Standing Expiration Date:   6/4/2025     Order Specific Question:   Manual Differential     Answer:   No     Order Specific Question:   Release to patient     Answer:   Routine Release [[id number]]   Total time spent during this patient encounter is 35 minutes. The total time spent with the patient includes: preparing to see the patient by reviewing of tests, prior notes or other relevant information, performing appropriate independent examination & evaluation, counseling, ordering of medications, tests or procedures, documenting clinic information in the electronic medical records or other health records, independently interpreting results of tests and communicating the results to the patient/family or caregiver.

## 2024-06-06 ENCOUNTER — PATIENT OUTREACH (OUTPATIENT)
Dept: OTHER | Facility: HOSPITAL | Age: 74
End: 2024-06-06
Payer: MEDICARE

## 2024-06-06 NOTE — PROGRESS NOTES
Nurse Initial Navigator Assessment: Received referral from Dr. Mendoza to follow patient regarding diagnosis and psychosocial support.Patient has diagnosis of prostate adenocarcinoma. Recent MRI prostate performed in May 2024 did not show any evidence of prostatic malignancy. Patient to continue active surveillance per Dr. Davis at [facility]. Will sign off.....Lucila Price RN, Oncology Nurse Navigator

## 2024-06-20 ENCOUNTER — OFFICE VISIT (OUTPATIENT)
Dept: INTERNAL MEDICINE | Facility: CLINIC | Age: 74
End: 2024-06-20
Payer: MEDICARE

## 2024-06-20 VITALS
TEMPERATURE: 97.6 F | OXYGEN SATURATION: 98 % | HEIGHT: 69 IN | SYSTOLIC BLOOD PRESSURE: 118 MMHG | HEART RATE: 114 BPM | WEIGHT: 135 LBS | DIASTOLIC BLOOD PRESSURE: 80 MMHG | BODY MASS INDEX: 19.99 KG/M2

## 2024-06-20 DIAGNOSIS — F33.1 MAJOR DEPRESSIVE DISORDER, RECURRENT, MODERATE: ICD-10-CM

## 2024-06-20 DIAGNOSIS — E11.69 HYPERLIPIDEMIA ASSOCIATED WITH TYPE 2 DIABETES MELLITUS: Chronic | ICD-10-CM

## 2024-06-20 DIAGNOSIS — E44.0 MODERATE MALNUTRITION: ICD-10-CM

## 2024-06-20 DIAGNOSIS — Z86.79 HISTORY OF ATRIAL FIBRILLATION: ICD-10-CM

## 2024-06-20 DIAGNOSIS — R35.0 URINE FREQUENCY: ICD-10-CM

## 2024-06-20 DIAGNOSIS — R63.4 ABNORMAL WEIGHT LOSS: ICD-10-CM

## 2024-06-20 DIAGNOSIS — R53.83 OTHER FATIGUE: ICD-10-CM

## 2024-06-20 DIAGNOSIS — F41.9 ANXIETY: ICD-10-CM

## 2024-06-20 DIAGNOSIS — Z76.89 ENCOUNTER TO ESTABLISH CARE: ICD-10-CM

## 2024-06-20 DIAGNOSIS — I10 PRIMARY HYPERTENSION: Primary | ICD-10-CM

## 2024-06-20 DIAGNOSIS — E78.5 HYPERLIPIDEMIA ASSOCIATED WITH TYPE 2 DIABETES MELLITUS: Chronic | ICD-10-CM

## 2024-06-20 DIAGNOSIS — Z79.4 TYPE 2 DIABETES MELLITUS WITHOUT COMPLICATION, WITH LONG-TERM CURRENT USE OF INSULIN: Chronic | ICD-10-CM

## 2024-06-20 DIAGNOSIS — E11.9 TYPE 2 DIABETES MELLITUS WITHOUT COMPLICATION, WITH LONG-TERM CURRENT USE OF INSULIN: Chronic | ICD-10-CM

## 2024-06-20 DIAGNOSIS — R10.2 PELVIC PAIN: ICD-10-CM

## 2024-06-20 PROCEDURE — 3079F DIAST BP 80-89 MM HG: CPT

## 2024-06-20 PROCEDURE — 3074F SYST BP LT 130 MM HG: CPT

## 2024-06-20 PROCEDURE — 3046F HEMOGLOBIN A1C LEVEL >9.0%: CPT

## 2024-06-20 PROCEDURE — 99215 OFFICE O/P EST HI 40 MIN: CPT

## 2024-06-20 PROCEDURE — 1160F RVW MEDS BY RX/DR IN RCRD: CPT

## 2024-06-20 PROCEDURE — 1159F MED LIST DOCD IN RCRD: CPT

## 2024-06-20 PROCEDURE — 1125F AMNT PAIN NOTED PAIN PRSNT: CPT

## 2024-06-20 RX ORDER — LOSARTAN POTASSIUM 25 MG/1
12.5 TABLET ORAL DAILY
Qty: 30 TABLET | Refills: 1 | Status: SHIPPED | OUTPATIENT
Start: 2024-06-20

## 2024-06-20 NOTE — PROGRESS NOTES
Chief Complaint  Establish Care, Fatigue, Shortness of Breath, Diabetes, Hyperlipidemia, and Hypertension     Subjective:      History of Present Illness {CC  Problem List  Visit  Diagnosis   Encounters  Notes  Medications  Labs  Result Review Imaging  Media :23}     Harry Sierra presents to [facility] for:      History of Present Illness     Pt is currently seeing hematology for workup/ anemia- pt last saw MD Reji 6/4/2024. Pt is prescribed vitamin d and iron.     Endo- Seejoi Kaur MD. Nex appt 8/5- last seen 5/9. Reduced Levemir and added Humalog. Last A1C 9.4    Neurology- [facility] neuro. [facility] seen 5/30. 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 Recommends cog behavioral and psych therapy is all is normal. Sees again 6/21/2024   - pt states that he is not going to do the skin test.     Urology- seeing GERRY irizarry. Recently had prostate MRI. Pt states MRI was WNL. PT is using suprapubic catheter. Pt states he feels like he has prostatitis. Pt states that he has been having periodic infections. Pt did try leg bag, but states this did not work out for him as he felt like his pain worsened with constant draining. Pt is taking gemtesa.     HTN- losartan   - pt states he has been weak and is unable to even walk through grocery store. Pt states that he has had this previously and a past provider changed his BP medication and this amde him feel better.     HLD- Lipitor   Afib- plavix        I have reviewed patient's medical history, any new submitted information provided by patient or medical assistant and updated medical record.      Objective:      Physical Exam  Vitals reviewed.   Constitutional:       General: He is not in acute distress.     Appearance: Normal appearance. He is not ill-appearing, toxic-appearing or diaphoretic.   HENT:      Head: Normocephalic.   Cardiovascular:      Rate and Rhythm: Normal rate and regular rhythm.  "     Pulses: Normal pulses.      Heart sounds: Normal heart sounds.   Pulmonary:      Effort: Pulmonary effort is normal.      Breath sounds: Normal breath sounds.   Skin:     General: Skin is warm and dry.      Capillary Refill: Capillary refill takes less than 2 seconds.   Neurological:      General: No focal deficit present.      Mental Status: He is alert.   Psychiatric:         Mood and Affect: Mood normal.         Behavior: Behavior normal.        Result Review  Data Reviewed:{ Labs  Result Review  Imaging  Med Tab  Media :23}                Vital Signs:   /80 (BP Location: Left arm, Patient Position: Sitting, Cuff Size: Adult)   Pulse 114   Temp 97.6 °F (36.4 °C) (Oral)   Ht 175.3 cm (69\")   Wt 61.2 kg (135 lb)   SpO2 98%   BMI 19.94 kg/m²   Estimated body mass index is 19.94 kg/m² as calculated from the following:    Height as of this encounter: 175.3 cm (69\").    Weight as of this encounter: 61.2 kg (135 lb).        Requested Prescriptions     Signed Prescriptions Disp Refills    losartan (Cozaar) 25 MG tablet 30 tablet 1     Sig: Take 0.5 tablets by mouth Daily.       Routine medications provided by this office will also be refilled via pharmacy request.       Current Outpatient Medications:     atorvastatin (LIPITOR) 40 MG tablet, TAKE ONE TABLET BY MOUTH EVERY NIGHT AT BEDTIME, Disp: 90 tablet, Rfl: 4    cefuroxime (CEFTIN) 250 MG tablet, , Disp: , Rfl:     Cholecalciferol (Vitamin D3) 10 MCG (400 UNIT) capsule, Take 400 Units by mouth Daily., Disp: 150 capsule, Rfl: 0    clopidogrel (PLAVIX) 75 MG tablet, TAKE 1 TABLET BY MOUTH DAILY, Disp: 90 tablet, Rfl: 4    Continuous Blood Gluc  (FreeStyle Vaishali 14 Day [place]) device, , Disp: , Rfl:     Continuous Blood Gluc Sensor (FreeStyle Vaishali 14 Day Sensor) misc, PLACE ONE SENSOR ON THE SKIN AS DIRECTED BY PROVIDER DAILY, Disp: 2 each, Rfl: 5    cyanocobalamin (VITAMIN B-12) 1000 MCG tablet, Take 1 tablet by mouth Daily., Disp: 30 " tablet, Rfl: 0    ferrous sulfate (FerrouSul) 325 (65 FE) MG tablet, Take 1 tablet by mouth Daily With Breakfast., Disp: 90 tablet, Rfl: 1    fluconazole (DIFLUCAN) 100 MG tablet, , Disp: , Rfl:     Gabapentin, Once-Daily, 300 MG tablet, Take  by mouth., Disp: , Rfl:     Gemtesa 75 MG tablet, , Disp: , Rfl:     HYDROcodone-acetaminophen (NORCO) 5-325 MG per tablet, Take 1 tablet by mouth As Needed., Disp: , Rfl:     insulin detemir (Levemir FlexPen) 100 UNIT/ML injection, Inject 30 Units under the skin into the appropriate area as directed Daily. (Patient taking differently: Inject 31 Units under the skin into the appropriate area as directed Daily.), Disp: 15 mL, Rfl: 8    Insulin Lispro, 1 Unit Dial, (HumaLOG KwikPen) 100 UNIT/ML solution pen-injector, Inject 8 Units under the skin into the appropriate area as directed 3 (Three) Times a Day With Meals., Disp: 15 mL, Rfl: 5    Insulin Pen Needle (Pen Needles) 31G X 5 MM misc, Use 1 each 4 (Four) Times a Day., Disp: 400 each, Rfl: 3    levoFLOXacin (LEVAQUIN) 500 MG tablet, , Disp: , Rfl:     metFORMIN (GLUCOPHAGE) 500 MG tablet, TAKE 1 TABLET BY MOUTH TWICE A DAY WITH A MEAL, Disp: 180 tablet, Rfl: 0    Multiple Vitamins-Minerals (CENTRUM SILVER 50+MEN) tablet, Take 1 tablet by mouth Daily., Disp: , Rfl:     mupirocin (BACTROBAN) 2 % cream, , Disp: , Rfl:     pantoprazole (PROTONIX) 40 MG EC tablet, TAKE ONE TABLET BY MOUTH DAILY, Disp: 90 tablet, Rfl: 0    tamsulosin (FLOMAX) 0.4 MG capsule 24 hr capsule, Take 1 capsule by mouth Daily., Disp: , Rfl:     losartan (Cozaar) 25 MG tablet, Take 0.5 tablets by mouth Daily., Disp: 30 tablet, Rfl: 1     Assessment and Plan:      Assessment and Plan {CC Problem List  Visit Diagnosis  ROS  Review (Popup)  Health Maintenance  Quality  BestPractice  Medications  SmartSets  SnapShot Encounters  Media :23}     Diagnoses and all orders for this visit:    1. Primary hypertension (Primary)  -     losartan (Cozaar) 25  MG tablet; Take 0.5 tablets by mouth Daily.  Dispense: 30 tablet; Refill: 1    2. Other fatigue    3. Moderate malnutrition    4. Type 2 diabetes mellitus without complication, with long-term current use of insulin    5. Hyperlipidemia associated with type 2 diabetes mellitus    6. History of atrial fibrillation    7. Encounter to establish care    8. Abnormal weight loss    9. Major depressive disorder, recurrent, moderate    10. Anxiety    11. Urine frequency    12. Pelvic pain             New Medications Ordered This Visit   Medications    losartan (Cozaar) 25 MG tablet     Sig: Take 0.5 tablets by mouth Daily.     Dispense:  30 tablet     Refill:  1       Educated patient on dosing and side effects of losartan.  I would like to have this at this time as I believe patient's blood pressure is getting lower.  Educated patient to watch blood pressure and reach out if blood pressure remains above 140/90 consistently.  Educated patient to continue eating a healthy diet and watching his sugar intake.  Educated patient to continue following up with neurology, endocrinology and urology.  Educated patient to reach out to urology for treatment of possible prostatitis.  Patient verbalizes understanding and is comfortable with plan of care.    Follow Up {Instructions Charge Capture  Follow-up Communications :23}     Return in about 3 months (around 10/2/2024) for Medicare Wellness.    I spent 50 minutes caring for Harry on this date of service. This time includes time spent by me in the following activities: preparing for the visit, reviewing tests, performing a medically appropriate examination and/or evaluation, counseling and educating the patient/family/caregiver, documenting information in the medical record, independently interpreting results and communicating that information with the patient/family/caregiver, care coordination, ordering medications, ordering test(s), ordering procedure(s), obtaining a separately  obtained history, reviewing a separately obtained history, and pt has many complications    Patient was given instructions and counseling regarding his condition or for health maintenance advice. Please see specific information pulled into the AVS if appropriate.    Dragon disclaimer:   Much of this encounter note is an electronic transcription/translation of spoken language to printed text. The electronic translation of spoken language may permit erroneous, or at times, nonsensical words or phrases to be inadvertently transcribed; Although I have reviewed the note for such errors, some may still exist.     Additional Patient Counseling:       There are no Patient Instructions on file for this visit.

## 2024-06-21 ENCOUNTER — PATIENT ROUNDING (BHMG ONLY) (OUTPATIENT)
Dept: INTERNAL MEDICINE | Facility: CLINIC | Age: 74
End: 2024-06-21
Payer: MEDICARE

## 2024-07-15 ENCOUNTER — TELEPHONE (OUTPATIENT)
Dept: INTERNAL MEDICINE | Facility: CLINIC | Age: 74
End: 2024-07-15
Payer: MEDICARE

## 2024-07-15 NOTE — TELEPHONE ENCOUNTER
----- Message from [employer] sent at 7/15/2024  9:06 AM EDT -----  Regarding: Appointment Request ()  Contact: [phone]  Appointment Request From: Harry Sierra    With Provider: Barbara Costa [-Primary Care Physician-]    Preferred Date Range: Any date 7/15/2024 or later    Preferred Times: Any    Reason: To address the following health maintenance concerns.  Annual Wellness Visit    Comments:  Also is it possible for you Barbara to prescript a handicap parking permit for me? Although< i am feeling somewhat better, I still have some extra effort needed in walking.    currently taking vitamin B 12 1000mg once a day  vitamin D 3 400 units once a day   iron 65 MG once a day    plus usual Centrum over 50 for men    thank you

## 2024-07-20 DIAGNOSIS — Z79.4 TYPE 2 DIABETES MELLITUS WITH HYPERGLYCEMIA, WITH LONG-TERM CURRENT USE OF INSULIN: Chronic | ICD-10-CM

## 2024-07-20 DIAGNOSIS — E11.65 TYPE 2 DIABETES MELLITUS WITH HYPERGLYCEMIA, WITH LONG-TERM CURRENT USE OF INSULIN: Chronic | ICD-10-CM

## 2024-08-05 ENCOUNTER — OFFICE VISIT (OUTPATIENT)
Dept: ENDOCRINOLOGY | Age: 74
End: 2024-08-05
Payer: MEDICARE

## 2024-08-05 VITALS
HEIGHT: 69 IN | BODY MASS INDEX: 20.23 KG/M2 | DIASTOLIC BLOOD PRESSURE: 84 MMHG | WEIGHT: 136.6 LBS | HEART RATE: 99 BPM | SYSTOLIC BLOOD PRESSURE: 142 MMHG | OXYGEN SATURATION: 97 %

## 2024-08-05 DIAGNOSIS — E78.5 HYPERLIPIDEMIA ASSOCIATED WITH TYPE 2 DIABETES MELLITUS: ICD-10-CM

## 2024-08-05 DIAGNOSIS — E11.69 HYPERLIPIDEMIA ASSOCIATED WITH TYPE 2 DIABETES MELLITUS: ICD-10-CM

## 2024-08-05 DIAGNOSIS — Z79.4 TYPE 2 DIABETES MELLITUS WITH HYPERGLYCEMIA, WITH LONG-TERM CURRENT USE OF INSULIN: Primary | ICD-10-CM

## 2024-08-05 DIAGNOSIS — E11.65 TYPE 2 DIABETES MELLITUS WITH HYPERGLYCEMIA, WITH LONG-TERM CURRENT USE OF INSULIN: Primary | ICD-10-CM

## 2024-08-05 LAB
ALBUMIN SERPL-MCNC: 4.4 G/DL (ref 3.5–5.2)
ALBUMIN/GLOB SERPL: 1.7 G/DL
ALP SERPL-CCNC: 108 U/L (ref 39–117)
ALT SERPL-CCNC: 11 U/L (ref 1–41)
AST SERPL-CCNC: 13 U/L (ref 1–40)
BILIRUB SERPL-MCNC: 0.5 MG/DL (ref 0–1.2)
BUN SERPL-MCNC: 12 MG/DL (ref 8–23)
BUN/CREAT SERPL: 16.9 (ref 7–25)
CALCIUM SERPL-MCNC: 9.7 MG/DL (ref 8.6–10.5)
CHLORIDE SERPL-SCNC: 102 MMOL/L (ref 98–107)
CO2 SERPL-SCNC: 26.1 MMOL/L (ref 22–29)
CREAT SERPL-MCNC: 0.71 MG/DL (ref 0.76–1.27)
EGFRCR SERPLBLD CKD-EPI 2021: 95.7 ML/MIN/1.73
GLOBULIN SER CALC-MCNC: 2.6 GM/DL
GLUCOSE SERPL-MCNC: 148 MG/DL (ref 65–99)
HBA1C MFR BLD: 8.6 % (ref 4.8–5.6)
POTASSIUM SERPL-SCNC: 3.8 MMOL/L (ref 3.5–5.2)
PROT SERPL-MCNC: 7 G/DL (ref 6–8.5)
SODIUM SERPL-SCNC: 139 MMOL/L (ref 136–145)

## 2024-08-05 PROCEDURE — 95251 CONT GLUC MNTR ANALYSIS I&R: CPT | Performed by: INTERNAL MEDICINE

## 2024-08-05 PROCEDURE — 1159F MED LIST DOCD IN RCRD: CPT | Performed by: INTERNAL MEDICINE

## 2024-08-05 PROCEDURE — 3046F HEMOGLOBIN A1C LEVEL >9.0%: CPT | Performed by: INTERNAL MEDICINE

## 2024-08-05 PROCEDURE — 99214 OFFICE O/P EST MOD 30 MIN: CPT | Performed by: INTERNAL MEDICINE

## 2024-08-05 PROCEDURE — 3079F DIAST BP 80-89 MM HG: CPT | Performed by: INTERNAL MEDICINE

## 2024-08-05 PROCEDURE — 1160F RVW MEDS BY RX/DR IN RCRD: CPT | Performed by: INTERNAL MEDICINE

## 2024-08-05 PROCEDURE — 3077F SYST BP >= 140 MM HG: CPT | Performed by: INTERNAL MEDICINE

## 2024-08-05 RX ORDER — INSULIN DETEMIR 100 [IU]/ML
24 INJECTION, SOLUTION SUBCUTANEOUS DAILY
Qty: 24 ML | Refills: 1 | Status: SHIPPED | OUTPATIENT
Start: 2024-08-05

## 2024-08-05 RX ORDER — INSULIN LISPRO 100 [IU]/ML
8 INJECTION, SOLUTION INTRAVENOUS; SUBCUTANEOUS
Qty: 15 ML | Refills: 5 | Status: SHIPPED | OUTPATIENT
Start: 2024-08-05

## 2024-08-05 NOTE — PROGRESS NOTES
Chief complaint/Reason for consult:  T2DM    HPI:   - 73 year old male here for management of diabetes mellitus type 2  - Last seen in 4/2023  - Has had diabetes for over 20 years  - No known complications to date  - Is currently taking Levemir 24 units daily, Novolog 4-6 units with meals, and metformin 500 mg bid   - Occasional hypoglycemia with activity  - He does use a Vaishali CGM but did not bring it today  - He does have a suprapubic catheter  - Is also on atorvastatin 40 mg daily  - He states he has improved his eating and adherence to insulin    The following portions of the patient's history were reviewed and updated as appropriate: allergies, current medications, past family history, past medical history, past social history, past surgical history, and problem list.      Objective     Vitals:    08/05/24 0923   BP: 142/84   Pulse: 99   SpO2: 97%        Physical Exam  Vitals reviewed.   Constitutional:       Appearance: Normal appearance.   HENT:      Head: Normocephalic and atraumatic.   Eyes:      General: No scleral icterus.  Pulmonary:      Effort: Pulmonary effort is normal. No respiratory distress.   Neurological:      Mental Status: He is alert.      Gait: Gait normal.   Psychiatric:         Mood and Affect: Mood normal.         Behavior: Behavior normal.         Thought Content: Thought content normal.         Judgment: Judgment normal.     CGM interpretation  Dates reviewed:  7/22-8/4/24  Data:  Avg of 113, 6% low, 1% very low, 90% in target, 3% high  Interpretation:  Reasonable glycemic control with some daytime and morning hypoglycemia      Assessment & Plan   1. Type 2 DM  - His BG has improved significantly since adding Novolog, he did reduce it to 4 units because of hypoglycemia which is reasonable  - Cont.  metformin 500 mg bid  - Cont. Levemir to 24 units daily and Novolog 4-6 units with meals     2. Hyperlipidemia  - On atorvastatin 40 mg daily     - Return to clinic in 4 months

## 2024-08-10 DIAGNOSIS — K21.00 GASTROESOPHAGEAL REFLUX DISEASE WITH ESOPHAGITIS WITHOUT HEMORRHAGE: Chronic | ICD-10-CM

## 2024-08-12 RX ORDER — PANTOPRAZOLE SODIUM 40 MG/1
40 TABLET, DELAYED RELEASE ORAL DAILY
Qty: 90 TABLET | Refills: 1 | Status: SHIPPED | OUTPATIENT
Start: 2024-08-12

## 2024-08-21 DIAGNOSIS — Z79.4 TYPE 2 DIABETES MELLITUS WITH HYPERGLYCEMIA, WITH LONG-TERM CURRENT USE OF INSULIN: Chronic | ICD-10-CM

## 2024-08-21 DIAGNOSIS — E11.65 TYPE 2 DIABETES MELLITUS WITH HYPERGLYCEMIA, WITH LONG-TERM CURRENT USE OF INSULIN: Chronic | ICD-10-CM

## 2024-08-21 NOTE — TELEPHONE ENCOUNTER
Rx Refill Note  Requested Prescriptions     Pending Prescriptions Disp Refills    BD Pen Needle Ivan 2nd Gen 32G X 4 MM misc [Pharmacy Med Name: BD IVAN 2 GEN PEN NDL 32G 4MM] 100 each 4     Sig: USE DAILY TO CHECK BLOOD SUGAR      Last office visit with prescribing clinician: 5/24/2024   Last telemedicine visit with prescribing clinician: Visit date not found   Next office visit with prescribing clinician: Visit date not found

## 2024-08-22 RX ORDER — PEN NEEDLE, DIABETIC 32GX 5/32"
NEEDLE, DISPOSABLE MISCELLANEOUS
Qty: 100 EACH | Refills: 4 | Status: SHIPPED | OUTPATIENT
Start: 2024-08-22

## 2024-09-04 RX ORDER — FLASH GLUCOSE SCANNING READER
EACH MISCELLANEOUS SEE ADMIN INSTRUCTIONS
Qty: 1 EACH | Refills: 1 | Status: SHIPPED | OUTPATIENT
Start: 2024-09-04

## 2024-09-04 NOTE — TELEPHONE ENCOUNTER
Rx Refill Note  Requested Prescriptions     Pending Prescriptions Disp Refills    Continuous Glucose  (FreeStyle Vaishali 14 Day [place]) device [Pharmacy Med Name: FREESTYLE VAISHALI 14 DAY [place]] 1 each 1     Sig: USE AS DIRECTED      Last office visit with prescribing clinician: 8/5/2024   Last telemedicine visit with prescribing clinician: Visit date not found   Next office visit with prescribing clinician: 12/10/2024                         Would you like a call back once the refill request has been completed: [] Yes [] No    If the office needs to give you a call back, can they leave a voicemail: [] Yes [] No    Sofiya Fajardo MA  09/04/24, 09:53 EDT

## 2024-10-07 ENCOUNTER — OFFICE VISIT (OUTPATIENT)
Dept: INTERNAL MEDICINE | Facility: CLINIC | Age: 74
End: 2024-10-07
Payer: MEDICARE

## 2024-10-07 VITALS
BODY MASS INDEX: 20.27 KG/M2 | TEMPERATURE: 97.9 F | SYSTOLIC BLOOD PRESSURE: 144 MMHG | HEIGHT: 69 IN | OXYGEN SATURATION: 97 % | DIASTOLIC BLOOD PRESSURE: 84 MMHG | WEIGHT: 136.87 LBS | HEART RATE: 99 BPM

## 2024-10-07 DIAGNOSIS — F02.811 LEWY BODY DEMENTIA WITH AGITATION, UNSPECIFIED DEMENTIA SEVERITY: ICD-10-CM

## 2024-10-07 DIAGNOSIS — Z79.4 TYPE 2 DIABETES MELLITUS WITHOUT COMPLICATION, WITH LONG-TERM CURRENT USE OF INSULIN: Chronic | ICD-10-CM

## 2024-10-07 DIAGNOSIS — Z93.59 CHRONIC SUPRAPUBIC CATHETER: ICD-10-CM

## 2024-10-07 DIAGNOSIS — R97.20 ABNORMAL PSA: ICD-10-CM

## 2024-10-07 DIAGNOSIS — Z79.4 TYPE 2 DIABETES MELLITUS WITH HYPERGLYCEMIA, WITH LONG-TERM CURRENT USE OF INSULIN: Chronic | ICD-10-CM

## 2024-10-07 DIAGNOSIS — E11.9 TYPE 2 DIABETES MELLITUS WITHOUT COMPLICATION, WITH LONG-TERM CURRENT USE OF INSULIN: Chronic | ICD-10-CM

## 2024-10-07 DIAGNOSIS — Z86.73 REMOTE HISTORY OF TIA: ICD-10-CM

## 2024-10-07 DIAGNOSIS — Z00.00 ROUTINE HEALTH MAINTENANCE: ICD-10-CM

## 2024-10-07 DIAGNOSIS — E11.65 TYPE 2 DIABETES MELLITUS WITH HYPERGLYCEMIA, WITH LONG-TERM CURRENT USE OF INSULIN: Chronic | ICD-10-CM

## 2024-10-07 DIAGNOSIS — Z00.00 MEDICARE ANNUAL WELLNESS VISIT, SUBSEQUENT: ICD-10-CM

## 2024-10-07 DIAGNOSIS — F32.0 CURRENT MILD EPISODE OF MAJOR DEPRESSIVE DISORDER WITHOUT PRIOR EPISODE: ICD-10-CM

## 2024-10-07 DIAGNOSIS — E11.69 HYPERLIPIDEMIA ASSOCIATED WITH TYPE 2 DIABETES MELLITUS: Chronic | ICD-10-CM

## 2024-10-07 DIAGNOSIS — G31.83 LEWY BODY DEMENTIA WITH AGITATION, UNSPECIFIED DEMENTIA SEVERITY: ICD-10-CM

## 2024-10-07 DIAGNOSIS — E78.5 HYPERLIPIDEMIA ASSOCIATED WITH TYPE 2 DIABETES MELLITUS: Chronic | ICD-10-CM

## 2024-10-07 DIAGNOSIS — Z12.11 ENCOUNTER FOR SCREENING FOR MALIGNANT NEOPLASM OF COLON: ICD-10-CM

## 2024-10-07 DIAGNOSIS — F41.9 ANXIETY: ICD-10-CM

## 2024-10-07 DIAGNOSIS — I10 PRIMARY HYPERTENSION: Primary | Chronic | ICD-10-CM

## 2024-10-07 PROBLEM — K25.9 GASTRIC ULCER: Status: ACTIVE | Noted: 2018-11-12

## 2024-10-07 PROBLEM — Z86.79 PERSONAL HISTORY OF CARDIOVASCULAR DISORDER: Status: ACTIVE | Noted: 2021-01-18

## 2024-10-07 PROCEDURE — 1170F FXNL STATUS ASSESSED: CPT

## 2024-10-07 PROCEDURE — G0439 PPPS, SUBSEQ VISIT: HCPCS

## 2024-10-07 PROCEDURE — 3079F DIAST BP 80-89 MM HG: CPT

## 2024-10-07 PROCEDURE — 3077F SYST BP >= 140 MM HG: CPT

## 2024-10-07 PROCEDURE — 3052F HG A1C>EQUAL 8.0%<EQUAL 9.0%: CPT

## 2024-10-07 PROCEDURE — 1125F AMNT PAIN NOTED PAIN PRSNT: CPT

## 2024-10-07 PROCEDURE — 99397 PER PM REEVAL EST PAT 65+ YR: CPT

## 2024-10-07 RX ORDER — FLASH GLUCOSE SENSOR
1 KIT MISCELLANEOUS
Qty: 2 EACH | Refills: 5 | Status: SHIPPED | OUTPATIENT
Start: 2024-10-07

## 2024-10-07 RX ORDER — SULFAMETHOXAZOLE/TRIMETHOPRIM 800-160 MG
TABLET ORAL
COMMUNITY
Start: 2024-10-04

## 2024-10-07 NOTE — PATIENT INSTRUCTIONS
Advance Care Planning and Advance Directives     You make decisions on a daily basis - decisions about where you want to live, your career, your home, your life. Perhaps one of the most important decisions you face is your choice for future medical care. Take time to talk with your family and your healthcare team and start planning today.  Advance Care Planning is a process that can help you:  Understand possible future healthcare decisions in light of your own experiences  Reflect on those decision in light of your goals and values  Discuss your decisions with those closest to you and the healthcare professionals that care for you  Make a plan by creating a document that reflects your wishes    Surrogate Decision Maker  In the event of a medical emergency, which has left you unable to communicate or to make your own decisions, you would need someone to make decisions for you.  It is important to discuss your preferences for medical treatment with this person while you are in good health.     Qualities of a surrogate decision maker:  Willing to take on this role and responsibility  Knows what you want for future medical care  Willing to follow your wishes even if they don't agree with them  Able to make difficult medical decisions under stressful circumstances    Advance Directives  These are legal documents you can create that will guide your healthcare team and decision maker(s) when needed. These documents can be stored in the electronic medical record.    Living Will - a legal document to guide your care if you have a terminal condition or a serious illness and are unable to communicate. States vary by statute in document names/types, but most forms may include one or more of the following:        -  Directions regarding life-prolonging treatments        -  Directions regarding artificially provided nutrition/hydration        -  Choosing a healthcare decision maker        -  Direction regarding organ/tissue  donation    Durable Power of  for Healthcare - this document names an -in-fact to make medical decisions for you, but it may also allow this person to make personal and financial decisions for you. Please seek the advice of an  if you need this type of document.    **Advance Directives are not required and no one may discriminate against you if you do not sign one.    Medical Orders  Many states allow specific forms/orders signed by your physician to record your wishes for medical treatment in your current state of health. This form, signed in personal communication with your physician, addresses resuscitation and other medical interventions that you may or may not want.      For more information or to schedule a time with a [facility] Advance Care Planning Facilitator contact: [facility] or call [phone] and someone will contact you directly.    Medicare Wellness  Personal Prevention Plan of Service     Date of Office Visit:    Encounter Provider:  JO Juárez  Place of Service:  [facility]  Patient Name: Harry ALCALA Mariela  :  1950    As part of the Medicare Wellness portion of your visit today, we are providing you with this personalized preventive plan of services (PPPS). This plan is based upon recommendations of the [place] Preventive Services Task Force (USPSTF) and the Advisory Committee on Immunization Practices (ACIP).    This lists the preventive care services that should be considered, and provides dates of when you are due. Items listed as completed are up-to-date and do not require any further intervention.    Health Maintenance   Topic Date Due   • URINE MICROALBUMIN  2024   • COLORECTAL CANCER SCREENING  2024   • COVID-19 Vaccine (2023- season) 2024 (Originally 2024)   • INFLUENZA VACCINE  2025 (Originally 2024)   • RSV Vaccine - Adults (1 - 1-dose 60+ series)  06/20/2025 (Originally 12/4/2008)   • HEMOGLOBIN A1C  02/05/2025   • LIPID PANEL  05/09/2025   • DIABETIC FOOT EXAM  06/24/2025   • DIABETIC EYE EXAM  07/17/2025   • ANNUAL WELLNESS VISIT  10/07/2025   • TDAP/TD VACCINES (3 - Td or Tdap) 02/08/2026   • Pneumococcal Vaccine 65+  Completed   • ZOSTER VACCINE  Completed   • HEPATITIS C SCREENING  Addressed       No orders of the defined types were placed in this encounter.      No follow-ups on file.

## 2024-10-07 NOTE — PROGRESS NOTES
Subjective   The ABCs of the Annual Wellness Visit  Medicare Wellness Visit      Harry Sierra is a 73 y.o. patient who presents for a Medicare Wellness Visit.    The following portions of the patient's history were reviewed and   updated as appropriate: allergies, current medications, past family history, past medical history, past social history, past surgical history, and problem list.    Compared to one year ago, the patient's physical   health is worse.  Compared to one year ago, the patient's mental   health is worse.    Recent Hospitalizations:  This patient has had a [facility] admission record on file within the last 365 days.  Current Medical Providers:  Patient Care Team:  Barbara Costa APRN as PCP - General (Nurse Practitioner)  Brayden Franco III, MD as Consulting Physician (Cardiology)  Rod Becker MD as Consulting Physician (Dermatology)  Barbara Costa APRN as Referring Physician (Nurse Practitioner)  Ayo Mendoza MD as Consulting Physician (Hematology and Oncology)  Dom Davis MD as Consulting Physician (Urology)    Outpatient Medications Prior to Visit   Medication Sig Dispense Refill    atorvastatin (LIPITOR) 40 MG tablet TAKE ONE TABLET BY MOUTH EVERY NIGHT AT BEDTIME 90 tablet 4    BD Pen Needle An 2nd Gen 32G X 4 MM misc USE DAILY TO CHECK BLOOD SUGAR 100 each 4    cefuroxime (CEFTIN) 250 MG tablet       Cholecalciferol (Vitamin D3) 10 MCG (400 UNIT) capsule Take 400 Units by mouth Daily. 150 capsule 0    clopidogrel (PLAVIX) 75 MG tablet TAKE 1 TABLET BY MOUTH DAILY 90 tablet 4    Continuous Glucose  (FreeStyle Vaishali 14 Day [place]) device USE AS DIRECTED 1 each 1    cyanocobalamin (VITAMIN B-12) 1000 MCG tablet Take 1 tablet by mouth Daily. 30 tablet 0    fluconazole (DIFLUCAN) 100 MG tablet       Gemtesa 75 MG tablet       insulin detemir (Levemir FlexPen) 100 UNIT/ML injection Inject 24 Units under the skin into the appropriate area  as directed Daily. 24 mL 1    Insulin Lispro, 1 Unit Dial, (HumaLOG KwikPen) 100 UNIT/ML solution pen-injector Inject 8 Units under the skin into the appropriate area as directed 3 (Three) Times a Day With Meals. 15 mL 5    Insulin Pen Needle (Pen Needles) 31G X 5 MM misc Use 1 each 4 (Four) Times a Day. 400 each 3    levoFLOXacin (LEVAQUIN) 500 MG tablet       losartan (Cozaar) 25 MG tablet Take 0.5 tablets by mouth Daily. 30 tablet 1    metFORMIN (GLUCOPHAGE) 500 MG tablet Take 1 tablet by mouth 2 (Two) Times a Day With Meals. 180 tablet 0    Multiple Vitamins-Minerals (CENTRUM SILVER 50+MEN) tablet Take 1 tablet by mouth Daily.      mupirocin (BACTROBAN) 2 % cream       pantoprazole (PROTONIX) 40 MG EC tablet TAKE 1 TABLET BY MOUTH DAILY 90 tablet 1    sulfamethoxazole-trimethoprim (BACTRIM DS,SEPTRA DS) 800-160 MG per tablet       tamsulosin (FLOMAX) 0.4 MG capsule 24 hr capsule Take 1 capsule by mouth Daily.      Continuous Blood Gluc Sensor (FreeStyle Vaishali 14 Day Sensor) [facility] ONE SENSOR ON THE SKIN AS DIRECTED BY PROVIDER DAILY 2 each 5    ferrous sulfate (FerrouSul) 325 (65 FE) MG tablet Take 1 tablet by mouth Daily With Breakfast. (Patient not taking: Reported on 8/5/2024) 90 tablet 1    Gabapentin, Once-Daily, 300 MG tablet Take  by mouth. (Patient not taking: Reported on 8/5/2024)      HYDROcodone-acetaminophen (NORCO) 5-325 MG per tablet Take 1 tablet by mouth As Needed. (Patient not taking: Reported on 8/5/2024)       No facility-administered medications prior to visit.     No opioid medication identified on active medication list. I have reviewed chart for other potential  high risk medication/s and harmful drug interactions in the elderly.      Aspirin is not on active medication list.  Aspirin use is contraindicated for this patient due to: history of bleeding.  .    Patient Active Problem List   Diagnosis    Primary hypertension    Routine health maintenance    Abnormal PSA    Gastroesophageal  "reflux disease with esophagitis without hemorrhage    Melena    Bilateral carotid artery stenosis    Transient cerebral ischemia    Prostate cancer    Sleep disturbance    Multilobar lung infiltrate, secondary bacterial infection    Sepsis - present on admission    Constipation    Valvular heart disease    Multifocal pneumonia    Type 2 diabetes mellitus without complication, with long-term current use of insulin    Near syncope    Hyperlipidemia associated with type 2 diabetes mellitus    Hyperinsulinism    Melanoma in situ of left upper extremity including shoulder    Chest pain, atypical    History of atrial fibrillation    Abnormal weight loss    Chronic cough    Urinary retention due to benign prostatic hyperplasia    Prostate cancer    Colon polyps    Diverticulosis    Acute UTI    Chronic suprapubic catheter    Moderate malnutrition    Iron deficiency anemia    Hypokalemia    Pyelonephritis    Gram-negative bacteremia    Carotid atherosclerosis    Remote history of TIA    Current mild episode of major depressive disorder without prior episode    Gastric ulcer    Lewy body dementia with agitation    Other abnormal tumor markers    Personal history of cardiovascular disorder     Advance Care Planning Advance Directive is not on file.  ACP discussion was held with the patient during this visit. Patient does not have an advance directive, information provided.            Objective   Vitals:    10/07/24 1004   BP: 144/84   BP Location: Left arm   Patient Position: Sitting   Cuff Size: Adult   Pulse: 99   Temp: 97.9 °F (36.6 °C)   TempSrc: Oral   SpO2: 97%   Weight: 62.1 kg (136 lb 13.9 oz)   Height: 175.3 cm (69\")   PainSc:   9   PainLoc: Groin       Estimated body mass index is 20.21 kg/m² as calculated from the following:    Height as of this encounter: 175.3 cm (69\").    Weight as of this encounter: 62.1 kg (136 lb 13.9 oz).    BMI is within normal parameters. No other follow-up for BMI required.       Does the " patient have evidence of cognitive impairment? No  Lab Results   Component Value Date    HGBA1C 8.60 (H) 08/05/2024                                                                                                Health  Risk Assessment    Smoking Status:  Social History     Tobacco Use   Smoking Status Never    Passive exposure: Never   Smokeless Tobacco Never     Alcohol Consumption:  Social History     Substance and Sexual Activity   Alcohol Use No       Fall Risk Screen  STEADI Fall Risk Assessment was completed, and patient is at LOW risk for falls.Assessment completed on:10/7/2024    Depression Screening:      10/7/2024     9:57 AM   PHQ-2/PHQ-9 Depression Screening   Little Interest or Pleasure in Doing Things 0-->not at all   Feeling Down, Depressed or Hopeless 1-->several days   PHQ-9: Brief Depression Severity Measure Score 1     Health Habits and Functional and Cognitive Screening:      10/7/2024    10:01 AM   Functional & Cognitive Status   Do you have difficulty preparing food and eating? No   Do you have difficulty bathing yourself, getting dressed or grooming yourself? No   Do you have difficulty using the toilet? No   Do you have difficulty moving around from place to place? Yes   Do you have trouble with steps or getting out of a bed or a chair? Yes   Current Diet Frequent Junk Food   Dental Exam Up to date   Eye Exam Up to date   Exercise (times per week) 0 times per week   Current Exercises Include No Regular Exercise   Do you need help using the phone?  No   Are you deaf or do you have serious difficulty hearing?  Yes   Do you need help to go to places out of walking distance? Yes   Do you need help shopping? Yes   Do you need help preparing meals?  Yes   Do you need help with housework?  Yes   Do you need help with laundry? Yes   Do you need help taking your medications? No   Do you need help managing money? No   Do you ever drive or ride in a car without wearing a seat belt? No   Have you felt  unusual stress, anger or loneliness in the last month? Yes   Who do you live with? Spouse   If you need help, do you have trouble finding someone available to you? No   Have you been bothered in the last four weeks by sexual problems? No   Do you have difficulty concentrating, remembering or making decisions? No           Age-appropriate Screening Schedule:  Refer to the list below for future screening recommendations based on patient's age, sex and/or medical conditions. Orders for these recommended tests are listed in the plan section. The patient has been provided with a written plan.    Health Maintenance List  Health Maintenance   Topic Date Due    URINE MICROALBUMIN  06/22/2024    COLORECTAL CANCER SCREENING  07/08/2024    COVID-19 Vaccine (7 - 2023-24 season) 12/27/2024 (Originally 9/1/2024)    INFLUENZA VACCINE  03/31/2025 (Originally 8/1/2024)    RSV Vaccine - Adults (1 - 1-dose 60+ series) 06/20/2025 (Originally 12/4/2008)    HEMOGLOBIN A1C  02/05/2025    LIPID PANEL  05/09/2025    DIABETIC FOOT EXAM  06/24/2025    DIABETIC EYE EXAM  07/17/2025    ANNUAL WELLNESS VISIT  10/07/2025    TDAP/TD VACCINES (3 - Td or Tdap) 02/08/2026    Pneumococcal Vaccine 65+  Completed    ZOSTER VACCINE  Completed    HEPATITIS C SCREENING  Addressed                                                                                                                                                CMS Preventative Services Quick Reference  Risk Factors Identified During Encounter  Chronic Pain: Natural history and expected course discussed. Questions answered.  Depression/Dysphoria: Current medication is effective, no change recommended  Fall Risk-High or Moderate: Discussed Fall Prevention in the home and Information on Fall Prevention Shared in After Visit Summary    The above risks/problems have been discussed with the patient.  Pertinent information has been shared with the patient in the After Visit Summary.  An After Visit  "Summary and PPPS were made available to the patient.    Follow Up:   Next Medicare Wellness visit to be scheduled in 1 year.         Additional E&M Note during same encounter follows:  Patient has additional, significant, and separately identifiable condition(s)/problem(s) that require work above and beyond the Medicare Wellness Visit     Chief Complaint  Medicare Wellness-subsequent    Subjective   HPI  Harry is also being seen today for an annual adult preventative physical exam.             Endo- Paige Kaur MD.    - last A1C 8.6     Neurology- [facility] neuro. [facility] seen 5/30   - MRI showed lacunar infarcts and micro hemorrhages      Urology-  Pt states he has been dealing with a bladder infection and kidney infection. Pt had cipro at home and was taking this as he states that it took multiple days to get prescribed medications. Pt states that he followed up this morning and had his suprapubic catheter changed. Pt states that he is now currently on Bactrim.     HTN- losartan              - pt states he has been weak and is unable to even walk through grocery store. Pt states that he has had this previously and a past provider changed his BP medication and this amde him feel better.      HLD- Lipitor     Afib- plavix       Objective   Vital Signs:  /84 (BP Location: Left arm, Patient Position: Sitting, Cuff Size: Adult)   Pulse 99   Temp 97.9 °F (36.6 °C) (Oral)   Ht 175.3 cm (69\")   Wt 62.1 kg (136 lb 13.9 oz)   SpO2 97%   BMI 20.21 kg/m²   Physical Exam  Vitals reviewed.   Constitutional:       General: He is not in acute distress.     Appearance: Normal appearance. He is not ill-appearing, toxic-appearing or diaphoretic.   HENT:      Head: Normocephalic.   Cardiovascular:      Rate and Rhythm: Normal rate and regular rhythm.      Pulses: Normal pulses.      Heart sounds: Normal heart sounds.   Pulmonary:      Effort: Pulmonary effort is normal.      Breath sounds: Normal breath sounds.   Abdominal:     "  General: Abdomen is flat.      Palpations: Abdomen is soft.      Tenderness: There is abdominal tenderness. There is left CVA tenderness. There is no right CVA tenderness.   Skin:     General: Skin is warm and dry.      Capillary Refill: Capillary refill takes less than 2 seconds.   Neurological:      General: No focal deficit present.      Mental Status: He is alert.      Motor: Weakness present.      Gait: Gait abnormal.   Psychiatric:         Mood and Affect: Mood normal.         Behavior: Behavior normal.                 Assessment and Plan               Type 2 diabetes mellitus with hyperglycemia, with long-term current use of insulin  Diabetes is stable.   Continue current treatment regimen.  Recommended an ADA diet.  Regular aerobic exercise.  Discussed sick day management.  Discussed foot care.  Diabetes will be reassessed in 6 months  Encounter for screening for malignant neoplasm of colon    Primary hypertension  Hypertension is stable and controlled  Continue current treatment regimen.  Dietary sodium restriction.  Regular aerobic exercise.  Ambulatory blood pressure monitoring.  Blood pressure will be reassessed in 3 months.  Medicare annual wellness visit, subsequent    Hyperlipidemia associated with type 2 diabetes mellitus   Lipid abnormalities are stable    Plan:  Continue same medication/s without change.      Discussed medication dosage, use, side effects, and goals of treatment in detail.    Counseled patient on lifestyle modifications to help control hyperlipidemia.     Patient Treatment Goals:   LDL goal is less than 70    Followup in 6 months.  Anxiety    Abnormal PSA    Chronic suprapubic catheter    Routine health maintenance    Current mild episode of major depressive disorder without prior episode    Lewy body dementia with agitation, unspecified dementia severity    Remote history of TIA    Type 2 diabetes mellitus without complication, with long-term current use of insulin      Orders  Placed This Encounter   Procedures    Microalbumin / Creatinine Urine Ratio - Urine, Clean Catch     Standing Status:   Future     Standing Expiration Date:   10/7/2025     Order Specific Question:   Release to patient     Answer:   Routine Release [[id number]]    Lipid Panel     Standing Status:   Future     Standing Expiration Date:   10/7/2025     Order Specific Question:   Release to patient     Answer:   Routine Release [[id number]]    TSH     Standing Status:   Future     Standing Expiration Date:   10/7/2025     Order Specific Question:   Release to patient     Answer:   Routine Release [[id number]]    Comprehensive Metabolic Panel     Standing Status:   Future     Standing Expiration Date:   10/7/2025     Order Specific Question:   Release to patient     Answer:   Routine Release [[id number]]    T4, free     Standing Status:   Future     Standing Expiration Date:   10/7/2025     Order Specific Question:   Release to patient     Answer:   Routine Release [[id number]]    Ambulatory Referral For Screening Colonoscopy     Referral Priority:   Routine     Referral Type:   Diagnostic Medical     Referral Reason:   Specialty Services Required     Referral Location[facility]   [facility]     Referred to Provider:   Ian Comer MD     Number of Visits Requested:   1    CBC & Differential     Standing Status:   Future     Standing Expiration Date:   10/7/2025     Order Specific Question:   Manual Differential     Answer:   No     Order Specific Question:   Release to patient     Answer:   Routine Release [[id number]]     New Medications Ordered This Visit   Medications    Continuous Glucose Sensor (FreeStyle Vaishali 14 Day Sensor) misc     Si applicator by Subdermal route Every 14 (Fourteen) Days.     Dispense:  2 each     Refill:  5      Educated patient on continuing his current medications as chronic conditions are stable.  Educated patient to follow-up with urology.  Will place order for freestyle vaishali.   Educated patient to maintain a healthy diet and daily exercise.  Educated patient to follow-up with neurology as previously indicated.  Patient verbalized understanding and is comfortable with the plan of care.      I spent 45 minutes caring for Harry on this date of service. This time includes time spent by me in the following activities:preparing for the visit, reviewing tests, obtaining and/or reviewing a separately obtained history, performing a medically appropriate examination and/or evaluation , counseling and educating the patient/family/caregiver, ordering medications, tests, or procedures, documenting information in the medical record, independently interpreting results and communicating that information with the patient/family/caregiver, care coordination, and I spent 10 minutes on medicare wellness  and 35 on chronic and acute problems   Follow Up   Return in about 3 months (around 1/7/2025) for Recheck.  Patient was given instructions and counseling regarding his condition or for health maintenance advice. Please see specific information pulled into the AVS if appropriate.

## 2024-10-08 ENCOUNTER — TELEPHONE (OUTPATIENT)
Dept: GASTROENTEROLOGY | Facility: CLINIC | Age: 74
End: 2024-10-08
Payer: MEDICARE

## 2024-10-08 NOTE — TELEPHONE ENCOUNTER
Hub staff attempted to follow warm transfer process and was unsuccessful     Caller: Harry Sierra    Relationship to patient: Self    Best call back number: [phone]    Patient is needing: PT IS CALLING TO SCHEDULE SCOPE. PLEASE CONTACT PT TO ASSIST.

## 2024-10-09 DIAGNOSIS — Z86.79 HISTORY OF ATRIAL FIBRILLATION: ICD-10-CM

## 2024-10-09 DIAGNOSIS — E11.69 HYPERLIPIDEMIA ASSOCIATED WITH TYPE 2 DIABETES MELLITUS: Chronic | ICD-10-CM

## 2024-10-09 DIAGNOSIS — I38 VALVULAR HEART DISEASE: ICD-10-CM

## 2024-10-09 DIAGNOSIS — I65.29 CAROTID ATHEROSCLEROSIS, UNSPECIFIED LATERALITY: ICD-10-CM

## 2024-10-09 DIAGNOSIS — I65.23 BILATERAL CAROTID ARTERY STENOSIS: Primary | Chronic | ICD-10-CM

## 2024-10-09 DIAGNOSIS — E78.5 HYPERLIPIDEMIA ASSOCIATED WITH TYPE 2 DIABETES MELLITUS: Chronic | ICD-10-CM

## 2024-10-11 ENCOUNTER — TELEPHONE (OUTPATIENT)
Dept: CARDIOLOGY | Facility: CLINIC | Age: 74
End: 2024-10-11

## 2024-10-11 NOTE — TELEPHONE ENCOUNTER
Hub staff attempted to follow warm transfer process and was unsuccessful     Caller: Harry Sierra    Relationship to patient: Self    Best call back number: [phone]    Patient is needing: PT IS CALLNG TO SCHEDULE AN APPT FROM A REFERRAL. TRIED TO WT BUT WAS ON HOLD FOR OVER A MINUTE. PLEASE CALL PT

## 2024-10-18 ENCOUNTER — OFFICE VISIT (OUTPATIENT)
Dept: CARDIOLOGY | Facility: CLINIC | Age: 74
End: 2024-10-18
Payer: MEDICARE

## 2024-10-18 VITALS
OXYGEN SATURATION: 98 % | SYSTOLIC BLOOD PRESSURE: 130 MMHG | HEIGHT: 69 IN | WEIGHT: 138.6 LBS | HEART RATE: 108 BPM | BODY MASS INDEX: 20.53 KG/M2 | DIASTOLIC BLOOD PRESSURE: 76 MMHG

## 2024-10-18 DIAGNOSIS — R01.1 MURMUR: ICD-10-CM

## 2024-10-18 DIAGNOSIS — R06.02 SOB (SHORTNESS OF BREATH): Primary | ICD-10-CM

## 2024-10-18 DIAGNOSIS — I65.23 BILATERAL CAROTID ARTERY STENOSIS: Chronic | ICD-10-CM

## 2024-10-18 DIAGNOSIS — Z79.4 TYPE 2 DIABETES MELLITUS WITH OTHER CIRCULATORY COMPLICATION, WITH LONG-TERM CURRENT USE OF INSULIN: ICD-10-CM

## 2024-10-18 DIAGNOSIS — E11.59 TYPE 2 DIABETES MELLITUS WITH OTHER CIRCULATORY COMPLICATION, WITH LONG-TERM CURRENT USE OF INSULIN: ICD-10-CM

## 2024-10-18 DIAGNOSIS — I10 PRIMARY HYPERTENSION: Chronic | ICD-10-CM

## 2024-10-18 PROCEDURE — 3075F SYST BP GE 130 - 139MM HG: CPT | Performed by: INTERNAL MEDICINE

## 2024-10-18 PROCEDURE — 99204 OFFICE O/P NEW MOD 45 MIN: CPT | Performed by: INTERNAL MEDICINE

## 2024-10-18 PROCEDURE — 93000 ELECTROCARDIOGRAM COMPLETE: CPT | Performed by: INTERNAL MEDICINE

## 2024-10-18 PROCEDURE — 3078F DIAST BP <80 MM HG: CPT | Performed by: INTERNAL MEDICINE

## 2024-10-18 PROCEDURE — 1159F MED LIST DOCD IN RCRD: CPT | Performed by: INTERNAL MEDICINE

## 2024-10-18 PROCEDURE — 1160F RVW MEDS BY RX/DR IN RCRD: CPT | Performed by: INTERNAL MEDICINE

## 2024-10-18 NOTE — PROGRESS NOTES
Subjective:     Encounter Date:10/18/24      Patient ID: Harry Sierra is a 73 y.o. male.    Chief Complaint:  History of Present Illness    Dear Barbara,    I had the pleasure of seeing this patient in the office today for initial evaluation and consultation.  I appreciate that you sent him in to see us.  They come in today to be seen for evaluation after an MRI of his head.    I have seen him in the past, back in 2019.  He had A-fib in the setting of sepsis, no recurrence, follow-up testing showed no abnormality, after that he did not follow-up in our office.    He recently was seen by neurology at [facility] for concern of Lewy body dementia.  He had a MRI of his head, he says that it was explained to him that he had small vessel disease secondary to hypertension and he wanted to come in to be seen here to make sure that he had small vessel disease in his head that he did not also have small vessel disease in his heart.    No chest pain or chest discomfort.  Does get more fatigued lately.  Also notes some shortness of breath with activity.  No presyncope or syncope.  No orthopnea or PND.    The following portions of the patient's history were reviewed and updated as appropriate: allergies, current medications, past family history, past medical history, past social history, past surgical history and problem list.      ECG 12 Lead    Date/Time: 10/18/2024 11:45 AM  Performed by: Brayden Franco III, MD    Authorized by: Brayden Franco III, MD  Comparison: compared with previous ECG   Similar to previous ECG  Rhythm: sinus rhythm and sinus tachycardia  Rate: tachycardic  Conduction: conduction normal  ST Segments: ST segments normal  T Waves: T waves normal  QRS axis: normal  Other: no other findings    Clinical impression: normal ECG             Objective:     Vitals:    10/18/24 1117   BP: 130/76   BP Location: Right arm   Patient Position: Sitting   Cuff Size: Adult   Pulse: 108   SpO2: 98%   Weight: 62.9 kg (138 lb  "9.6 oz)   Height: 175.3 cm (69\")     Body mass index is 20.47 kg/m².      Vitals reviewed.   Constitutional:       General: Not in acute distress.     Appearance: Well-developed. Not diaphoretic.   Eyes:      General:         Right eye: No discharge.         Left eye: No discharge.      Conjunctiva/sclera: Conjunctivae normal.   HENT:      Head: Normocephalic and atraumatic.      Nose: Nose normal.   Neck:      Thyroid: No thyromegaly.      Trachea: No tracheal deviation.   Pulmonary:      Effort: Pulmonary effort is normal. No respiratory distress.      Breath sounds: Normal breath sounds. No stridor.   Chest:      Chest wall: Not tender to palpatation.   Cardiovascular:      Normal rate. Regular rhythm.      Murmurs: There is no murmur.      . No S3 gallop. No click. No rub.   Pulses:     Intact distal pulses.   Edema:     Peripheral edema absent.   Abdominal:      General: Bowel sounds are normal. There is no distension.      Palpations: Abdomen is soft. There is no abdominal mass.   Musculoskeletal: Normal range of motion.         General: No tenderness or deformity.      Cervical back: Normal range of motion and neck supple. Skin:     General: Skin is warm and dry.      Findings: No erythema or rash.   Neurological:      Mental Status: Alert.   Psychiatric:         Attention and Perception: Attention normal.         Data and records reviewed:     Lab Results   Component Value Date    GLUCOSE 150 (H) 10/10/2024    BUN 17 10/10/2024    CREATININE 0.78 10/10/2024     10/10/2024    K 4.5 10/10/2024     10/10/2024    CALCIUM 9.5 10/10/2024    PROTEINTOT 6.9 06/04/2024    ALBUMIN 4.5 10/10/2024    ALT 13 10/10/2024    AST 13 10/10/2024    ALKPHOS 108 10/10/2024    BILITOT 0.3 10/10/2024    GLOB 2.9 06/04/2024    AGRATIO 1.4 06/04/2024    BCR 21.8 10/10/2024    ANIONGAP 13.5 06/04/2024    EGFR 96.9 06/04/2024     Lab Results   Component Value Date    CHOL 120 09/15/2017     Lab Results   Component Value " Date    TRIG 77 10/10/2024    TRIG 63 05/09/2024    TRIG 54 01/23/2024     Lab Results   Component Value Date    HDL 42 10/10/2024    HDL 45 05/09/2024    HDL 42 01/23/2024     Lab Results   Component Value Date    LDL 31 10/10/2024    LDL 32 05/09/2024    LDL 39 01/23/2024     Lab Results   Component Value Date    VLDL 16 10/10/2024    VLDL 14 05/09/2024    VLDL 13 01/23/2024     Lab Results   Component Value Date    LDLHDL 0.98 01/23/2024    LDLHDL 1.21 12/12/2022    LDLHDL 2.29 09/15/2017     CBC          4/29/2024    09:33 6/4/2024    09:58 10/10/2024    10:36   CBC   WBC 4.26  8.41  4.49    RBC 3.17  3.09  3.53    Hemoglobin 10.2  10.3  11.4    Hematocrit 31.1  31.3  33.6    MCV 98.1  101.3  95.2    MCH 32.2  33.3  32.3    MCHC 32.8  32.9  33.9    RDW 13.2  14.6  13.5    Platelets 267  206  214      No radiology results for the last 90 days.  Results for orders placed during the hospital encounter of 01/24/19    Adult Transthoracic Echo Complete W/ Cont if Necessary Per Protocol    Interpretation Summary  · Left ventricular systolic function is normal. Calculated EF = 52.0%. Estimated EF was in agreement with the calculated EF. Normal left ventricular cavity size and wall thickness noted. All left ventricular wall segments contract normally.  · Left ventricular diastolic dysfunction is noted (grade II w/high LAP) consistent with pseudonormalization.  · No evidence of a patent foramen ovale. Saline test results are negative.  · Mild mitral valve regurgitation is present with a very anteriorly directed eccentric jet noted.    Reviewed the results on the head MRI from [facility]      Assessment:          Diagnosis Plan   1. SOB (shortness of breath)  ECG 12 Lead    Adult Transthoracic Echo Complete W/ Cont if Necessary Per Protocol      2. Primary hypertension        3. Bilateral carotid artery stenosis        4. Type 2 diabetes mellitus with other circulatory complication, with long-term current use of insulin         5. Murmur               Plan:       1.  Murmur-had some mild mitral regurgitation in the past, murmur is more prominent on today's exam, we will set him up for an echocardiogram  2.  Had concerned about the small vessel disease on the MRI this we discussed the pathophysiology of that.  He is to follow-up with neurology on that  3.  Hypertension-good control, continue current medical therapy  4.  Bilateral carotid artery stenosis, remains on clopidogrel as well as atorvastatin  5.  Mixed hyperlipidemia on lipid-lowering therapy, AST, ALT reviewed  6.  Diabetes mellitus with circulatory complication-continue risk factor modification.    Thank you very much for allowing us to participate in the care of this pleasant patient.  Please don't hesitate to call if I can be of assistance in any way.      Current Outpatient Medications:     atorvastatin (LIPITOR) 40 MG tablet, TAKE ONE TABLET BY MOUTH EVERY NIGHT AT BEDTIME, Disp: 90 tablet, Rfl: 4    BD Pen Needle An 2nd Gen 32G X 4 MM misc, USE DAILY TO CHECK BLOOD SUGAR, Disp: 100 each, Rfl: 4    Cholecalciferol (Vitamin D3) 10 MCG (400 UNIT) capsule, Take 400 Units by mouth Daily., Disp: 150 capsule, Rfl: 0    clopidogrel (PLAVIX) 75 MG tablet, TAKE 1 TABLET BY MOUTH DAILY, Disp: 90 tablet, Rfl: 4    Continuous Glucose  (FreeStyle Vaishali 14 Day [place]) device, USE AS DIRECTED, Disp: 1 each, Rfl: 1    Continuous Glucose Sensor (FreeStyle Vaishali 14 Day Sensor) misc, 1 applicator by Subdermal route Every 14 (Fourteen) Days., Disp: 2 each, Rfl: 5    cyanocobalamin (VITAMIN B-12) 1000 MCG tablet, Take 1 tablet by mouth Daily., Disp: 30 tablet, Rfl: 0    ferrous sulfate (FerrouSul) 325 (65 FE) MG tablet, Take 1 tablet by mouth Daily With Breakfast., Disp: 90 tablet, Rfl: 1    Gemtesa 75 MG tablet, , Disp: , Rfl:     insulin detemir (Levemir FlexPen) 100 UNIT/ML injection, Inject 24 Units under the skin into the appropriate area as directed Daily., Disp: 24 mL, Rfl:  1    Insulin Lispro, 1 Unit Dial, (HumaLOG KwikPen) 100 UNIT/ML solution pen-injector, Inject 8 Units under the skin into the appropriate area as directed 3 (Three) Times a Day With Meals. (Patient taking differently: Inject 6 Units under the skin into the appropriate area as directed 3 (Three) Times a Day With Meals.), Disp: 15 mL, Rfl: 5    Insulin Pen Needle (Pen Needles) 31G X 5 MM misc, Use 1 each 4 (Four) Times a Day., Disp: 400 each, Rfl: 3    losartan (Cozaar) 25 MG tablet, Take 0.5 tablets by mouth Daily., Disp: 30 tablet, Rfl: 1    metFORMIN (GLUCOPHAGE) 500 MG tablet, Take 1 tablet by mouth 2 (Two) Times a Day With Meals., Disp: 180 tablet, Rfl: 0    Multiple Vitamins-Minerals (CENTRUM SILVER 50+MEN) tablet, Take 1 tablet by mouth Daily., Disp: , Rfl:     mupirocin (BACTROBAN) 2 % cream, , Disp: , Rfl:     pantoprazole (PROTONIX) 40 MG EC tablet, TAKE 1 TABLET BY MOUTH DAILY, Disp: 90 tablet, Rfl: 1         No follow-ups on file.

## 2024-11-07 ENCOUNTER — HOSPITAL ENCOUNTER (OUTPATIENT)
Dept: CARDIOLOGY | Facility: HOSPITAL | Age: 74
Discharge: HOME | End: 2024-11-07
Admitting: INTERNAL MEDICINE
Payer: MEDICARE

## 2024-11-07 VITALS
SYSTOLIC BLOOD PRESSURE: 146 MMHG | WEIGHT: 138 LBS | HEIGHT: 69 IN | BODY MASS INDEX: 20.44 KG/M2 | HEART RATE: 100 BPM | DIASTOLIC BLOOD PRESSURE: 78 MMHG

## 2024-11-07 DIAGNOSIS — R06.02 SOB (SHORTNESS OF BREATH): ICD-10-CM

## 2024-11-07 LAB
AORTIC ARCH: 2.2 CM
ASCENDING AORTA: 3.3 CM
BH CV ECHO MEAS - ACS: 1.61 CM
BH CV ECHO MEAS - AO MAX PG: 14.7 MMHG
BH CV ECHO MEAS - AO MEAN PG: 8 MMHG
BH CV ECHO MEAS - AO ROOT DIAM: 3.4 CM
BH CV ECHO MEAS - AO V2 MAX: 192 CM/SEC
BH CV ECHO MEAS - AO V2 VTI: 29.8 CM
BH CV ECHO MEAS - AVA(I,D): 1.9 CM2
BH CV ECHO MEAS - EDV(CUBED): 74.1 ML
BH CV ECHO MEAS - EDV(MOD-SP2): 80 ML
BH CV ECHO MEAS - EDV(MOD-SP4): 83 ML
BH CV ECHO MEAS - EF(MOD-BP): 62 %
BH CV ECHO MEAS - EF(MOD-SP2): 63.7 %
BH CV ECHO MEAS - EF(MOD-SP4): 59 %
BH CV ECHO MEAS - ESV(CUBED): 22.5 ML
BH CV ECHO MEAS - ESV(MOD-SP2): 29 ML
BH CV ECHO MEAS - ESV(MOD-SP4): 34 ML
BH CV ECHO MEAS - FS: 32.7 %
BH CV ECHO MEAS - IVS/LVPW: 0.9 CM
BH CV ECHO MEAS - IVSD: 0.9 CM
BH CV ECHO MEAS - LAT PEAK E' VEL: 12 CM/SEC
BH CV ECHO MEAS - LV DIASTOLIC VOL/BSA (35-75): 47 CM2
BH CV ECHO MEAS - LV MASS(C)D: 127.8 GRAMS
BH CV ECHO MEAS - LV MAX PG: 5 MMHG
BH CV ECHO MEAS - LV MEAN PG: 3 MMHG
BH CV ECHO MEAS - LV SYSTOLIC VOL/BSA (12-30): 19.3 CM2
BH CV ECHO MEAS - LV V1 MAX: 112 CM/SEC
BH CV ECHO MEAS - LV V1 VTI: 18.7 CM
BH CV ECHO MEAS - LVIDD: 4.2 CM
BH CV ECHO MEAS - LVIDS: 2.8 CM
BH CV ECHO MEAS - LVOT AREA: 3 CM2
BH CV ECHO MEAS - LVOT DIAM: 1.96 CM
BH CV ECHO MEAS - LVPWD: 1 CM
BH CV ECHO MEAS - MED PEAK E' VEL: 8.4 CM/SEC
BH CV ECHO MEAS - MR MAX PG: 105.2 MMHG
BH CV ECHO MEAS - MR MAX VEL: 512.9 CM/SEC
BH CV ECHO MEAS - MV A DUR: 0.13 SEC
BH CV ECHO MEAS - MV A MAX VEL: 127 CM/SEC
BH CV ECHO MEAS - MV DEC SLOPE: 835.1 CM/SEC2
BH CV ECHO MEAS - MV DEC TIME: 0.19 SEC
BH CV ECHO MEAS - MV E MAX VEL: 74.7 CM/SEC
BH CV ECHO MEAS - MV E/A: 0.59
BH CV ECHO MEAS - MV MAX PG: 9.9 MMHG
BH CV ECHO MEAS - MV MEAN PG: 5.4 MMHG
BH CV ECHO MEAS - MV P1/2T: 39.8 MSEC
BH CV ECHO MEAS - MV V2 VTI: 27.7 CM
BH CV ECHO MEAS - MVA(P1/2T): 5.5 CM2
BH CV ECHO MEAS - MVA(VTI): 2.04 CM2
BH CV ECHO MEAS - PA ACC TIME: 0.15 SEC
BH CV ECHO MEAS - PA V2 MAX: 98.9 CM/SEC
BH CV ECHO MEAS - PULM A REVS DUR: 0.12 SEC
BH CV ECHO MEAS - PULM A REVS VEL: 34.8 CM/SEC
BH CV ECHO MEAS - PULM DIAS VEL: 53.6 CM/SEC
BH CV ECHO MEAS - PULM S/D: 0.85
BH CV ECHO MEAS - PULM SYS VEL: 45.5 CM/SEC
BH CV ECHO MEAS - QP/QS: 0.54
BH CV ECHO MEAS - RAP SYSTOLE: 3 MMHG
BH CV ECHO MEAS - RV MAX PG: 1.3 MMHG
BH CV ECHO MEAS - RV V1 MAX: 57 CM/SEC
BH CV ECHO MEAS - RV V1 VTI: 10.7 CM
BH CV ECHO MEAS - RVOT DIAM: 1.91 CM
BH CV ECHO MEAS - RVSP: 27.1 MMHG
BH CV ECHO MEAS - SUP REN AO DIAM: 1.9 CM
BH CV ECHO MEAS - SV(LVOT): 56.5 ML
BH CV ECHO MEAS - SV(MOD-SP2): 51 ML
BH CV ECHO MEAS - SV(MOD-SP4): 49 ML
BH CV ECHO MEAS - SV(RVOT): 30.5 ML
BH CV ECHO MEAS - SVI(LVOT): 32 ML/M2
BH CV ECHO MEAS - SVI(MOD-SP2): 28.9 ML/M2
BH CV ECHO MEAS - SVI(MOD-SP4): 27.8 ML/M2
BH CV ECHO MEAS - TAPSE (>1.6): 2.43 CM
BH CV ECHO MEAS - TR MAX PG: 24.1 MMHG
BH CV ECHO MEAS - TR MAX VEL: 245.6 CM/SEC
BH CV ECHO MEASUREMENTS AVERAGE E/E' RATIO: 7.32
BH CV XLRA - RV BASE: 2.6 CM
BH CV XLRA - RV LENGTH: 8 CM
BH CV XLRA - RV MID: 1.79 CM
BH CV XLRA - TDI S': 16.1 CM/SEC
LEFT ATRIUM VOLUME INDEX: 17 ML/M2
SINUS: 3.1 CM
STJ: 3.1 CM

## 2024-11-07 PROCEDURE — 93306 TTE W/DOPPLER COMPLETE: CPT

## 2024-11-08 ENCOUNTER — TELEPHONE (OUTPATIENT)
Dept: CARDIOLOGY | Facility: CLINIC | Age: 74
End: 2024-11-08
Payer: MEDICARE

## 2024-11-08 NOTE — TELEPHONE ENCOUNTER
Called and left VM, will continue to try to reach pt.    HUB- please put patient straight through to triage    Angelita Artis, RN  Triage RN  11/08/24 08:44 EST

## 2024-11-08 NOTE — TELEPHONE ENCOUNTER
----- Message from Brayden Franco sent at 11/7/2024  4:35 PM EST -----  Please call- looks good, EF 62%, mild mitral valvular disease unchanged from prior echo, see us back in one year

## 2024-11-11 ENCOUNTER — TELEPHONE (OUTPATIENT)
Dept: GASTROENTEROLOGY | Facility: CLINIC | Age: 74
End: 2024-11-11
Payer: MEDICARE

## 2024-11-11 NOTE — TELEPHONE ENCOUNTER
LAST C/S  7/8/23   IN EP[facility]     PERSONAL HX OF POLYPS    NO FAMILY HX OF POLYPS    NO FAMILY HX OF COLON CA     ASA OR PLAVIX            LIST OF  MEDICATIONS    atorvastatin (LIPITOR) 40 MG tablet  BD Pen Needle An 2nd Gen 32G X 4 MM misc  Cholecalciferol (Vitamin D3) 10 MCG (400 UNIT) capsule  clopidogrel (PLAVIX) 75 MG tablet  Continuous Glucose  (FreeStyle Vaishali 14 Day [place]) device  Continuous Glucose Sensor (FreeStyle Vaishali 14 Day Sensor) misc  cyanocobalamin (VITAMIN B-12) 1000 MCG tablet  ferrous sulfate (FerrouSul) 325 (65 FE) MG tablet  Gemtesa 75 MG tablet    insulin detemir (Levemir FlexPen) 100 UNIT/ML injection    Insulin Lispro, 1 Unit Dial, (HumaLOG KwikPen) 100 UNIT/ML solution pen-injector  Insulin Pen Needle (Pen Needles) 31G X 5 MM misc    losartan (Cozaar) 25 MG tablet    metFORMIN (GLUCOPHAGE) 500 MG tablet  Multiple Vitamins-Minerals (CENTRUM SILVER 50+MEN) tablet  mupirocin (BACTROBAN) 2 % cream  pantoprazole (PROTONIX) 40 MG EC tablet             OA QUESTIONNAIRE SCANNED IN MEDIA

## 2024-11-11 NOTE — TELEPHONE ENCOUNTER
Notified patient of results/recommendations. Patient verbalized understanding. He will call back to scheduled FU.     Jasmina Beltran RN  Triage [facility]

## 2024-11-12 ENCOUNTER — TELEPHONE (OUTPATIENT)
Dept: INTERNAL MEDICINE | Facility: CLINIC | Age: 74
End: 2024-11-12
Payer: MEDICARE

## 2024-11-13 NOTE — TELEPHONE ENCOUNTER
CALLED AND SW PT AND LET HIM KNOW WE HAVE GOTTEN THE OK FOR HIM TO HOLD HIS CLOPIDOGREL 5 DASY PRIOR TO SCOPE. PT IS NOT YET SCHEDULED WAITING ON PROVIDER TO REVIEW OA. PT UNDERSTANDS

## 2024-11-22 DIAGNOSIS — I65.23 BILATERAL CAROTID ARTERY STENOSIS: ICD-10-CM

## 2024-11-22 DIAGNOSIS — I65.23 BILATERAL CAROTID ARTERY OCCLUSION: Primary | ICD-10-CM

## 2024-12-17 ENCOUNTER — TELEPHONE (OUTPATIENT)
Dept: INTERNAL MEDICINE | Facility: CLINIC | Age: 74
End: 2024-12-17
Payer: MEDICARE

## 2024-12-17 NOTE — TELEPHONE ENCOUNTER
Caller: Harry Sierra    Relationship: Self    Best call back number: [phone]     What form or medical record are you requesting: PATIENT REQUESTS RECORD FROM HIS TREATMENT FOR AN 'OOZING ULCER' 6-8 YEARS AGO    Who is requesting this form or medical record from you: PATIENT    How would you like to receive the form or medical records (pick-up, mail, fax): PICKUP  If pick-up, provide patient with address and location details    Timeframe paperwork needed: ASAP    Additional notes: PT CANNOT REMEMBER NAME OF DR WHO TREATED HIM

## 2024-12-26 NOTE — TELEPHONE ENCOUNTER
Advised. Patient requesting procedure records. Advised to contact [facility] for them. He voiced understanding

## 2024-12-27 ENCOUNTER — TELEPHONE (OUTPATIENT)
Dept: INTERNAL MEDICINE | Facility: CLINIC | Age: 74
End: 2024-12-27
Payer: MEDICARE

## 2024-12-27 NOTE — TELEPHONE ENCOUNTER
Discussed health with Mr and Mrs. Sierra, and their two daughters Sandee and Tamra on the phone. Educated pt and family on proper SP tube catheter and bag care. Educated pt NOT to use 409 as a , and do a vinegar wash and appropriate catheter care. Educated pt to follow up with Endocrinology as him and his family have questions regarding omnipod and hypoglycemia. Explained the importance of eating small, frequent meals and checking his blood sugars. Pt to follow up with endocrine and urology at the beginning of January.

## 2025-01-09 DIAGNOSIS — Z79.4 TYPE 2 DIABETES MELLITUS WITH HYPERGLYCEMIA, WITH LONG-TERM CURRENT USE OF INSULIN: ICD-10-CM

## 2025-01-09 DIAGNOSIS — E11.65 TYPE 2 DIABETES MELLITUS WITH HYPERGLYCEMIA, WITH LONG-TERM CURRENT USE OF INSULIN: ICD-10-CM

## 2025-01-09 NOTE — TELEPHONE ENCOUNTER
Rx Refill Note  Requested Prescriptions     Pending Prescriptions Disp Refills    metFORMIN (GLUCOPHAGE) 500 MG tablet [Pharmacy Med Name: METFORMIN  MG TABLET] 180 tablet 0     Sig: TAKE 1 TABLET BY MOUTH TWICE A DAY WITH A MEAL      Last office visit with prescribing clinician: 8/5/2024   Last telemedicine visit with prescribing clinician: Visit date not found   Next office visit with prescribing clinician: Visit date not found                         Would you like a call back once the refill request has been completed: [] Yes [] No    If the office needs to give you a call back, can they leave a voicemail: [] Yes [] No    Sofiya Fajardo MA  01/09/25, 07:26 EST

## 2025-01-15 ENCOUNTER — TELEPHONE (OUTPATIENT)
Dept: ONCOLOGY | Facility: CLINIC | Age: 75
End: 2025-01-15

## 2025-01-15 NOTE — TELEPHONE ENCOUNTER
Caller: Harry Sierra    Relationship to patient: Self    Best call back number: [phone]    Chief complaint: R/S     Type of visit: LAB & F/U     Requested date: ANY DAY 9:30 OR AFTER     If rescheduling, when is the original appointment: 12/19/24

## 2025-01-16 ENCOUNTER — OFFICE VISIT (OUTPATIENT)
Dept: INTERNAL MEDICINE | Facility: CLINIC | Age: 75
End: 2025-01-16
Payer: MEDICARE

## 2025-01-16 VITALS
OXYGEN SATURATION: 96 % | SYSTOLIC BLOOD PRESSURE: 140 MMHG | HEIGHT: 69 IN | DIASTOLIC BLOOD PRESSURE: 82 MMHG | BODY MASS INDEX: 20.06 KG/M2 | HEART RATE: 113 BPM | TEMPERATURE: 97.6 F | WEIGHT: 135.4 LBS

## 2025-01-16 DIAGNOSIS — E11.59 TYPE 2 DIABETES MELLITUS WITH OTHER CIRCULATORY COMPLICATION, WITH LONG-TERM CURRENT USE OF INSULIN: Primary | Chronic | ICD-10-CM

## 2025-01-16 DIAGNOSIS — Z93.59 CHRONIC SUPRAPUBIC CATHETER: Chronic | ICD-10-CM

## 2025-01-16 DIAGNOSIS — I10 PRIMARY HYPERTENSION: Chronic | ICD-10-CM

## 2025-01-16 DIAGNOSIS — R33.8 URINARY RETENTION DUE TO BENIGN PROSTATIC HYPERPLASIA: Chronic | ICD-10-CM

## 2025-01-16 DIAGNOSIS — N40.1 URINARY RETENTION DUE TO BENIGN PROSTATIC HYPERPLASIA: Chronic | ICD-10-CM

## 2025-01-16 DIAGNOSIS — Z79.4 TYPE 2 DIABETES MELLITUS WITH OTHER CIRCULATORY COMPLICATION, WITH LONG-TERM CURRENT USE OF INSULIN: Primary | Chronic | ICD-10-CM

## 2025-01-16 DIAGNOSIS — N41.1 CHRONIC PROSTATITIS: Chronic | ICD-10-CM

## 2025-01-16 DIAGNOSIS — D50.9 IRON DEFICIENCY ANEMIA, UNSPECIFIED IRON DEFICIENCY ANEMIA TYPE: Chronic | ICD-10-CM

## 2025-01-16 DIAGNOSIS — E11.59 TYPE 2 DIABETES MELLITUS WITH OTHER CIRCULATORY COMPLICATION, WITH LONG-TERM CURRENT USE OF INSULIN: ICD-10-CM

## 2025-01-16 DIAGNOSIS — Z79.4 TYPE 2 DIABETES MELLITUS WITH OTHER CIRCULATORY COMPLICATION, WITH LONG-TERM CURRENT USE OF INSULIN: ICD-10-CM

## 2025-01-16 DIAGNOSIS — D50.9 IRON DEFICIENCY ANEMIA, UNSPECIFIED IRON DEFICIENCY ANEMIA TYPE: ICD-10-CM

## 2025-01-16 DIAGNOSIS — F33.1 MAJOR DEPRESSIVE DISORDER, RECURRENT, MODERATE: Chronic | ICD-10-CM

## 2025-01-16 RX ORDER — FERRIC MALTOL 30 MG/1
30 CAPSULE ORAL DAILY
Qty: 30 CAPSULE | Refills: 3 | Status: SHIPPED | OUTPATIENT
Start: 2025-01-16

## 2025-01-16 RX ORDER — FLUCONAZOLE 150 MG/1
TABLET ORAL
COMMUNITY
Start: 2024-11-22 | End: 2025-02-06

## 2025-01-16 RX ORDER — LEVOFLOXACIN 750 MG/1
TABLET, FILM COATED ORAL
COMMUNITY
Start: 2024-12-11 | End: 2025-02-06

## 2025-01-16 NOTE — PROGRESS NOTES
Chief Complaint  Follow-up     Subjective:      History of Present Illness {CC  Problem List  Visit  Diagnosis   Encounters  Notes  Medications  Labs  Result Review Imaging  Media :23}     Harry Sierra presents to [facility] for:      History of Present Illness        The patient presents for evaluation of prostatitis, diabetes mellitus, and anemia.    He reports an improvement in his overall health status, with a weight gain of 3 pounds, from a previous low of 129.5 to a current weight of 133.4 pounds. He has been managing his urinary symptoms with an overnight bag, which has improved his sleep quality by reducing the frequency of nighttime bathroom visits. He has been using this method for approximately 1.5 years. He has also been utilizing a water hose catheter with a new flip bean, which he ordered online. He reports feeling better and has gained 3 pounds back. He has been taking cranberry powder pills 2 to 3 times a week to help wash out some of the bad bacteria. He has been taking Azo as needed for pain relief. He has been following up with [facility] every 6 weeks to get his catheter changed and plans to switch to every 4 weeks. He has been taking 500 mg of Tylenol before each visit to manage any discomfort. He has been advised to clean the outside of the apparatus with a solution of white vinegar and water in a 1:2 ratio daily. He has been experiencing bladder infections with burning and pain, which were initially thought to be due to prostatitis, but no medication was prescribed. He has been monitoring his kidney function closely. He has been scheduled for an annual check-up with Dr. Mendoza on 02/12/2025. He recently experienced a fall in the bathroom, resulting in him being trapped between the toilet and cabinet. This incident necessitated a 5 to 6-day hospital stay, during which he was administered potent antibiotics and an intravenous antifungal to  prevent a yeast infection. The diagnosis was prostatitis, and he was prescribed a 60-day course of medication, of which he has 15 days remaining. He has been advised to provide a sample after 60 days of treatment for serological analysis and blood work. If the results indicate that the condition has been resolved, no further treatment will be necessary. However, if the condition persists, an additional 30-day course of treatment will be recommended.    His diabetes is well-managed, with his blood glucose levels typically around 130. He has an upcoming appointment with Dr. Franco on 01/31/2025. He has not had his A1c checked since August 2024 but recalls that it was within the desired range during his last visit to [place]. He has been managing his diabetes with quickset insulin, administered either before or after meals, and occasionally takes 60 units of lispro 3 times a day. He also takes 24-hour long-acting insulin, adjusting the dose as needed to prevent hypoglycemia. He has been advised to exercise regularly and monitor his blood glucose levels closely. He has been picking up 2 medications from [facility] today.    He has been unable to tolerate the prescribed iron supplement due to constipation. His hemoglobin levels have consistently been low, ranging between 3.5 and 4, instead of the normal range of 10 to 13. He has been taking vitamin D3, B12, and a multivitamin 3 to 4 times a week. He has been feeling better and has gained 3 pounds back. He has been experiencing weakness and has been using a cart for support while walking, which he finds stressful and embarrassing. He has been feeling better overall and has been very grateful for the care he has received.    Supplemental Information  He has been on anticoagulant therapy and has noticed a small purple spot on his hand, which he attributes to the medication.     FAMILY HISTORY  His older brother had prostate cancer and underwent 32 radiation  "treatments.    MEDICATIONS  Current: vitamin D3, vitamin B12, multivitamin, lispro, Humalog, Tylenol, Levaquin, Azo         I have reviewed patient's medical history, any new submitted information provided by patient or medical assistant and updated medical record.      Objective:      Physical Exam  Vitals reviewed.   Constitutional:       General: He is not in acute distress.     Appearance: Normal appearance. He is not ill-appearing, toxic-appearing or diaphoretic.   HENT:      Head: Normocephalic.   Cardiovascular:      Rate and Rhythm: Normal rate and regular rhythm.      Pulses: Normal pulses.      Heart sounds: Normal heart sounds.   Pulmonary:      Effort: Pulmonary effort is normal.      Breath sounds: Normal breath sounds.   Abdominal:      Comments: Suprapubic catheter     Skin:     General: Skin is warm and dry.      Capillary Refill: Capillary refill takes less than 2 seconds.   Neurological:      General: No focal deficit present.      Mental Status: He is alert.   Psychiatric:         Mood and Affect: Mood normal.         Behavior: Behavior normal.        Result Review  Data Reviewed:{ Labs  Result Review  Imaging  Med Tab  Media :23}     Results                    Vital Signs:   /82 (BP Location: Left arm, Patient Position: Sitting, Cuff Size: Adult)   Pulse 113   Temp 97.6 °F (36.4 °C) (Oral)   Ht 175.3 cm (69\")   Wt 61.4 kg (135 lb 6.4 oz)   SpO2 96%   BMI 20.00 kg/m²                 Requested Prescriptions      No prescriptions requested or ordered in this encounter       Routine medications provided by this office will also be refilled via pharmacy request.       Current Outpatient Medications:     atorvastatin (LIPITOR) 40 MG tablet, TAKE ONE TABLET BY MOUTH EVERY NIGHT AT BEDTIME, Disp: 90 tablet, Rfl: 4    BD Pen Needle An 2nd Gen 32G X 4 MM misc, USE DAILY TO CHECK BLOOD SUGAR, Disp: 100 each, Rfl: 4    Cholecalciferol (Vitamin D3) 10 MCG (400 UNIT) capsule, Take 400 Units " by mouth Daily., Disp: 150 capsule, Rfl: 0    clopidogrel (PLAVIX) 75 MG tablet, TAKE 1 TABLET BY MOUTH DAILY, Disp: 90 tablet, Rfl: 4    Continuous Glucose  (FreeStyle Vaishali 14 Day [place]) device, USE AS DIRECTED, Disp: 1 each, Rfl: 1    Continuous Glucose Sensor (FreeStyle Vaishali 14 Day Sensor) misc, 1 applicator by Subdermal route Every 14 (Fourteen) Days., Disp: 2 each, Rfl: 5    cyanocobalamin (VITAMIN B-12) 1000 MCG tablet, Take 1 tablet by mouth Daily., Disp: 30 tablet, Rfl: 0    ferrous sulfate (FerrouSul) 325 (65 FE) MG tablet, Take 1 tablet by mouth Daily With Breakfast., Disp: 90 tablet, Rfl: 1    fluconazole (DIFLUCAN) 150 MG tablet, , Disp: , Rfl:     Gemtesa 75 MG tablet, , Disp: , Rfl:     Insulin Glargine (LANTUS SOLOSTAR) 100 UNIT/ML injection pen, Inject 24 Units under the skin into the appropriate area as directed Daily., Disp: 24 mL, Rfl: 0    Insulin Lispro, 1 Unit Dial, (HumaLOG KwikPen) 100 UNIT/ML solution pen-injector, Inject 8 Units under the skin into the appropriate area as directed 3 (Three) Times a Day With Meals. (Patient taking differently: Inject 6 Units under the skin into the appropriate area as directed 3 (Three) Times a Day With Meals.), Disp: 15 mL, Rfl: 5    Insulin Pen Needle (Pen Needles) 31G X 5 MM misc, Use 1 each 4 (Four) Times a Day., Disp: 400 each, Rfl: 3    levoFLOXacin (LEVAQUIN) 750 MG tablet, , Disp: , Rfl:     losartan (Cozaar) 25 MG tablet, Take 0.5 tablets by mouth Daily., Disp: 30 tablet, Rfl: 1    metFORMIN (GLUCOPHAGE) 500 MG tablet, Take 1 tablet by mouth 2 (Two) Times a Day With Meals., Disp: 180 tablet, Rfl: 0    Multiple Vitamins-Minerals (CENTRUM SILVER 50+MEN) tablet, Take 1 tablet by mouth Daily., Disp: , Rfl:     mupirocin (BACTROBAN) 2 % cream, , Disp: , Rfl:     pantoprazole (PROTONIX) 40 MG EC tablet, TAKE 1 TABLET BY MOUTH DAILY, Disp: 90 tablet, Rfl: 1     Assessment and Plan:      Assessment and Plan {CC Problem List  Visit Diagnosis   ROS  Review (Popup)  [facility]  Quality  BestPractice  Medications  SmartSets  SnapShot Encounters  Media :23}     Problem List Items Addressed This Visit    None         1. Prostatitis.  He has been on a regimen of antibiotics for prostatitis, with 15 days left of the 60-day prescription. He reports improvement in symptoms but continues to experience weakness and occasional bladder infections. He is advised to continue the current antibiotic regimen and follow up with [facility] in about a week for a catheter change. He is also advised to take cranberry powder pills 2-3 times a week to help flush out bacteria. He is instructed to monitor for any signs of kidney damage and report any new symptoms.    2. Diabetes Mellitus.  His blood sugar levels are generally well-controlled, though he occasionally experiences hypoglycemic episodes. He is currently using quickset insulin (lispro) 60 units once daily, sometimes twice, and adjusts his 24-hour long-acting insulin dose based on his blood sugar readings. He is advised to continue his current insulin regimen and monitor his blood sugar levels closely. He is also advised to follow up with endocrinology as scheduled.    3. Anemia.  He reports that prescribed iron supplements cause constipation. A prescription for a better-tolerated iron supplement has been provided. He is instructed to start this new iron supplement after completing his current course of Levaquin. He is also advised to discuss this new iron supplement with Dr. Mendoza to coordinate insurance coverage.    4. Health Maintenance.  A comprehensive set of laboratory tests, including hemoglobin, metabolic panel, white blood cells, and red blood cells, will be conducted. He is advised to fast before the lab tests to ensure accurate results. He is also reminded to follow up with Dr. Franco on 07/31/2024, and Dr. Mendoza on 02/12/2025.       Patient verbalizes understanding and is comfortable with  the plan of care.    Follow Up {Instructions Charge Capture  Follow-up Communications :23}     No follow-ups on file.      Patient was given instructions and counseling regarding his condition or for health maintenance advice. Please see specific information pulled into the AVS if appropriate.    Dragon disclaimer:   Much of this encounter note is an electronic transcription/translation of spoken language to printed text. The electronic translation of spoken language may permit erroneous, or at times, nonsensical words or phrases to be inadvertently transcribed; Although I have reviewed the note for such errors, some may still exist.         Additional Patient Counseling:       There are no Patient Instructions on file for this visit.    Patient or patient representative verbalized consent for the use of Ambient Listening during the visit with  JO Juárez for chart documentation. 2/8/2025  22:45 EST

## 2025-01-17 LAB
ALBUMIN SERPL-MCNC: 4 G/DL (ref 3.5–5.2)
ALBUMIN/GLOB SERPL: 1.7 G/DL
ALP SERPL-CCNC: 96 U/L (ref 39–117)
ALT SERPL-CCNC: 13 U/L (ref 1–41)
AST SERPL-CCNC: 13 U/L (ref 1–40)
BASOPHILS # BLD AUTO: 0 10*3/MM3 (ref 0–0.2)
BASOPHILS NFR BLD AUTO: 0 % (ref 0–1.5)
BILIRUB SERPL-MCNC: 0.4 MG/DL (ref 0–1.2)
BUN SERPL-MCNC: 13 MG/DL (ref 8–23)
BUN/CREAT SERPL: 18.3 (ref 7–25)
CALCIUM SERPL-MCNC: 9.3 MG/DL (ref 8.6–10.5)
CHLORIDE SERPL-SCNC: 101 MMOL/L (ref 98–107)
CO2 SERPL-SCNC: 26.6 MMOL/L (ref 22–29)
CREAT SERPL-MCNC: 0.71 MG/DL (ref 0.76–1.27)
EGFRCR SERPLBLD CKD-EPI 2021: 95.1 ML/MIN/1.73
EOSINOPHIL # BLD AUTO: 0 10*3/MM3 (ref 0–0.4)
EOSINOPHIL NFR BLD AUTO: 0 % (ref 0.3–6.2)
ERYTHROCYTE [DISTWIDTH] IN BLOOD BY AUTOMATED COUNT: 12.6 % (ref 12.3–15.4)
GLOBULIN SER CALC-MCNC: 2.4 GM/DL
GLUCOSE SERPL-MCNC: 182 MG/DL (ref 65–99)
HBA1C MFR BLD: 7 % (ref 4.8–5.6)
HCT VFR BLD AUTO: 29.1 % (ref 37.5–51)
HGB BLD-MCNC: 10.1 G/DL (ref 13–17.7)
IMM GRANULOCYTES # BLD AUTO: 0.02 10*3/MM3 (ref 0–0.05)
IMM GRANULOCYTES NFR BLD AUTO: 0.7 % (ref 0–0.5)
LYMPHOCYTES # BLD AUTO: 1.06 10*3/MM3 (ref 0.7–3.1)
LYMPHOCYTES NFR BLD AUTO: 38.7 % (ref 19.6–45.3)
MCH RBC QN AUTO: 33.2 PG (ref 26.6–33)
MCHC RBC AUTO-ENTMCNC: 34.7 G/DL (ref 31.5–35.7)
MCV RBC AUTO: 95.7 FL (ref 79–97)
MONOCYTES # BLD AUTO: 0.46 10*3/MM3 (ref 0.1–0.9)
MONOCYTES NFR BLD AUTO: 16.8 % (ref 5–12)
NEUTROPHILS # BLD AUTO: 1.2 10*3/MM3 (ref 1.7–7)
NEUTROPHILS NFR BLD AUTO: 43.8 % (ref 42.7–76)
NRBC BLD AUTO-RTO: 0 /100 WBC (ref 0–0.2)
PLATELET # BLD AUTO: 228 10*3/MM3 (ref 140–450)
POTASSIUM SERPL-SCNC: 4.1 MMOL/L (ref 3.5–5.2)
PROT SERPL-MCNC: 6.4 G/DL (ref 6–8.5)
RBC # BLD AUTO: 3.04 10*6/MM3 (ref 4.14–5.8)
SODIUM SERPL-SCNC: 140 MMOL/L (ref 136–145)
WBC # BLD AUTO: 2.74 10*3/MM3 (ref 3.4–10.8)

## 2025-01-20 DIAGNOSIS — E11.65 TYPE 2 DIABETES MELLITUS WITH HYPERGLYCEMIA, WITH LONG-TERM CURRENT USE OF INSULIN: ICD-10-CM

## 2025-01-20 DIAGNOSIS — Z79.4 TYPE 2 DIABETES MELLITUS WITH HYPERGLYCEMIA, WITH LONG-TERM CURRENT USE OF INSULIN: ICD-10-CM

## 2025-01-22 DIAGNOSIS — E11.65 TYPE 2 DIABETES MELLITUS WITH HYPERGLYCEMIA, WITH LONG-TERM CURRENT USE OF INSULIN: ICD-10-CM

## 2025-01-22 DIAGNOSIS — Z79.4 TYPE 2 DIABETES MELLITUS WITH HYPERGLYCEMIA, WITH LONG-TERM CURRENT USE OF INSULIN: ICD-10-CM

## 2025-01-22 DIAGNOSIS — D70.9 NEUTROPENIA, UNSPECIFIED TYPE: Primary | ICD-10-CM

## 2025-01-22 RX ORDER — FLASH GLUCOSE SENSOR
1 KIT MISCELLANEOUS
Qty: 2 EACH | Refills: 0 | Status: SHIPPED | OUTPATIENT
Start: 2025-01-22

## 2025-01-22 NOTE — TELEPHONE ENCOUNTER
Incoming Refill Request      Medication requested (name and dose): metformin and freestyle antelmo 14 day sensors     Pharmacy where request should be sent: [facility] on first floor    Additional details provided by patient:     Best call back number: [phone]    Does the patient have less than a 3 day supply:  [] Yes  [] No    Anna Marie Mann Rep  01/22/25, 08:41 EST

## 2025-01-27 ENCOUNTER — TELEPHONE (OUTPATIENT)
Dept: ENDOCRINOLOGY | Age: 75
End: 2025-01-27
Payer: MEDICARE

## 2025-01-27 DIAGNOSIS — Z79.4 TYPE 2 DIABETES MELLITUS WITH HYPERGLYCEMIA, WITH LONG-TERM CURRENT USE OF INSULIN: ICD-10-CM

## 2025-01-27 DIAGNOSIS — E11.65 TYPE 2 DIABETES MELLITUS WITH HYPERGLYCEMIA, WITH LONG-TERM CURRENT USE OF INSULIN: ICD-10-CM

## 2025-01-27 NOTE — TELEPHONE ENCOUNTER
Called and spoke with pt. Informed pt Rx was sent 01/22. Pt voiced understanding and had no further questions or concerns.

## 2025-01-27 NOTE — TELEPHONE ENCOUNTER
PT IS NEEDING BELOW MEDS REFILLED    metFORMIN (GLUCOPHAGE) 500 MG tablet [[id number]]     Continuous Glucose Sensor (FreeStyle Vaishali 14 Day Sensor) misc [[id number]]     [facility]

## 2025-01-31 ENCOUNTER — OFFICE VISIT (OUTPATIENT)
Dept: ENDOCRINOLOGY | Age: 75
End: 2025-01-31
Payer: MEDICARE

## 2025-01-31 VITALS
DIASTOLIC BLOOD PRESSURE: 74 MMHG | SYSTOLIC BLOOD PRESSURE: 126 MMHG | OXYGEN SATURATION: 98 % | HEIGHT: 69 IN | HEART RATE: 110 BPM | BODY MASS INDEX: 20.17 KG/M2 | WEIGHT: 136.2 LBS

## 2025-01-31 DIAGNOSIS — E11.69 HYPERLIPIDEMIA ASSOCIATED WITH TYPE 2 DIABETES MELLITUS: ICD-10-CM

## 2025-01-31 DIAGNOSIS — E78.5 HYPERLIPIDEMIA ASSOCIATED WITH TYPE 2 DIABETES MELLITUS: ICD-10-CM

## 2025-01-31 DIAGNOSIS — E11.65 TYPE 2 DIABETES MELLITUS WITH HYPERGLYCEMIA, WITH LONG-TERM CURRENT USE OF INSULIN: Primary | ICD-10-CM

## 2025-01-31 DIAGNOSIS — Z79.4 TYPE 2 DIABETES MELLITUS WITH HYPERGLYCEMIA, WITH LONG-TERM CURRENT USE OF INSULIN: Primary | ICD-10-CM

## 2025-01-31 NOTE — PROGRESS NOTES
Chief complaint/Reason for consult: T2DM    HPI:   - 73 year old male here for management of diabetes mellitus type 2  - Last seen in 8/2023  - Had prostatitis that he was hospitalized for since last visit  - Has had diabetes for over 20 years  - Complicated by TIA  - Is currently taking Lantus 24 units daily, Novolog 4-6 units with meals but sometimes takes it after eating, and metformin 500 mg bid   - Occasional hypoglycemia with activity  - He does use a Vaishali CGM  - He does have a suprapubic catheter  - Is also on atorvastatin 40 mg daily    The following portions of the patient's history were reviewed and updated as appropriate: allergies, current medications, past family history, past medical history, past social history, past surgical history, and problem list.      Objective     Vitals:    01/31/25 0859   BP: 126/74   Pulse: 110   SpO2: 98%        Physical Exam  Vitals reviewed.   Constitutional:       Appearance: Normal appearance.   HENT:      Head: Normocephalic and atraumatic.   Eyes:      General: No scleral icterus.  Pulmonary:      Effort: Pulmonary effort is normal. No respiratory distress.   Neurological:      Mental Status: He is alert.      Gait: Gait normal.   Psychiatric:         Mood and Affect: Mood normal.         Behavior: Behavior normal.         Thought Content: Thought content normal.         Judgment: Judgment normal.     CGM interpretation  Dates reviewed:  1/18-1/31/25  Data:  Avg glucose of 193, 25% very high, 27% high, 45% in target, 3% low  Interpretation:  Overnight hypoglycemia, hyperglycemia around 3 p.m.    Assessment & Plan   1. Type 2 DM uncontrolled with hypoglycemia and hyperglycemia  - Recent A1c has improved to 7%  - His BG has improved significantly since adding Novolog, he did reduce it to 4 units because of hypoglycemia which is reasonable  - Cont.  metformin 500 mg bid  - Decrease Lantus to 20 units daily and Novolog 4-6 units with meals (stressed importance taking  insulin before eating)     2. Hyperlipidemia  - On atorvastatin 40 mg daily     - Return to clinic in 4 months

## 2025-02-05 ENCOUNTER — TRANSCRIBE ORDERS (OUTPATIENT)
Dept: ADMINISTRATIVE | Facility: HOSPITAL | Age: 75
End: 2025-02-05
Payer: MEDICARE

## 2025-02-05 DIAGNOSIS — C61 PROSTATE CANCER: Primary | ICD-10-CM

## 2025-02-05 DIAGNOSIS — R97.20 ELEVATED PROSTATE SPECIFIC ANTIGEN (PSA): ICD-10-CM

## 2025-02-06 ENCOUNTER — OFFICE VISIT (OUTPATIENT)
Dept: GASTROENTEROLOGY | Facility: CLINIC | Age: 75
End: 2025-02-06
Payer: MEDICARE

## 2025-02-06 VITALS
DIASTOLIC BLOOD PRESSURE: 79 MMHG | BODY MASS INDEX: 20.04 KG/M2 | HEART RATE: 103 BPM | SYSTOLIC BLOOD PRESSURE: 121 MMHG | WEIGHT: 135.3 LBS | HEIGHT: 69 IN | TEMPERATURE: 96.3 F

## 2025-02-06 DIAGNOSIS — D12.2 ADENOMATOUS POLYP OF ASCENDING COLON: ICD-10-CM

## 2025-02-06 DIAGNOSIS — K59.01 SLOW TRANSIT CONSTIPATION: Primary | ICD-10-CM

## 2025-02-06 PROCEDURE — 3078F DIAST BP <80 MM HG: CPT | Performed by: PHYSICIAN ASSISTANT

## 2025-02-06 PROCEDURE — 1159F MED LIST DOCD IN RCRD: CPT | Performed by: PHYSICIAN ASSISTANT

## 2025-02-06 PROCEDURE — 99213 OFFICE O/P EST LOW 20 MIN: CPT | Performed by: PHYSICIAN ASSISTANT

## 2025-02-06 PROCEDURE — 3074F SYST BP LT 130 MM HG: CPT | Performed by: PHYSICIAN ASSISTANT

## 2025-02-06 PROCEDURE — 1160F RVW MEDS BY RX/DR IN RCRD: CPT | Performed by: PHYSICIAN ASSISTANT

## 2025-02-06 NOTE — PROGRESS NOTES
"Chief Complaint  Constipation    Subjective          History Of Present Illness:    Harry Sierra is a  74 y.o. male patient of Dr. Comer with a past medical history of hypertension, hyperlipidemia, type 2 diabetes mellitus, prostate cancer, colon polyps, A-fib, CAD on Plavix, BPH with urinary retention status post suprapubic catheter who presents as a follow-up for constipation.    CBC 1/17/2025 with hemoglobin 10.1.    Patient reports he has had no further issues with bleeding since 2023. Patient reports he has had some constipation and has recently restarted some metamucil.  He denies any chest pain, abdominal pain, nausea, vomiting, weight loss, poor appetite.  He has put on weight since last year.  No melena or hematochezia.  He remains on pantoprazole 40 mg daily.    He does remain on Plavix.  Patient is okay to hold therapy per his primary care.    Additional data reviewed:  Colonoscopy 7/8/2023 while in the ICU for GI bleed with divereticulosis of the sigmoid colon, red blood within the sigmoid colon and descending colon, normal terminal ileum.  Colonoscopy 6/30/2023 -normal terminal ileum, nonbleeding internal hemorrhoids, diverticulosis, 120 mm polyp in the cecum, 115 mm polyp at the Paddock flexure, 113 mm polyp in the ascending colon, seven 5 to 13 mm polyps in the transverse colon at the hepatic flexure, ascending colon and cecum.  Recall 1 year.  EGD 7/6/2023 - regular Z-line, normal stomach, small hiatal hernia, normal duodenum    Objective   Vital Signs:   /79   Pulse 103   Temp 96.3 °F (35.7 °C)   Ht 175.3 cm (69\")   Wt 61.4 kg (135 lb 4.8 oz)   BMI 19.98 kg/m²       Physical Exam  Vitals reviewed.   Constitutional:       General: He is not in acute distress.     Appearance: Normal appearance. He is not ill-appearing.   HENT:      Head: Normocephalic and atraumatic.      Nose: Nose normal.      Mouth/Throat:      Pharynx: Oropharynx is clear.   Eyes:      Extraocular Movements: Extraocular " movements intact.      Conjunctiva/sclera: Conjunctivae normal.      Pupils: Pupils are equal, round, and reactive to light.   Pulmonary:      Effort: Pulmonary effort is normal.   Abdominal:      General: There is no distension.      Palpations: Abdomen is soft. There is no mass.      Tenderness: There is no abdominal tenderness.   Musculoskeletal:         General: No swelling. Normal range of motion.      Cervical back: Normal range of motion.   Skin:     General: Skin is warm and dry.      Findings: No bruising or rash.   Neurological:      General: No focal deficit present.      Mental Status: He is alert and oriented to person, place, and time.      Motor: No weakness.      Gait: Gait normal.   Psychiatric:         Mood and Affect: Mood normal.          Result Review :   The following data was reviewed by: Zoe Godfrey PA-C on 02/06/2025:  CMP          8/5/2024    09:53 10/10/2024    10:36 1/17/2025    11:45   CMP   Glucose 148  150  182    BUN 12  17  13    Creatinine 0.71  0.78  0.71    Sodium 139  137  140    Potassium 3.8  4.5  4.1    Chloride 102  103  101    Calcium 9.7  9.5  9.3    Total Protein 7.0  6.9  6.4    Albumin 4.4  4.5  4.0    Globulin 2.6  2.4  2.4    Total Bilirubin 0.5  0.3  0.4    Alkaline Phosphatase 108  108  96    AST (SGOT) 13  13  13    ALT (SGPT) 11  13  13    BUN/Creatinine Ratio 16.9  21.8  18.3      CBC          6/4/2024    09:58 10/10/2024    10:36 1/17/2025    11:45   CBC   WBC 8.41  4.49  2.74    RBC 3.09  3.53  3.04    Hemoglobin 10.3  11.4  10.1    Hematocrit 31.3  33.6  29.1    .3  95.2  95.7    MCH 33.3  32.3  33.2    MCHC 32.9  33.9  34.7    RDW 14.6  13.5  12.6    Platelets 206  214  228            Assessment and Plan    Diagnoses and all orders for this visit:    1. Slow transit constipation (Primary)    2. Adenomatous polyp of ascending colon  Overview:  Added automatically from request for surgery [id number]    Orders:  -     Case Request; Standing  -      Implement Anesthesia orders day of procedure.; Standing  -     Follow Anesthesia Guidelines / Protocol; Future  -     Verify bowel prep was successful; Standing  -     Give tap water enema if bowel prep was insufficient; Standing  -     Case Request       Continue Metamucil therapy for constipation  Proceed with colonoscopy for screening purposes.    Follow Up   Return for Colonoscopy.    Dragon dictation used throughout this note.            Zoe Myaa PA-C   [facility]  [address] [place] [address]  Office: [phone]

## 2025-02-08 PROBLEM — F33.1 MAJOR DEPRESSIVE DISORDER, RECURRENT, MODERATE: Status: ACTIVE | Noted: 2025-02-08

## 2025-02-14 DIAGNOSIS — Z79.4 TYPE 2 DIABETES MELLITUS WITH HYPERGLYCEMIA, WITH LONG-TERM CURRENT USE OF INSULIN: Primary | ICD-10-CM

## 2025-02-14 DIAGNOSIS — E11.65 TYPE 2 DIABETES MELLITUS WITH HYPERGLYCEMIA, WITH LONG-TERM CURRENT USE OF INSULIN: Primary | ICD-10-CM

## 2025-02-14 NOTE — TELEPHONE ENCOUNTER
Rx Refill Note  Requested Prescriptions     Pending Prescriptions Disp Refills    Insulin Glargine (LANTUS SOLOSTAR) 100 UNIT/ML injection pen 24 mL 0     Sig: Inject 24 Units under the skin into the appropriate area as directed Daily.      Last office visit with prescribing clinician: 1/31/2025   Last telemedicine visit with prescribing clinician: Visit date not found   Next office visit with prescribing clinician: 5/30/2025                         Would you like a call back once the refill request has been completed: [] Yes [] No    If the office needs to give you a call back, can they leave a voicemail: [] Yes [] No    Ofelia Leiva MA  02/14/25, 18:04 EST

## 2025-02-24 DIAGNOSIS — E11.65 TYPE 2 DIABETES MELLITUS WITH HYPERGLYCEMIA, WITH LONG-TERM CURRENT USE OF INSULIN: ICD-10-CM

## 2025-02-24 DIAGNOSIS — Z79.4 TYPE 2 DIABETES MELLITUS WITH HYPERGLYCEMIA, WITH LONG-TERM CURRENT USE OF INSULIN: ICD-10-CM

## 2025-02-24 RX ORDER — FLASH GLUCOSE SENSOR
1 KIT MISCELLANEOUS
Qty: 2 EACH | Refills: 4 | Status: SHIPPED | OUTPATIENT
Start: 2025-02-24

## 2025-02-24 NOTE — TELEPHONE ENCOUNTER
Rx Refill Note  Requested Prescriptions     Pending Prescriptions Disp Refills    Continuous Glucose Sensor (FreeStyle Vaishali 14 Day Sensor) misc 2 each 0     Sig: Change sensor every 14 days.      Last office visit with prescribing clinician: 1/31/2025   Last telemedicine visit with prescribing clinician: Visit date not found   Next office visit with prescribing clinician: 5/30/2025                         Would you like a call back once the refill request has been completed: [] Yes [] No    If the office needs to give you a call back, can they leave a voicemail: [] Yes [] No    Hue Fajardo  02/24/25, 09:13 EST

## 2025-02-27 ENCOUNTER — TELEPHONE (OUTPATIENT)
Dept: ONCOLOGY | Facility: CLINIC | Age: 75
End: 2025-02-27

## 2025-02-27 NOTE — TELEPHONE ENCOUNTER
Caller: Harry Sierra    Relationship to patient: Self    Best call back number: [phone]    Chief complaint: SCHEDULING    Type of visit: LAB, FOLLOW UP 1    Requested date: NEXT AVLIABLE     If rescheduling, when is the original appointment: 2-12

## 2025-03-04 ENCOUNTER — LAB (OUTPATIENT)
Dept: LAB | Facility: HOSPITAL | Age: 75
End: 2025-03-04
Payer: MEDICARE

## 2025-03-04 ENCOUNTER — OFFICE VISIT (OUTPATIENT)
Dept: ONCOLOGY | Facility: CLINIC | Age: 75
End: 2025-03-04
Payer: MEDICARE

## 2025-03-04 VITALS
RESPIRATION RATE: 16 BRPM | SYSTOLIC BLOOD PRESSURE: 120 MMHG | OXYGEN SATURATION: 96 % | WEIGHT: 140.5 LBS | DIASTOLIC BLOOD PRESSURE: 71 MMHG | HEIGHT: 69 IN | HEART RATE: 117 BPM | BODY MASS INDEX: 20.81 KG/M2 | TEMPERATURE: 97.5 F

## 2025-03-04 DIAGNOSIS — R53.83 OTHER FATIGUE: ICD-10-CM

## 2025-03-04 DIAGNOSIS — D50.0 IRON DEFICIENCY ANEMIA DUE TO CHRONIC BLOOD LOSS: ICD-10-CM

## 2025-03-04 DIAGNOSIS — C61 PROSTATE CANCER: ICD-10-CM

## 2025-03-04 DIAGNOSIS — E55.9 VITAMIN D DEFICIENCY, UNSPECIFIED: ICD-10-CM

## 2025-03-04 DIAGNOSIS — C61 PROSTATE CANCER: Primary | ICD-10-CM

## 2025-03-04 DIAGNOSIS — E55.9 VITAMIN D DEFICIENCY: ICD-10-CM

## 2025-03-04 LAB
25(OH)D3 SERPL-MCNC: 23.8 NG/ML (ref 30–100)
ALBUMIN SERPL-MCNC: 3.9 G/DL (ref 3.5–5.2)
ALBUMIN/GLOB SERPL: 1.2 G/DL
ALP SERPL-CCNC: 110 U/L (ref 39–117)
ALT SERPL W P-5'-P-CCNC: 13 U/L (ref 1–41)
ANION GAP SERPL CALCULATED.3IONS-SCNC: 14.6 MMOL/L (ref 5–15)
AST SERPL-CCNC: 14 U/L (ref 1–40)
BASOPHILS # BLD AUTO: 0.01 10*3/MM3 (ref 0–0.2)
BASOPHILS NFR BLD AUTO: 0.2 % (ref 0–1.5)
BILIRUB SERPL-MCNC: 0.4 MG/DL (ref 0–1.2)
BUN SERPL-MCNC: 12 MG/DL (ref 8–23)
BUN/CREAT SERPL: 16.2 (ref 7–25)
CALCIUM SPEC-SCNC: 9.3 MG/DL (ref 8.6–10.5)
CHLORIDE SERPL-SCNC: 100 MMOL/L (ref 98–107)
CO2 SERPL-SCNC: 24.4 MMOL/L (ref 22–29)
CREAT SERPL-MCNC: 0.74 MG/DL (ref 0.76–1.27)
DEPRECATED RDW RBC AUTO: 45.1 FL (ref 37–54)
EGFRCR SERPLBLD CKD-EPI 2021: 93.9 ML/MIN/1.73
EOSINOPHIL # BLD AUTO: 0.01 10*3/MM3 (ref 0–0.4)
EOSINOPHIL NFR BLD AUTO: 0.2 % (ref 0.3–6.2)
ERYTHROCYTE [DISTWIDTH] IN BLOOD BY AUTOMATED COUNT: 12.8 % (ref 12.3–15.4)
FERRITIN SERPL-MCNC: 50.3 NG/ML (ref 30–400)
GLOBULIN UR ELPH-MCNC: 3.3 GM/DL
GLUCOSE SERPL-MCNC: 290 MG/DL (ref 65–99)
HCT VFR BLD AUTO: 34.2 % (ref 37.5–51)
HGB BLD-MCNC: 11.2 G/DL (ref 13–17.7)
IMM GRANULOCYTES # BLD AUTO: 0.08 10*3/MM3 (ref 0–0.05)
IMM GRANULOCYTES NFR BLD AUTO: 1.5 % (ref 0–0.5)
IRON 24H UR-MRATE: 85 MCG/DL (ref 59–158)
IRON SATN MFR SERPL: 22 % (ref 20–50)
LYMPHOCYTES # BLD AUTO: 1.24 10*3/MM3 (ref 0.7–3.1)
LYMPHOCYTES NFR BLD AUTO: 22.8 % (ref 19.6–45.3)
MCH RBC QN AUTO: 32.1 PG (ref 26.6–33)
MCHC RBC AUTO-ENTMCNC: 32.7 G/DL (ref 31.5–35.7)
MCV RBC AUTO: 98 FL (ref 79–97)
MONOCYTES # BLD AUTO: 0.6 10*3/MM3 (ref 0.1–0.9)
MONOCYTES NFR BLD AUTO: 11 % (ref 5–12)
NEUTROPHILS NFR BLD AUTO: 3.51 10*3/MM3 (ref 1.7–7)
NEUTROPHILS NFR BLD AUTO: 64.3 % (ref 42.7–76)
NRBC BLD AUTO-RTO: 0 /100 WBC (ref 0–0.2)
PLATELET # BLD AUTO: 239 10*3/MM3 (ref 140–450)
PMV BLD AUTO: 9.9 FL (ref 6–12)
POTASSIUM SERPL-SCNC: 4.1 MMOL/L (ref 3.5–5.2)
PROT SERPL-MCNC: 7.2 G/DL (ref 6–8.5)
PSA SERPL-MCNC: 10.7 NG/ML (ref 0–4)
RBC # BLD AUTO: 3.49 10*6/MM3 (ref 4.14–5.8)
SODIUM SERPL-SCNC: 139 MMOL/L (ref 136–145)
TIBC SERPL-MCNC: 384 MCG/DL (ref 298–536)
TRANSFERRIN SERPL-MCNC: 258 MG/DL (ref 200–360)
WBC NRBC COR # BLD AUTO: 5.45 10*3/MM3 (ref 3.4–10.8)

## 2025-03-04 PROCEDURE — 80053 COMPREHEN METABOLIC PANEL: CPT | Performed by: INTERNAL MEDICINE

## 2025-03-04 PROCEDURE — 82728 ASSAY OF FERRITIN: CPT | Performed by: INTERNAL MEDICINE

## 2025-03-04 PROCEDURE — 82306 VITAMIN D 25 HYDROXY: CPT | Performed by: INTERNAL MEDICINE

## 2025-03-04 PROCEDURE — 84153 ASSAY OF PSA TOTAL: CPT | Performed by: INTERNAL MEDICINE

## 2025-03-04 PROCEDURE — 85025 COMPLETE CBC W/AUTO DIFF WBC: CPT

## 2025-03-04 PROCEDURE — 83540 ASSAY OF IRON: CPT | Performed by: INTERNAL MEDICINE

## 2025-03-04 PROCEDURE — 84466 ASSAY OF TRANSFERRIN: CPT | Performed by: INTERNAL MEDICINE

## 2025-03-04 PROCEDURE — 36415 COLL VENOUS BLD VENIPUNCTURE: CPT

## 2025-03-04 NOTE — PROGRESS NOTES
[facility]    CONSULTING IN BLOOD DISORDERS & CANCER      REASON FOR CONSULTATION/CHIEF COMPLAINT:     Evaluation and management for prostate cancer                             REQUESTING PHYSICIAN: No ref. provider found  RECORDS OBTAINED:  Records of the patients history including those from the electronic medical record were reviewed and summarized in detail.    HISTORY OF PRESENT ILLNESS:    The patient is a 74 y.o. year old male with medical history significant for DM 2, coronary artery disease, BPH and chronic urinary retention s/p suprapubic catheter (2/2023) who comes for prostate cancer evaluation and management.    Patient was noted to have elevated PSA in 4-5 range and 2017.  A prostatic biopsy performed on 1/8/2018 showed prostate adenocarcinoma, max [place] score 6 (3+3).  3 out of 12 cores positive.  Prostate measured 63.6 cc.  Decipher score 0.21, low risk.  He was seen by Dr. Mayo Davis, urology and plan made for active surveillance.  Patient also follows up with him for BPH and history of urinary retention.  Urodynamic study in October 2022 was a poor study.  He underwent cystoscopy and suprapubic catheter placement on 2/8/2023.    Most recent PSA are from January 2024 (8.910) and March 2024 (9.77).    A CT C/A/P from 11/18/2023 showed no evidence of lymphadenopathy or metastasis.  Numerous tiny calcified benign granulomas demonstrated throughout both lungs.  There is diffuse bladder wall thickening with suprapubic catheter in position.  Suspected right kidney lower pole pyelonephritis.  The spleen is mildly enlarged, 13.5 cm.    A CT A/P from 3/7/2024 too showed enlarged prostrate, no lymphadenopathy or metastatic lesions.    Patient was referred to this clinic due to ongoing symptoms of weight loss, poor appetite,  severe fatigue and concern for cancer.  Accompanied by his daughter who is a  in [place].    Patient's father was a cardiologist with [facility].    Lab work in  April 2024: Noted normal iron, vitamin B12 and folate levels.  Mild vitamin D deficiency (30.9) and low testosterone (total testosterone 173).    INTERIM HISTORY:  Patient returns today and somewhat delayed follow-up.  We last saw him in June 2024.  At that time he had just had MRI of the prostate with stable findings.  [facility] has [facility], currently with follow-up scheduled for this coming June with repeat MRI again.    Patient notes recently being given an iron supplement by his PCP, ferric maltol, to try for hopefully better tolerance.  He has had some difficulty getting the lid off the bottle but is also wondering about his iron studies.  We did check these per request today and he does have ferritin that is noted to be dropping, currently 50 and we discussed it would be okay for him to go ahead and try the ferric maltol once daily.    Patient also has a history of vitamin D deficiency and today vitamin D level is low at 23.8.  He admits that he has not been taking his vitamin D every day and we discussed getting back on this routinely and having his labs repeated by his PCP.    Patient has a noted healing area on his left scalp from recent biopsy reportedly with his dermatologist Dr. Becker.  Patient states there were no concerning findings on pathology.    He denies other concerns at this time.    Past Medical History:   Diagnosis Date    Bilateral carotid artery stenosis     Claustrophobia     Diabetes mellitus     GERD (gastroesophageal reflux disease)     History of gastric ulcer     History of pneumonia 2018    MULTIFOCAL, HOSPITALIZED X 6 DAYS    History of shingles 2016    History of TIA (transient ischemic attack)     X2    Hyperlipidemia     Hypertension     Infection due to parainfluenza virus 2 11/13/2018    Melanoma     LEFT FOREARM     Occlusion and stenosis of bilateral carotid arteries 02/17/2023    Prostate cancer     FOLLOWED BY DR ALVARADO    Type 2 diabetes mellitus  with hyperglycemia     Urinary catheter in place     SUPRA PUBIC    Uses self-applied continuous glucose monitoring device     FREESTYLE VAISHALI    Vertebral artery insufficiency     Vertigo      Past Surgical History:   Procedure Laterality Date    CATARACT EXTRACTION      COLONOSCOPY      2011 Dr [facility]    COLONOSCOPY N/A 6/30/2023    Procedure: COLONOSCOPY TO CECUM WITH SALINE LIFT &  HOT SNARE POLYPECTOMIES, COLD SNARE POLYPECTOMIES;  Surgeon: Ian Comer MD;  Location: [facility] ENDOSCOPY;  Service: Gastroenterology;  Laterality: N/A;  PRE- SCREENING  POST- COLON POLYPS, DIVERTICULOSIS, HEMORRHOIDS    ENDOSCOPY N/A 12/15/2016    Procedure: ESOPHAGOGASTRODUODENOSCOPY WITH COLD BIOPSIES;  Surgeon: Moshe Lux MD;  Location: [facility] ENDOSCOPY;  Service:     SKIN GRAFT SPLIT THICKNESS Left 10/27/2020    Procedure: EXCISION OF MELANOMA FROM THE LEFT FOREARM REQUIRING FULL THICKNESS SKIN GRAFT;  Surgeon: Nick Nuñez MD;  Location: [facility] OR;  Service: General;  Laterality: Left;    SUPRAPUBIC TUBE PLACEMENT N/A 2/8/2023    Procedure: CYSTOSCOPY SUPRAPUBIC CATHETER INSERTION;  Surgeon: Dom Davis MD;  Location: [facility] MAIN OR;  Service: Urology;  Laterality: N/A;       MEDICATIONS    Current Outpatient Medications:     atorvastatin (LIPITOR) 40 MG tablet, TAKE ONE TABLET BY MOUTH EVERY NIGHT AT BEDTIME, Disp: 90 tablet, Rfl: 4    BD Pen Needle An 2nd Gen 32G X 4 MM misc, USE DAILY TO CHECK BLOOD SUGAR, Disp: 100 each, Rfl: 4    Cholecalciferol (Vitamin D3) 10 MCG (400 UNIT) capsule, Take 400 Units by mouth Daily., Disp: 150 capsule, Rfl: 0    clopidogrel (PLAVIX) 75 MG tablet, TAKE 1 TABLET BY MOUTH DAILY, Disp: 90 tablet, Rfl: 4    Continuous Glucose  (FreeStyle Vaishali 14 Day [place]) device, USE AS DIRECTED, Disp: 1 each, Rfl: 1    Continuous Glucose Sensor (FreeStyle Vaishali 14 Day Sensor) misc, Change sensor every 14 days., Disp: 2 each, Rfl: 4    cyanocobalamin  (VITAMIN B-12) 1000 MCG tablet, Take 1 tablet by mouth Daily., Disp: 30 tablet, Rfl: 0    Gemtesa 75 MG tablet, , Disp: , Rfl:     Insulin Glargine (LANTUS SOLOSTAR) 100 UNIT/ML injection pen, Inject 20 Units under the skin into the appropriate area as directed Daily. Indications: Type 2 Diabetes, Disp: 18 mL, Rfl: 1    Insulin Pen Needle (Pen Needles) 31G X 5 MM misc, Use 1 each 4 (Four) Times a Day., Disp: 400 each, Rfl: 3    losartan (Cozaar) 25 MG tablet, Take 0.5 tablets by mouth Daily., Disp: 30 tablet, Rfl: 1    metFORMIN (GLUCOPHAGE) 500 MG tablet, Take 1 tablet by mouth 2 (Two) Times a Day With Meals., Disp: 180 tablet, Rfl: 0    Multiple Vitamins-Minerals (CENTRUM SILVER 50+MEN) tablet, Take 1 tablet by mouth Daily., Disp: , Rfl:     pantoprazole (PROTONIX) 40 MG EC tablet, TAKE 1 TABLET BY MOUTH DAILY, Disp: 90 tablet, Rfl: 1    psyllium (METAMUCIL) 58.6 % packet, Take 1 packet by mouth Daily., Disp: , Rfl:     sodium chloride (NS) 0.9 % irrigation, Irrigate with 3,000 ML as directed for a time dose. Drain bladder, irrigate with 60ml, then leave in bladder until next drain., Disp: 4000 mL, Rfl: 3    Ferric Maltol 30 MG capsule, Take 1 capsule by mouth Daily., Disp: , Rfl:     ALLERGIES:     Allergies   Allergen Reactions    Bee Venom Shortness Of Breath    Shellfish-Derived Products Anaphylaxis and Rash    Aleve [Naproxen] Other (See Comments)     Bleeding      Asa [Aspirin] Other (See Comments)     bleeding    Ibuprofen GI Bleeding       SOCIAL HISTORY:       Social History     Socioeconomic History    Marital status:     Number of children: 4   Tobacco Use    Smoking status: Never     Passive exposure: Never    Smokeless tobacco: Never   Vaping Use    Vaping status: Never Used   Substance and Sexual Activity    Alcohol use: No    Drug use: No    Sexual activity: Defer         FAMILY HISTORY:  Family History   Problem Relation Age of Onset    Diabetes Mother     Heart disease Mother     Uterine  "cancer Mother     Heart attack Mother         early    Diabetes Father     Heart disease Father     Kidney disease Father     Skin cancer Father         melanoma    Cancer Father         testicle    [place] Hyperthermia Neg Hx        REVIEW OF SYSTEMS:  As per HPI       Vitals:    03/04/25 0931   BP: 120/71   Pulse: 117   Resp: 16   Temp: 97.5 °F (36.4 °C)   TempSrc: Oral   SpO2: 96%   Weight: 63.7 kg (140 lb 8 oz)   Height: 175.3 cm (69.02\")   PainSc: 0-No pain             3/4/2025     9:28 AM   Current Status   ECOG score 0      PHYSICAL EXAM:    CONSTITUTIONAL:  Vital signs reviewed.  No distress, looks comfortable.  EYES:  Conjunctiva and lids unremarkable.   EARS,NOSE,MOUTH,THROAT:  Ears and nose appear unremarkable.  Lips, teeth, gums appear unremarkable.  RESPIRATORY:  Normal respiratory effort.  Lungs clear to auscultation bilaterally.  CARDIOVASCULAR:  Normal S1, S2.  No murmurs rubs or gallops.  No significant lower extremity edema.  GASTROINTESTINAL: Abdomen appears unremarkable.  Nondistended  LYMPHATIC:  No cervical, supraclavicular lymphadenopathy.  NEURO: AAOx3, no focal deficits.  Appears to have equal strength all 4 extremities.  MUSCULOSKELETAL:  Unremarkable digits/nails.  No cyanosis or clubbing.  No apparent joint deformities.  SKIN:  Warm.  No rashes.  PSYCHIATRIC:  Normal judgment and insight.  Normal mood and affect.     RECENT LABS:        Office Visit on 03/04/2025   Component Date Value Ref Range Status    PSA 03/04/2025 10.700 (H)  0.000 - 4.000 ng/mL Final    Ferritin 03/04/2025 50.30  30.00 - 400.00 ng/mL Final    Iron 03/04/2025 85  59 - 158 mcg/dL Final    Iron Saturation (TSAT) 03/04/2025 22  20 - 50 % Final    Transferrin 03/04/2025 258  200 - 360 mg/dL Final    TIBC 03/04/2025 384  298 - 536 mcg/dL Final   Lab on 03/04/2025   Component Date Value Ref Range Status    Glucose 03/04/2025 290 (H)  65 - 99 mg/dL Final    BUN 03/04/2025 12  8 - 23 mg/dL Final    Creatinine 03/04/2025 0.74 " (L)  0.76 - 1.27 mg/dL Final    Sodium 03/04/2025 139  136 - 145 mmol/L Final    Potassium 03/04/2025 4.1  3.5 - 5.2 mmol/L Final    Chloride 03/04/2025 100  98 - 107 mmol/L Final    CO2 03/04/2025 24.4  22.0 - 29.0 mmol/L Final    Calcium 03/04/2025 9.3  8.6 - 10.5 mg/dL Final    Total Protein 03/04/2025 7.2  6.0 - 8.5 g/dL Final    Albumin 03/04/2025 3.9  3.5 - 5.2 g/dL Final    ALT (SGPT) 03/04/2025 13  1 - 41 U/L Final    AST (SGOT) 03/04/2025 14  1 - 40 U/L Final    Alkaline Phosphatase 03/04/2025 110  39 - 117 U/L Final    Total Bilirubin 03/04/2025 0.4  0.0 - 1.2 mg/dL Final    Globulin 03/04/2025 3.3  gm/dL Final    A/G Ratio 03/04/2025 1.2  g/dL Final    BUN/Creatinine Ratio 03/04/2025 16.2  7.0 - 25.0 Final    Anion Gap 03/04/2025 14.6  5.0 - 15.0 mmol/L Final    eGFR 03/04/2025 93.9  >60.0 mL/min/1.73 Final    25 Hydroxy, Vitamin D 03/04/2025 23.8 (L)  30.0 - 100.0 ng/ml Final    WBC 03/04/2025 5.45  3.40 - 10.80 10*3/mm3 Final    RBC 03/04/2025 3.49 (L)  4.14 - 5.80 10*6/mm3 Final    Hemoglobin 03/04/2025 11.2 (L)  13.0 - 17.7 g/dL Final    Hematocrit 03/04/2025 34.2 (L)  37.5 - 51.0 % Final    MCV 03/04/2025 98.0 (H)  79.0 - 97.0 fL Final    MCH 03/04/2025 32.1  26.6 - 33.0 pg Final    MCHC 03/04/2025 32.7  31.5 - 35.7 g/dL Final    RDW 03/04/2025 12.8  12.3 - 15.4 % Final    RDW-SD 03/04/2025 45.1  37.0 - 54.0 fl Final    MPV 03/04/2025 9.9  6.0 - 12.0 fL Final    Platelets 03/04/2025 239  140 - 450 10*3/mm3 Final    Neutrophil % 03/04/2025 64.3  42.7 - 76.0 % Final    Lymphocyte % 03/04/2025 22.8  19.6 - 45.3 % Final    Monocyte % 03/04/2025 11.0  5.0 - 12.0 % Final    Eosinophil % 03/04/2025 0.2 (L)  0.3 - 6.2 % Final    Basophil % 03/04/2025 0.2  0.0 - 1.5 % Final    Immature Grans % 03/04/2025 1.5 (H)  0.0 - 0.5 % Final    Neutrophils, Absolute 03/04/2025 3.51  1.70 - 7.00 10*3/mm3 Final    Lymphocytes, Absolute 03/04/2025 1.24  0.70 - 3.10 10*3/mm3 Final    Monocytes, Absolute 03/04/2025 0.60   0.10 - 0.90 10*3/mm3 Final    Eosinophils, Absolute 03/04/2025 0.01  0.00 - 0.40 10*3/mm3 Final    Basophils, Absolute 03/04/2025 0.01  0.00 - 0.20 10*3/mm3 Final    Immature Grans, Absolute 03/04/2025 0.08 (H)  0.00 - 0.05 10*3/mm3 Final    nRBC 03/04/2025 0.0  0.0 - 0.2 /100 WBC Final         ASSESSMENT:  Patient is a pleasant 73-year-old male with medical history significant for DM 2, coronary artery disease, BPH and chronic urinary retention s/p suprapubic catheter (2/2023) who comes for prostate cancer evaluation and management.    # Prostate adenocarcinoma, [place] 6 (3+3):  Patient was noted to have elevated PSA in 4-5 range in 2017.    A prostatic biopsy performed on 1/8/2018 showed prostate adenocarcinoma, max [place] score 6 (3+3).  3 out of 12 cores positive.  Prostate measured 63.6 cc.  Decipher score 0.21, low risk.  He was seen by Dr. Mayo Davis, urology and plan made for active surveillance.   Most recent PSA are from January 2024 (8.910) and March 2024 (9.77).  Most recent scans from November 2023 and March 2024 showed no evidence of barbara or distant metastatic disease.  Patient's symptoms do not appear to be related to prostate cancer.  [place] 6 prostate cancer is considered a low risk disease.    A MRI prostate performed in May 2024 did not show any evidence of prostatic malignancy (verbal report from the radiologist).  It did show bladder wall thickening, likely from chronic suprapubic catheter.  Patient has been advised to follow-up with Dr. Mayo Davis, urologist closely.  3/4/2025 per review today patient clinically is doing well with no new concerning symptoms.  He did request a PSA be drawn which did return higher at 10.7 (8.91-year ago).  Patient has [facility] with [facility], Dr. Mayo Davis, in June with repeat MRI of the prostate prior to.  We will forward PSA results onto him today and I will also review with Dr. Mendoza for any further implications.    # Poor appetite,  fatigue and weight loss:  Appears multifactorial related to advanced age, poorly controlled DM2, anemia and depression.  Daughter is concerned about possible Lewy body dementia.  They have an appointment with neurology/memory clinic with [facility] on 5/19/2024.  Lab work in April 2024: Noted normal iron, vitamin B12 and folate levels.  Normal thyroid labs.  Mild vitamin D deficiency (30.9) and low testosterone (total testosterone 173).  6/4/2024: Appetite and fatigue much improved now with better nutrition.  Encouraged to maintain adequate nutrition and drinks at least 1 protein drinks every day.  Given low normal vitamin D levels, will start him on oral vitamin D 400 units daily.  Follow-up in 6 months with repeat labs    # Suspected dementia, specifically difficulty with short-term memory.  Excellent long-term memory.  Following with neurology    # Urinary retention s/p suprapubic catheter: Management per urology    # Anemia  3/4/2025 ferritin 50, iron sat 22%.  Patient recently given oral iron supplement per PCP, ferric maltol, with hopes that he can tolerate this better.  We discussed this would be fine to try once a day.  He will follow-up with PCP for refill if this is tolerable.    # Vitamin D deficiency  4/29/2025 Vitamin D level 30.9  3/4/2025 vitamin D level 23.8.  Patient admittedly states he has not been taking his vitamin D daily.  Discussed to get back on daily supplementation.     PLAN:   -PSA drawn today per patient request.  Reviewed with patient that this has gone up slightly.  These results and this note will be sent to Dr. Mayo Davis. Will also discuss with Dr. Mendoza  -Patient has follow-up MRI of the prostate scheduled in June for [facility] and follow-up thereafter.  -We will plan to see him back after MRI as well, specifically with appointment with Dr. Mendoza with CBC, CMP, vitamin D level, ferritin, iron profile.  -Patient will try taking ferric maltol, new iron supplementation provided to  him by his PCP, daily.  -Patient instructed to get back on vitamin D supplementation daily.  -Continue to follow with endocrinology and neurology.-    I spent 50 minutes caring for Harry on this date of service. This time includes time spent by me in the following activities: preparing for the visit, reviewing tests, performing a medically appropriate examination and/or evaluation, counseling and educating the patient/family/caregiver, referring and communicating with other health care professionals, documenting information in the medical record, independently interpreting results and communicating that information with the patient/family/caregiver, care coordination, ordering test(s), obtaining a separately obtained history, and reviewing a separately obtained history      Orders Placed This Encounter   Procedures    PSA Diagnostic     Order Specific Question:   Release to patient     Answer:   Routine Release [[id number]]    Ferritin     Order Specific Question:   Release to patient     Answer:   Routine Release [[id number]]    Iron Profile     Order Specific Question:   Release to patient     Answer:   Routine Release [[id number]]

## 2025-03-06 ENCOUNTER — TELEPHONE (OUTPATIENT)
Dept: GASTROENTEROLOGY | Facility: CLINIC | Age: 75
End: 2025-03-06
Payer: MEDICARE

## 2025-03-06 NOTE — TELEPHONE ENCOUNTER
I called pt to confirm  scope for 3/13/25.Pt has no questions. Pt is aware the arrival time is 8 am. I am sending prep instructions to pt my chart.

## 2025-03-10 DIAGNOSIS — E11.65 TYPE 2 DIABETES MELLITUS WITH HYPERGLYCEMIA, WITH LONG-TERM CURRENT USE OF INSULIN: ICD-10-CM

## 2025-03-10 DIAGNOSIS — Z79.4 TYPE 2 DIABETES MELLITUS WITH HYPERGLYCEMIA, WITH LONG-TERM CURRENT USE OF INSULIN: ICD-10-CM

## 2025-03-10 RX ORDER — INSULIN LISPRO 100 [IU]/ML
8 INJECTION, SOLUTION INTRAVENOUS; SUBCUTANEOUS
Qty: 15 ML | Refills: 5 | OUTPATIENT
Start: 2025-03-10

## 2025-03-10 NOTE — TELEPHONE ENCOUNTER
Rx Refill Note  Requested Prescriptions     Pending Prescriptions Disp Refills    Insulin Lispro, 1 Unit Dial, (HumaLOG KwikPen) 100 UNIT/ML solution pen-injector 15 mL 5     Sig: Inject 8 Units under the skin into the appropriate area as directed 3 (Three) Times a Day With Meals.      Last office visit with prescribing clinician: 1/31/2025   Last telemedicine visit with prescribing clinician: Visit date not found   Next office visit with prescribing clinician: 5/30/2025                         Would you like a call back once the refill request has been completed: [] Yes [] No    If the office needs to give you a call back, can they leave a voicemail: [] Yes [] No    Hue Fajardo  03/10/25, 12:35 EDT

## 2025-03-13 ENCOUNTER — ANESTHESIA (OUTPATIENT)
Dept: GASTROENTEROLOGY | Facility: HOSPITAL | Age: 75
End: 2025-03-13
Payer: MEDICARE

## 2025-03-13 ENCOUNTER — HOSPITAL ENCOUNTER (OUTPATIENT)
Facility: HOSPITAL | Age: 75
Setting detail: HOSPITAL OUTPATIENT SURGERY
Discharge: HOME | End: 2025-03-13
Attending: INTERNAL MEDICINE | Admitting: INTERNAL MEDICINE
Payer: MEDICARE

## 2025-03-13 ENCOUNTER — ANESTHESIA EVENT (OUTPATIENT)
Dept: GASTROENTEROLOGY | Facility: HOSPITAL | Age: 75
End: 2025-03-13
Payer: MEDICARE

## 2025-03-13 VITALS
SYSTOLIC BLOOD PRESSURE: 132 MMHG | RESPIRATION RATE: 16 BRPM | WEIGHT: 134 LBS | BODY MASS INDEX: 19.85 KG/M2 | DIASTOLIC BLOOD PRESSURE: 84 MMHG | HEART RATE: 102 BPM | HEIGHT: 69 IN | OXYGEN SATURATION: 100 %

## 2025-03-13 DIAGNOSIS — D12.2 ADENOMATOUS POLYP OF ASCENDING COLON: ICD-10-CM

## 2025-03-13 LAB — GLUCOSE BLDC GLUCOMTR-MCNC: 96 MG/DL (ref 70–130)

## 2025-03-13 PROCEDURE — 25010000002 PROPOFOL 200 MG/20ML EMULSION: Performed by: NURSE ANESTHETIST, CERTIFIED REGISTERED

## 2025-03-13 PROCEDURE — 25010000002 LIDOCAINE PF 2% 2 % SOLUTION: Performed by: NURSE ANESTHETIST, CERTIFIED REGISTERED

## 2025-03-13 PROCEDURE — 88305 TISSUE EXAM BY PATHOLOGIST: CPT | Performed by: INTERNAL MEDICINE

## 2025-03-13 PROCEDURE — 45385 COLONOSCOPY W/LESION REMOVAL: CPT | Performed by: INTERNAL MEDICINE

## 2025-03-13 PROCEDURE — 25810000003 LACTATED RINGERS PER 1000 ML: Performed by: NURSE ANESTHETIST, CERTIFIED REGISTERED

## 2025-03-13 PROCEDURE — 25810000003 LACTATED RINGERS PER 1000 ML: Performed by: INTERNAL MEDICINE

## 2025-03-13 PROCEDURE — S0260 H&P FOR SURGERY: HCPCS | Performed by: INTERNAL MEDICINE

## 2025-03-13 PROCEDURE — 82948 REAGENT STRIP/BLOOD GLUCOSE: CPT

## 2025-03-13 RX ORDER — SODIUM CHLORIDE, SODIUM LACTATE, POTASSIUM CHLORIDE, CALCIUM CHLORIDE 600; 310; 30; 20 MG/100ML; MG/100ML; MG/100ML; MG/100ML
30 INJECTION, SOLUTION INTRAVENOUS CONTINUOUS
Status: DISCONTINUED | OUTPATIENT
Start: 2025-03-13 | End: 2025-03-13 | Stop reason: HOSPADM

## 2025-03-13 RX ORDER — LIDOCAINE HYDROCHLORIDE 20 MG/ML
INJECTION, SOLUTION EPIDURAL; INFILTRATION; INTRACAUDAL; PERINEURAL AS NEEDED
Status: DISCONTINUED | OUTPATIENT
Start: 2025-03-13 | End: 2025-03-13 | Stop reason: SURG

## 2025-03-13 RX ORDER — SODIUM CHLORIDE, SODIUM LACTATE, POTASSIUM CHLORIDE, CALCIUM CHLORIDE 600; 310; 30; 20 MG/100ML; MG/100ML; MG/100ML; MG/100ML
INJECTION, SOLUTION INTRAVENOUS CONTINUOUS PRN
Status: DISCONTINUED | OUTPATIENT
Start: 2025-03-13 | End: 2025-03-13 | Stop reason: SURG

## 2025-03-13 RX ORDER — PROPOFOL 10 MG/ML
INJECTION, EMULSION INTRAVENOUS AS NEEDED
Status: DISCONTINUED | OUTPATIENT
Start: 2025-03-13 | End: 2025-03-13 | Stop reason: SURG

## 2025-03-13 RX ADMIN — SODIUM CHLORIDE, POTASSIUM CHLORIDE, SODIUM LACTATE AND CALCIUM CHLORIDE: 600; 310; 30; 20 INJECTION, SOLUTION INTRAVENOUS at 09:41

## 2025-03-13 RX ADMIN — LIDOCAINE HYDROCHLORIDE 60 MG: 20 INJECTION, SOLUTION EPIDURAL; INFILTRATION; INTRACAUDAL; PERINEURAL at 09:45

## 2025-03-13 RX ADMIN — PROPOFOL 100 MG: 10 INJECTION, EMULSION INTRAVENOUS at 09:45

## 2025-03-13 RX ADMIN — PROPOFOL 100 MCG/KG/MIN: 10 INJECTION, EMULSION INTRAVENOUS at 09:46

## 2025-03-13 RX ADMIN — SODIUM CHLORIDE, POTASSIUM CHLORIDE, SODIUM LACTATE AND CALCIUM CHLORIDE 30 ML/HR: 600; 310; 30; 20 INJECTION, SOLUTION INTRAVENOUS at 09:19

## 2025-03-13 NOTE — DISCHARGE INSTRUCTIONS
For the next 24 hours patient needs to be with a responsible adult.    For THE REST OF TODAY DO NOT drive, operate machinery, appliances, drink alcohol, make important decisions or sign legal documents.    Start with a light or bland diet if you are feeling sick to your stomach otherwise advance to regular diet as tolerated.    Follow recommendations on procedure report if provided by your doctor.    Call Dr Comer     Problems may include but not limited to: large amounts of bleeding, trouble breathing, repeated vomiting, severe unrelieved pain, fever or chills.      If biopsies or polyps were taken, MD will call you with the results in about 7 days. If you don't hear from the MD in 2 weeks, call the number above.

## 2025-03-13 NOTE — ANESTHESIA POSTPROCEDURE EVALUATION
Patient: Harry Sierra    Procedure Summary       Date: 03/13/25 Room / Location: [facility] ENDOSCOPY 7 / [facility] ENDOSCOPY    Anesthesia Start: 0941 Anesthesia Stop: 1010    Procedure: COLONOSCOPY to cecum and into ti with cold biopsy/snare polypectomies Diagnosis:       Adenomatous polyp of ascending colon      (Adenomatous polyp of ascending colon [D12.2])    Surgeons: Ian Comer MD Provider: Jose Alcantar MD    Anesthesia Type: MAC ASA Status: 4            Anesthesia Type: MAC    Vitals  Vitals Value Taken Time   /84 03/13/25 10:29   Temp     Pulse 102 03/13/25 10:29   Resp 16 03/13/25 10:29   SpO2 100 % 03/13/25 10:29           Post Anesthesia Care and Evaluation    Patient location during evaluation: PACU  Patient participation: complete - patient participated  Level of consciousness: awake and alert  Pain management: adequate    Airway patency: patent  Anesthetic complications: No anesthetic complications    Cardiovascular status: acceptable  Respiratory status: acceptable  Hydration status: acceptable    Comments: --------------------            03/13/25               1029     --------------------   BP:       132/84     Pulse:     102       Resp:       16       SpO2:      100%     --------------------

## 2025-03-13 NOTE — H&P
[facility]  Pre Procedure History & Physical    Chief Complaint:   Time for my colonoscopy    Subjective     HPI:   74 y.o. male presenting to endoscopy unit today for surveillance colonoscopy.    Past Medical History:   Past Medical History:   Diagnosis Date    Bilateral carotid artery stenosis     Claustrophobia     Diabetes mellitus     GERD (gastroesophageal reflux disease)     History of gastric ulcer     History of pneumonia 2018    MULTIFOCAL, HOSPITALIZED X 6 DAYS    History of shingles 2016    History of TIA (transient ischemic attack)     X2    Hyperlipidemia     Hypertension     Infection due to parainfluenza virus 2 11/13/2018    Melanoma     LEFT FOREARM     Occlusion and stenosis of bilateral carotid arteries 02/17/2023    Prostate cancer     FOLLOWED BY DR ALVARADO    Type 2 diabetes mellitus with hyperglycemia     Urinary catheter in place     SUPRA PUBIC    Uses self-applied continuous glucose monitoring device     FREESTYLE MACRINA    Vertebral artery insufficiency     Vertigo        Family History:  Family History   Problem Relation Age of Onset    Diabetes Mother     Heart disease Mother     Uterine cancer Mother     Heart attack Mother         early    Diabetes Father     Heart disease Father     Kidney disease Father     Skin cancer Father         melanoma    Cancer Father         testicle    Malig Hyperthermia Neg Hx        Social History:   reports that he has never smoked. He has never been exposed to tobacco smoke. He has never used smokeless tobacco. He reports that he does not drink alcohol and does not use drugs.    Medications:   Medications Prior to Admission   Medication Sig Dispense Refill Last Dose/Taking    atorvastatin (LIPITOR) 40 MG tablet TAKE ONE TABLET BY MOUTH EVERY NIGHT AT BEDTIME 90 tablet 4 Past Week    BD Pen Needle An 2nd Gen 32G X 4 MM misc USE DAILY TO CHECK BLOOD SUGAR 100 each 4 Taking    Cholecalciferol (Vitamin D3) 10 MCG (400 UNIT)  "capsule Take 400 Units by mouth Daily. 150 capsule 0 Past Week    clopidogrel (PLAVIX) 75 MG tablet TAKE 1 TABLET BY MOUTH DAILY 90 tablet 4 3/7/2025    Continuous Glucose  (FreeStyle Vaishali 14 Day [place]) device USE AS DIRECTED 1 each 1 Taking    Continuous Glucose Sensor (FreeStyle Vaishali 14 Day Sensor) misc Change sensor every 14 days. 2 each 4 Taking    cyanocobalamin (VITAMIN B-12) 1000 MCG tablet Take 1 tablet by mouth Daily. 30 tablet 0 Past Week    Ferric Maltol 30 MG capsule Take 1 capsule by mouth Daily.   Past Week    Gemtesa 75 MG tablet    Past Week    Insulin Glargine (LANTUS SOLOSTAR) 100 UNIT/ML injection pen Inject 20 Units under the skin into the appropriate area as directed Daily. Indications: Type 2 Diabetes 18 mL 1 3/12/2025    Insulin Pen Needle (Pen Needles) 31G X 5 MM misc Use 1 each 4 (Four) Times a Day. 400 each 3 Taking    losartan (Cozaar) 25 MG tablet Take 0.5 tablets by mouth Daily. 30 tablet 1 3/12/2025    metFORMIN (GLUCOPHAGE) 500 MG tablet Take 1 tablet by mouth 2 (Two) Times a Day With Meals. 180 tablet 0 3/12/2025    Multiple Vitamins-Minerals (CENTRUM SILVER 50+MEN) tablet Take 1 tablet by mouth Daily.   Past Week    pantoprazole (PROTONIX) 40 MG EC tablet TAKE 1 TABLET BY MOUTH DAILY 90 tablet 1 Past Week    psyllium (METAMUCIL) 58.6 % packet Take 1 packet by mouth Daily.   Past Week    sodium chloride (NS) 0.9 % irrigation Irrigate with 3,000 ML as directed for a time dose. Drain bladder, irrigate with 60ml, then leave in bladder until next drain. 4000 mL 3 3/13/2025 Morning       Allergies:  Bee venom, Shellfish-derived products, Aleve [naproxen], Asa [aspirin], and Ibuprofen    Objective     Blood pressure 152/88, pulse 105, resp. rate 16, height 175.3 cm (69\"), weight 60.8 kg (134 lb), SpO2 100%.  Physical Exam:   General: patient awake, alert and cooperative    Assessment & Plan     Diagnosis:  Personal hx of colon polyps    Anticipated Surgical " Procedure:  Colonoscopy    The risks, benefits, and alternatives of this procedure have been discussed with the patient or the responsible party- the patient understands and agrees to proceed.

## 2025-03-13 NOTE — ANESTHESIA PREPROCEDURE EVALUATION
Anesthesia Evaluation     Patient summary reviewed and Nursing notes reviewed                Airway   Mallampati: II  Dental      Pulmonary - negative pulmonary ROS   Cardiovascular     ECG reviewed  PT is on anticoagulation therapy  Rhythm: regular  Rate: normal    (+) hypertension, valvular problems/murmurs murmur, TI and MR, dysrhythmias Paroxysmal Atrial Fib, hyperlipidemia,  carotid artery disease carotid bilateral      Neuro/Psych  (+) dizziness/light headedness, psychiatric history Anxiety, dementia  GI/Hepatic/Renal/Endo    (+) GERD, PUD, renal disease- CRI, diabetes mellitus type 2 using insulin    Musculoskeletal (-) negative ROS    Abdominal    Substance History - negative use     OB/GYN negative ob/gyn ROS         Other      history of cancer (prostate/skin)                  Anesthesia Plan    ASA 4     MAC     (Mild moderate dementia  SR currently with PAF history  )  intravenous induction     Anesthetic plan, risks, benefits, and alternatives have been provided, discussed and informed consent has been obtained with: patient.    CODE STATUS:

## 2025-03-14 LAB
CYTO UR: NORMAL
LAB AP CASE REPORT: NORMAL
PATH REPORT.FINAL DX SPEC: NORMAL
PATH REPORT.GROSS SPEC: NORMAL

## 2025-03-20 ENCOUNTER — PATIENT MESSAGE (OUTPATIENT)
Dept: ENDOCRINOLOGY | Age: 75
End: 2025-03-20
Payer: MEDICARE

## 2025-03-20 DIAGNOSIS — Z79.4 TYPE 2 DIABETES MELLITUS WITH HYPERGLYCEMIA, WITH LONG-TERM CURRENT USE OF INSULIN: ICD-10-CM

## 2025-03-20 DIAGNOSIS — E11.65 TYPE 2 DIABETES MELLITUS WITH HYPERGLYCEMIA, WITH LONG-TERM CURRENT USE OF INSULIN: ICD-10-CM

## 2025-03-21 RX ORDER — INSULIN LISPRO 100 [IU]/ML
4 INJECTION, SOLUTION INTRAVENOUS; SUBCUTANEOUS
Qty: 15 ML | Refills: 1 | Status: SHIPPED | OUTPATIENT
Start: 2025-03-21

## 2025-03-21 RX ORDER — FLASH GLUCOSE SENSOR
1 KIT MISCELLANEOUS
Qty: 2 EACH | Refills: 4 | Status: SHIPPED | OUTPATIENT
Start: 2025-03-21

## 2025-04-22 DIAGNOSIS — Z79.4 TYPE 2 DIABETES MELLITUS WITH HYPERGLYCEMIA, WITH LONG-TERM CURRENT USE OF INSULIN: ICD-10-CM

## 2025-04-22 DIAGNOSIS — E11.65 TYPE 2 DIABETES MELLITUS WITH HYPERGLYCEMIA, WITH LONG-TERM CURRENT USE OF INSULIN: ICD-10-CM

## 2025-04-22 NOTE — TELEPHONE ENCOUNTER
Rx Refill Note  Requested Prescriptions     Pending Prescriptions Disp Refills    metFORMIN (GLUCOPHAGE) 500 MG tablet 180 tablet 0     Sig: Take 1 tablet by mouth 2 (Two) Times a Day With Meals.      Last office visit with prescribing clinician: 1/31/2025   Last telemedicine visit with prescribing clinician: Visit date not found   Next office visit with prescribing clinician: 5/30/2025                         Would you like a call back once the refill request has been completed: [] Yes [] No    If the office needs to give you a call back, can they leave a voicemail: [] Yes [] No    Sofiya Fajardo MA  04/22/25, 07:51 EDT

## 2025-05-02 ENCOUNTER — HOSPITAL ENCOUNTER (EMERGENCY)
Facility: HOSPITAL | Age: 75
Discharge: HOME | End: 2025-05-02
Attending: EMERGENCY MEDICINE
Payer: MEDICARE

## 2025-05-02 VITALS
OXYGEN SATURATION: 97 % | SYSTOLIC BLOOD PRESSURE: 140 MMHG | HEART RATE: 117 BPM | TEMPERATURE: 98.4 F | RESPIRATION RATE: 18 BRPM | DIASTOLIC BLOOD PRESSURE: 79 MMHG

## 2025-05-02 DIAGNOSIS — T78.40XA ACUTE ALLERGIC REACTION, INITIAL ENCOUNTER: Primary | ICD-10-CM

## 2025-05-02 PROCEDURE — 25010000002 DIPHENHYDRAMINE PER 50 MG: Performed by: EMERGENCY MEDICINE

## 2025-05-02 PROCEDURE — 25010000002 METHYLPREDNISOLONE PER 125 MG: Performed by: EMERGENCY MEDICINE

## 2025-05-02 PROCEDURE — 99282 EMERGENCY DEPT VISIT SF MDM: CPT

## 2025-05-02 PROCEDURE — 96374 THER/PROPH/DIAG INJ IV PUSH: CPT

## 2025-05-02 PROCEDURE — 25010000002 FAMOTIDINE 10 MG/ML SOLUTION: Performed by: EMERGENCY MEDICINE

## 2025-05-02 PROCEDURE — 25810000003 LACTATED RINGERS SOLUTION: Performed by: EMERGENCY MEDICINE

## 2025-05-02 PROCEDURE — 96375 TX/PRO/DX INJ NEW DRUG ADDON: CPT

## 2025-05-02 RX ORDER — DIPHENHYDRAMINE HCL 25 MG
25 TABLET ORAL EVERY 6 HOURS PRN
Qty: 10 TABLET | Refills: 0 | Status: SHIPPED | OUTPATIENT
Start: 2025-05-02

## 2025-05-02 RX ORDER — FAMOTIDINE 10 MG/ML
20 INJECTION, SOLUTION INTRAVENOUS ONCE
Status: COMPLETED | OUTPATIENT
Start: 2025-05-02 | End: 2025-05-02

## 2025-05-02 RX ORDER — FAMOTIDINE 20 MG/1
20 TABLET, FILM COATED ORAL 2 TIMES DAILY
Qty: 8 TABLET | Refills: 0 | Status: SHIPPED | OUTPATIENT
Start: 2025-05-02

## 2025-05-02 RX ORDER — PREDNISONE 20 MG/1
20 TABLET ORAL DAILY
Qty: 4 TABLET | Refills: 0 | Status: SHIPPED | OUTPATIENT
Start: 2025-05-02

## 2025-05-02 RX ORDER — METHYLPREDNISOLONE SODIUM SUCCINATE 125 MG/2ML
125 INJECTION, POWDER, LYOPHILIZED, FOR SOLUTION INTRAMUSCULAR; INTRAVENOUS ONCE
Status: COMPLETED | OUTPATIENT
Start: 2025-05-02 | End: 2025-05-02

## 2025-05-02 RX ORDER — DIPHENHYDRAMINE HYDROCHLORIDE 50 MG/ML
25 INJECTION, SOLUTION INTRAMUSCULAR; INTRAVENOUS ONCE
Status: COMPLETED | OUTPATIENT
Start: 2025-05-02 | End: 2025-05-02

## 2025-05-02 RX ADMIN — METHYLPREDNISOLONE SODIUM SUCCINATE 125 MG: 125 INJECTION, POWDER, FOR SOLUTION INTRAMUSCULAR; INTRAVENOUS at 09:58

## 2025-05-02 RX ADMIN — SODIUM CHLORIDE, POTASSIUM CHLORIDE, SODIUM LACTATE AND CALCIUM CHLORIDE 500 ML: 600; 310; 30; 20 INJECTION, SOLUTION INTRAVENOUS at 10:01

## 2025-05-02 RX ADMIN — DIPHENHYDRAMINE HYDROCHLORIDE 25 MG: 50 INJECTION, SOLUTION INTRAMUSCULAR; INTRAVENOUS at 09:58

## 2025-05-02 RX ADMIN — FAMOTIDINE 20 MG: 10 INJECTION INTRAVENOUS at 09:59

## 2025-05-02 NOTE — ED PROVIDER NOTES
EMERGENCY DEPARTMENT ENCOUNTER    Room Number:  28/28  PCP: Barbara Costa APRN  Historian: Patient      HPI:  Chief Complaint: Allergic reaction  A complete HPI/ROS/PMH/PSH/SH/FH are unobtainable due to: None  Context: Harry Sierra is a 74 y.o. male who presents to the ED c/o allergic reaction.  Patient states he has a history of shellfish allergy.  Patient ate shrimp 2 nights ago.  Patient states developed hives afterwards.  Patient states hives have gotten worse.  Has been using Benadryl.  Patient states his mouth is dry.  No specific swelling in his mouth.  No difficulty breathing has had no vomiting or diarrhea.            PAST MEDICAL HISTORY  Active Ambulatory Problems     Diagnosis Date Noted    Primary hypertension 08/30/2016    Routine health maintenance 08/30/2016    Abnormal PSA 09/01/2016    Chronic prostatitis 09/26/2016    Gastroesophageal reflux disease with esophagitis without hemorrhage 12/01/2016    Melena 12/01/2016    Bilateral carotid artery stenosis 12/01/2016    Transient cerebral ischemia 09/14/2017    Prostate cancer 03/13/2018    Sleep disturbance 09/18/2018    Multilobar lung infiltrate, secondary bacterial infection 11/14/2018    Sepsis - present on admission 11/14/2018    Constipation 11/14/2018    Valvular heart disease 11/15/2018    Multifocal pneumonia 01/24/2019    Type 2 diabetes mellitus with circulatory disorder, with long-term current use of insulin 01/24/2019    Near syncope 01/24/2019    Hyperlipidemia associated with type 2 diabetes mellitus 12/08/2019    Hyperinsulinism 12/08/2019    Melanoma in situ of left upper extremity including shoulder 10/06/2020    Chest pain, atypical 01/18/2021    History of atrial fibrillation 01/18/2021    Abnormal weight loss 12/12/2022    Chronic cough 12/12/2022    Urinary retention due to benign prostatic hyperplasia 02/08/2023    Prostate cancer 02/08/2023    Colon polyps 04/12/2023    Diverticulosis 07/24/2023    Acute UTI  09/07/2023    Chronic suprapubic catheter 09/08/2023    Moderate malnutrition 09/08/2023    Iron deficiency anemia 09/09/2023    Hypokalemia 09/09/2023    Pyelonephritis 11/18/2023    Gram-negative bacteremia 11/19/2023    Carotid atherosclerosis 04/10/2024    Remote history of TIA 04/10/2024    Current mild episode of major depressive disorder without prior episode 06/10/2024    Gastric ulcer 11/12/2018    Lewy body dementia with agitation 06/10/2024    Other abnormal tumor markers 09/01/2016    Personal history of cardiovascular disorder 01/18/2021    Major depressive disorder, recurrent, moderate 02/08/2025     Resolved Ambulatory Problems     Diagnosis Date Noted    Type 2 diabetes mellitus with hyperglycemia, without long-term current use of insulin 08/30/2016    UTI (urinary tract infection) due to urinary indwelling catheter 09/27/2016    Hyperlipidemia LDL goal <70 12/12/2017    Infection due to parainfluenza virus 2 11/13/2018    Dehydration 11/13/2018    Atrial fibrillation (new) 11/13/2018    HTN (hypertension) 01/24/2019    Long term current use of anticoagulant therapy 01/24/2019    Atrial fibrillation 01/24/2019    Hypopotassemia 11/19/2023     Past Medical History:   Diagnosis Date    Claustrophobia     Diabetes mellitus     GERD (gastroesophageal reflux disease)     History of gastric ulcer     History of pneumonia 2018    History of shingles 2016    History of TIA (transient ischemic attack)     Hyperlipidemia     Hypertension     Melanoma     Occlusion and stenosis of bilateral carotid arteries 02/17/2023    Type 2 diabetes mellitus with hyperglycemia     Urinary catheter in place     Uses self-applied continuous glucose monitoring device     Vertebral artery insufficiency     Vertigo          PAST SURGICAL HISTORY  Past Surgical History:   Procedure Laterality Date    CATARACT EXTRACTION      COLONOSCOPY      2011 Dr Reynoso [facility]    COLONOSCOPY N/A 6/30/2023    Procedure: COLONOSCOPY TO  CECUM WITH SALINE LIFT &  HOT SNARE POLYPECTOMIES, COLD SNARE POLYPECTOMIES;  Surgeon: Ian Comer MD;  Location: [facility] ENDOSCOPY;  Service: Gastroenterology;  Laterality: N/A;  PRE- SCREENING  POST- COLON POLYPS, DIVERTICULOSIS, HEMORRHOIDS    COLONOSCOPY N/A 3/13/2025    Procedure: COLONOSCOPY to cecum and into ti with cold biopsy/snare polypectomies;  Surgeon: Ian Comer MD;  Location: [facility] ENDOSCOPY;  Service: Gastroenterology;  Laterality: N/A;  pre: hx colon polyps  post: diverticulosis, polyps, hemorrhoids    ENDOSCOPY N/A 12/15/2016    Procedure: ESOPHAGOGASTRODUODENOSCOPY WITH COLD BIOPSIES;  Surgeon: Moshe Lux MD;  Location: [facility] ENDOSCOPY;  Service:     SKIN GRAFT SPLIT THICKNESS Left 10/27/2020    Procedure: EXCISION OF MELANOMA FROM THE LEFT FOREARM REQUIRING FULL THICKNESS SKIN GRAFT;  Surgeon: Nick Nuñez MD;  Location: [facility] OR;  Service: General;  Laterality: Left;    SUPRAPUBIC TUBE PLACEMENT N/A 2/8/2023    Procedure: CYSTOSCOPY SUPRAPUBIC CATHETER INSERTION;  Surgeon: Dom Davis MD;  Location: [facility] MAIN OR;  Service: Urology;  Laterality: N/A;         FAMILY HISTORY  Family History   Problem Relation Age of Onset    Diabetes Mother     Heart disease Mother     Uterine cancer Mother     Heart attack Mother         early    Diabetes Father     Heart disease Father     Kidney disease Father     Skin cancer Father         melanoma    Cancer Father         testicle    Malig Hyperthermia Neg Hx          SOCIAL HISTORY  Social History     Socioeconomic History    Marital status:     Number of children: 4   Tobacco Use    Smoking status: Never     Passive exposure: Never    Smokeless tobacco: Never   Vaping Use    Vaping status: Never Used   Substance and Sexual Activity    Alcohol use: No    Drug use: No    Sexual activity: Defer         ALLERGIES  Bee venom, Shellfish-derived products, Aleve [naproxen], Asa [aspirin], and Ibuprofen        REVIEW  OF SYSTEMS  Review of Systems   Allergic reaction      PHYSICAL EXAM  ED Triage Vitals   Temp Heart Rate Resp BP SpO2   05/02/25 0942 05/02/25 0942 05/02/25 0942 05/02/25 0945 05/02/25 0942   98.4 °F (36.9 °C) (!) 140 18 140/84 97 %      Temp src Heart Rate Source Patient Position BP Location FiO2 (%)   05/02/25 0942 -- -- -- --   Tympanic           Physical Exam      GENERAL: no acute distress  HENT: nares patent.  No intraoral swelling or lesions  EYES: no scleral icterus  CV: regular rhythm, normal rate  RESPIRATORY: normal effort  ABDOMEN: soft  MUSCULOSKELETAL: no deformity  NEURO: alert, moves all extremities, follows commands  PSYCH:  calm, cooperative  SKIN: warm, dry.  Blanching urticaria arms legs abdomen    Vital signs and nursing notes reviewed.          LAB RESULTS  No results found for this or any previous visit (from the past 24 hours).    Ordered the above labs and reviewed the results.        RADIOLOGY  No Radiology Exams Resulted Within Past 24 Hours          PROCEDURES  Procedures          MEDICATIONS GIVEN IN ER  Medications   lactated ringers bolus 500 mL (0 mL Intravenous Stopped 5/2/25 1033)   methylPREDNISolone sodium succinate (SOLU-Medrol) injection 125 mg (125 mg Intravenous Given 5/2/25 0958)   famotidine (PEPCID) injection 20 mg (20 mg Intravenous Given 5/2/25 0959)   diphenhydrAMINE (BENADRYL) injection 25 mg (25 mg Intravenous Given 5/2/25 0958)                   MEDICAL DECISION MAKING, PROGRESS, and CONSULTS    All labs have been independently reviewed by me.  All radiology studies have been reviewed by me and I have also reviewed the radiology report.   EKGs independently viewed and interpreted by me.  Discussion below represents my analysis of pertinent findings related to patient's condition, differential diagnosis, treatment plan and final disposition.      Additional sources:  - Discussed/ obtained information from independent historians: None    - External (non-ED) record  review: Epic reviewed patient recent colonoscopy 3/13/2025 for polyp    - Chronic or social conditions impacting care: None    - Shared decision making: None      Orders placed during this visit:  No orders of the defined types were placed in this encounter.        Additional orders considered but not ordered:  None        Differential diagnosis includes but is not limited to:    Allergic reaction versus chronic urticaria      Independent interpretation of labs, radiology studies, and discussions with consultants:  ED Course as of 05/02/25 1146   Fri May 02, 2025   1134 11:35 EDT  Presents for allergic reaction.  Has known history of shellfish allergy and did eat shrimp.  Patient states his rash is better.  His heart rate is improved.  Patient is in eating [employer] right now.  Patient will be discharged home.  Will be started on steroids.  Lower doses patient does have diabetes.  Will also start on Pepcid and Benadryl.  He will follow-up with his primary doctor. [SL]      ED Course User Index  [SL] Jayjay Coreas MD                 DIAGNOSIS  Final diagnoses:   Acute allergic reaction, initial encounter         DISPOSITION  DISCHARGE    Patient discharged in stable condition.    Reviewed implications of results, diagnosis, meds, responsibility to follow up, warning signs and symptoms of possible worsening, potential complications and reasons to return to ER, including worsening symptoms    Patient/Family voiced understanding of above instructions.    Discussed plan for discharge, as there is no emergent indication for admission. Patient referred to primary care provider for BP management due to today's BP. Pt/family is agreeable and understands need for follow up and repeat testing.  Pt is aware that discharge does not mean that nothing is wrong but it indicates no emergency is present that requires admission and they must continue care with follow-up as given below or physician of their choice.      FOLLOW-UP  Barbara Costa, APRN  [address]  [address]  [place] [address]  [phone]    Schedule an appointment as soon as possible for a visit in 2 days           Medication List        New Prescriptions      diphenhydrAMINE 25 MG tablet  Commonly known as: BENADRYL  Take 1 tablet by mouth Every 6 (Six) Hours As Needed for Itching.     famotidine 20 MG tablet  Commonly known as: PEPCID  Take 1 tablet by mouth 2 (Two) Times a Day.     predniSONE 20 MG tablet  Commonly known as: DELTASONE  Take 1 tablet by mouth Daily.               Where to Get Your Medications        These medications were sent to [facility] [place]  [address], [address], [place] [address]      Hours: Monday to Friday 7 AM to 7 PM Phone: [phone]   diphenhydrAMINE 25 MG tablet  famotidine 20 MG tablet  predniSONE 20 MG tablet                  Latest Documented Vital Signs:  As of 11:46 EDT  BP- 140/79 HR- 117 Temp- 98.4 °F (36.9 °C) (Tympanic) O2 sat- 97%              --    Please note that portions of this were completed with a voice recognition program.       Note Disclaimer: At [facility], we believe that sharing information builds trust and better relationships. You are receiving this note because you are receiving care at [facility] or recently visited. It is possible you will see health information before a provider has talked with you about it. This kind of information can be easy to misunderstand. To help you fully understand what it means for your health, we urge you to discuss this note with your provider.            Jayjay Coreas MD  05/02/25 0514

## 2025-05-06 ENCOUNTER — HOSPITAL ENCOUNTER (OUTPATIENT)
Facility: HOSPITAL | Age: 75
Discharge: HOME | End: 2025-05-06
Admitting: NURSE PRACTITIONER
Payer: MEDICARE

## 2025-05-06 ENCOUNTER — OFFICE VISIT (OUTPATIENT)
Age: 75
End: 2025-05-06
Payer: MEDICARE

## 2025-05-06 VITALS
BODY MASS INDEX: 19.85 KG/M2 | DIASTOLIC BLOOD PRESSURE: 74 MMHG | HEIGHT: 69 IN | WEIGHT: 134 LBS | SYSTOLIC BLOOD PRESSURE: 138 MMHG

## 2025-05-06 DIAGNOSIS — I65.23 BILATERAL CAROTID ARTERY STENOSIS: ICD-10-CM

## 2025-05-06 DIAGNOSIS — I65.23 CAROTID STENOSIS, BILATERAL: ICD-10-CM

## 2025-05-06 DIAGNOSIS — I65.23 BILATERAL CAROTID ARTERY STENOSIS: Primary | Chronic | ICD-10-CM

## 2025-05-06 DIAGNOSIS — I10 PRIMARY HYPERTENSION: ICD-10-CM

## 2025-05-06 DIAGNOSIS — I65.23 BILATERAL CAROTID ARTERY OCCLUSION: ICD-10-CM

## 2025-05-06 LAB
BH CV XLRA MEAS LEFT CAROTID BULB EDV: 18.8 CM/SEC
BH CV XLRA MEAS LEFT CAROTID BULB PSV: 110.5 CM/SEC
BH CV XLRA MEAS LEFT DIST CCA EDV: -17.2 CM/SEC
BH CV XLRA MEAS LEFT DIST CCA PSV: -105.8 CM/SEC
BH CV XLRA MEAS LEFT DIST ICA EDV: -24.5 CM/SEC
BH CV XLRA MEAS LEFT DIST ICA PSV: -70.1 CM/SEC
BH CV XLRA MEAS LEFT ICA/CCA RATIO: 1.05
BH CV XLRA MEAS LEFT MID CCA EDV: 22 CM/SEC
BH CV XLRA MEAS LEFT MID CCA PSV: 138.3 CM/SEC
BH CV XLRA MEAS LEFT MID ICA EDV: -19.6 CM/SEC
BH CV XLRA MEAS LEFT MID ICA PSV: -79.9 CM/SEC
BH CV XLRA MEAS LEFT PROX CCA EDV: 20.7 CM/SEC
BH CV XLRA MEAS LEFT PROX CCA PSV: 129.3 CM/SEC
BH CV XLRA MEAS LEFT PROX ECA EDV: -13.3 CM/SEC
BH CV XLRA MEAS LEFT PROX ECA PSV: -112.1 CM/SEC
BH CV XLRA MEAS LEFT PROX ICA EDV: 18.8 CM/SEC
BH CV XLRA MEAS LEFT PROX ICA PSV: 110.5 CM/SEC
BH CV XLRA MEAS LEFT PROX SCLA PSV: 154.9 CM/SEC
BH CV XLRA MEAS LEFT VERTEBRAL A EDV: -14 CM/SEC
BH CV XLRA MEAS LEFT VERTEBRAL A PSV: -80.6 CM/SEC
BH CV XLRA MEAS RIGHT CAROTID BULB EDV: 14 CM/SEC
BH CV XLRA MEAS RIGHT CAROTID BULB PSV: 65.9 CM/SEC
BH CV XLRA MEAS RIGHT DIST CCA EDV: 20.8 CM/SEC
BH CV XLRA MEAS RIGHT DIST CCA PSV: 101.4 CM/SEC
BH CV XLRA MEAS RIGHT DIST ICA EDV: -23.1 CM/SEC
BH CV XLRA MEAS RIGHT DIST ICA PSV: -82.7 CM/SEC
BH CV XLRA MEAS RIGHT ICA/CCA RATIO: 0.89
BH CV XLRA MEAS RIGHT MID CCA EDV: 19.1 CM/SEC
BH CV XLRA MEAS RIGHT MID CCA PSV: 123.9 CM/SEC
BH CV XLRA MEAS RIGHT MID ICA EDV: -20.5 CM/SEC
BH CV XLRA MEAS RIGHT MID ICA PSV: -73.3 CM/SEC
BH CV XLRA MEAS RIGHT PROX CCA EDV: 17.3 CM/SEC
BH CV XLRA MEAS RIGHT PROX CCA PSV: 109.2 CM/SEC
BH CV XLRA MEAS RIGHT PROX ECA EDV: -15.1 CM/SEC
BH CV XLRA MEAS RIGHT PROX ECA PSV: -177 CM/SEC
BH CV XLRA MEAS RIGHT PROX ICA EDV: 18.2 CM/SEC
BH CV XLRA MEAS RIGHT PROX ICA PSV: 90.4 CM/SEC
BH CV XLRA MEAS RIGHT PROX SCLA PSV: 109.2 CM/SEC
BH CV XLRA MEAS RIGHT VERTEBRAL A EDV: -9.8 CM/SEC
BH CV XLRA MEAS RIGHT VERTEBRAL A PSV: -47.2 CM/SEC
LEFT ARM BP: NORMAL MMHG
RIGHT ARM BP: NORMAL MMHG

## 2025-05-06 PROCEDURE — 93880 EXTRACRANIAL BILAT STUDY: CPT | Performed by: SURGERY

## 2025-05-06 PROCEDURE — 1159F MED LIST DOCD IN RCRD: CPT | Performed by: NURSE PRACTITIONER

## 2025-05-06 PROCEDURE — 1160F RVW MEDS BY RX/DR IN RCRD: CPT | Performed by: NURSE PRACTITIONER

## 2025-05-06 PROCEDURE — 93880 EXTRACRANIAL BILAT STUDY: CPT

## 2025-05-06 PROCEDURE — 3075F SYST BP GE 130 - 139MM HG: CPT | Performed by: NURSE PRACTITIONER

## 2025-05-06 PROCEDURE — 3078F DIAST BP <80 MM HG: CPT | Performed by: NURSE PRACTITIONER

## 2025-05-06 PROCEDURE — 99213 OFFICE O/P EST LOW 20 MIN: CPT | Performed by: NURSE PRACTITIONER

## 2025-05-06 RX ORDER — LOSARTAN POTASSIUM 25 MG/1
12.5 TABLET ORAL DAILY
Qty: 30 TABLET | Refills: 3 | Status: SHIPPED | OUTPATIENT
Start: 2025-05-06

## 2025-05-06 NOTE — PROGRESS NOTES
Chief Complaint  No chief complaint on file.    Subjective        Harry Sierra presents to [facility] VASCULAR SURGERY  HPI   Harry Sierra is a 74 y.o. male that has been followed in our office for carotid artery stenosis. He returns today in follow up along with a carotid duplex.   He denies any symptoms consistent with CVA, TIA, or amaurosis fugax.  He reports he ate a shrimp cocktail over the weekend and developed hives due to a shellfish allergy.  He was placed on prednisone and this is improving.  He does report his balance is off today.    Review of Systems   Constitutional:  Negative for fever.   Eyes:  Negative for visual disturbance.   Cardiovascular:  Negative for leg swelling.   Gastrointestinal:  Negative for abdominal pain.   Musculoskeletal:  Negative for back pain.   Skin:  Negative for color change, pallor and wound.   Neurological:  Negative for dizziness, facial asymmetry, speech difficulty and weakness.        Harry Sierra  reports that he has never smoked. He has never been exposed to tobacco smoke. He has never used smokeless tobacco..        Objective   Vital Signs:  Vitals:    05/06/25 1156   BP: 138/74        Body mass index is 19.79 kg/m².       Physical Exam  Vitals reviewed.   Constitutional:       Appearance: Normal appearance.   HENT:      Head: Normocephalic.   Cardiovascular:      Rate and Rhythm: Normal rate and regular rhythm.      Pulses: Normal pulses.           Dorsalis pedis pulses are 3+ on the right side and 3+ on the left side.        Posterior tibial pulses are 3+ on the right side and 3+ on the left side.   Pulmonary:      Effort: Pulmonary effort is normal.   Skin:     General: Skin is warm.   Neurological:      General: No focal deficit present.      Mental Status: He is alert and oriented to person, place, and time.   Psychiatric:         Mood and Affect: Mood normal.          Result Review :        Previous carotid duplex: Duplex Carotid Ultrasound CAR  (04/12/2024 10:45)     Carotid duplex from today: Duplex Carotid Ultrasound CAR (05/06/2025 11:41)                  Assessment and Plan     Diagnoses and all orders for this visit:    1. Bilateral carotid artery stenosis (Primary)    2. Carotid stenosis, bilateral  -     Duplex Carotid Ultrasound CAR; Future               Patient has stable carotid artery stenosis. He is to continue his antiplatelet agent which is Plavix. He is on a statin for cholesterol control.  We discussed adequate blood pressure control. He will return in 2 year along with a repeat carotid artery duplex.     Follow Up     Return in about 2 years (around 5/6/2027) for carotid duplex.  Patient was given instructions and counseling regarding his condition or for health maintenance advice. Please see specific information pulled into the AVS if appropriate.     JO Souza

## 2025-06-02 ENCOUNTER — TELEPHONE (OUTPATIENT)
Dept: ENDOCRINOLOGY | Age: 75
End: 2025-06-02
Payer: MEDICARE

## 2025-06-04 ENCOUNTER — HOSPITAL ENCOUNTER (OUTPATIENT)
Facility: HOSPITAL | Age: 75
Discharge: HOME | End: 2025-06-04
Admitting: UROLOGY
Payer: MEDICARE

## 2025-06-04 DIAGNOSIS — C61 PROSTATE CANCER: ICD-10-CM

## 2025-06-04 DIAGNOSIS — R97.20 ELEVATED PROSTATE SPECIFIC ANTIGEN (PSA): ICD-10-CM

## 2025-06-04 PROCEDURE — 25510000002 GADOBENATE DIMEGLUMINE 529 MG/ML SOLUTION: Performed by: UROLOGY

## 2025-06-04 PROCEDURE — 72197 MRI PELVIS W/O & W/DYE: CPT

## 2025-06-04 PROCEDURE — A9577 INJ MULTIHANCE: HCPCS | Performed by: UROLOGY

## 2025-06-04 RX ADMIN — GADOBENATE DIMEGLUMINE 12 ML: 529 INJECTION, SOLUTION INTRAVENOUS at 13:10

## 2025-06-23 ENCOUNTER — TELEPHONE (OUTPATIENT)
Dept: ONCOLOGY | Facility: CLINIC | Age: 75
End: 2025-06-23
Payer: MEDICARE

## 2025-07-09 DIAGNOSIS — K21.00 GASTROESOPHAGEAL REFLUX DISEASE WITH ESOPHAGITIS WITHOUT HEMORRHAGE: Chronic | ICD-10-CM

## 2025-07-11 RX ORDER — PANTOPRAZOLE SODIUM 40 MG/1
40 TABLET, DELAYED RELEASE ORAL DAILY
Qty: 90 TABLET | Refills: 1 | Status: SHIPPED | OUTPATIENT
Start: 2025-07-11

## 2025-07-17 DIAGNOSIS — E11.65 TYPE 2 DIABETES MELLITUS WITH HYPERGLYCEMIA, WITH LONG-TERM CURRENT USE OF INSULIN: Chronic | ICD-10-CM

## 2025-07-17 DIAGNOSIS — Z79.4 TYPE 2 DIABETES MELLITUS WITH HYPERGLYCEMIA, WITH LONG-TERM CURRENT USE OF INSULIN: Chronic | ICD-10-CM

## 2025-07-17 RX ORDER — PEN NEEDLE, DIABETIC 32GX 5/32"
1 NEEDLE, DISPOSABLE MISCELLANEOUS DAILY
Qty: 100 EACH | Refills: 4 | Status: SHIPPED | OUTPATIENT
Start: 2025-07-17

## 2025-07-22 DIAGNOSIS — Z79.4 TYPE 2 DIABETES MELLITUS WITH HYPERGLYCEMIA, WITH LONG-TERM CURRENT USE OF INSULIN: ICD-10-CM

## 2025-07-22 DIAGNOSIS — E11.65 TYPE 2 DIABETES MELLITUS WITH HYPERGLYCEMIA, WITH LONG-TERM CURRENT USE OF INSULIN: ICD-10-CM

## 2025-07-23 RX ORDER — INSULIN GLARGINE 100 [IU]/ML
20 INJECTION, SOLUTION SUBCUTANEOUS DAILY
Qty: 15 ML | Refills: 0 | Status: SHIPPED | OUTPATIENT
Start: 2025-07-23

## 2025-07-23 NOTE — TELEPHONE ENCOUNTER
Rx Refill Note  Requested Prescriptions     Pending Prescriptions Disp Refills    Insulin Glargine (Lantus SoloStar) 100 UNIT/ML injection pen 18 mL 1     Sig: Inject 20 Units under the skin into the appropriate area as directed Daily. Indications: Type 2 Diabetes      Last office visit with prescribing clinician: 1/31/2025   Last telemedicine visit with prescribing clinician: Visit date not found   Next office visit with prescribing clinician: 7/29/2025                         Would you like a call back once the refill request has been completed: [] Yes [] No    If the office needs to give you a call back, can they leave a voicemail: [] Yes [] No  Sofiya Fajardo MA  7/23/2025  07:48 EDT

## 2025-08-21 ENCOUNTER — EXTERNAL PBMM DATA (OUTPATIENT)
Dept: PHARMACY | Facility: OTHER | Age: 75
End: 2025-08-21
Payer: MEDICARE

## (undated) DEVICE — PENCL E/S ULTRAVAC TELESCP NOSE HOLSTR 10FT

## (undated) DEVICE — GLV SURG SIGNATURE ESSENTIAL PF LTX SZ8

## (undated) DEVICE — STERILE FOAM PAD: Brand: CENTURION

## (undated) DEVICE — KT ORCA ORCAPOD DISP STRL

## (undated) DEVICE — SPNG GZ WOVN 4X4IN 12PLY 10/BX STRL

## (undated) DEVICE — SNAR POLYP CAPTIVATOR RND STFF 2.4 240CM 10MM 1P/U

## (undated) DEVICE — GLV SURG BIOGEL LTX PF 8

## (undated) DEVICE — INTRO CATH SUPRAPUB LAWRENCE 16FORNG

## (undated) DEVICE — TRAP FLD MINIVAC MEGADYNE 100ML

## (undated) DEVICE — ADHS SKIN DERMABOND TOP ADVANCED

## (undated) DEVICE — DRAPE,REIN 53X77,STERILE: Brand: MEDLINE

## (undated) DEVICE — SUT GUT CHRM 3/0 SH 27IN G122H

## (undated) DEVICE — SYR LL TP 10ML STRL

## (undated) DEVICE — LN SMPL CO2 SHTRM SD STREAM W/M LUER

## (undated) DEVICE — APPL CHLORAPREP HI/LITE 26ML ORNG

## (undated) DEVICE — DRAINBAG,ANTI-REFLUX TOWER,L/F,2000ML,LL: Brand: MEDLINE

## (undated) DEVICE — LOU MINOR PROCEDURE: Brand: MEDLINE INDUSTRIES, INC.

## (undated) DEVICE — NEEDLE, QUINCKE 22GX3.5": Brand: MEDLINE INDUSTRIES, INC.

## (undated) DEVICE — LOU CYSTO: Brand: MEDLINE INDUSTRIES, INC.

## (undated) DEVICE — STPLR SKIN VISISTAT WD 35CT

## (undated) DEVICE — THE SINGLE USE ETRAP – POLYP TRAP IS USED FOR SUCTION RETRIEVAL OF ENDOSCOPICALLY REMOVED POLYPS.: Brand: ETRAP

## (undated) DEVICE — SINGLE-USE BIOPSY FORCEPS: Brand: RADIAL JAW 4

## (undated) DEVICE — ADAPT CLN BIOGUARD AIR/H2O DISP

## (undated) DEVICE — TIDISHIELD UROLOGY DRAIN BAGS FROSTY VINYL STERILE FITS SIEMENS UROSKOP ACCESS 20 PER CASE: Brand: TIDISHIELD

## (undated) DEVICE — CATHETER,FOLEY,100%SILICONE,16FR,10ML,LF: Brand: MEDLINE

## (undated) DEVICE — SENSR O2 OXIMAX FNGR A/ 18IN NONSTR

## (undated) DEVICE — TUBING, SUCTION, 1/4" X 10', STRAIGHT: Brand: MEDLINE

## (undated) DEVICE — INTENDED FOR TISSUE SEPARATION, AND OTHER PROCEDURES THAT REQUIRE A SHARP SURGICAL BLADE TO PUNCTURE OR CUT.: Brand: BARD-PARKER ® CARBON RIB-BACK BLADES

## (undated) DEVICE — CANN O2 ETCO2 FITS ALL CONN CO2 SMPL A/ 7IN DISP LF

## (undated) DEVICE — SUT SILK 2/0 FS BLK 18IN 685G